# Patient Record
Sex: MALE | Race: BLACK OR AFRICAN AMERICAN | Employment: OTHER | URBAN - METROPOLITAN AREA
[De-identification: names, ages, dates, MRNs, and addresses within clinical notes are randomized per-mention and may not be internally consistent; named-entity substitution may affect disease eponyms.]

---

## 2017-09-05 ENCOUNTER — HOSPITAL ENCOUNTER (INPATIENT)
Age: 82
LOS: 1 days | Discharge: HOME | DRG: 378 | End: 2017-09-06
Attending: EMERGENCY MEDICINE | Admitting: INTERNAL MEDICINE
Payer: MEDICARE

## 2017-09-05 ENCOUNTER — APPOINTMENT (OUTPATIENT)
Dept: GENERAL RADIOLOGY | Age: 82
DRG: 378 | End: 2017-09-05
Attending: EMERGENCY MEDICINE
Payer: MEDICARE

## 2017-09-05 ENCOUNTER — APPOINTMENT (OUTPATIENT)
Dept: CT IMAGING | Age: 82
DRG: 378 | End: 2017-09-05
Attending: EMERGENCY MEDICINE
Payer: MEDICARE

## 2017-09-05 ENCOUNTER — APPOINTMENT (OUTPATIENT)
Dept: ULTRASOUND IMAGING | Age: 82
DRG: 378 | End: 2017-09-05
Attending: INTERNAL MEDICINE
Payer: MEDICARE

## 2017-09-05 DIAGNOSIS — N18.9 ACUTE ON CHRONIC KIDNEY FAILURE (HCC): ICD-10-CM

## 2017-09-05 DIAGNOSIS — N17.9 ACUTE ON CHRONIC KIDNEY FAILURE (HCC): ICD-10-CM

## 2017-09-05 DIAGNOSIS — K92.2 GASTROINTESTINAL HEMORRHAGE, UNSPECIFIED GASTROINTESTINAL HEMORRHAGE TYPE: Primary | ICD-10-CM

## 2017-09-05 PROBLEM — N40.0 BPH (BENIGN PROSTATIC HYPERTROPHY): Status: ACTIVE | Noted: 2017-09-05

## 2017-09-05 PROBLEM — N18.4 CKD (CHRONIC KIDNEY DISEASE) STAGE 4, GFR 15-29 ML/MIN (HCC): Status: ACTIVE | Noted: 2017-09-05

## 2017-09-05 PROBLEM — N13.30 HYDRONEPHROSIS: Status: ACTIVE | Noted: 2017-09-05

## 2017-09-05 PROBLEM — K92.1 MELENA: Status: ACTIVE | Noted: 2017-09-05

## 2017-09-05 LAB
ALBUMIN SERPL-MCNC: 3.6 G/DL (ref 3.5–5)
ALBUMIN/GLOB SERPL: 0.9 {RATIO} (ref 1.1–2.2)
ALP SERPL-CCNC: 94 U/L (ref 45–117)
ALT SERPL-CCNC: 15 U/L (ref 12–78)
ANION GAP SERPL CALC-SCNC: 8 MMOL/L (ref 5–15)
APPEARANCE UR: CLEAR
APTT PPP: 30 SEC (ref 22.1–32.5)
AST SERPL-CCNC: 17 U/L (ref 15–37)
BACTERIA URNS QL MICRO: NEGATIVE /HPF
BASOPHILS # BLD: 0 K/UL (ref 0–0.1)
BASOPHILS NFR BLD: 1 % (ref 0–1)
BILIRUB SERPL-MCNC: 0.2 MG/DL (ref 0.2–1)
BILIRUB UR QL: NEGATIVE
BUN SERPL-MCNC: 45 MG/DL (ref 6–20)
BUN/CREAT SERPL: 13 (ref 12–20)
CALCIUM SERPL-MCNC: 8.6 MG/DL (ref 8.5–10.1)
CHLORIDE SERPL-SCNC: 108 MMOL/L (ref 97–108)
CO2 SERPL-SCNC: 27 MMOL/L (ref 21–32)
COLOR UR: ABNORMAL
CREAT SERPL-MCNC: 3.45 MG/DL (ref 0.7–1.3)
CREAT UR-MCNC: 29.44 MG/DL
EOSINOPHIL # BLD: 0.2 K/UL (ref 0–0.4)
EOSINOPHIL NFR BLD: 3 % (ref 0–7)
EPITH CASTS URNS QL MICRO: ABNORMAL /LPF
ERYTHROCYTE [DISTWIDTH] IN BLOOD BY AUTOMATED COUNT: 12.8 % (ref 11.5–14.5)
GLOBULIN SER CALC-MCNC: 4.1 G/DL (ref 2–4)
GLUCOSE SERPL-MCNC: 109 MG/DL (ref 65–100)
GLUCOSE UR STRIP.AUTO-MCNC: NEGATIVE MG/DL
HCT VFR BLD AUTO: 30.5 % (ref 36.6–50.3)
HCT VFR BLD AUTO: 32.4 % (ref 36.6–50.3)
HEMOCCULT STL QL: POSITIVE
HGB BLD-MCNC: 10.4 G/DL (ref 12.1–17)
HGB BLD-MCNC: 9.7 G/DL (ref 12.1–17)
HGB UR QL STRIP: ABNORMAL
HYALINE CASTS URNS QL MICRO: ABNORMAL /LPF (ref 0–5)
INR PPP: 1 (ref 0.9–1.1)
KETONES UR QL STRIP.AUTO: NEGATIVE MG/DL
LEUKOCYTE ESTERASE UR QL STRIP.AUTO: NEGATIVE
LIPASE SERPL-CCNC: 179 U/L (ref 73–393)
LYMPHOCYTES # BLD: 1.2 K/UL (ref 0.8–3.5)
LYMPHOCYTES NFR BLD: 20 % (ref 12–49)
MCH RBC QN AUTO: 30.9 PG (ref 26–34)
MCHC RBC AUTO-ENTMCNC: 32.1 G/DL (ref 30–36.5)
MCV RBC AUTO: 96.1 FL (ref 80–99)
MONOCYTES # BLD: 0.7 K/UL (ref 0–1)
MONOCYTES NFR BLD: 11 % (ref 5–13)
NEUTS SEG # BLD: 3.9 K/UL (ref 1.8–8)
NEUTS SEG NFR BLD: 65 % (ref 32–75)
NITRITE UR QL STRIP.AUTO: NEGATIVE
PH UR STRIP: 6.5 [PH] (ref 5–8)
PLATELET # BLD AUTO: 179 K/UL (ref 150–400)
POTASSIUM SERPL-SCNC: 4.4 MMOL/L (ref 3.5–5.1)
PROT SERPL-MCNC: 7.7 G/DL (ref 6.4–8.2)
PROT UR STRIP-MCNC: 30 MG/DL
PROT UR-MCNC: 48 MG/DL (ref 0–11.9)
PROTHROMBIN TIME: 10.4 SEC (ref 9–11.1)
RBC # BLD AUTO: 3.37 M/UL (ref 4.1–5.7)
RBC #/AREA URNS HPF: ABNORMAL /HPF (ref 0–5)
SODIUM SERPL-SCNC: 143 MMOL/L (ref 136–145)
SODIUM UR-SCNC: 120 MMOL/L
SP GR UR REFRACTOMETRY: 1.01 (ref 1–1.03)
THERAPEUTIC RANGE,PTTT: NORMAL SECS (ref 58–77)
UROBILINOGEN UR QL STRIP.AUTO: 0.2 EU/DL (ref 0.2–1)
WBC # BLD AUTO: 5.9 K/UL (ref 4.1–11.1)
WBC URNS QL MICRO: ABNORMAL /HPF (ref 0–4)

## 2017-09-05 PROCEDURE — 96361 HYDRATE IV INFUSION ADD-ON: CPT

## 2017-09-05 PROCEDURE — 97165 OT EVAL LOW COMPLEX 30 MIN: CPT

## 2017-09-05 PROCEDURE — 80053 COMPREHEN METABOLIC PANEL: CPT | Performed by: EMERGENCY MEDICINE

## 2017-09-05 PROCEDURE — 85610 PROTHROMBIN TIME: CPT | Performed by: EMERGENCY MEDICINE

## 2017-09-05 PROCEDURE — 82570 ASSAY OF URINE CREATININE: CPT | Performed by: INTERNAL MEDICINE

## 2017-09-05 PROCEDURE — 82272 OCCULT BLD FECES 1-3 TESTS: CPT | Performed by: EMERGENCY MEDICINE

## 2017-09-05 PROCEDURE — C9113 INJ PANTOPRAZOLE SODIUM, VIA: HCPCS | Performed by: INTERNAL MEDICINE

## 2017-09-05 PROCEDURE — 74011250636 HC RX REV CODE- 250/636: Performed by: INTERNAL MEDICINE

## 2017-09-05 PROCEDURE — 74011250637 HC RX REV CODE- 250/637: Performed by: INTERNAL MEDICINE

## 2017-09-05 PROCEDURE — 36415 COLL VENOUS BLD VENIPUNCTURE: CPT | Performed by: INTERNAL MEDICINE

## 2017-09-05 PROCEDURE — 81001 URINALYSIS AUTO W/SCOPE: CPT | Performed by: INTERNAL MEDICINE

## 2017-09-05 PROCEDURE — 85730 THROMBOPLASTIN TIME PARTIAL: CPT | Performed by: EMERGENCY MEDICINE

## 2017-09-05 PROCEDURE — 96374 THER/PROPH/DIAG INJ IV PUSH: CPT

## 2017-09-05 PROCEDURE — 74011000250 HC RX REV CODE- 250: Performed by: SPECIALIST

## 2017-09-05 PROCEDURE — 97535 SELF CARE MNGMENT TRAINING: CPT

## 2017-09-05 PROCEDURE — 65270000029 HC RM PRIVATE

## 2017-09-05 PROCEDURE — 83690 ASSAY OF LIPASE: CPT | Performed by: EMERGENCY MEDICINE

## 2017-09-05 PROCEDURE — 85018 HEMOGLOBIN: CPT | Performed by: INTERNAL MEDICINE

## 2017-09-05 PROCEDURE — 74176 CT ABD & PELVIS W/O CONTRAST: CPT

## 2017-09-05 PROCEDURE — C9113 INJ PANTOPRAZOLE SODIUM, VIA: HCPCS | Performed by: EMERGENCY MEDICINE

## 2017-09-05 PROCEDURE — 97161 PT EVAL LOW COMPLEX 20 MIN: CPT

## 2017-09-05 PROCEDURE — 94761 N-INVAS EAR/PLS OXIMETRY MLT: CPT

## 2017-09-05 PROCEDURE — 99284 EMERGENCY DEPT VISIT MOD MDM: CPT

## 2017-09-05 PROCEDURE — 84300 ASSAY OF URINE SODIUM: CPT | Performed by: INTERNAL MEDICINE

## 2017-09-05 PROCEDURE — 76770 US EXAM ABDO BACK WALL COMP: CPT

## 2017-09-05 PROCEDURE — 74011000250 HC RX REV CODE- 250: Performed by: INTERNAL MEDICINE

## 2017-09-05 PROCEDURE — 74011250636 HC RX REV CODE- 250/636: Performed by: EMERGENCY MEDICINE

## 2017-09-05 PROCEDURE — 84156 ASSAY OF PROTEIN URINE: CPT | Performed by: INTERNAL MEDICINE

## 2017-09-05 PROCEDURE — 85025 COMPLETE CBC W/AUTO DIFF WBC: CPT | Performed by: EMERGENCY MEDICINE

## 2017-09-05 RX ORDER — SODIUM CHLORIDE 0.9 % (FLUSH) 0.9 %
5-10 SYRINGE (ML) INJECTION AS NEEDED
Status: DISCONTINUED | OUTPATIENT
Start: 2017-09-05 | End: 2017-09-06 | Stop reason: HOSPADM

## 2017-09-05 RX ORDER — AMLODIPINE BESYLATE 5 MG/1
10 TABLET ORAL
Status: COMPLETED | OUTPATIENT
Start: 2017-09-05 | End: 2017-09-05

## 2017-09-05 RX ORDER — CHLORTHALIDONE 25 MG/1
25 TABLET ORAL DAILY
COMMUNITY
End: 2017-09-06

## 2017-09-05 RX ORDER — SODIUM CHLORIDE 0.9 % (FLUSH) 0.9 %
5-10 SYRINGE (ML) INJECTION EVERY 8 HOURS
Status: DISCONTINUED | OUTPATIENT
Start: 2017-09-05 | End: 2017-09-06 | Stop reason: HOSPADM

## 2017-09-05 RX ORDER — TAMSULOSIN HYDROCHLORIDE 0.4 MG/1
0.4 CAPSULE ORAL DAILY
Status: DISCONTINUED | OUTPATIENT
Start: 2017-09-05 | End: 2017-09-06 | Stop reason: HOSPADM

## 2017-09-05 RX ORDER — ACETAMINOPHEN 325 MG/1
325 TABLET ORAL
COMMUNITY
End: 2018-11-30

## 2017-09-05 RX ORDER — PANTOPRAZOLE SODIUM 40 MG/10ML
40 INJECTION, POWDER, LYOPHILIZED, FOR SOLUTION INTRAVENOUS
Status: COMPLETED | OUTPATIENT
Start: 2017-09-05 | End: 2017-09-05

## 2017-09-05 RX ORDER — CARVEDILOL 12.5 MG/1
25 TABLET ORAL 2 TIMES DAILY WITH MEALS
Status: DISCONTINUED | OUTPATIENT
Start: 2017-09-05 | End: 2017-09-05

## 2017-09-05 RX ORDER — ACETAMINOPHEN 325 MG/1
650 TABLET ORAL
Status: DISCONTINUED | OUTPATIENT
Start: 2017-09-05 | End: 2017-09-06 | Stop reason: HOSPADM

## 2017-09-05 RX ORDER — CARVEDILOL 6.25 MG/1
6.25 TABLET ORAL 2 TIMES DAILY WITH MEALS
Status: DISCONTINUED | OUTPATIENT
Start: 2017-09-05 | End: 2017-09-05

## 2017-09-05 RX ORDER — SODIUM CHLORIDE 9 MG/ML
75 INJECTION, SOLUTION INTRAVENOUS CONTINUOUS
Status: DISPENSED | OUTPATIENT
Start: 2017-09-05 | End: 2017-09-05

## 2017-09-05 RX ORDER — ACETAMINOPHEN 500 MG
325 TABLET ORAL
Status: ON HOLD | COMMUNITY
End: 2017-09-05

## 2017-09-05 RX ORDER — NALOXONE HYDROCHLORIDE 0.4 MG/ML
0.4 INJECTION, SOLUTION INTRAMUSCULAR; INTRAVENOUS; SUBCUTANEOUS AS NEEDED
Status: DISCONTINUED | OUTPATIENT
Start: 2017-09-05 | End: 2017-09-06 | Stop reason: HOSPADM

## 2017-09-05 RX ORDER — CARVEDILOL 12.5 MG/1
12.5 TABLET ORAL 2 TIMES DAILY WITH MEALS
Status: DISCONTINUED | OUTPATIENT
Start: 2017-09-05 | End: 2017-09-06 | Stop reason: HOSPADM

## 2017-09-05 RX ORDER — HYDRALAZINE HYDROCHLORIDE 20 MG/ML
20 INJECTION INTRAMUSCULAR; INTRAVENOUS
Status: DISCONTINUED | OUTPATIENT
Start: 2017-09-05 | End: 2017-09-06 | Stop reason: HOSPADM

## 2017-09-05 RX ORDER — AMLODIPINE BESYLATE 5 MG/1
10 TABLET ORAL DAILY
Status: DISCONTINUED | OUTPATIENT
Start: 2017-09-06 | End: 2017-09-06 | Stop reason: HOSPADM

## 2017-09-05 RX ORDER — ATORVASTATIN CALCIUM 20 MG/1
20 TABLET, FILM COATED ORAL DAILY
COMMUNITY
End: 2018-02-07

## 2017-09-05 RX ORDER — MORPHINE SULFATE 4 MG/ML
1 INJECTION, SOLUTION INTRAMUSCULAR; INTRAVENOUS
Status: DISCONTINUED | OUTPATIENT
Start: 2017-09-05 | End: 2017-09-06 | Stop reason: HOSPADM

## 2017-09-05 RX ORDER — CARVEDILOL 6.25 MG/1
6.25 TABLET ORAL 2 TIMES DAILY WITH MEALS
COMMUNITY
End: 2017-09-06

## 2017-09-05 RX ORDER — ATORVASTATIN CALCIUM 20 MG/1
20 TABLET, FILM COATED ORAL DAILY
Status: DISCONTINUED | OUTPATIENT
Start: 2017-09-06 | End: 2017-09-06 | Stop reason: HOSPADM

## 2017-09-05 RX ADMIN — POLYETHYLENE GLYCOL-3350 AND ELECTROLYTES 4000 ML: 236; 6.74; 5.86; 2.97; 22.74 POWDER, FOR SOLUTION ORAL at 20:57

## 2017-09-05 RX ADMIN — Medication 10 ML: at 20:58

## 2017-09-05 RX ADMIN — SODIUM CHLORIDE 500 ML: 900 INJECTION, SOLUTION INTRAVENOUS at 00:29

## 2017-09-05 RX ADMIN — SODIUM CHLORIDE 40 MG: 9 INJECTION INTRAMUSCULAR; INTRAVENOUS; SUBCUTANEOUS at 20:57

## 2017-09-05 RX ADMIN — SODIUM CHLORIDE 75 ML/HR: 900 INJECTION, SOLUTION INTRAVENOUS at 02:40

## 2017-09-05 RX ADMIN — AMLODIPINE BESYLATE 10 MG: 5 TABLET ORAL at 03:40

## 2017-09-05 RX ADMIN — CARVEDILOL 12.5 MG: 12.5 TABLET, FILM COATED ORAL at 17:03

## 2017-09-05 RX ADMIN — POLYETHYLENE GLYCOL-3350 AND ELECTROLYTES 2000 ML: 236; 6.74; 5.86; 2.97; 22.74 POWDER, FOR SOLUTION ORAL at 15:33

## 2017-09-05 RX ADMIN — SODIUM CHLORIDE 40 MG: 9 INJECTION INTRAMUSCULAR; INTRAVENOUS; SUBCUTANEOUS at 08:50

## 2017-09-05 RX ADMIN — SODIUM CHLORIDE 500 ML: 900 INJECTION, SOLUTION INTRAVENOUS at 00:48

## 2017-09-05 RX ADMIN — Medication 10 ML: at 13:56

## 2017-09-05 RX ADMIN — Medication 10 ML: at 20:59

## 2017-09-05 RX ADMIN — HYDRALAZINE HYDROCHLORIDE 20 MG: 20 INJECTION INTRAMUSCULAR; INTRAVENOUS at 17:21

## 2017-09-05 RX ADMIN — CARVEDILOL 25 MG: 12.5 TABLET, FILM COATED ORAL at 08:50

## 2017-09-05 RX ADMIN — TAMSULOSIN HYDROCHLORIDE 0.4 MG: 0.4 CAPSULE ORAL at 08:50

## 2017-09-05 RX ADMIN — Medication 10 ML: at 00:20

## 2017-09-05 RX ADMIN — PANTOPRAZOLE SODIUM 40 MG: 40 INJECTION, POWDER, FOR SOLUTION INTRAVENOUS at 00:29

## 2017-09-05 NOTE — PROGRESS NOTES
[facility]: MED RECONCILIATION    Comments/Recommendations:    Patient is alert, awake, and oriented   NKDA   At first, patient stated he does not take atorvastatin for his cholesterol anymore and he takes something else but couldn't recall the name. Spoke with pharmacist at [facility] and he stated patient picked up atorvastatin 20 mg #30 on 8/17 so went back and spoke to patient and he stated he does take atorvastatin 20 mg and the reason he thought he took something different was probably because he used to be on atorvastatin 40 mg and now the 20 mg tablet looks smaller.  At first, patient couldn't recall the strength of his carvedilol. Spoke with pharmacist at [facility] and he stated last prescribed dose was carvedilol 6.25 mg 1 tablet twice a day on 6/12 but was never picked up so went back and spoke to patient and he stated he does take it twice a day and his last dose was yesterday morning.  Recent refill history could not be documented for amlodipine and carvedilol. The patient does report taking these medications. Adherence ?  Patient was aware not to use NSAIDs OTC for pain and to always use Tylenol     Medications added:     · Tylenol prn  · Artificial tears prn    Medications removed:    · Hydrochlorothiazide 50 mg    Medications adjusted:    · Atorvastatin 20 mg  · Carvedilol 6.25 mg    Information obtained from: Patient, Rx Query     Allergies: Review of patient's allergies indicates no known allergies. Prior to Admission Medications:   Prior to Admission Medications   Prescriptions Last Dose Informant Patient Reported? Taking?   acetaminophen (TYLENOL) 325 mg tablet 8/29/2017 at AM Self Yes Yes   Sig: Take 325 mg by mouth every four (4) hours as needed for Pain. amlodipine (NORVASC) 10 mg tablet 9/4/2017 at AM Self Yes Yes   Sig: Take 10 mg by mouth daily. aspirin delayed-release 81 mg tablet 9/4/2017 at am Self Yes Yes   Sig: Take 81 mg by mouth daily.    atorvastatin (LIPITOR) 20 mg tablet 9/4/2017 at AM Self Yes Yes   Sig: Take 20 mg by mouth daily. benazepril (LOTENSIN) 40 mg tablet 9/4/2017 at am Self Yes Yes   Sig: Take 40 mg by mouth daily. carbamazepine (TEGRETOL) 200 mg tablet 9/4/2017 at am Self Yes Yes   Sig: Take 200 mg by mouth three (3) times daily. carvedilol (COREG) 6.25 mg tablet 9/4/2017 at AM Self Yes Yes   Sig: Take 6.25 mg by mouth two (2) times daily (with meals). chlorthalidone (HYGROTEN) 25 mg tablet 9/4/2017 at am Self Yes Yes   Sig: Take 25 mg by mouth daily. dextran 70-hypromellose (ARTIFICIAL TEARS,[id number],) ophthalmic solution 8/29/2017 at am Self Yes Yes   Sig: Administer 1 Drop to both eyes as needed (dry eye).       Facility-Administered Medications: None         Thank you,   Amelia Garcia, PharmD Candidate, Class of 2018   Reviewed and approved  Ten Black, Shai, BCPS

## 2017-09-05 NOTE — ROUTINE PROCESS
TRANSFER - IN REPORT:    Verbal report received from [facility] RN(name) on Tracey Rosenbaum  being received from ED(unit) for routine progression of care. Report consisted of patients Situation, Background, Assessment and   Recommendations(SBAR). Information from the following report(s) SBAR, Kardex, ED Summary, MAR and Accordion was reviewed with the receiving nurse. Opportunity for questions and clarification was provided. Assessment completed upon patients arrival to unit and care assumed.

## 2017-09-05 NOTE — CONSULTS
[address] [facility]  YOB: 1935     Assessment & Plan:   1. TINA or CKD progression  · He has active UA in 2015  · ? Nphrologist out pt  · UA Now  · Cr worse from 2014 to 2017: migh be HTN nephrosclerosis  · If cr stable, ok to resume ACE  · Get US,PVR  2. HTN  · MANY YEARS  · Goal< 140/90  · Home readings will be help ful  3. GI Bleed  · diverticular                   Subjective:   CHIEF COMPLAIN:arf  HPI:  Mr. Felicitas Looney is a 80 y.o. male who is being admitted for GI bleeding. We are seeing him for Cr 3.45. I do not see any cr In between: cr was 2.1 mg% in 2014. He can not tell me if he has seen any Nephrologist.  He has Urologist at [facility]. He has HTN for years. Denies DM,Hepatits  No stones. No NSAIDs. No wt loss. Mr. Felicitas Looney presented to our Emergency Department t  complaining of melena stools. This started last evening. No associated abdominal discomfort, hematemesis, fever or chills. No use of NSAIDs. He also denies any dizziness or passing out. No chest pain or SOB. He has had a colonoscopy for GI bleeding which was attributed to a diverticular bleed. He will be admitted for further management. Review of Systems  A comprehensive review of systems was negative except for that written in the HPI. Past Medical History:   Diagnosis Date    Bronchitis, chronic (HCC)     CAD (coronary artery disease)     CHF (congestive heart failure) (HCC)     Chronic kidney disease     Hypertension       Past Surgical History:   Procedure Laterality Date    HX CORONARY ARTERY BYPASS GRAFT      HX PACEMAKER         Social History     Social History    Marital status:      Spouse name: N/A    Number of children: N/A    Years of education: N/A     Occupational History    Not on file.      Social History Main Topics    Smoking status: Former Smoker    Smokeless tobacco: Never Used    Alcohol use No    Drug use: No    Sexual activity: Not on file     Other Topics Concern    Not on file     Social History Narrative      Family History   Problem Relation Age of Onset    Heart Disease Mother     Hypertension Mother       Prior to Admission medications    Medication Sig Start Date End Date Taking? Authorizing Provider   chlorthalidone (HYGROTEN) 25 mg tablet Take 25 mg by mouth daily. Yes Pablo Engle MD   dextran 70-hypromellose (ARTIFICIAL TEARS,[id number],) ophthalmic solution Administer 1 Drop to both eyes as needed (dry eye). Yes Historical Provider   acetaminophen (TYLENOL) 325 mg tablet Take 325 mg by mouth every four (4) hours as needed for Pain. Yes Historical Provider   aspirin delayed-release 81 mg tablet Take 81 mg by mouth daily. Yes Pablo Engle MD   benazepril (LOTENSIN) 40 mg tablet Take 40 mg by mouth daily. Yes Pablo Engle MD   carvedilol (COREG) 12.5 mg tablet Take 12.5 mg by mouth two (2) times daily (with meals). Yes Pablo Engle MD   amlodipine (NORVASC) 10 mg tablet Take 10 mg by mouth daily. Yes Pablo Engle MD   carbamazepine (TEGRETOL) 200 mg tablet Take 200 mg by mouth three (3) times daily. Yes Pablo Engle MD     No Known Allergies    Objective:     Vitals:  Blood pressure 120/87, pulse 82, temperature 98.1 °F (36.7 °C), resp. rate 19, height 5' 10\" (1.778 m), weight 76.9 kg (169 lb 8.5 oz), SpO2 99 %.   Temp (24hrs), Av.9 °F (36.6 °C), Min:97.8 °F (36.6 °C), Max:98.1 °F (36.7 °C)      Intake and Output:      1901 -  0700  In: -   Out: 200 [Urine:200]    Physical Exam:                Patient is intubated:  no    Physical Examination:   GENERAL ASSESSMENT: well developed and well nourished  SKIN: normal color, no lesions  HEAD: normocephalic  EYES: normal eyes  NECK: normal  CHEST: normal air exchange, no rales, no rhonchi, no wheezes  HEART: regular rate and rhythm, normal S1/S2  ABDOMEN: soft, non-distended, no masses, no hepatosplenomegaly  :Ruffin: no  EXTREMITY: normal and symmetric movement, normal range of motion, no joint swelling  NEURO: gross motor exam normal by observation      ECG/rhythm[de-identified] Rev:yes  Xray/CT/US/MRI REV:yes  Data Review   Recent Results (from the past 72 hour(s))   CBC WITH AUTOMATED DIFF    Collection Time: 09/05/17 12:25 AM   Result Value Ref Range    WBC 5.9 4.1 - 11.1 K/uL    RBC 3.37 (L) 4.10 - 5.70 M/uL    HGB 10.4 (L) 12.1 - 17.0 g/dL    HCT 32.4 (L) 36.6 - 50.3 %    MCV 96.1 80.0 - 99.0 FL    MCH 30.9 26.0 - 34.0 PG    MCHC 32.1 30.0 - 36.5 g/dL    RDW 12.8 11.5 - 14.5 %    PLATELET 883 873 - 617 K/uL    NEUTROPHILS 65 32 - 75 %    LYMPHOCYTES 20 12 - 49 %    MONOCYTES 11 5 - 13 %    EOSINOPHILS 3 0 - 7 %    BASOPHILS 1 0 - 1 %    ABS. NEUTROPHILS 3.9 1.8 - 8.0 K/UL    ABS. LYMPHOCYTES 1.2 0.8 - 3.5 K/UL    ABS. MONOCYTES 0.7 0.0 - 1.0 K/UL    ABS. EOSINOPHILS 0.2 0.0 - 0.4 K/UL    ABS. BASOPHILS 0.0 0.0 - 0.1 K/UL   METABOLIC PANEL, COMPREHENSIVE    Collection Time: 09/05/17 12:25 AM   Result Value Ref Range    Sodium 143 136 - 145 mmol/L    Potassium 4.4 3.5 - 5.1 mmol/L    Chloride 108 97 - 108 mmol/L    CO2 27 21 - 32 mmol/L    Anion gap 8 5 - 15 mmol/L    Glucose 109 (H) 65 - 100 mg/dL    BUN 45 (H) 6 - 20 MG/DL    Creatinine 3.45 (H) 0.70 - 1.30 MG/DL    BUN/Creatinine ratio 13 12 - 20      GFR est AA 21 (L) >60 ml/min/1.73m2    GFR est non-AA 17 (L) >60 ml/min/1.73m2    Calcium 8.6 8.5 - 10.1 MG/DL    Bilirubin, total 0.2 0.2 - 1.0 MG/DL    ALT (SGPT) 15 12 - 78 U/L    AST (SGOT) 17 15 - 37 U/L    Alk.  phosphatase 94 45 - 117 U/L    Protein, total 7.7 6.4 - 8.2 g/dL    Albumin 3.6 3.5 - 5.0 g/dL    Globulin 4.1 (H) 2.0 - 4.0 g/dL    A-G Ratio 0.9 (L) 1.1 - 2.2     OCCULT BLOOD, STOOL    Collection Time: 09/05/17 12:25 AM   Result Value Ref Range    Occult blood, stool POSITIVE (A) NEG     PROTHROMBIN TIME + INR    Collection Time: 09/05/17 12:25 AM   Result Value Ref Range    INR 1.0 0.9 - 1.1      Prothrombin time 10.4 9.0 - 11.1 sec   PTT Collection Time: 09/05/17 12:25 AM   Result Value Ref Range    aPTT 30.0 22.1 - 32.5 sec    aPTT, therapeutic range     58.0 - 77.0 SECS   LIPASE    Collection Time: 09/05/17 12:25 AM   Result Value Ref Range    Lipase 179 73 - 393 U/L       Discussed with:    Patient  Thank you so much to allow us to participate in this patient's care. We will follow.  : Leala Bloch, MD  9/5/2017      [facility]:  [email or web address]  Bianca Jordan office:  [address], [address] [place] [address],[address]  [place], [address] [place] [address]  Phone: [phone]  Fax :     [phone]    [facility] office:  [address] [facility], [address]  Phone - [phone]  Fax - [phone]

## 2017-09-05 NOTE — ED NOTES
Spoke to Dr Rand Ace about pt blood pressure of 186/90, per MD orders give amlodopine now, but no other medication is required at this time to drop blood pressure, MD does not want to drop blood pressure to quickly. Pt stable and non symptomatic at this time.

## 2017-09-05 NOTE — CONSULTS
Urology Consult    Patient: Almas Nguyen MRN: [id number]  SSN: [id number]    YOB: 1935  Age: 80 y.o. Sex: male          Date of Consultation:  September 5, 2017  Requesting Physician: Holly Og MD  Reason for Consultation:  hydro         History of Present Illness:  Almas Nguyen is a 80 y.o. male admitted 9/5/2017 to the hospital for GI bleeding. He was founfd to have elevated cr. CT and us show hydro. CT shows distended bladder. Dome almost to umbilicus. I asked staff to place pelayo. Urine running clear. Cath not painful. Pt had retention 8 months ago. Had pelayo a few weeks. mata following. Currently on tamsulosin. denies sig dysuria. Has had worsening frequency. Past Medical History:   Diagnosis Date    Bronchitis, chronic (HCC)     CAD (coronary artery disease)     CHF (congestive heart failure) (HCC)     Chronic kidney disease     Hypertension         Past Surgical History:   Procedure Laterality Date    HX CORONARY ARTERY BYPASS GRAFT      HX PACEMAKER         No Known Allergies     Prior to Admission medications    Medication Sig Start Date End Date Taking? Authorizing Provider   chlorthalidone (HYGROTEN) 25 mg tablet Take 25 mg by mouth daily. Yes Pablo Engle MD   dextran 70-hypromellose (ARTIFICIAL TEARS,[id number],) ophthalmic solution Administer 1 Drop to both eyes as needed (dry eye). Yes Historical Provider   acetaminophen (TYLENOL) 325 mg tablet Take 325 mg by mouth every four (4) hours as needed for Pain. Yes Historical Provider   atorvastatin (LIPITOR) 20 mg tablet Take 20 mg by mouth daily. Yes Historical Provider   carvedilol (COREG) 6.25 mg tablet Take 6.25 mg by mouth two (2) times daily (with meals). Yes Historical Provider   aspirin delayed-release 81 mg tablet Take 81 mg by mouth daily. Yes Pablo Engle MD   benazepril (LOTENSIN) 40 mg tablet Take 40 mg by mouth daily.    Yes Pablo Engle MD   amlodipine (NORVASC) 10 mg tablet Take 10 mg by mouth daily. Yes Phys MD Kadie   carbamazepine (TEGRETOL) 200 mg tablet Take 200 mg by mouth three (3) times daily. Yes Phys MD Kadie       Social History   Substance Use Topics    Smoking status: Former Smoker    Smokeless tobacco: Never Used    Alcohol use No        Family History   Problem Relation Age of Onset    Heart Disease Mother     Hypertension Mother         ROS:  Admission ROS by Latasha Valadez MD from 2017 was reviewed and changes (other than per HPI) include: none. Physical Exam:            Vitals[de-identified]    Temp (24hrs), Av.9 °F (36.6 °C), Min:97.8 °F (36.6 °C), Max:98.1 °F (36.7 °C)   Blood pressure 156/87, pulse 75, temperature 97.8 °F (36.6 °C), resp. rate 18, height 5' 10\" (1.778 m), weight 76.9 kg (169 lb 8.5 oz), SpO2 100 %.              I&O's:     07 -  1900  In: -   Out: 350 [Urine:350]             General:    alert and oriented, appears nontoxic, no acute distress                     Skin:   Warm and dry                HEENT:  NCAT, anicteric sclerae, moist mucus membranes                 Chest[de-identified]  normal respiratory effort      CV[de-identified]  Regular rhythm, no LE edema, sternal scar             Abdomen/Flank[de-identified]  no CVAT, soft, non-tender abdomen without masses, organomegaly or hernia             Bladder[de-identified]  bladder not palpable   Lymph nodes:  no cervical or supraclavicular LAD   Musculoskeletal:  no deformities    Genitalia:  uncirc, pelayo with clear urine   Neuro/Psychiatric:   normal affect     Lab Review:     CBC   Recent Labs      17   1047  17   0025   WBC   --   5.9   HGB  9.7*  10.4*   HCT  30.5*  32.4*   PLT   --   179     CMP   Recent Labs      17   0025   NA  143   K  4.4   CL  108   CO2  27   GLU  109*   BUN  45*   CREA  3.45*   CA  8.6   ALB  3.6   TBILI  0.2   SGOT  17   ALT  15       Other   Recent Labs      17   0025   INR  1.0       UA:   Lab Results   Component Value Date/Time    Color YELLOW/STRAW 2017 10:55 AM Appearance CLEAR 09/05/2017 10:55 AM    Specific gravity 1.009 09/05/2017 10:55 AM    pH (UA) 6.5 09/05/2017 10:55 AM    Protein 30 09/05/2017 10:55 AM    Glucose NEGATIVE  09/05/2017 10:55 AM    Ketone NEGATIVE  09/05/2017 10:55 AM    Bilirubin NEGATIVE  09/05/2017 10:55 AM    Urobilinogen 0.2 09/05/2017 10:55 AM    Nitrites NEGATIVE  09/05/2017 10:55 AM    Leukocyte Esterase NEGATIVE  09/05/2017 10:55 AM    Epithelial cells FEW 09/05/2017 10:55 AM    Bacteria NEGATIVE  09/05/2017 10:55 AM    WBC 0-4 09/05/2017 10:55 AM    RBC 0-5 09/05/2017 10:55 AM       Cultures:      Imaging:  IMPRESSION  IMPRESSION:     1. Prostatomegaly contributes to urinary retention, moderate left  hydronephrosis, and mild right hydronephrosis. No nephrolithiasis. 2. Colonic diverticulosis is the likely source of rectal bleeding. 3. Hepatic lesions may represent cysts but cannot be fully characterized. 4. 3 mm left lower lobe nodule. Guidelines by the Fleischner society (Radiology  2017 special report) suggest that patients with low risk for lung cancer and  solid nodule(s) less than or equal to 6 mm in diameter require no follow-up. In  patients with a higher risk, such as smokers, follow-up noncontrast chest CT  should be considered at 12 months. Patients with a known malignancy are at  increased risk for metastasis and should receive a three month follow-up.         Assessment:     Principal Problem:    GI bleeding (9/5/2017)    Active Problems:    HTN (hypertension), benign (9/2/2011)      Coronary atherosclerosis of native coronary artery (9/2/2011)      Other and unspecified hyperlipidemia (9/2/2011)      Melena (9/5/2017)      CKD (chronic kidney disease) stage 4, GFR 15-29 ml/min (HCC) (9/5/2017)      BPH (benign prostatic hypertrophy) (9/5/2017)      Hydronephrosis (9/5/2017)          Plan:     1. Retention, leave pelayo 7-10 days  2. Cont flowmax  3 arf, follow cr  4.  Repeat imaging in future to make sure hydro resolved (outpainent unless cr stays up)    Signed By: Andrea Gunderson MD  - September 5, 2017

## 2017-09-05 NOTE — IP AVS SNAPSHOT
Diego Valdez 
 
 
 657 [facility] [address] 
[phone] Patient: Moises Carreon MRN: [id number] :1935 You are allergic to the following No active allergies Recent Documentation Height Weight BMI Smoking Status 1.778 m 74.3 kg 23.5 kg/m2 Former Smoker Unresulted Labs Order Current Status SAMPLE TO BLOOD BANK In process Emergency Contacts Name Discharge Info Relation Home Work Mobile [facility] DISCHARGE CAREGIVER [3] Daughter [21] [phone] [facility] DISCHARGE CAREGIVER [3] Son [22] [phone] About your hospitalization You were admitted on:  2017 You last received care in the:  [facility] 5M1 MED SURG 1 You were discharged on:  2017 Unit phone number:  [phone] Why you were hospitalized Your primary diagnosis was:  Gi Bleeding Your diagnoses also included:  Melena, Ckd (Chronic Kidney Disease) Stage 4, Gfr 15-29 Ml/Min (Hcc), Coronary Atherosclerosis Of Native Coronary Artery, Htn (Hypertension), Benign, Other And Unspecified Hyperlipidemia, Bph (Benign Prostatic Hypertrophy), Hydronephrosis, H Pylori Ulcer, Urine Retention Providers Seen During Your Hospitalizations Provider Role Specialty Primary office phone Lamin Lira DO Attending Provider Emergency Medicine [phone] Henok Aldrich MD Attending Provider Internal Medicine [phone] Trevor Redding MD Attending Provider Internal Medicine [phone] Your Primary Care Physician (PCP) Primary Care Physician Office Phone Office Fax UNKNOWN, PROVIDER ** None ** ** None ** Follow-up Information Follow up With Details Comments Contact Info  
 your primary care doctor Schedule an appointment as soon as possible for a visit in 1 week Anupama Damian MD Schedule an appointment as soon as possible for a visit in 2 weeks  [address] [facility] 57 
[phone] Andrea Gunderson MD Schedule an appointment as soon as possible for a visit in 1 week for voiding trial 402 [facility] [address] [facility] 
[phone] Cesia Bar MD Schedule an appointment as soon as possible for a visit in 2 weeks  [address] [facility] [address] [facility] 
[phone] Current Discharge Medication List  
  
START taking these medications Dose & Instructions Dispensing Information Comments Morning Noon Evening Bedtime  
 amoxicillin 500 mg capsule Commonly known as:  AMOXIL Your last dose was: Your next dose is:    
   
   
 Dose:  1000 mg Take 2 Caps by mouth two (2) times a day for 14 days. Quantity:  56 Cap Refills:  0  
     
   
   
   
  
 clarithromycin 500 mg tablet Commonly known as:  Cresenciano Carte Your last dose was: Your next dose is:    
   
   
 Dose:  500 mg Take 1 Tab by mouth two (2) times a day for 14 days. Quantity:  28 Tab Refills:  0  
     
   
   
   
  
 ferrous sulfate 324 mg (65 mg iron) tablet Your last dose was: Your next dose is:    
   
   
 Dose:  324 mg Take 1 Tab by mouth Daily (before breakfast). Quantity:  30 Tab Refills:  1  
     
   
   
   
  
 finasteride 5 mg tablet Commonly known as:  PROSCAR Your last dose was: Your next dose is:    
   
   
 Dose:  5 mg Take 1 Tab by mouth daily. Quantity:  30 Tab Refills:  1  
     
   
   
   
  
 * pantoprazole 40 mg tablet Commonly known as:  PROTONIX Your last dose was: Your next dose is:    
   
   
 Dose:  40 mg Take 1 Tab by mouth two (2) times a day for 14 days. Quantity:  28 Tab Refills:  0  
     
   
   
   
  
 * pantoprazole 40 mg tablet Commonly known as:  PROTONIX Start taking on:  9/20/2017 Your last dose was: Your next dose is:    
   
   
 Dose:  40 mg Take 1 Tab by mouth Daily (before breakfast). Quantity:  30 Tab Refills:  1  
     
   
   
   
  
 senna-docusate 8.6-50 mg per tablet Commonly known as:  SENNA WITH DOCUSATE SODIUM Your last dose was: Your next dose is:    
   
   
 Dose:  1 Tab Take 1 Tab by mouth daily. Quantity:  30 Tab Refills:  1  
     
   
   
   
  
 tamsulosin 0.4 mg capsule Commonly known as:  FLOMAX Your last dose was: Your next dose is:    
   
   
 Dose:  0.4 mg Take 1 Cap by mouth daily. Quantity:  30 Cap Refills:  1  
     
   
   
   
  
 * Notice: This list has 2 medication(s) that are the same as other medications prescribed for you. Read the directions carefully, and ask your doctor or other care provider to review them with you. CONTINUE these medications which have CHANGED Dose & Instructions Dispensing Information Comments Morning Noon Evening Bedtime  
 carvedilol 12.5 mg tablet Commonly known as:  Priyank Mood What changed:   
- medication strength 
- how much to take Your last dose was: Your next dose is:    
   
   
 Dose:  12.5 mg Take 1 Tab by mouth two (2) times daily (with meals). Quantity:  60 Tab Refills:  0 CONTINUE these medications which have NOT CHANGED Dose & Instructions Dispensing Information Comments Morning Noon Evening Bedtime  
 acetaminophen 325 mg tablet Commonly known as:  TYLENOL Your last dose was: Your next dose is:    
   
   
 Dose:  325 mg Take 325 mg by mouth every four (4) hours as needed for Pain. Refills:  0  
     
   
   
   
  
 amLODIPine 10 mg tablet Commonly known as:  Achilles Winfield Your last dose was: Your next dose is:    
   
   
 Dose:  10 mg Take 10 mg by mouth daily. Refills:  0 ARTIFICIAL TEARS([id number]) ophthalmic solution Generic drug:  dextran 70-hypromellose Your last dose was: Your next dose is:    
   
   
 Dose:  1 Drop Administer 1 Drop to both eyes as needed (dry eye). Refills:  0  
     
   
   
   
  
 aspirin delayed-release 81 mg tablet Your last dose was: Your next dose is:    
   
   
 Dose:  81 mg Take 81 mg by mouth daily. Refills:  0  
     
   
   
   
  
 atorvastatin 20 mg tablet Commonly known as:  LIPITOR Your last dose was: Your next dose is:    
   
   
 Dose:  20 mg Take 20 mg by mouth daily. Refills:  0  
     
   
   
   
  
 carBAMazepine 200 mg tablet Commonly known as:  TEGretol Your last dose was: Your next dose is:    
   
   
 Dose:  200 mg Take 200 mg by mouth three (3) times daily. Refills:  0 STOP taking these medications   
 benazepril 40 mg tablet Commonly known as:  LOTENSIN  
   
  
 chlorthalidone 25 mg tablet Commonly known as:  Earnie Sleet Where to Get Your Medications Information on where to get these meds will be given to you by the nurse or doctor. ! Ask your nurse or doctor about these medications  
  amoxicillin 500 mg capsule  
 carvedilol 12.5 mg tablet  
 clarithromycin 500 mg tablet  
 ferrous sulfate 324 mg (65 mg iron) tablet  
 finasteride 5 mg tablet  
 pantoprazole 40 mg tablet  
 pantoprazole 40 mg tablet  
 senna-docusate 8.6-50 mg per tablet  
 tamsulosin 0.4 mg capsule Discharge Instructions HOSPITALIST DISCHARGE INSTRUCTIONS 
NAME: Lakisha Urias :  1935 MRN:  [id number] Date/Time:  2017 12:41 PM 
 
ADMIT DATE: 2017 DISCHARGE DATE: 2017 ADMITTING DIAGNOSIS: 
GI bleed, peptic ulcer, gastritis, renal failure from urine obstruction DISCHARGE DIAGNOSIS: 
same MEDICATIONS: 
See after visit summary · It is important that you take the medication exactly as they are prescribed. · Keep your medication in the bottles provided by the pharmacist and keep a list of the medication names, dosages, and times to be taken in your wallet. · Do not take other medications without consulting your doctor Pain Management: per above medications What to do at [facility] Recommended diet:  Cardiac Diet Recommended activity: Activity as tolerated 1) Return to the hospital if you feel worse 2) If you experience any of the following symptoms then please call your primary care physician or return to the emergency room if you cannot get hold of your doctor: 
Fever, chills, nausea, vomiting, diarrhea, change in mentation, falling, bleeding, shortness of breath, chest pain, severe headache, severe abdominal pain, 3) Keep pelayo catheter until your can follow up with the Urologist.  Start taking flomax and proscar for your prostate 4) You have gastric ulcers, caused by H. Pylori bacteria. Take Amoxicillin, Clarithromycin, and Protonix for 14 days, then take protonix daily 5) Do not take NSAIDS (ibuprofen, naproxen, BC powder, etc...) 6) Due to renal failure, hold your benazepril and hygroten until you can see your primary care doctor Follow Up: Follow-up Information Follow up With Details Comments Contact Info  
 your primary care doctor Schedule an appointment as soon as possible for a visit in 1 week Roselyn Rodriguez MD Schedule an appointment as soon as possible for a visit in 2 weeks  [address] [place] [address] 
[phone] Micah Mathur MD Schedule an appointment as soon as possible for a visit in 1 week for voiding trial 402 [facility] 1330 1400 [address] [place] [address] 
[phone] Saniya Dhillon MD Schedule an appointment as soon as possible for a visit in 2 weeks  611 [facility] TREATMENT [facility] [address] 70 [facility] 
[phone] Information obtained by : 
I understand that if any problems occur once I am at home I am to contact my physician. I understand and acknowledge receipt of the instructions indicated above. Physician's or R.N.'s Signature                                                                  Date/Time Patient or Representative Signature                                                          Date/Time H. Pylori Bacterial Infection: Care Instructions Your Care Instructions Your test shows the presence of Helicobacter pylori (H. pylori), a kind of bacterium that lives in the lining of the stomach. Many people have H. pylori in their stomachs and do not develop problems. But sometimes H. pylori causes an upset stomach or a sore (ulcer) in the stomach lining. Most stomach ulcers are caused by H. pylori. Symptoms of an ulcer include gnawing or burning pain in the belly that can last minutes or hours. Eating food or taking antacids helps relieve the pain, but the symptoms may come back after a while. Antibiotic medicine can cure an H. pylori infection. Follow-up care is a key part of your treatment and safety. Be sure to make and go to all appointments, and call your doctor if you are having problems. Its also a good idea to know your test results and keep a list of the medicines you take. How can you care for yourself at home? · Take your antibiotics as directed. Do not stop taking them just because you feel better. You need to take the full course of antibiotics. · If your doctor prescribes other medicine, take it exactly as prescribed. Call your doctor if you think you are having a problem with your medicine. You will get more details on the specific medicine your doctor prescribes. · Eat a healthy, balanced diet. ¨ Eat smaller meals, and eat more often. Be sure to eat at least three meals a day. ¨ Avoid heavily spiced or greasy foods. ¨ Do not drink beverages that have caffeine if they bother your stomach. These include coffee, tea, and soda. · Do not smoke. Smoking slows the healing of your ulcer and can make an ulcer come back. If you need help quitting, talk to your doctor about stop-smoking programs and medicines. These can increase your chances of quitting for good. · Limit how much alcohol you drink. Alcohol can slow healing of an ulcer and can make your symptoms worse. · Wash your hands after going to the bathroom. · Avoid aspirin, ibuprofen, or other anti-inflammatory medicines, because they can irritate the stomach. If you need pain medicine, try acetaminophen (Tylenol). When should you call for help? Call 911 anytime you think you may need emergency care. For example, call if: 
· You passed out (lost consciousness). · You vomit blood or what looks like coffee grounds. · You pass maroon or very bloody stools. Call your doctor now or seek immediate medical care if: 
· You have severe pain in your belly, back, or shoulders. · You have new or worsening belly pain. · You are dizzy or lightheaded, or you feel like you may faint. · Your stools are black and tarlike or have streaks of blood. Watch closely for changes in your health, and be sure to contact your doctor if: 
· You have new symptoms such as weight loss, nausea, or vomiting. · You do not feel better as expected. Where can you learn more? Go to http://oscar-kamari.info/. Enter Y769 in the search box to learn more about \"H. Pylori Bacterial Infection: Care Instructions. \" Current as of: November 21, 2016 Content Version: 11.3 © 2173-7135 [employer].  Care instructions adapted under license by [address] (which disclaims liability or warranty for this information). If you have questions about a medical condition or this instruction, always ask your healthcare professional. [address] any warranty or liability for your use of this information. Peptic Ulcer Disease: Care Instructions Your Care Instructions Peptic ulcers are sores on the inside of the stomach or the small intestine. They are usually caused by an infection with bacteria or from use of nonsteroidal anti-inflammatory drugs (NSAIDs). NSAIDs include aspirin, ibuprofen (Advil), and naproxen (Aleve). Your doctor may have prescribed medicine to reduce stomach acid. You also may need to take antibiotics if your peptic ulcers are caused by an infection. You can help your stomach heal and keep ulcers from coming back by making some changes in your lifestyle. Quit smoking, limit caffeine and alcohol, and reduce stress. Follow-up care is a key part of your treatment and safety. Be sure to make and go to all appointments, and call your doctor if you are having problems. Its also a good idea to know your test results and keep a list of the medicines you take. How can you care for yourself at home? · Take your medicines exactly as prescribed. Call your doctor if you think you are having a problem with your medicine. · Do not take aspirin or other NSAIDs such as ibuprofen (Advil or Motrin) or naproxen (Aleve). Ask your doctor what you can take for pain. · Do not smoke. Smoking can make ulcers worse. If you need help quitting, talk to your doctor about stop-smoking programs and medicines. These can increase your chances of quitting for good. · Drink in moderation or avoid drinking alcohol. · Do not drink beverages that have caffeine if they bother your stomach. These include coffee, tea, and soda. · Eat a balanced diet of small, frequent meals.  Make an appointment with a dietitian if you need help planning your meals. · Reduce stress. Avoid people and places that make you feel anxious, if you can. Learn ways to reduce stress, such as biofeedback, guided imagery, and meditation. When should you call for help? Call 911 anytime you think you may need emergency care. For example, call if: 
· You passed out (lost consciousness). · You vomit blood or what looks like coffee grounds. · You pass maroon or very bloody stools. Call your doctor now or seek immediate medical care if: 
· You have severe pain in your belly, back, or shoulders. · You have new or worsening belly pain. · You are dizzy or lightheaded, or you feel like you may faint. · Your stools are black and tarlike or have streaks of blood. Watch closely for changes in your health, and be sure to contact your doctor if: 
· You have new symptoms such as weight loss, nausea or vomiting. · You do not feel better as expected. Where can you learn more? Go to http://oscar-kamari.info/. Enter H234 in the search box to learn more about \"Peptic Ulcer Disease: Care Instructions. \" Current as of: August 9, 2016 Content Version: 11.3 © 6692-9280 [employer]. Care instructions adapted under license by [employer] (which disclaims liability or warranty for this information). If you have questions about a medical condition or this instruction, always ask your healthcare professional. [place] [address] any warranty or liability for your use of this information. Acute Kidney Injury: Care Instructions Your Care Instructions Acute kidney injury is the sudden loss of kidney function that happens when the kidneys stop working over a period of hours, days or, in some cases, weeks. It is also known as acute renal failure. Common causes of acute kidney injury are dehydration, blood loss, and medicines. When acute kidney injury happens, the kidneys cannot remove waste and excess fluids from the body. The waste and fluids build up and become harmful. Kidney function may return to normal if the cause of acute kidney injury is treated quickly. Your chance of a full recovery depends on what caused the problem, how quickly the cause was treated, and what other medical problems you have. A machine may be used to help your kidneys remove waste and fluids for a short period of time. This is called dialysis. Follow-up care is a key part of your treatment and safety. Be sure to make and go to all appointments, and call your doctor if you are having problems. It's also a good idea to know your test results and keep a list of the medicines you take. How can you care for yourself at home? · Talk to your doctor about how much fluid you should drink. · Eat a balanced diet. Talk to your doctor or a dietitian about what type of diet may be best for you. · Follow the instructions and schedule for dialysis that your doctor gives you. · Do not smoke. Smoking can make your condition worse. If you need help quitting, talk to your doctor about stop-smoking programs and medicines. These can increase your chances of quitting for good. · Do not drink alcohol. · Review all of your medicines with your doctor. Do not take any medicines, including nonsteroidal anti-inflammatory drugs (NSAIDs) such as ibuprofen (Advil, Motrin) or naproxen (Aleve), unless your doctor says it is safe for you to do so. · Make sure that anyone treating you for any health problem knows that you have had acute kidney injury. When should you call for help? Call 911 anytime you think you may need emergency care. For example, call if: 
· You passed out (lost consciousness). · You have severe trouble breathing. Call your doctor now or seek immediate medical care if: 
· You have less urine than normal or no urine. · You have trouble urinating or can urinate only very small amounts. · You are confused or have trouble thinking clearly. · You feel weaker or more tired than usual. 
· You are very thirsty, lightheaded, or dizzy. · You have nausea and vomiting. · You have new swelling of your arms or feet, or your swelling is worse. · You have blood in your urine. · Your body weight goes up every day. · You have new or worse trouble breathing. Watch closely for changes in your health, and be sure to contact your doctor if: 
· You do not get better as expected. Where can you learn more? Go to http://oscar-kamari.info/. Enter I652 in the search box to learn more about \"Acute Kidney Injury: Care Instructions. \" Current as of: April 3, 2017 Content Version: 11.3 © 4371-4633 [employer]. Care instructions adapted under license by [employer] (which disclaims liability or warranty for this information). If you have questions about a medical condition or this instruction, always ask your healthcare professional. [address] any warranty or liability for your use of this information. Urinary Retention: Care Instructions Your Care Instructions Urinary retention means that you aren't able to urinate. In men, it is often caused by a blockage of the urinary tract from an enlarged prostate gland. In men and women, it can also be caused by an infection or nerve damage. Or it may be a side effect of a medicine. The doctor may have drained the urine from your bladder. If you still have problems passing urine, you may need to use a catheter at home. This is used to empty your bladder until the problem can be fixed. Your doctor may put a catheter in your bladder before you go home. If so, he or she will tell you when to come back to have the catheter removed. The doctor has checked you closely. But problems can develop later.  If you notice any problems or new symptoms, get medical treatment right away. Follow-up care is a key part of your treatment and safety. Be sure to make and go to all appointments, and call your doctor if you are having problems. It's also a good idea to know your test results and keep a list of the medicines you take. How can you care for yourself at home? · Take your medicines exactly as prescribed. Call your doctor if you think you are having a problem with your medicine. You will get more details on the specific medicines your doctor prescribes. · Check with your doctor before you use any over-the-counter medicines. Many cold and allergy medicines, for example, can make this problem worse. Make sure your doctor knows all of the medicines, vitamins, supplements, and herbal remedies you take. · Spread out through the day the amount of fluid you drink. Do not drink a lot at bedtime. · Avoid alcohol and caffeine. · If you have been given a catheter, or if one is already in place, follow the instructions you were given. Always wash your hands before and after you handle the catheter. When should you call for help? Call your doctor now or seek immediate medical care if: 
· You cannot urinate at all, or it is getting harder to urinate. · You have symptoms of a urinary tract infection. These may include: 
¨ Pain or burning when you urinate. ¨ A frequent need to urinate without being able to pass much urine. ¨ Pain in the flank, which is just below the rib cage and above the waist on either side of the back. ¨ Blood in your urine. ¨ A fever. Watch closely for changes in your health, and be sure to contact your doctor if: 
· You have any problems with your catheter. · You do not get better as expected. Where can you learn more? Go to http://oscar-kamari.info/. Enter M244 in the search box to learn more about \"Urinary Retention: Care Instructions. \" Current as of: August 12, 2016 Content Version: 11.3 © 4383-5373 [employer]. Care instructions adapted under license by [employer] (which disclaims liability or warranty for this information). If you have questions about a medical condition or this instruction, always ask your healthcare professional. [address] any warranty or liability for your use of this information. Discharge Orders None Harimata Announcement We are excited to announce that we are making your provider's discharge notes available to you in Harimata. You will see these notes when they are completed and signed by the physician that discharged you from your recent hospital stay. If you have any questions or concerns about any information you see in Harimata, please call the Health Information Department where you were seen or reach out to your Primary Care Provider for more information about your plan of care. Introducing [facility]! Jeramie Horton introduces Harimata patient portal. Now you can access parts of your medical record, email your doctor's office, and request medication refills online. 1. In your internet browser, go to [email or web address] 2. Click on the First Time User? Click Here link in the Sign In box. You will see the New Member Sign Up page. 3. Enter your Harimata Access Code exactly as it appears below. You will not need to use this code after youve completed the sign-up process. If you do not sign up before the expiration date, you must request a new code. · Harimata Access Code: [email or web address] Expires: 12/5/2017  1:01 PM 
 
4. Enter the last four digits of your Social Security Number (xxxx) and Date of Birth (mm/dd/yyyy) as indicated and click Submit. You will be taken to the next sign-up page. 5. Create a Harimata ID. This will be your Harimata login ID and cannot be changed, so think of one that is secure and easy to remember. 6. Create a [employer] password. You can change your password at any time. 7. Enter your Password Reset Question and Answer. This can be used at a later time if you forget your password. 8. Enter your e-mail address. You will receive e-mail notification when new information is available in [address] 9. Click Sign Up. You can now view and download portions of your medical record. 10. Click the Download Summary menu link to download a portable copy of your medical information. If you have questions, please visit the Frequently Asked Questions section of the [employer] website. Remember, [employer] is NOT to be used for urgent needs. For medical emergencies, dial 911. Now available from your iPhone and Android! General Information Please provide this summary of care documentation to your next provider. Patient Signature:  ____________________________________________________________ Date:  ____________________________________________________________  
  
[facility] Provider Signature:  ____________________________________________________________ Date:  ____________________________________________________________

## 2017-09-05 NOTE — PROGRESS NOTES
Occupational Therapy EVALUATION/discharge  Patient: Tracey Rosenbaum (89 y.o. male)  Date: 2017  Primary Diagnosis: GI bleeding        Precautions:      ASSESSMENT:   Based on the objective data described below, the patient presents with supervision to independence for basic ADLs. ADLs are limited by decreased mobility in UE joints due to arthritis but patient able to perform daily tasks without modifications/assistance or assistive devices. Patient demonstrated good mobility during transfers and ADL tasks in bathroom. Patient lives in home with son and he reports both share home responsibilities, and reports no difficulty managing ADL tasks at baseline. Patient without need for further skilled acute occupational therapy. OT is not indicated at this time. Discharge Recommendations: none  Further Equipment Recommendations for Discharge: none noted       SUBJECTIVE:   Patient stated We both do the cleaning and cooking. My wife  in  and we just make it work.     OBJECTIVE DATA SUMMARY:   HISTORY:   Past Medical History:   Diagnosis Date    Bronchitis, chronic ([facility] [address]     CAD (coronary artery disease)     CHF (congestive heart failure) (HCC)     Chronic kidney disease     Hypertension      Past Surgical History:   Procedure Laterality Date    HX CORONARY ARTERY BYPASS GRAFT      HX PACEMAKER         Prior Level of Function/Home Situation: Patient reports independence with ADL tasks.   Expanded or extensive additional review of patient history:     Home Situation  Home Environment: Private residence  # Steps to Enter: 2  Rails to Enter: Yes  One/Two Story Residence: Two story, live on 1st floor  Living Alone: No  Support Systems: Child(kenney)  Patient Expects to be Discharged to[de-identified] Private residence  Current DME Used/Available at Home: Cane, straight, Grab bars  Tub or Shower Type: Tub/Shower combination  [x]  Right hand dominant   []  Left hand dominant    EXAMINATION OF PERFORMANCE DEFICITS:  Cognitive/Behavioral Status:  Neurologic State: Alert     Cognition: Follows commands  Perception: Appears intact  Perseveration: No perseveration noted  Safety/Judgement: Awareness of environment    Skin: intact as seen    Edema: none noted     Hearing: Auditory  Auditory Impairment: None    Vision/Perceptual:         Range of Motion:  UEs WFL bilaterally. Slight deformity at left elbow due to arthritis. Strength:  WFL bilaterally. Coordination:     Fine Motor Skills-Upper: Left Intact; Right Intact    Gross Motor Skills-Upper: Left Intact; Right Intact    Tone & Sensation:  Normal tone, sensation intact     Balance:  Sitting: Intact  Standing: Intact    Functional Mobility and Transfers for ADLs:  Bed Mobility:  Rolling: Independent  Supine to Sit: Independent  Sit to Supine: Independent  Scooting: Independent    Transfers:  Sit to Stand: Supervision  Stand to Sit: Supervision  Toilet Transfer : Supervision    ADL Assessment:  Feeding: Independent (simulated, NPO at this time )    Oral Facial Hygiene/Grooming: Supervision (standing at sink )    Bathing: Supervision    Upper Body Dressing: Supervision    Lower Body Dressing: Supervision    Toileting: Supervision (standing to void into urinal for collection.  )                ADL Intervention and task modifications:     Cognitive Retraining  Safety/Judgement: Awareness of environment    Therapeutic Exercise:    Functional Measure:  Barthel Index:    Bathin  Bladder: 10  Bowels: 10  Groomin  Dressing: 10  Feeding: 10  Mobility: 0 (not seen by therapist)  Stairs: 0 (not seen by therapist )  Toilet Use: 10  Transfer (Bed to Chair and Back): 15  Total: 75       Barthel and G-code impairment scale:  Percentage of impairment CH  0% CI  1-19% CJ  20-39% CK  40-59% CL  60-79% CM  80-99% CN  100%   Barthel Score 0-100 100 99-80 79-60 59-40 20-39 1-19   0   Barthel Score 0-20 20 17-19 13-16 9-12 5-8 1-4 0      The Barthel ADL Index: Guidelines  1. The index should be used as a record of what a patient does, not as a record of what a patient could do. 2. The main aim is to establish degree of independence from any help, physical or verbal, however minor and for whatever reason. 3. The need for supervision renders the patient not independent. 4. A patient's performance should be established using the best available evidence. Asking the patient, friends/relatives and nurses are the usual sources, but direct observation and common sense are also important. However direct testing is not needed. 5. Usually the patient's performance over the preceding 24-48 hours is important, but occasionally longer periods will be relevant. 6. Middle categories imply that the patient supplies over 50 per cent of the effort. 7. Use of aids to be independent is allowed. Joana Moreno., Barthel, D.W. (5694). Functional evaluation: the Barthel Index. [address] [facility] (14)2. Benito Marie polo DACIA Diamond, Leo Patel., Valentina Brown., [place], [address] [place] [address] (1999). Measuring the change indisability after inpatient rehabilitation; comparison of the responsiveness of the Barthel Index and Functional [place] Measure. Journal of Neurology, Neurosurgery, and Psychiatry, 66(4), 952-374. Laurita Monteiro, N.J.A, KEKE Mckeon.ANIKA, & Hermelinda Pablo M.ANETA. (2004.) Assessment of post-stroke quality of life in cost-effectiveness studies: The usefulness of the Barthel Index and the EuroQoL-5D. Quality of Life Research, 13, 438-68         G codes: In compliance with CMSs Claims Based Outcome Reporting, the following G-code set was chosen for this patient based on their primary functional limitation being treated: The outcome measure chosen to determine the severity of the functional limitation was the barthel index with a score of 75/100 which was correlated with the impairment scale. ?  Self Care:     - CURRENT STATUS: CJ - 20%-39% impaired, limited or restricted    - GOAL STATUS: CJ - 20%-39% impaired, limited or restricted    - D/C STATUS:  CJ - 20%-39% impaired, limited or restricted       Occupational Therapy Evaluation Charge Determination   History Examination Decision-Making   LOW Complexity : Brief history review  LOW Complexity : 1-3 performance deficits relating to physical, cognitive , or psychosocial skils that result in activity limitations and / or participation restrictions  LOW Complexity : No comorbidities that affect functional and no verbal or physical assistance needed to complete eval tasks       Based on the above components, the patient evaluation is determined to be of the following complexity level: LOW   Pain:Pain Scale 1: Numeric (0 - 10)  Pain Intensity 1: 0              Activity Tolerance:   VSS  Please refer to the flowsheet for vital signs taken during this treatment. After treatment:   []  Patient left in no apparent distress sitting up in chair  [x]  Patient left in no apparent distress in bed  [x]  Call bell left within reach  [x]  Nursing notified  []  Caregiver present  []  Bed alarm activated    COMMUNICATION/EDUCATION:   Communication/Collaboration:  [x]      Home safety education was provided and the patient/caregiver indicated understanding. [x]      Patient/family have participated as able and agree with findings and recommendations. []      Patient is unable to participate in plan of care at this time.   Findings and recommendations were discussed with: Registered Nurse    Lise Wood OTR/L  Time Calculation: 30 mins

## 2017-09-05 NOTE — ED NOTES
TRANSFER - OUT REPORT:    Verbal report given to 5th floor RN (name) on Pham Cristina  being transferred to [facility] (unit) for routine progression of care       Report consisted of patients Situation, Background, Assessment and   Recommendations(SBAR). Information from the following report(s) SBAR, ED Summary, MAR, Recent Results and Cardiac Rhythm paced was reviewed with the receiving nurse. Lines:   Peripheral IV 09/05/17 Left Forearm (Active)   Site Assessment Clean, dry, & intact; Swelling 9/5/2017 12:29 AM   Phlebitis Assessment 0 9/5/2017 12:29 AM   Infiltration Assessment 0 9/5/2017 12:29 AM   Dressing Status Clean, dry, & intact 9/5/2017 12:29 AM   Dressing Type Transparent 9/5/2017 12:29 AM   Hub Color/Line Status Pink 9/5/2017 12:29 AM        Opportunity for questions and clarification was provided.       Patient transported with:   Registered Nurse

## 2017-09-05 NOTE — IP AVS SNAPSHOT
[address] [facility] 
 
 
 [address] [facility] [address] [facility] 
[phone] Patient: Jaime Leung MRN: [id number] :[id number] Current Discharge Medication List  
  
START taking these medications Dose & Instructions Dispensing Information Comments Morning Noon Evening Bedtime  
 amoxicillin 500 mg capsule Commonly known as:  AMOXIL Your last dose was: Your next dose is:    
   
   
 Dose:  1000 mg Take 2 Caps by mouth two (2) times a day for 14 days. Quantity:  56 Cap Refills:  0  
     
   
   
   
  
 clarithromycin 500 mg tablet Commonly known as:  Greta Felton Your last dose was: Your next dose is:    
   
   
 Dose:  500 mg Take 1 Tab by mouth two (2) times a day for 14 days. Quantity:  28 Tab Refills:  0  
     
   
   
   
  
 ferrous sulfate 324 mg (65 mg iron) tablet Your last dose was: Your next dose is:    
   
   
 Dose:  324 mg Take 1 Tab by mouth Daily (before breakfast). Quantity:  30 Tab Refills:  1  
     
   
   
   
  
 finasteride 5 mg tablet Commonly known as:  PROSCAR Your last dose was: Your next dose is:    
   
   
 Dose:  5 mg Take 1 Tab by mouth daily. Quantity:  30 Tab Refills:  1  
     
   
   
   
  
 * pantoprazole 40 mg tablet Commonly known as:  PROTONIX Your last dose was: Your next dose is:    
   
   
 Dose:  40 mg Take 1 Tab by mouth two (2) times a day for 14 days. Quantity:  28 Tab Refills:  0  
     
   
   
   
  
 * pantoprazole 40 mg tablet Commonly known as:  PROTONIX Start taking on:  2017 Your last dose was: Your next dose is:    
   
   
 Dose:  40 mg Take 1 Tab by mouth Daily (before breakfast). Quantity:  30 Tab Refills:  1  
     
   
   
   
  
 senna-docusate 8.6-50 mg per tablet Commonly known as:  SENNA WITH DOCUSATE SODIUM Your last dose was: Your next dose is:    
   
   
 Dose:  1 Tab Take 1 Tab by mouth daily. Quantity:  30 Tab Refills:  1  
     
   
   
   
  
 tamsulosin 0.4 mg capsule Commonly known as:  FLOMAX Your last dose was: Your next dose is:    
   
   
 Dose:  0.4 mg Take 1 Cap by mouth daily. Quantity:  30 Cap Refills:  1  
     
   
   
   
  
 * Notice: This list has 2 medication(s) that are the same as other medications prescribed for you. Read the directions carefully, and ask your doctor or other care provider to review them with you. CONTINUE these medications which have CHANGED Dose & Instructions Dispensing Information Comments Morning Noon Evening Bedtime  
 carvedilol 12.5 mg tablet Commonly known as:  Luis Alberto Rodriguez What changed:   
- medication strength 
- how much to take Your last dose was: Your next dose is:    
   
   
 Dose:  12.5 mg Take 1 Tab by mouth two (2) times daily (with meals). Quantity:  60 Tab Refills:  0 CONTINUE these medications which have NOT CHANGED Dose & Instructions Dispensing Information Comments Morning Noon Evening Bedtime  
 acetaminophen 325 mg tablet Commonly known as:  TYLENOL Your last dose was: Your next dose is:    
   
   
 Dose:  325 mg Take 325 mg by mouth every four (4) hours as needed for Pain. Refills:  0  
     
   
   
   
  
 amLODIPine 10 mg tablet Commonly known as:  Agata Navarrete Your last dose was: Your next dose is:    
   
   
 Dose:  10 mg Take 10 mg by mouth daily. Refills:  0  
     
   
   
   
  
 ARTIFICIAL TEARS([id number]) ophthalmic solution Generic drug:  dextran 70-hypromellose Your last dose was: Your next dose is:    
   
   
 Dose:  1 Drop Administer 1 Drop to both eyes as needed (dry eye). Refills:  0  
     
   
   
   
  
 aspirin delayed-release 81 mg tablet Your last dose was: Your next dose is:    
   
   
 Dose:  81 mg Take 81 mg by mouth daily. Refills:  0  
     
   
   
   
  
 atorvastatin 20 mg tablet Commonly known as:  LIPITOR Your last dose was: Your next dose is:    
   
   
 Dose:  20 mg Take 20 mg by mouth daily. Refills:  0  
     
   
   
   
  
 carBAMazepine 200 mg tablet Commonly known as:  TEGretol Your last dose was: Your next dose is:    
   
   
 Dose:  200 mg Take 200 mg by mouth three (3) times daily. Refills:  0 STOP taking these medications   
 benazepril 40 mg tablet Commonly known as:  LOTENSIN  
   
  
 chlorthalidone 25 mg tablet Commonly known as:  Nai Roles Where to Get Your Medications Information on where to get these meds will be given to you by the nurse or doctor. ! Ask your nurse or doctor about these medications  
  amoxicillin 500 mg capsule  
 carvedilol 12.5 mg tablet  
 clarithromycin 500 mg tablet  
 ferrous sulfate 324 mg (65 mg iron) tablet  
 finasteride 5 mg tablet  
 pantoprazole 40 mg tablet  
 pantoprazole 40 mg tablet  
 senna-docusate 8.6-50 mg per tablet  
 tamsulosin 0.4 mg capsule

## 2017-09-05 NOTE — CONSULTS
Sanjana Reeves. Antonia Urbano MD  [phone] office  [phone] voicemail   Gastroenterology Consultation Note      Admit Date: 9/5/2017  Consult Date: 9/5/2017   I greatly appreciate your asking me to see Aleksandr Carlos, thank you very much for the opportunity to participate in his care. Narrative Assessment and Plan   · Melena/hematochezia  · ANemia  · History of diverticulosis  · History of polyps with postpolypectomy bleeding    Prep for EGD and colonsocopy tomorrow  He is willing  Further recommendations based on results of endoscopy/colonoscopy   Transfuse if <7    Subjective:     Chief Complaint: Gastrointestinal Bleeding    History of Present Illness: Melena, located in stool, radiating to toilet, described as dark dark red stool. For 2 days. PCP:  Not On File [facility]    Past Medical History:   Diagnosis Date    Bronchitis, chronic (HCC)     CAD (coronary artery disease)     CHF (congestive heart failure) (HCC)     Chronic kidney disease     Hypertension         Past Surgical History:   Procedure Laterality Date    HX CORONARY ARTERY BYPASS GRAFT      HX PACEMAKER         Social History   Substance Use Topics    Smoking status: Former Smoker    Smokeless tobacco: Never Used    Alcohol use No        Family History   Problem Relation Age of Onset    Heart Disease Mother     Hypertension Mother         No Known Allergies         Home Medications:  Prior to Admission Medications   Prescriptions Last Dose Informant Patient Reported? Taking?   acetaminophen (TYLENOL) 325 mg tablet 8/29/2017 at AM Self Yes Yes   Sig: Take 325 mg by mouth every four (4) hours as needed for Pain. amlodipine (NORVASC) 10 mg tablet 9/4/2017 at AM Self Yes Yes   Sig: Take 10 mg by mouth daily. aspirin delayed-release 81 mg tablet 9/4/2017 at am Self Yes Yes   Sig: Take 81 mg by mouth daily. atorvastatin (LIPITOR) 20 mg tablet 9/4/2017 at AM Self Yes Yes   Sig: Take 20 mg by mouth daily. benazepril (LOTENSIN) 40 mg tablet 9/4/2017 at am Self Yes Yes   Sig: Take 40 mg by mouth daily. carbamazepine (TEGRETOL) 200 mg tablet 9/4/2017 at am Self Yes Yes   Sig: Take 200 mg by mouth three (3) times daily. carvedilol (COREG) 6.25 mg tablet 9/4/2017 at AM Self Yes Yes   Sig: Take 6.25 mg by mouth two (2) times daily (with meals). chlorthalidone (HYGROTEN) 25 mg tablet 9/4/2017 at am Self Yes Yes   Sig: Take 25 mg by mouth daily. dextran 70-hypromellose (ARTIFICIAL TEARS,[id number],) ophthalmic solution 8/29/2017 at am Self Yes Yes   Sig: Administer 1 Drop to both eyes as needed (dry eye).       Facility-Administered Medications: None       Hospital Medications:  Current Facility-Administered Medications   Medication Dose Route Frequency    sodium chloride (NS) flush 5-10 mL  5-10 mL IntraVENous Q8H    sodium chloride (NS) flush 5-10 mL  5-10 mL IntraVENous PRN    sodium chloride (NS) flush 5-10 mL  5-10 mL IntraVENous Q8H    sodium chloride (NS) flush 5-10 mL  5-10 mL IntraVENous PRN    acetaminophen (TYLENOL) tablet 650 mg  650 mg Oral Q4H PRN    morphine injection 1 mg  1 mg IntraVENous Q4H PRN    naloxone (NARCAN) injection 0.4 mg  0.4 mg IntraVENous PRN    0.9% sodium chloride infusion  75 mL/hr IntraVENous CONTINUOUS    pantoprazole (PROTONIX) 40 mg in sodium chloride 0.9 % 10 mL injection  40 mg IntraVENous Q12H    [START ON 9/6/2017] amLODIPine (NORVASC) tablet 10 mg  10 mg Oral DAILY    carvedilol (COREG) tablet 25 mg  25 mg Oral BID WITH MEALS    tamsulosin (FLOMAX) capsule 0.4 mg  0.4 mg Oral DAILY    hydrALAZINE (APRESOLINE) 20 mg/mL injection 20 mg  20 mg IntraVENous Q6H PRN    peg 3350-electrolytes (COLYTE) 4000 mL  4,000 mL Oral ONCE    peg 3350-electrolytes (COLYTE) 4000 mL  2,000 mL Oral ONCE       Review of Systems: Admission ROS by Fabio Braun MD from 9/5/2017 were reviewed with the patient and changes (other than per HPI) include: none      Objective: Physical Exam:  Visit Vitals    /87    Pulse 75    Temp 97.8 °F (36.6 °C)    Resp 18    Ht 5' 10\" (1.778 m)    Wt 76.9 kg (169 lb 8.5 oz)    SpO2 100%    BMI 24.33 kg/m2     SpO2 Readings from Last 6 Encounters:   09/05/17 100%   08/20/15 99%   09/10/14 98%   09/11/11 100%   09/05/11 100%   09/03/11 100%          Intake/Output Summary (Last 24 hours) at 09/05/17 1307  Last data filed at 09/05/17 1035   Gross per 24 hour   Intake                0 ml   Output              550 ml   Net             -550 ml      General: no distress, comfortable  Skin:  No rash or palpable dermatologic mass lesions  HEENT: Pupils equal, sclera anicteric, oropharynx with no gross lesions  Cardiovascular: No abnormal audible heart sounds, well perfused, no edema  Respiratory:  No abnormal audible breath sounds, normal respiratory effort, no throacic deformity  GI:   Abdomen nondistended, nontender, no mass, no free fluid, no rebound or guarding. Musculoskeletal:  No skeletal deformity nor acute arthritis noted. Neurological:  Motor and sensory function intact in upper extremeties  Psychiatric:  Normal affect, memory intact, appears to have insight into current illness  Lymphatic:  No cervical, supraclavicular, or periumbilic lymphadenopathy    Laboratory:    Recent Results (from the past 24 hour(s))   CBC WITH AUTOMATED DIFF    Collection Time: 09/05/17 12:25 AM   Result Value Ref Range    WBC 5.9 4.1 - 11.1 K/uL    RBC 3.37 (L) 4.10 - 5.70 M/uL    HGB 10.4 (L) 12.1 - 17.0 g/dL    HCT 32.4 (L) 36.6 - 50.3 %    MCV 96.1 80.0 - 99.0 FL    MCH 30.9 26.0 - 34.0 PG    MCHC 32.1 30.0 - 36.5 g/dL    RDW 12.8 11.5 - 14.5 %    PLATELET 455 650 - 286 K/uL    NEUTROPHILS 65 32 - 75 %    LYMPHOCYTES 20 12 - 49 %    MONOCYTES 11 5 - 13 %    EOSINOPHILS 3 0 - 7 %    BASOPHILS 1 0 - 1 %    ABS. NEUTROPHILS 3.9 1.8 - 8.0 K/UL    ABS. LYMPHOCYTES 1.2 0.8 - 3.5 K/UL    ABS. MONOCYTES 0.7 0.0 - 1.0 K/UL    ABS.  EOSINOPHILS 0.2 0.0 - 0.4 K/UL    ABS. BASOPHILS 0.0 0.0 - 0.1 K/UL   METABOLIC PANEL, COMPREHENSIVE    Collection Time: 09/05/17 12:25 AM   Result Value Ref Range    Sodium 143 136 - 145 mmol/L    Potassium 4.4 3.5 - 5.1 mmol/L    Chloride 108 97 - 108 mmol/L    CO2 27 21 - 32 mmol/L    Anion gap 8 5 - 15 mmol/L    Glucose 109 (H) 65 - 100 mg/dL    BUN 45 (H) 6 - 20 MG/DL    Creatinine 3.45 (H) 0.70 - 1.30 MG/DL    BUN/Creatinine ratio 13 12 - 20      GFR est AA 21 (L) >60 ml/min/1.73m2    GFR est non-AA 17 (L) >60 ml/min/1.73m2    Calcium 8.6 8.5 - 10.1 MG/DL    Bilirubin, total 0.2 0.2 - 1.0 MG/DL    ALT (SGPT) 15 12 - 78 U/L    AST (SGOT) 17 15 - 37 U/L    Alk.  phosphatase 94 45 - 117 U/L    Protein, total 7.7 6.4 - 8.2 g/dL    Albumin 3.6 3.5 - 5.0 g/dL    Globulin 4.1 (H) 2.0 - 4.0 g/dL    A-G Ratio 0.9 (L) 1.1 - 2.2     OCCULT BLOOD, STOOL    Collection Time: 09/05/17 12:25 AM   Result Value Ref Range    Occult blood, stool POSITIVE (A) NEG     PROTHROMBIN TIME + INR    Collection Time: 09/05/17 12:25 AM   Result Value Ref Range    INR 1.0 0.9 - 1.1      Prothrombin time 10.4 9.0 - 11.1 sec   PTT    Collection Time: 09/05/17 12:25 AM   Result Value Ref Range    aPTT 30.0 22.1 - 32.5 sec    aPTT, therapeutic range     58.0 - 77.0 SECS   LIPASE    Collection Time: 09/05/17 12:25 AM   Result Value Ref Range    Lipase 179 73 - 393 U/L   HGB & HCT    Collection Time: 09/05/17 10:47 AM   Result Value Ref Range    HGB 9.7 (L) 12.1 - 17.0 g/dL    HCT 30.5 (L) 36.6 - 50.3 %   CREATININE, UR, RANDOM    Collection Time: 09/05/17 10:55 AM   Result Value Ref Range    Creatinine, urine 29.44 mg/dL   URINALYSIS W/ RFLX MICROSCOPIC    Collection Time: 09/05/17 10:55 AM   Result Value Ref Range    Color YELLOW/STRAW      Appearance CLEAR CLEAR      Specific gravity 1.009 1.003 - 1.030      pH (UA) 6.5 5.0 - 8.0      Protein 30 (A) NEG mg/dL    Glucose NEGATIVE  NEG mg/dL    Ketone NEGATIVE  NEG mg/dL    Bilirubin NEGATIVE  NEG      Blood TRACE (A) NEG Urobilinogen 0.2 0.2 - 1.0 EU/dL    Nitrites NEGATIVE  NEG      Leukocyte Esterase NEGATIVE  NEG      WBC 0-4 0 - 4 /hpf    RBC 0-5 0 - 5 /hpf    Epithelial cells FEW FEW /lpf    Bacteria NEGATIVE  NEG /hpf    Hyaline cast 0-2 0 - 5 /lpf   PROTEIN URINE, RANDOM    Collection Time: 09/05/17 10:55 AM   Result Value Ref Range    Protein, urine random 48 (H) 0.0 - 11.9 mg/dL         Assessment/Plan:     Principal Problem:    GI bleeding (9/5/2017)    Active Problems:    HTN (hypertension), benign (9/2/2011)      Coronary atherosclerosis of native coronary artery (9/2/2011)      Other and unspecified hyperlipidemia (9/2/2011)      Melena (9/5/2017)      CKD (chronic kidney disease) stage 4, GFR 15-29 ml/min ([facility]) (9/5/2017)      BPH (benign prostatic hypertrophy) (9/5/2017)      Hydronephrosis (9/5/2017)         See above narrative for full detail.

## 2017-09-05 NOTE — ED NOTES
Bedside and Verbal shift change report given to [address] [place] [address] (oncoming nurse) by Ulysses Laundry RN (offgoing nurse). Report included the following information SBAR, ED Summary, MAR, Recent Results and Cardiac Rhythm NSR.

## 2017-09-05 NOTE — ROUTINE PROCESS
Bedside and Verbal shift change report given to Marci 1 (oncoming nurse) by Carmelita Campos RN (offgoing nurse). Report included the following information SBAR, Kardex, MAR, Accordion and Recent Results.

## 2017-09-05 NOTE — ED NOTES
Pt c/o \"heavy blood in my stool\", pt c/o blood in his stool since today, describes the stool as dark/black. Stated 3 bloody DMs today. Pt denies n/v/d, headache, dizziness.

## 2017-09-05 NOTE — PROGRESS NOTES
Bedside and Verbal shift change report given to Fidel Todd RN (oncoming nurse) by Uziel Padilla RN (offgoing nurse). Report included the following information SBAR, Kardex, Intake/Output, MAR, Accordion and Recent Results.

## 2017-09-05 NOTE — PROGRESS NOTES
Jaspal Hackettelsen [facility]  [address]  [phone]      Medical Progress Note      NAME: Bradley Barrett   :  1935  MRM:  [id number]    Date/Time: 2017         Subjective:     Chief Complaint:  Patient was seen and examined by me. Chart reviewed. Denied further bleeding       Objective:       Vitals:       Last 24hrs VS reviewed since prior progress note.  Most recent are:    Visit Vitals    /87    Pulse 75    Temp 97.8 °F (36.6 °C)    Resp 18    Ht 5' 10\" (1.778 m)    Wt 76.9 kg (169 lb 8.5 oz)    SpO2 100%    BMI 24.33 kg/m2     SpO2 Readings from Last 6 Encounters:   17 100%   08/20/15 99%   09/10/14 98%   11 100%   11 100%   11 100%          Intake/Output Summary (Last 24 hours) at 17 1302  Last data filed at 17 1035   Gross per 24 hour   Intake                0 ml   Output              550 ml   Net             -550 ml        Exam:     Physical Exam:    Gen:  Elderly, thin, frail, NAD  HEENT:  Pink conjunctivae, PERRL, hearing intact to voice, moist mucous membranes  Neck:  Supple, without masses, thyroid non-tender  Resp:  No accessory muscle use, clear breath sounds without wheezes rales or rhonchi  Card:  No murmurs, normal S1, S2 without thrills, bruits or peripheral edema  Abd:  Soft, non-tender, non-distended, normoactive bowel sounds are present  Musc:  No cyanosis or clubbing  Skin:  No rashes  Neuro:  Cranial nerves 3-12 are grossly intact, follows commands appropriately  Psych:  Fair insight, oriented to person, place and time, alert    Medications Reviewed: (see below)    Lab Data Reviewed: (see below)    ______________________________________________________________________    Medications:     Current Facility-Administered Medications   Medication Dose Route Frequency    sodium chloride (NS) flush 5-10 mL  5-10 mL IntraVENous Q8H    sodium chloride (NS) flush 5-10 mL  5-10 mL IntraVENous PRN    sodium chloride (NS) flush 5-10 mL  5-10 mL IntraVENous Q8H    sodium chloride (NS) flush 5-10 mL  5-10 mL IntraVENous PRN    acetaminophen (TYLENOL) tablet 650 mg  650 mg Oral Q4H PRN    morphine injection 1 mg  1 mg IntraVENous Q4H PRN    naloxone (NARCAN) injection 0.4 mg  0.4 mg IntraVENous PRN    0.9% sodium chloride infusion  75 mL/hr IntraVENous CONTINUOUS    pantoprazole (PROTONIX) 40 mg in sodium chloride 0.9 % 10 mL injection  40 mg IntraVENous Q12H    [START ON 9/6/2017] amLODIPine (NORVASC) tablet 10 mg  10 mg Oral DAILY    carvedilol (COREG) tablet 25 mg  25 mg Oral BID WITH MEALS    tamsulosin (FLOMAX) capsule 0.4 mg  0.4 mg Oral DAILY    hydrALAZINE (APRESOLINE) 20 mg/mL injection 20 mg  20 mg IntraVENous Q6H PRN    peg 3350-electrolytes (COLYTE) 4000 mL  4,000 mL Oral ONCE    peg 3350-electrolytes (COLYTE) 4000 mL  2,000 mL Oral ONCE          Lab Review:     Recent Labs      09/05/17   1047  09/05/17   0025   WBC   --   5.9   HGB  9.7*  10.4*   HCT  30.5*  32.4*   PLT   --   179     Recent Labs      09/05/17   0025   NA  143   K  4.4   CL  108   CO2  27   GLU  109*   BUN  45*   CREA  3.45*   CA  8.6   ALB  3.6   TBILI  0.2   SGOT  17   ALT  15   INR  1.0     Lab Results   Component Value Date/Time    Glucose (POC) 128 09/07/2011 06:16 PM    Glucose (POC) 99 09/05/2011 12:17 AM    Glucose (POC) 170 09/02/2011 08:39 PM    Glucose (POC) 115 09/02/2011 07:42 AM          Assessment / Plan:     Principal Problem:    79 yo hx of HTN, CAD s/p CABG, CKD, BPH, presented w/ GI bleeding, TINA, bilateral hydronephrosis    1) GI bleeding: unclear etiology. Cont IV PPI. Monitor Hgb closely. GI to eval    2) TINA/CKD 4: likely due to urine obstruction. Will hold diuretics, ACEi. Renal following    3) Bilateral hydronephrosis/urine retention: likely from BPH. Urology now following. Cont pelayo    4) HTN: cont coreg, norvasc. Hold ACEi and diuretics due to TINA.   Start IV hydralazine prn    5) CAD: cont BB, statin.   Holding ASA due to GI bleed    6) BPH: started flomax    Total time spent with patient: 35 Minutes (prolonged care from 12-12:35pm)                 [address] discussed with: Patient, family, nursing    Discussed:  Care Plan    Prophylaxis:  SCD's    Disposition:  Home w/Family           ___________________________________________________    Attending Physician: Andrzej Mendes MD

## 2017-09-05 NOTE — PROGRESS NOTES
Patient voided 350ml post void bladder scan done, patient retaining up to 458ml. MD paged as requested.

## 2017-09-05 NOTE — ED PROVIDER NOTES
HPI Comments: 80 y.o. male with past medical history significant for CABG, Pacemaker, CHF, CKD, HTN, Endoscopy who presents from home with chief complaint of blood in stool. Pt reports while having a bowel movement this evening approximately 3 hours ago, he noticed blood in his stool. Pt decribes the blood as \"very dark\". During bowel movement his stomach as \"growling\" but he denies any significant abdominal pain. Pt further dnies nausea or vomiting. He takes aspirin daily. There are no other acute medical concerns at this time. Chart review: Admitted for lower GI bleed 9/2/2011 and 9/7/2011 suspected 2/2 diverticula. PCP: Not On File [facility]    Note written by Elsa Covarrubias, as dictated by Rosalie Carroll DO 12:22 AM    The history is provided by the patient. No  was used. Past Medical History:   Diagnosis Date    Bronchitis, chronic (HCC)     CAD (coronary artery disease)     CHF (congestive heart failure) (HCC)     Chronic kidney disease     Hypertension        Past Surgical History:   Procedure Laterality Date    HX CORONARY ARTERY BYPASS GRAFT      HX PACEMAKER           Family History:   Problem Relation Age of Onset    Heart Disease Mother     Hypertension Mother        Social History     Social History    Marital status:      Spouse name: N/A    Number of children: N/A    Years of education: N/A     Occupational History    Not on file. Social History Main Topics    Smoking status: Former Smoker    Smokeless tobacco: Never Used    Alcohol use No    Drug use: No    Sexual activity: Not on file     Other Topics Concern    Not on file     Social History Narrative     ALLERGIES: Review of patient's allergies indicates no known allergies. Review of Systems   Constitutional: Negative for appetite change, chills, fever and unexpected weight change. HENT: Negative for ear pain, hearing loss, rhinorrhea and trouble swallowing. Eyes: Negative for pain and visual disturbance. Respiratory: Negative for cough, chest tightness and shortness of breath. Cardiovascular: Negative for chest pain and palpitations. Gastrointestinal: Positive for blood in stool. Negative for abdominal distention, abdominal pain and vomiting. Genitourinary: Negative for dysuria, hematuria and urgency. Musculoskeletal: Negative for back pain and myalgias. Skin: Negative for rash. Neurological: Negative for dizziness, syncope, weakness and numbness. Psychiatric/Behavioral: Negative for confusion and suicidal ideas. All other systems reviewed and are negative. Vitals:    09/05/17 0002   BP: (!) 225/102   Pulse: 95   Resp: 18   Temp: 97.8 °F (36.6 °C)   SpO2: 100%   Weight: 76.9 kg (169 lb 8.5 oz)   Height: 5' 10\" (1.778 m)            Physical Exam   Constitutional: He is oriented to person, place, and time. He appears well-developed and well-nourished. No distress. HENT:   Head: Normocephalic and atraumatic. Right Ear: External ear normal.   Left Ear: External ear normal.   Nose: Nose normal.   Mouth/Throat: Oropharynx is clear and moist. No oropharyngeal exudate. Eyes: Conjunctivae and EOM are normal. Pupils are equal, round, and reactive to light. Right eye exhibits no discharge. Left eye exhibits no discharge. No scleral icterus. Neck: Normal range of motion. Neck supple. No JVD present. No tracheal deviation present. Cardiovascular: Normal rate, regular rhythm, normal heart sounds and intact distal pulses. Exam reveals no gallop and no friction rub. No murmur heard. Pulmonary/Chest: Effort normal and breath sounds normal. No stridor. No respiratory distress. He has no decreased breath sounds. He has no wheezes. He has no rhonchi. He has no rales. He exhibits no tenderness. Abdominal: Soft. Bowel sounds are normal. He exhibits no distension. There is no tenderness. There is no rebound and no guarding.    Genitourinary: Rectal exam shows guaiac positive stool. Musculoskeletal: Normal range of motion. He exhibits no edema or tenderness. Neurological: He is alert and oriented to person, place, and time. He has normal strength and normal reflexes. No cranial nerve deficit or sensory deficit. He exhibits normal muscle tone. Coordination normal. GCS eye subscore is 4. GCS verbal subscore is 5. GCS motor subscore is 6. Skin: Skin is warm and dry. No rash noted. He is not diaphoretic. No erythema. No pallor. Psychiatric: He has a normal mood and affect. His behavior is normal. Judgment and thought content normal.   Nursing note and vitals reviewed. MDM  Number of Diagnoses or Management Options  Acute on chronic kidney failure [facility]):   Gastrointestinal hemorrhage, unspecified gastrointestinal hemorrhage type:      Amount and/or Complexity of Data Reviewed  Clinical lab tests: ordered and reviewed  Tests in the radiology section of CPT®: ordered and reviewed  Discuss the patient with other providers: yes (Hospitalist)    Risk of Complications, Morbidity, and/or Mortality  Presenting problems: moderate  Diagnostic procedures: moderate  Management options: moderate    Patient Progress  Patient progress: stable    ED Course       Procedures  CONSULT NOTE:  2:11 AM Junito Dalal DO spoke with Dr. Leisa Ortega, Consult for Hospitalist.  Discussed available diagnostic tests and clinical findings. He is in agreement with care plans as outlined. Dr. Leisa Ortega will see and admit pt for further evaluation and treatment. Chief Complaint   Patient presents with    Melena       3:11 AM  The patients presenting problems have been discussed, and they are in agreement with the care plan formulated and outlined with them. I have encouraged them to ask questions as they arise throughout their visit.     MEDICATIONS GIVEN:  Medications   sodium chloride (NS) flush 5-10 mL (10 mL IntraVENous Given 9/5/17 0020)   sodium chloride (NS) flush 5-10 mL (not administered)   sodium chloride (NS) flush 5-10 mL (not administered)   sodium chloride (NS) flush 5-10 mL (not administered)   acetaminophen (TYLENOL) tablet 650 mg (not administered)   morphine injection 1 mg (not administered)   naloxone (NARCAN) injection 0.4 mg (not administered)   0.9% sodium chloride infusion (75 mL/hr IntraVENous New Bag 9/5/17 0240)   pantoprazole (PROTONIX) 40 mg in sodium chloride 0.9 % 10 mL injection (not administered)   amLODIPine (NORVASC) tablet 10 mg (not administered)   carvedilol (COREG) tablet 25 mg (not administered)   tamsulosin (FLOMAX) capsule 0.4 mg (not administered)   sodium chloride 0.9 % bolus infusion 500 mL (500 mL IntraVENous New Bag 9/5/17 0029)   pantoprazole (PROTONIX) injection 40 mg (40 mg IntraVENous Given 9/5/17 0029)   sodium chloride 0.9 % bolus infusion 500 mL (500 mL IntraVENous New Bag 9/5/17 0048)       LABS REVIEWED:  Recent Results (from the past 24 hour(s))   CBC WITH AUTOMATED DIFF    Collection Time: 09/05/17 12:25 AM   Result Value Ref Range    WBC 5.9 4.1 - 11.1 K/uL    RBC 3.37 (L) 4.10 - 5.70 M/uL    HGB 10.4 (L) 12.1 - 17.0 g/dL    HCT 32.4 (L) 36.6 - 50.3 %    MCV 96.1 80.0 - 99.0 FL    MCH 30.9 26.0 - 34.0 PG    MCHC 32.1 30.0 - 36.5 g/dL    RDW 12.8 11.5 - 14.5 %    PLATELET 395 014 - 387 K/uL    NEUTROPHILS 65 32 - 75 %    LYMPHOCYTES 20 12 - 49 %    MONOCYTES 11 5 - 13 %    EOSINOPHILS 3 0 - 7 %    BASOPHILS 1 0 - 1 %    ABS. NEUTROPHILS 3.9 1.8 - 8.0 K/UL    ABS. LYMPHOCYTES 1.2 0.8 - 3.5 K/UL    ABS. MONOCYTES 0.7 0.0 - 1.0 K/UL    ABS. EOSINOPHILS 0.2 0.0 - 0.4 K/UL    ABS.  BASOPHILS 0.0 0.0 - 0.1 K/UL   METABOLIC PANEL, COMPREHENSIVE    Collection Time: 09/05/17 12:25 AM   Result Value Ref Range    Sodium 143 136 - 145 mmol/L    Potassium 4.4 3.5 - 5.1 mmol/L    Chloride 108 97 - 108 mmol/L    CO2 27 21 - 32 mmol/L    Anion gap 8 5 - 15 mmol/L    Glucose 109 (H) 65 - 100 mg/dL    BUN 45 (H) 6 - 20 MG/DL    Creatinine 3.45 (H) 0.70 - 1.30 MG/DL    BUN/Creatinine ratio 13 12 - 20      GFR est AA 21 (L) >60 ml/min/1.73m2    GFR est non-AA 17 (L) >60 ml/min/1.73m2    Calcium 8.6 8.5 - 10.1 MG/DL    Bilirubin, total 0.2 0.2 - 1.0 MG/DL    ALT (SGPT) 15 12 - 78 U/L    AST (SGOT) 17 15 - 37 U/L    Alk. phosphatase 94 45 - 117 U/L    Protein, total 7.7 6.4 - 8.2 g/dL    Albumin 3.6 3.5 - 5.0 g/dL    Globulin 4.1 (H) 2.0 - 4.0 g/dL    A-G Ratio 0.9 (L) 1.1 - 2.2     OCCULT BLOOD, STOOL    Collection Time: 09/05/17 12:25 AM   Result Value Ref Range    Occult blood, stool POSITIVE (A) NEG     PROTHROMBIN TIME + INR    Collection Time: 09/05/17 12:25 AM   Result Value Ref Range    INR 1.0 0.9 - 1.1      Prothrombin time 10.4 9.0 - 11.1 sec   PTT    Collection Time: 09/05/17 12:25 AM   Result Value Ref Range    aPTT 30.0 22.1 - 32.5 sec    aPTT, therapeutic range     58.0 - 77.0 SECS   LIPASE    Collection Time: 09/05/17 12:25 AM   Result Value Ref Range    Lipase 179 73 - 393 U/L       VITAL SIGNS:  Patient Vitals for the past 12 hrs:   Temp Pulse Resp BP SpO2   09/05/17 0100 - - - (!) 176/99 100 %   09/05/17 0045 - - - 177/87 100 %   09/05/17 0031 - - - 173/81 100 %   09/05/17 0029 - - - - 100 %   09/05/17 0002 97.8 °F (36.6 °C) 95 18 (!) 225/102 100 %       RADIOLOGY RESULTS:  The following have been ordered and reviewed:  CT ABD PELV WO CONT   Final Result      US RETROPERITONEUM COMP    (Results Pending)     Study Result      EXAM:  CT ABD PELV WO CONT     INDICATION: Dark red rectal bleeding today. Acute renal insufficiency.     COMPARISON: No comparison CT.     CONTRAST:  None.     TECHNIQUE:   Thin axial images were obtained through the abdomen and pelvis. Coronal and  sagittal reconstructions were generated. Oral contrast was not administered. CT  dose reduction was achieved through use of a standardized protocol tailored for  this examination and automatic exposure control for dose modulation.    No IV contrast secondary to estimated GFR less than 30. The absence of intravenous contrast material reduces the sensitivity for  evaluation of the solid parenchymal organs of the abdomen.      FINDINGS:   LUNG BASES: Posterior, medial left lower lobe nodule measures 3 mm. Calcified  granuloma is in the posterior left lower lobe. No pneumonia. INCIDENTALLY IMAGED HEART AND MEDIASTINUM: Mild cardiomegaly is partially  imaged. LIVER: Hypoattenuating liver lesions in segment 4 most likely represent cysts. Liver surface is smooth. GALLBLADDER: Unremarkable. SPLEEN: Normal size. PANCREAS: No inflammation. ADRENALS: Hypertrophy without nodule. KIDNEYS/URETERS: Moderate left hydronephrosis and hydroureter. Mild right  hydronephrosis and hydroureter. No nephrolithiasis. Bilateral renal lesions most  likely represent a combination of hemorrhagic cysts and simple cysts. STOMACH: Nondistended. SMALL BOWEL: No dilatation or wall thickening. COLON: Moderate diverticulosis. No diverticulitis. APPENDIX: Unremarkable. PERITONEUM: No ascites or pneumoperitoneum. RETROPERITONEUM: No lymphadenopathy or aortic aneurysm. REPRODUCTIVE ORGANS: Prostate gland is enlarged. URINARY BLADDER: Distended. Urinary bladder dome small diverticulum. No calculus  or mass. BONES: [Lumbar spinal degenerative disc disease and ankylosis. Right more than  left sacroiliac joint ankylosis. ADDITIONAL COMMENTS: Right inguinal hernia contains nonobstructed small bowel.     IMPRESSION  IMPRESSION:     1. Prostatomegaly contributes to urinary retention, moderate left  hydronephrosis, and mild right hydronephrosis. No nephrolithiasis. 2. Colonic diverticulosis is the likely source of rectal bleeding. 3. Hepatic lesions may represent cysts but cannot be fully characterized. 4. 3 mm left lower lobe nodule.  Guidelines by the Fleischner society (Radiology  2017 special report) suggest that patients with low risk for lung cancer and  solid nodule(s) less than or equal to 6 mm in diameter require no follow-up. In  patients with a higher risk, such as smokers, follow-up noncontrast chest CT  should be considered at 12 months. Patients with a known malignancy are at  increased risk for metastasis and should receive a three month follow-up. CONSULTATIONS:   Hospitalist    PROGRESS NOTES:  Discussed results and plan with patient. Patient will be admitted/observed for further evaluation and treatment. DIAGNOSIS:    1. Gastrointestinal hemorrhage, unspecified gastrointestinal hemorrhage type    2. Acute on chronic kidney failure (HCC)        PLAN:  Admit/obs    ED COURSE: The patients hospital course has been uncomplicated.

## 2017-09-05 NOTE — PROGRESS NOTES
Thank you for requesting this consultation but, this patient has been seen by   Dr Dennis Khanna in the past.  We will inform them of this request.    Respectfully,  Avinash Waters.  Jennifer De Oliveira, LINGP-BC

## 2017-09-05 NOTE — PROGRESS NOTES
9/5/2017  12:55 PM  EMR reviewed, CM met w/ Pt and son Fatemeh Simms for needs assessment and D/C planning. Pt lives w/ son Charley Hunter in 1 story home w/ 2 entry steps, PTA Pt was independent w/ ADL's and drives. Emergency contact is [facility] (c) [phone]. PCP is Dr Albert Patel, has Rx coverage and fills at [facility] in [facility]. Pt has had HH about 8 mo ago w/ [facility].; DME includes straight cane. Family will transport home and to all F/U. Consider palliative consult as Pt RRAT >21 and CHF. Care Management Interventions  PCP Verified by CM: Yes (PCP is DR. Albert Patel; last visit 3 wks ago)  Palliative Care Consult (Criteria: CHF and RRAT>21): Yes (Pt RRAT is 26, documentated CHF)  Transition of Care Consult (CM Consult): Discharge Planning  Discharge Durable Medical Equipment: No  Physical Therapy Consult: Yes  Occupational Therapy Consult: Yes  Current Support Network: Relative's Home (Pt lives w/ son Charley Hunter)  Confirm Follow Up Transport: Family  Discharge Location  Discharge Placement: Home   Plan is to D/c to home when stable, no current D/c needs ID'dYahir Riley  Case Management

## 2017-09-05 NOTE — PROGRESS NOTES
physical Therapy EVALUATION/DISCHARGE  Patient: Isreal Interiano (38 y.o. male)  Date: 9/5/2017  Primary Diagnosis: GI bleeding  melena  Procedure(s) (LRB):  ESOPHAGOGASTRODUODENOSCOPY (EGD)/COLONOSCOPY (N/A)     Precautions:   ASSESSMENT :  Based on the objective data described below, the patient presents with admission for GI Bleed today. Patient presents with intact strength ROM balance and endurance for safe and independent functional mobility such as amb 250 and stairs. Patient lives with his son in 2 story home but lives on first floor. Patient does not require PT services at this time. No DME or follow up PT needed. Skilled physical therapy is not indicated at this time. PLAN :  Discharge Recommendations: None  Further Equipment Recommendations for Discharge: none     SUBJECTIVE:   Patient stated I did not have blood in my urine the last time I went to bathroom.     OBJECTIVE DATA SUMMARY:   HISTORY:    Past Medical History:   Diagnosis Date    Bronchitis, chronic (HCC)     CAD (coronary artery disease)     CHF (congestive heart failure) (HCC)     Chronic kidney disease     Hypertension      Past Surgical History:   Procedure Laterality Date    HX CORONARY ARTERY BYPASS GRAFT      HX PACEMAKER       Prior Level of Function/Home Situation: completely independent  Personal factors and/or comorbidities impacting plan of care:     Home Situation  Home Environment: Private residence  # Steps to Enter: 2  Rails to Enter: Yes  One/Two Story Residence: Two story, live on 1st floor  Living Alone: No  Support Systems: Child(kenney)  Patient Expects to be Discharged to[de-identified] Private residence  Current DME Used/Available at Home: Cane, straight, Grab bars  Tub or Shower Type: Tub/Shower combination    EXAMINATION/PRESENTATION/DECISION MAKING:   Critical Behavior:  Neurologic State: Alert     Cognition: Follows commands  Safety/Judgement: Awareness of environment  Hearing:   Auditory  Auditory Impairment: None    Range Of Motion:  AROM: Within functional limits  Strength:    Strength: Generally decreased, functional     Sensation:   Sensation: Impaired (bilateral feet)     Coordination:  Coordination: Generally decreased, functional       Functional Mobility:  Bed Mobility:  Rolling: Independent  Supine to Sit: Independent  Sit to Supine: Independent  Scooting: Independent  Transfers:  Sit to Stand: Modified independent  Stand to Sit: Modified independent  Stand Pivot Transfers: Modified independent     Bed to Chair: Modified independent              Balance:   Sitting: Intact  Standing: Intact  Ambulation/Gait Training:  Distance (ft): 250 Feet (ft)  Assistive Device: Gait belt  Ambulation - Level of Assistance: Modified independent  Gait Abnormalities: Antalgic     Stairs:  Number of Stairs Trained: 2  Stairs - Level of Assistance: Modified independent   Rail Use: Right          Functional Measure:  Barthel Index:    Bathin  Bladder: 10  Bowels: 10  Groomin  Dressing: 10  Feeding: 10  Mobility: 0 (not seen by therapist)  Stairs: 0 (not seen by therapist )  Toilet Use: 10  Transfer (Bed to Chair and Back): 15  Total: 75       Barthel and G-code impairment scale:  Percentage of impairment CH  0% CI  1-19% CJ  20-39% CK  40-59% CL  60-79% CM  80-99% CN  100%   Barthel Score 0-100 100 99-80 79-60 59-40 20-39 1-19   0   Barthel Score 0-20 20 17-19 13-16 9-12 5-8 1-4 0      The Barthel ADL Index: Guidelines  1. The index should be used as a record of what a patient does, not as a record of what a patient could do. 2. The main aim is to establish degree of independence from any help, physical or verbal, however minor and for whatever reason. 3. The need for supervision renders the patient not independent. 4. A patient's performance should be established using the best available evidence.  Asking the patient, friends/relatives and nurses are the usual sources, but direct observation and common sense are also important. However direct testing is not needed. 5. Usually the patient's performance over the preceding 24-48 hours is important, but occasionally longer periods will be relevant. 6. Middle categories imply that the patient supplies over 50 per cent of the effort. 7. Use of aids to be independent is allowed. Yuliya Bernal., Barthel, D.W. (4903). Functional evaluation: the Barthel Index. [address] [facility] (14)2. Cherelle Munoz polo DACIA Diamond, Kimberley Phipps., Josephine Bella., Micah, [address] (1999). Measuring the change indisability after inpatient rehabilitation; comparison of the responsiveness of the Barthel Index and Functional [place] Measure. Journal of Neurology, Neurosurgery, and Psychiatry, 66(4), 941-121. CODI Joshi, LIZ Mckeon, & Jed Saunders M.A. (2004.) Assessment of post-stroke quality of life in cost-effectiveness studies: The usefulness of the Barthel Index and the EuroQoL-5D. Quality of Life Research, 13, 202-37       G codes: In compliance with CMSs Claims Based Outcome Reporting, the following G-code set was chosen for this patient based on their primary functional limitation being treated: The outcome measure chosen to determine the severity of the functional limitation was the Barthel Index with a score of 75/100 which was correlated with the impairment scale.     ? Mobility - Walking and Moving Around:     - CURRENT STATUS: CJ - 20%-39% impaired, limited or restricted    - GOAL STATUS: CJ - 20%-39% impaired, limited or restricted    - D/C STATUS:  CJ - 20%-39% impaired, limited or restricted          Physical Therapy Evaluation Charge Determination   History Examination Presentation Decision-Making   LOW Complexity : Zero comorbidities / personal factors that will impact the outcome / POC LOW Complexity : 1-2 Standardized tests and measures addressing body structure, function, activity limitation and / or participation in recreation  LOW Complexity : Stable, uncomplicated  Other outcome measures Barthel Index  LOW       Based on the above components, the patient evaluation is determined to be of the following complexity level: LOW     Pain: denies any pain        Activity Tolerance:   fair  Please refer to the flowsheet for vital signs taken during this treatment. After treatment:   []   Patient left in no apparent distress sitting up in chair  [x]   Patient left in no apparent distress in bed  [x]   Call bell left within reach  [x]   Nursing notified  [x]   Caregiver present  []   Bed alarm activated    COMMUNICATION/EDUCATION:   Communication/Collaboration:  [x]   Fall prevention education was provided and the patient/caregiver indicated understanding. [x]   Patient/family have participated as able and agree with findings and recommendations. []   Patient is unable to participate in plan of care at this time.   Findings and recommendations were discussed with: Registered Nurse    Thank you for this referral.  Levonne Cabot, PT   Time Calculation: 19 mins

## 2017-09-05 NOTE — ROUTINE PROCESS
Primary Nurse Nabor Peterson RN and Jordan Adam RN performed a dual skin assessment on this patient. No impairment noted  Gareth score is 20.

## 2017-09-05 NOTE — H&P
[address]  [phone]    Admission History and Physical      NAME:              Juan Diego Zurita   :   1935   MRN:  [id number]     PCP:  Not On File [facility]     Date:     2017     Chief  Complaint: Black stools    History Of Presenting Illness:       Mr. Junaid Vega is a 80 y.o. male who is being admitted for GI bleeding. Mr. Junaid Vega presented to our Emergency Department today complaining of melena stools. This started last evening. No associated abdominal discomfort, hematemesis, fever or chills. No use of NSAIDs. He also denies any dizziness or passing out. No chest pain or SOB. He has had a colonoscopy for GI bleeding which was attributed to a diverticular bleed. He will be admitted for further management. No Known Allergies    Prior to Admission medications    Medication Sig Start Date End Date Taking? Authorizing Provider   chlorthalidone (HYGROTEN) 25 mg tablet Take 25 mg by mouth daily. Yes Pablo Engle MD   atorvastatin (LIPITOR) 40 mg tablet Take 40 mg by mouth daily. Yes Pablo Engle MD   aspirin delayed-release 81 mg tablet Take 81 mg by mouth daily. Yes Pablo Engle MD   benazepril (LOTENSIN) 40 mg tablet Take 40 mg by mouth daily. Yes Pablo Engle MD   carvedilol (COREG) 12.5 mg tablet Take 12.5 mg by mouth two (2) times daily (with meals). Take 2 tablets every morning. Take 1 tablet every evening. Yes Pablo Engle MD   carbamazepine (TEGRETOL) 200 mg tablet Take 200 mg by mouth three (3) times daily. Take 1 and 1/2 tablet in morning and 1 tablet at noon then 1 tablet at 3pm.   Yes Pablo Egnle MD   hydrochlorothiazide (HYDRODIURIL) 50 mg tablet Take 50 mg by mouth daily. Pablo Engle MD   amlodipine (NORVASC) 10 mg tablet Take  by mouth daily.     Pablo Engle MD       Past Medical History: Diagnosis Date    Bronchitis, chronic ([facility] [address] [facility]     CAD (coronary artery disease)     CHF (congestive heart failure) (HCC)     Chronic kidney disease     Hypertension         Past Surgical History:   Procedure Laterality Date    HX CORONARY ARTERY BYPASS GRAFT      HX PACEMAKER         Social History   Substance Use Topics    Smoking status: Former Smoker    Smokeless tobacco: Never Used    Alcohol use No        Family History   Problem Relation Age of Onset    Heart Disease Mother     Hypertension Mother         Review of Systems:    Constitutional ROS: no fever, chills, rigors or night sweats  Respiratory ROS: no cough, sputum, hemoptysis, dyspnea or pleuritic pain. Cardiovascular ROS: no chest pain, palpitations, orthopnea, PND or syncope  Endocrine ROS: no polydispsia, polyuria, heat or cold intolerance or major weight change. Gastrointestinal ROS: no dysphagia, abdominal pain, nausea, vomiting or diarrhea    Genito-Urinary ROS: no dysuria, frequency, hematuria, retention or flank pain  Musculoskeletal ROS: no joint pain, swelling or muscular tenderness  Neurological ROS: no headache, confusion, focal weakness   Psychiatric ROS: no depression, anxiety, mood swings  Dermatological ROS: no rash, pruritis, or urticaria  Heme-Lymph ROS: no swollen glands, bleeding    Examination:    Constitutional:    Visit Vitals    BP (!) 176/99    Pulse 95    Temp 97.8 °F (36.6 °C)    Resp 18    Ht 5' 10\" (1.778 m)    Wt 76.9 kg (169 lb 8.5 oz)    SpO2 100%    BMI 24.33 kg/m2         General:  Weak and ill looking patient in no acute distress  Eyes: Pink conjunctivae, PERRLA with no discharge. Normal eye movements  Ear, Nose, Mouth & Throat: No ottorrhea, rhinorrhea, non tender sinuses, moist mucous membranes  Respiratory:  No accessory muscle use, clear breath sounds without crackles or wheezes  Cardiovascular:  No JVD or murmurs, regular and normal S1, S2 without thrills, bruits or peripheral edema.    GI & :  Soft abdomen, minimal discomfort, non-distended, normoactive bowel sounds with no palpable organomegaly  Heme:  No cervical or axillary adenopathy. Musculoskeletal:  No cyanosis, clubbing, atrophy or deformities  Skin:  No rashes, bruising or ulcers   Neurological: Awake and alert, speech is clear, CNs 2-12 are grossly intact and otherwise non focal  Psychiatric:  Has a fair insight and is oriented x 3  ________________________________________________________________________    Data Review:    Labs:    Recent Labs      09/05/17   0025   WBC  5.9   HGB  10.4*   HCT  32.4*   PLT  179     Recent Labs      09/05/17   0025   NA  143   K  4.4   CL  108   CO2  27   GLU  109*   BUN  45*   CREA  3.45*   CA  8.6   ALB  3.6   SGOT  17   ALT  15     No components found for: GLPOC  No results for input(s): PH, PCO2, PO2, HCO3, FIO2 in the last 72 hours. Recent Labs      09/05/17   0025   INR  1.0       Radiological Studies:      CT scan abdomen - Prostatomegaly contributes to urinary retention, moderate left hydronephrosis, and mild right hydronephrosis. No nephrolithiasis. Colonic diverticulosis is the likely source of rectal bleeding. Hepatic lesions may represent cysts but cannot be fully characterized. 3 mm left lower lobe nodule. I have also reviewed available old medical records. Assessment & Impression:     Mr. Sherri Monge is a 80 y.o. male being evaluated for:     Principal Problem:    GI bleeding (9/5/2017)    Active Problems:    HTN (hypertension), benign (9/2/2011)      Coronary atherosclerosis of native coronary artery (9/2/2011)      Other and unspecified hyperlipidemia (9/2/2011)      Melena (9/5/2017)      CKD (chronic kidney disease) stage 4, GFR 15-29 ml/min ([facility]) (9/5/2017)         Plan of management:    GI bleeding (9/5/2017)/ Melena (9/5/2017): admit to hospital. Although likely diverticular, melanotic stools may indicate an upper etiology too. Start IV pantoprazole.  Monitor Hgb and transfuse as needed. Consult GI     CKD (chronic kidney disease) stage 4, GFR 15-29 ml/min ([facility]) (9/5/2017): renal function progressively worsened since last admission. Suspect partly maybe from BPH. Hydrate for now. Monitor renal function. HTN (hypertension), benign (9/2/2011): uncontrolled. Resume Coreg, Amlodipine. Hold diuretics, ACEi    BPH (benign prostatic hypertrophy) (9/5/2017)/ Hydronephrosis (9/5/2017): consult urology. Start Flomax    Coronary atherosclerosis of native coronary artery (9/2/2011): remote hx and he says he has been without symptoms. Resume Coreg, statin.  Hold Asprin    Other and unspecified hyperlipidemia (9/2/2011): resume statin if verified     Code Status:  Full    Surrogate decision maker: Family    Risk of deterioration: high      Total time spent for the care of the patient: [address] [place] [address] discussed with: Patient, Nursing Staff and ED physician    Discussed:  Code Status, Care Plan and D/C Planning    Prophylaxis:  H2B/PPI    Probable Disposition:  Home w/Family           ___________________________________________________    Attending Physician: Ashlyn Juarez MD

## 2017-09-06 ENCOUNTER — ANESTHESIA EVENT (OUTPATIENT)
Dept: ENDOSCOPY | Age: 82
DRG: 378 | End: 2017-09-06
Payer: MEDICARE

## 2017-09-06 ENCOUNTER — ANESTHESIA (OUTPATIENT)
Dept: ENDOSCOPY | Age: 82
DRG: 378 | End: 2017-09-06
Payer: MEDICARE

## 2017-09-06 VITALS
HEIGHT: 70 IN | BODY MASS INDEX: 23.45 KG/M2 | OXYGEN SATURATION: 100 % | DIASTOLIC BLOOD PRESSURE: 75 MMHG | TEMPERATURE: 97.9 F | SYSTOLIC BLOOD PRESSURE: 156 MMHG | RESPIRATION RATE: 18 BRPM | WEIGHT: 163.8 LBS | HEART RATE: 72 BPM

## 2017-09-06 PROBLEM — K27.9 H PYLORI ULCER: Status: ACTIVE | Noted: 2017-09-06

## 2017-09-06 PROBLEM — B96.81 H PYLORI ULCER: Status: ACTIVE | Noted: 2017-09-06

## 2017-09-06 PROBLEM — R33.9 URINE RETENTION: Status: ACTIVE | Noted: 2017-09-06

## 2017-09-06 LAB
ANION GAP SERPL CALC-SCNC: 8 MMOL/L (ref 5–15)
BUN SERPL-MCNC: 32 MG/DL (ref 6–20)
BUN/CREAT SERPL: 12 (ref 12–20)
CALCIUM SERPL-MCNC: 8.6 MG/DL (ref 8.5–10.1)
CHLORIDE SERPL-SCNC: 110 MMOL/L (ref 97–108)
CO2 SERPL-SCNC: 25 MMOL/L (ref 21–32)
CREAT SERPL-MCNC: 2.66 MG/DL (ref 0.7–1.3)
ERYTHROCYTE [DISTWIDTH] IN BLOOD BY AUTOMATED COUNT: 12.9 % (ref 11.5–14.5)
GLUCOSE SERPL-MCNC: 88 MG/DL (ref 65–100)
H PYLORI FROM TISSUE: POSITIVE
HCT VFR BLD AUTO: 28.3 % (ref 36.6–50.3)
HGB BLD-MCNC: 9.1 G/DL (ref 12.1–17)
KIT LOT NO., HCLOLOT: ABNORMAL
MAGNESIUM SERPL-MCNC: 1.7 MG/DL (ref 1.6–2.4)
MCH RBC QN AUTO: 30.5 PG (ref 26–34)
MCHC RBC AUTO-ENTMCNC: 32.2 G/DL (ref 30–36.5)
MCV RBC AUTO: 95 FL (ref 80–99)
NEGATIVE CONTROL: NEGATIVE
PHOSPHATE SERPL-MCNC: 2.9 MG/DL (ref 2.6–4.7)
PLATELET # BLD AUTO: 164 K/UL (ref 150–400)
POSITIVE CONTROL: POSITIVE
POTASSIUM SERPL-SCNC: 4.2 MMOL/L (ref 3.5–5.1)
RBC # BLD AUTO: 2.98 M/UL (ref 4.1–5.7)
SODIUM SERPL-SCNC: 143 MMOL/L (ref 136–145)
WBC # BLD AUTO: 4.6 K/UL (ref 4.1–11.1)

## 2017-09-06 PROCEDURE — 36415 COLL VENOUS BLD VENIPUNCTURE: CPT | Performed by: INTERNAL MEDICINE

## 2017-09-06 PROCEDURE — 76040000019: Performed by: SPECIALIST

## 2017-09-06 PROCEDURE — 76060000031 HC ANESTHESIA FIRST 0.5 HR: Performed by: SPECIALIST

## 2017-09-06 PROCEDURE — 74011250637 HC RX REV CODE- 250/637: Performed by: UROLOGY

## 2017-09-06 PROCEDURE — 0DJD8ZZ INSPECTION OF LOWER INTESTINAL TRACT, VIA NATURAL OR ARTIFICIAL OPENING ENDOSCOPIC: ICD-10-PCS | Performed by: SPECIALIST

## 2017-09-06 PROCEDURE — 0DB68ZX EXCISION OF STOMACH, VIA NATURAL OR ARTIFICIAL OPENING ENDOSCOPIC, DIAGNOSTIC: ICD-10-PCS | Performed by: SPECIALIST

## 2017-09-06 PROCEDURE — 80048 BASIC METABOLIC PNL TOTAL CA: CPT | Performed by: INTERNAL MEDICINE

## 2017-09-06 PROCEDURE — 74011000258 HC RX REV CODE- 258

## 2017-09-06 PROCEDURE — 74011250637 HC RX REV CODE- 250/637: Performed by: INTERNAL MEDICINE

## 2017-09-06 PROCEDURE — 84100 ASSAY OF PHOSPHORUS: CPT | Performed by: INTERNAL MEDICINE

## 2017-09-06 PROCEDURE — 74011250636 HC RX REV CODE- 250/636

## 2017-09-06 PROCEDURE — 83735 ASSAY OF MAGNESIUM: CPT | Performed by: INTERNAL MEDICINE

## 2017-09-06 PROCEDURE — 87077 CULTURE AEROBIC IDENTIFY: CPT | Performed by: SPECIALIST

## 2017-09-06 PROCEDURE — 74011000250 HC RX REV CODE- 250

## 2017-09-06 PROCEDURE — 77030009426 HC FCPS BIOP ENDOSC BSC -B: Performed by: SPECIALIST

## 2017-09-06 PROCEDURE — 85027 COMPLETE CBC AUTOMATED: CPT | Performed by: INTERNAL MEDICINE

## 2017-09-06 PROCEDURE — 74011250637 HC RX REV CODE- 250/637: Performed by: SPECIALIST

## 2017-09-06 RX ORDER — AMOXICILLIN 500 MG/1
1000 CAPSULE ORAL 2 TIMES DAILY
Qty: 56 CAP | Refills: 0 | Status: SHIPPED | OUTPATIENT
Start: 2017-09-06 | End: 2017-09-20

## 2017-09-06 RX ORDER — PANTOPRAZOLE SODIUM 40 MG/1
40 TABLET, DELAYED RELEASE ORAL
Status: DISCONTINUED | OUTPATIENT
Start: 2017-09-06 | End: 2017-09-06 | Stop reason: HOSPADM

## 2017-09-06 RX ORDER — LIDOCAINE HYDROCHLORIDE 20 MG/ML
INJECTION, SOLUTION EPIDURAL; INFILTRATION; INTRACAUDAL; PERINEURAL AS NEEDED
Status: DISCONTINUED | OUTPATIENT
Start: 2017-09-06 | End: 2017-09-06 | Stop reason: HOSPADM

## 2017-09-06 RX ORDER — FINASTERIDE 5 MG/1
5 TABLET, FILM COATED ORAL DAILY
Status: DISCONTINUED | OUTPATIENT
Start: 2017-09-06 | End: 2017-09-06 | Stop reason: HOSPADM

## 2017-09-06 RX ORDER — FENTANYL CITRATE 50 UG/ML
100 INJECTION, SOLUTION INTRAMUSCULAR; INTRAVENOUS
Status: DISCONTINUED | OUTPATIENT
Start: 2017-09-06 | End: 2017-09-06 | Stop reason: HOSPADM

## 2017-09-06 RX ORDER — PANTOPRAZOLE SODIUM 40 MG/1
40 TABLET, DELAYED RELEASE ORAL 2 TIMES DAILY
Qty: 28 TAB | Refills: 0 | Status: SHIPPED | OUTPATIENT
Start: 2017-09-06 | End: 2017-09-20

## 2017-09-06 RX ORDER — NALOXONE HYDROCHLORIDE 0.4 MG/ML
0.4 INJECTION, SOLUTION INTRAMUSCULAR; INTRAVENOUS; SUBCUTANEOUS
Status: DISCONTINUED | OUTPATIENT
Start: 2017-09-06 | End: 2017-09-06 | Stop reason: HOSPADM

## 2017-09-06 RX ORDER — SODIUM CHLORIDE 9 MG/ML
50 INJECTION, SOLUTION INTRAVENOUS CONTINUOUS
Status: DISPENSED | OUTPATIENT
Start: 2017-09-06 | End: 2017-09-06

## 2017-09-06 RX ORDER — CARVEDILOL 12.5 MG/1
12.5 TABLET ORAL 2 TIMES DAILY WITH MEALS
Qty: 60 TAB | Refills: 0 | Status: SHIPPED | OUTPATIENT
Start: 2017-09-06 | End: 2017-10-10

## 2017-09-06 RX ORDER — PANTOPRAZOLE SODIUM 40 MG/1
40 TABLET, DELAYED RELEASE ORAL
Qty: 30 TAB | Refills: 1 | Status: SHIPPED | OUTPATIENT
Start: 2017-09-20 | End: 2018-02-07

## 2017-09-06 RX ORDER — MIDAZOLAM HYDROCHLORIDE 1 MG/ML
.25-5 INJECTION, SOLUTION INTRAMUSCULAR; INTRAVENOUS
Status: DISCONTINUED | OUTPATIENT
Start: 2017-09-06 | End: 2017-09-06 | Stop reason: HOSPADM

## 2017-09-06 RX ORDER — SODIUM CHLORIDE 9 MG/ML
INJECTION, SOLUTION INTRAVENOUS
Status: DISCONTINUED | OUTPATIENT
Start: 2017-09-06 | End: 2017-09-06 | Stop reason: HOSPADM

## 2017-09-06 RX ORDER — FINASTERIDE 5 MG/1
5 TABLET, FILM COATED ORAL DAILY
Qty: 30 TAB | Refills: 1 | Status: SHIPPED | OUTPATIENT
Start: 2017-09-06 | End: 2019-10-06

## 2017-09-06 RX ORDER — DEXTROMETHORPHAN/PSEUDOEPHED 2.5-7.5/.8
1.2 DROPS ORAL
Status: DISCONTINUED | OUTPATIENT
Start: 2017-09-06 | End: 2017-09-06 | Stop reason: HOSPADM

## 2017-09-06 RX ORDER — PROPOFOL 10 MG/ML
INJECTION, EMULSION INTRAVENOUS
Status: DISCONTINUED | OUTPATIENT
Start: 2017-09-06 | End: 2017-09-06 | Stop reason: HOSPADM

## 2017-09-06 RX ORDER — FLUMAZENIL 0.1 MG/ML
0.2 INJECTION INTRAVENOUS
Status: DISCONTINUED | OUTPATIENT
Start: 2017-09-06 | End: 2017-09-06 | Stop reason: HOSPADM

## 2017-09-06 RX ORDER — CLARITHROMYCIN 500 MG/1
500 TABLET, FILM COATED ORAL 2 TIMES DAILY
Qty: 28 TAB | Refills: 0 | Status: SHIPPED | OUTPATIENT
Start: 2017-09-06 | End: 2017-09-20

## 2017-09-06 RX ORDER — FERROUS SULFATE 324(65)MG
324 TABLET, DELAYED RELEASE (ENTERIC COATED) ORAL
Qty: 30 TAB | Refills: 1 | Status: SHIPPED | OUTPATIENT
Start: 2017-09-06 | End: 2018-02-07

## 2017-09-06 RX ORDER — TAMSULOSIN HYDROCHLORIDE 0.4 MG/1
0.4 CAPSULE ORAL DAILY
Qty: 30 CAP | Refills: 1 | Status: SHIPPED | OUTPATIENT
Start: 2017-09-06 | End: 2018-09-17 | Stop reason: CLARIF

## 2017-09-06 RX ORDER — AMOXICILLIN 250 MG
1 CAPSULE ORAL DAILY
Qty: 30 TAB | Refills: 1 | Status: SHIPPED | OUTPATIENT
Start: 2017-09-06 | End: 2017-10-10

## 2017-09-06 RX ORDER — PROPOFOL 10 MG/ML
INJECTION, EMULSION INTRAVENOUS AS NEEDED
Status: DISCONTINUED | OUTPATIENT
Start: 2017-09-06 | End: 2017-09-06 | Stop reason: HOSPADM

## 2017-09-06 RX ADMIN — SODIUM CHLORIDE: 9 INJECTION, SOLUTION INTRAVENOUS at 07:15

## 2017-09-06 RX ADMIN — AMLODIPINE BESYLATE 10 MG: 5 TABLET ORAL at 09:29

## 2017-09-06 RX ADMIN — ATORVASTATIN CALCIUM 20 MG: 20 TABLET, FILM COATED ORAL at 09:29

## 2017-09-06 RX ADMIN — CARVEDILOL 12.5 MG: 12.5 TABLET, FILM COATED ORAL at 09:29

## 2017-09-06 RX ADMIN — SIMETHICONE 80 MG: 20 SUSPENSION/ DROPS ORAL at 08:00

## 2017-09-06 RX ADMIN — FINASTERIDE 5 MG: 5 TABLET, FILM COATED ORAL at 09:35

## 2017-09-06 RX ADMIN — PROPOFOL 20 MG: 10 INJECTION, EMULSION INTRAVENOUS at 07:51

## 2017-09-06 RX ADMIN — TAMSULOSIN HYDROCHLORIDE 0.4 MG: 0.4 CAPSULE ORAL at 09:29

## 2017-09-06 RX ADMIN — PROPOFOL 140 MCG/KG/MIN: 10 INJECTION, EMULSION INTRAVENOUS at 07:50

## 2017-09-06 RX ADMIN — PROPOFOL 80 MG: 10 INJECTION, EMULSION INTRAVENOUS at 07:50

## 2017-09-06 RX ADMIN — PANTOPRAZOLE SODIUM 40 MG: 40 TABLET, DELAYED RELEASE ORAL at 09:29

## 2017-09-06 RX ADMIN — Medication 10 ML: at 06:07

## 2017-09-06 RX ADMIN — LIDOCAINE HYDROCHLORIDE 40 MG: 20 INJECTION, SOLUTION EPIDURAL; INFILTRATION; INTRACAUDAL; PERINEURAL at 07:50

## 2017-09-06 NOTE — PROGRESS NOTES
[address] [facility]  YOB: 1935          Assessment & Plan:   1. TINA or CKD progression  · He has active UA in 2015  · ? Nphrologist out pt  ·  TINA due to [facility]: better with pierce  · Cr worse from 2014 to 2017: migh be HTN nephrosclerosis  · When cr stable, ok to resume ACE  · US: B/l [place], DR. Moreno  2. HTN  · MANY YEARS  · Goal< 140/90  · Home readings will be help ful  3.  GI Bleed  · diverticular       Subjective:   CC:arf  HPI: Patient seen for TINA  TINA is better  US: Hydro BLPierce     Current Facility-Administered Medications   Medication Dose Route Frequency    finasteride (PROSCAR) tablet 5 mg  5 mg Oral DAILY    0.9% sodium chloride infusion  50 mL/hr IntraVENous CONTINUOUS    midazolam (VERSED) injection 0.25-5 mg  0.25-5 mg IntraVENous Multiple    fentaNYL citrate (PF) injection 100 mcg  100 mcg IntraVENous Multiple    naloxone (NARCAN) injection 0.4 mg  0.4 mg IntraVENous Multiple    flumazenil (ROMAZICON) 0.1 mg/mL injection 0.2 mg  0.2 mg IntraVENous Multiple    simethicone (MYLICON) 43VR/5.3GB oral drops 80 mg  1.2 mL Oral Multiple    pantoprazole (PROTONIX) tablet 40 mg  40 mg Oral ACB    sodium chloride (NS) flush 5-10 mL  5-10 mL IntraVENous Q8H    sodium chloride (NS) flush 5-10 mL  5-10 mL IntraVENous PRN    sodium chloride (NS) flush 5-10 mL  5-10 mL IntraVENous Q8H    sodium chloride (NS) flush 5-10 mL  5-10 mL IntraVENous PRN    acetaminophen (TYLENOL) tablet 650 mg  650 mg Oral Q4H PRN    morphine injection 1 mg  1 mg IntraVENous Q4H PRN    naloxone (NARCAN) injection 0.4 mg  0.4 mg IntraVENous PRN    amLODIPine (NORVASC) tablet 10 mg  10 mg Oral DAILY    tamsulosin (FLOMAX) capsule 0.4 mg  0.4 mg Oral DAILY    hydrALAZINE (APRESOLINE) 20 mg/mL injection 20 mg  20 mg IntraVENous Q6H PRN    atorvastatin (LIPITOR) tablet 20 mg  20 mg Oral DAILY    carvedilol (COREG) tablet 12.5 mg  12.5 mg Oral BID WITH MEALS          Objective:     Vitals:  Blood pressure 149/81, pulse 89, temperature 97.7 °F (36.5 °C), resp. rate 16, height 5' 10\" (1.778 m), weight 74.3 kg (163 lb 12.8 oz), SpO2 100 %. Temp (24hrs), Av °F (36.7 °C), Min:97.6 °F (36.4 °C), Max:98.6 °F (37 °C)      Intake and Output:  701 - 1900  In: 690 [P.O.:240; I.V.:450]  Out: -   1901 - 700  In: -   Out: 9074 [Urine:1550]    Physical Exam:                                  ECG/rhythm:    Data Review      No results for input(s): TNIPOC in the last 72 hours. No lab exists for component: ITNL   No results for input(s): CPK, CKMB, TROIQ in the last 72 hours. Recent Labs      17   0600  17   1047  17   0025   NA  143   --   143   K  4.2   --   4.4   CL  110*   --   108   CO2  25   --   27   BUN  32*   --   45*   CREA  2.66*   --   3.45*   GLU  88   --   109*   PHOS  2.9   --    --    MG  1.7   --    --    CA  8.6   --   8.6   ALB   --    --   3.6   WBC  4.6   --   5.9   HGB  9.1*  9.7*  10.4*   HCT  28.3*  30.5*  32.4*   PLT  164   --   179      Recent Labs      17   0025   INR  1.0   PTP  10.4   APTT  30.0     Needs: urine analysis, urine sodium, protein and creatinine  Lab Results   Component Value Date/Time    Sodium urine, random 120 2017 10:55 AM    Creatinine, urine 29.44 2017 10:55 AM              : Ashely Gonzalez MD  2017        [facility]:  [email or web address]  Shannon Gurpreet office:  [address] [place] [address], [address] [place] [address],[address]  [place], [address] [facility]  Phone: [phone]  Fax :     [phone]    [place] office:  [address] [place] [address]  [place], [address]   Phone - [phone]  Fax - [phone]

## 2017-09-06 NOTE — PROCEDURES
[address] [facility] ORACIO Chau MD  [phone]      2017    Esophagogastroduodenoscopy & Colonoscopy Procedure Note  Indy Marley  : 1935  Thelma Boyd Medical Record Number: [id number]      Indications:    Melena/hematochezia Iron deficiency anemia   Referring Physician:  Not On File [facility]  Anesthesia/Sedation: Conscious Sedation/Moderate Sedation/MAC  Endoscopist:  Dr. Taryn Grewal  Complications:  None  Estimated Blood Loss:  None    Permit:  The indications, risks, benefits and alternatives were reviewed with the patient or their decision maker who was provided an opportunity to ask questions and all questions were answered. The specific risks of esophagogastroduodenoscopy with conscious sedation were reviewed, including but not limited to anesthetic complication, bleeding, adverse drug reaction, missed lesion, infection, IV site reactions, and intestinal perforation which would lead to the need for surgical repair. Alternatives to EGD and colonoscopy including radiographic imaging, observation without testing, or laboratory testing were reviewed as well as the limitations of those alternatives discussed. After considering the options and having all their questions answered, the patient or their decision maker provided both verbal and written consent to proceed. -----------EGD------------   Procedure in Detail:  After obtaining informed consent, positioning of the patient in the left lateral decubitus position, and conduction of a pre-procedure pause or \"time out\" the endoscope was introduced into the mouth and advanced to the duodenum. A careful inspection was made, and findings or interventions are described below. Findings:   Esophagus:Medium sized hiatus hernia - no varices or bleeding.     Stomach: Hiatal hernia and erythematous gastropathy, Biopsies from the mucosa of the gastric antrum and gastric fundus are taken for rapid helicobacter testing. Hemostasis is confirmed from biopsy sites. Duodenum/jejunum: Small 4mm ulcer of duodenal bulb, not bleeding. No stigmata or hematin.        ----------Colonoscopy-----------    Procedure in Detail:  After obtaining informed consent, positioning of the patient in the left lateral decubitus position, and conduction of a pre-procedure pause or \"time out\" the endoscope was introduced into the anus and advanced to the cecum, which was identified by the ileocecal valve and appendiceal orifice. The quality of the colonic preparation was good. A careful inspection was made as the colonoscope was withdrawn, findings and interventions are described below. Findings: There is diverticulosis in the sigmoid colon without complications such as bleeding, inflammatory change, or luminal narrowing. In the rectum, medium internal hemorrhoids are noted without bleeding. OF note, the succus in the colon is completely free of blood or hematin. Bleeding has stopped.    ------------------------------  Specimens:    See above    Complications:   None; patient tolerated the procedure well. Impressions:  EGD:  Duodenal ulcer and gastritis and hiatus hernia. Colonoscopy: Aside from diverticulosis: Normal colonoscopy to the cecum, with no evidence of neoplasia, or mucosal abnormality. Recommendations:      - Advance diet and discharge  -Iron daily until hgb normal  -Repeat CBC at PCP in 2-4 weeks  -PPI for 2 months. Minimize or avoid NSAIDs. Thank you for entrusting me with this patient's care. Please do not hesitate to contact me with any questions or if I can be of assistance with any of your other patients' GI needs.     Signed By: Noa Pretty MD                        September 6, 2017

## 2017-09-06 NOTE — DISCHARGE INSTRUCTIONS
HOSPITALIST DISCHARGE INSTRUCTIONS  NAME: Tracey Rosenbaum   :  1935   MRN:  [id number]     Date/Time:  2017 12:41 PM    ADMIT DATE: 2017     DISCHARGE DATE: 2017     ADMITTING DIAGNOSIS:  GI bleed, peptic ulcer, gastritis, renal failure from urine obstruction    DISCHARGE DIAGNOSIS:  same    MEDICATIONS:  See after visit summary       · It is important that you take the medication exactly as they are prescribed. · Keep your medication in the bottles provided by the pharmacist and keep a list of the medication names, dosages, and times to be taken in your wallet. · Do not take other medications without consulting your doctor     Pain Management: per above medications    What to do at Home    Recommended diet:  Cardiac Diet    Recommended activity: Activity as tolerated    1) Return to the hospital if you feel worse    2) If you experience any of the following symptoms then please call your primary care physician or return to the emergency room if you cannot get hold of your doctor:  Fever, chills, nausea, vomiting, diarrhea, change in mentation, falling, bleeding, shortness of breath, chest pain, severe headache, severe abdominal pain,     3) Keep pelayo catheter until your can follow up with the Urologist.  Start taking flomax and proscar for your prostate    4) You have gastric ulcers, caused by H. Pylori bacteria. Take Amoxicillin, Clarithromycin, and Protonix for 14 days, then take protonix daily    5) Do not take NSAIDS (ibuprofen, naproxen, BC powder, etc...)    6) Due to renal failure, hold your benazepril and hygroten until you can see your primary care doctor    Follow Up:   Follow-up Information     Follow up With Details Comments Contact Info    your primary care doctor Schedule an appointment as soon as possible for a visit in 1 week      Nilda Junior MD Schedule an appointment as soon as possible for a visit in 2 weeks  [address]  [facility] [address] [phone] Joe Espinoza MD Schedule an appointment as soon as possible for a visit in 1 week for voiding trial [address] [address] [phone]      Efrain Bowman MD Schedule an appointment as soon as possible for a visit in 2 weeks  [address] [place]  [address]  [address] [place] [address]  [phone]              Information obtained by :  I understand that if any problems occur once I am at home I am to contact my physician. I understand and acknowledge receipt of the instructions indicated above. [de-identified] or R.N.'s Signature                                                                  Date/Time                                                                                                                                              Patient or Representative Signature                                                          Date/Time         H. Pylori Bacterial Infection: Care Instructions  Your Care Instructions    Your test shows the presence of Helicobacter pylori (H. pylori), a kind of bacterium that lives in the lining of the stomach. Many people have H. pylori in their stomachs and do not develop problems. But sometimes H. pylori causes an upset stomach or a sore (ulcer) in the stomach lining. Most stomach ulcers are caused by H. pylori. Symptoms of an ulcer include gnawing or burning pain in the belly that can last minutes or hours. Eating food or taking antacids helps relieve the pain, but the symptoms may come back after a while. Antibiotic medicine can cure an H. pylori infection. Follow-up care is a key part of your treatment and safety. Be sure to make and go to all appointments, and call your doctor if you are having problems. Its also a good idea to know your test results and keep a list of the medicines you take.   How can you care for yourself at home? · Take your antibiotics as directed. Do not stop taking them just because you feel better. You need to take the full course of antibiotics. · If your doctor prescribes other medicine, take it exactly as prescribed. Call your doctor if you think you are having a problem with your medicine. You will get more details on the specific medicine your doctor prescribes. · Eat a healthy, balanced diet. ¨ Eat smaller meals, and eat more often. Be sure to eat at least three meals a day. ¨ Avoid heavily spiced or greasy foods. ¨ Do not drink beverages that have caffeine if they bother your stomach. These include coffee, tea, and soda. · Do not smoke. Smoking slows the healing of your ulcer and can make an ulcer come back. If you need help quitting, talk to your doctor about stop-smoking programs and medicines. These can increase your chances of quitting for good. · Limit how much alcohol you drink. Alcohol can slow healing of an ulcer and can make your symptoms worse. · Wash your hands after going to the bathroom. · Avoid aspirin, ibuprofen, or other anti-inflammatory medicines, because they can irritate the stomach. If you need pain medicine, try acetaminophen (Tylenol). When should you call for help? Call 911 anytime you think you may need emergency care. For example, call if:  · You passed out (lost consciousness). · You vomit blood or what looks like coffee grounds. · You pass maroon or very bloody stools. Call your doctor now or seek immediate medical care if:  · You have severe pain in your belly, back, or shoulders. · You have new or worsening belly pain. · You are dizzy or lightheaded, or you feel like you may faint. · Your stools are black and tarlike or have streaks of blood. Watch closely for changes in your health, and be sure to contact your doctor if:  · You have new symptoms such as weight loss, nausea, or vomiting. · You do not feel better as expected.   Where can you learn more? Go to http://oscar-kamari.info/. Enter Q231 in the search box to learn more about \"H. Pylori Bacterial Infection: Care Instructions. \"  Current as of: November 21, 2016  Content Version: 11.3  © 6301-2001 [employer]. Care instructions adapted under license by [employer] (which disclaims liability or warranty for this information). If you have questions about a medical condition or this instruction, always ask your healthcare professional. [place] [address] any warranty or liability for your use of this information. Peptic Ulcer Disease: Care Instructions  Your Care Instructions    Peptic ulcers are sores on the inside of the stomach or the small intestine. They are usually caused by an infection with bacteria or from use of nonsteroidal anti-inflammatory drugs (NSAIDs). NSAIDs include aspirin, ibuprofen (Advil), and naproxen (Aleve). Your doctor may have prescribed medicine to reduce stomach acid. You also may need to take antibiotics if your peptic ulcers are caused by an infection. You can help your stomach heal and keep ulcers from coming back by making some changes in your lifestyle. Quit smoking, limit caffeine and alcohol, and reduce stress. Follow-up care is a key part of your treatment and safety. Be sure to make and go to all appointments, and call your doctor if you are having problems. Its also a good idea to know your test results and keep a list of the medicines you take. How can you care for yourself at home? · Take your medicines exactly as prescribed. Call your doctor if you think you are having a problem with your medicine. · Do not take aspirin or other NSAIDs such as ibuprofen (Advil or Motrin) or naproxen (Aleve). Ask your doctor what you can take for pain. · Do not smoke. Smoking can make ulcers worse. If you need help quitting, talk to your doctor about stop-smoking programs and medicines.  These can increase your chances of quitting for good. · Drink in moderation or avoid drinking alcohol. · Do not drink beverages that have caffeine if they bother your stomach. These include coffee, tea, and soda. · Eat a balanced diet of small, frequent meals. Make an appointment with a dietitian if you need help planning your meals. · Reduce stress. Avoid people and places that make you feel anxious, if you can. Learn ways to reduce stress, such as biofeedback, guided imagery, and meditation. When should you call for help? Call 911 anytime you think you may need emergency care. For example, call if:  · You passed out (lost consciousness). · You vomit blood or what looks like coffee grounds. · You pass maroon or very bloody stools. Call your doctor now or seek immediate medical care if:  · You have severe pain in your belly, back, or shoulders. · You have new or worsening belly pain. · You are dizzy or lightheaded, or you feel like you may faint. · Your stools are black and tarlike or have streaks of blood. Watch closely for changes in your health, and be sure to contact your doctor if:  · You have new symptoms such as weight loss, nausea or vomiting. · You do not feel better as expected. Where can you learn more? Go to http://oscar-kamari.info/. Enter Q987 in the search box to learn more about \"Peptic Ulcer Disease: Care Instructions. \"  Current as of: August 9, 2016  Content Version: 11.3  © 1814-1965 [employer]. Care instructions adapted under license by [employer] (which disclaims liability or warranty for this information). If you have questions about a medical condition or this instruction, always ask your healthcare professional. [place] [address] any warranty or liability for your use of this information.          Acute Kidney Injury: Care Instructions  Your Care Instructions  Acute kidney injury is the sudden loss of kidney function that happens when the kidneys stop working over a period of hours, days or, in some cases, weeks. It is also known as acute renal failure. Common causes of acute kidney injury are dehydration, blood loss, and medicines. When acute kidney injury happens, the kidneys cannot remove waste and excess fluids from the body. The waste and fluids build up and become harmful. Kidney function may return to normal if the cause of acute kidney injury is treated quickly. Your chance of a full recovery depends on what caused the problem, how quickly the cause was treated, and what other medical problems you have. A machine may be used to help your kidneys remove waste and fluids for a short period of time. This is called dialysis. Follow-up care is a key part of your treatment and safety. Be sure to make and go to all appointments, and call your doctor if you are having problems. It's also a good idea to know your test results and keep a list of the medicines you take. How can you care for yourself at home? · Talk to your doctor about how much fluid you should drink. · Eat a balanced diet. Talk to your doctor or a dietitian about what type of diet may be best for you. · Follow the instructions and schedule for dialysis that your doctor gives you. · Do not smoke. Smoking can make your condition worse. If you need help quitting, talk to your doctor about stop-smoking programs and medicines. These can increase your chances of quitting for good. · Do not drink alcohol. · Review all of your medicines with your doctor. Do not take any medicines, including nonsteroidal anti-inflammatory drugs (NSAIDs) such as ibuprofen (Advil, Motrin) or naproxen (Aleve), unless your doctor says it is safe for you to do so. · Make sure that anyone treating you for any health problem knows that you have had acute kidney injury. When should you call for help? Call 911 anytime you think you may need emergency care.  For example, call if:  · You passed out (lost consciousness). · You have severe trouble breathing. Call your doctor now or seek immediate medical care if:  · You have less urine than normal or no urine. · You have trouble urinating or can urinate only very small amounts. · You are confused or have trouble thinking clearly. · You feel weaker or more tired than usual.  · You are very thirsty, lightheaded, or dizzy. · You have nausea and vomiting. · You have new swelling of your arms or feet, or your swelling is worse. · You have blood in your urine. · Your body weight goes up every day. · You have new or worse trouble breathing. Watch closely for changes in your health, and be sure to contact your doctor if:  · You do not get better as expected. Where can you learn more? Go to http://oscar-kamari.info/. Enter W540 in the search box to learn more about \"Acute Kidney Injury: Care Instructions. \"  Current as of: April 3, 2017  Content Version: 11.3  © 0623-6607 [employer]. Care instructions adapted under license by [employer] (which disclaims liability or warranty for this information). If you have questions about a medical condition or this instruction, always ask your healthcare professional. [place] [address] any warranty or liability for your use of this information. Urinary Retention: Care Instructions  Your Care Instructions    Urinary retention means that you aren't able to urinate. In men, it is often caused by a blockage of the urinary tract from an enlarged prostate gland. In men and women, it can also be caused by an infection or nerve damage. Or it may be a side effect of a medicine. The doctor may have drained the urine from your bladder. If you still have problems passing urine, you may need to use a catheter at home. This is used to empty your bladder until the problem can be fixed. Your doctor may put a catheter in your bladder before you go home.  If so, he or she will tell you when to come back to have the catheter removed. The doctor has checked you closely. But problems can develop later. If you notice any problems or new symptoms, get medical treatment right away. Follow-up care is a key part of your treatment and safety. Be sure to make and go to all appointments, and call your doctor if you are having problems. It's also a good idea to know your test results and keep a list of the medicines you take. How can you care for yourself at home? · Take your medicines exactly as prescribed. Call your doctor if you think you are having a problem with your medicine. You will get more details on the specific medicines your doctor prescribes. · Check with your doctor before you use any over-the-counter medicines. Many cold and allergy medicines, for example, can make this problem worse. Make sure your doctor knows all of the medicines, vitamins, supplements, and herbal remedies you take. · Spread out through the day the amount of fluid you drink. Do not drink a lot at bedtime. · Avoid alcohol and caffeine. · If you have been given a catheter, or if one is already in place, follow the instructions you were given. Always wash your hands before and after you handle the catheter. When should you call for help? Call your doctor now or seek immediate medical care if:  · You cannot urinate at all, or it is getting harder to urinate. · You have symptoms of a urinary tract infection. These may include:  ¨ Pain or burning when you urinate. ¨ A frequent need to urinate without being able to pass much urine. ¨ Pain in the flank, which is just below the rib cage and above the waist on either side of the back. ¨ Blood in your urine. ¨ A fever. Watch closely for changes in your health, and be sure to contact your doctor if:  · You have any problems with your catheter. · You do not get better as expected. Where can you learn more?   Go to http://oscar-kamari.info/. Enter M244 in the search box to learn more about \"Urinary Retention: Care Instructions. \"  Current as of: August 12, 2016  Content Version: 11.3  © 2777-9764 [employer]. Care instructions adapted under license by [employer] (which disclaims liability or warranty for this information). If you have questions about a medical condition or this instruction, always ask your healthcare professional. [place] [address] any warranty or liability for your use of this information.

## 2017-09-06 NOTE — ROUTINE PROCESS
Pham Cristina  1935  [id number]    Situation:  Verbal report received from: Macho Oneil RN  Procedure: Procedure(s):  ESOPHAGOGASTRODUODENOSCOPY (EGD)/COLONOSCOPY  COLONOSCOPY  ESOPHAGOGASTRODUODENAL (EGD) BIOPSY    Background:    Preoperative diagnosis: melena  Postoperative diagnosis: Hiatal Hernia  Gastritis  Duodenal Ulcer  Diverticulosis  Hemorrhoids    :  Dr. Jossy Rosales  Assistant(s): Endoscopy Technician-1: Jose Miguel Sin  Endoscopy RN-1: Jarod Bundy RN    Specimens: * No specimens in log *  H. Pylori  no    Assessment:  Intra-procedure medications     Anesthesia gave intra-procedure sedation and medications, see anesthesia flow sheet yes    Intravenous fluids: NS@ KVO     Vital signs stable     Abdominal assessment: round and soft     Recommendation:  Discharge patient per MD order. Return to floor  Permission to share finding with family or friend n/a.

## 2017-09-06 NOTE — PROGRESS NOTES
Assumed care of patient from anesthesia. Endoscope was pre-cleaned at bedside immediately following procedure by Nadia.

## 2017-09-06 NOTE — PROGRESS NOTES
See anesthesia flow-sheet for procedural sedation and vital signs. No respiratory distress. Abdomen without distention. Stable for transfer to recovery per anesthesia. Rapid H Pylori obtained.

## 2017-09-06 NOTE — ANESTHESIA POSTPROCEDURE EVALUATION
Post-Anesthesia Evaluation and Assessment    Patient: Swetha De Jesus MRN: [id number]  SSN: [id number]    YOB: 1935  Age: 80 y.o. Sex: male       Cardiovascular Function/Vital Signs  Visit Vitals    /56    Pulse 83    Temp 36.5 °C (97.7 °F)    Resp 18    Ht 5' 10\" (1.778 m)    Wt 74.3 kg (163 lb 12.8 oz)    SpO2 100%    BMI 23.5 kg/m2       Patient is status post MAC anesthesia for Procedure(s):  ESOPHAGOGASTRODUODENOSCOPY (EGD)/COLONOSCOPY  COLONOSCOPY  ESOPHAGOGASTRODUODENAL (EGD) BIOPSY. Nausea/Vomiting: None    Postoperative hydration reviewed and adequate. Pain:  Pain Scale 1: Numeric (0 - 10) (09/06/17 0817)  Pain Intensity 1: 0 (09/06/17 0817)   Managed    Neurological Status:   Neuro  Neurologic State: Alert (09/05/17 1000)  Cognition: Follows commands (09/05/17 1000)   At baseline    Mental Status and Level of Consciousness: Arousable    Pulmonary Status:   O2 Device: Nasal cannula (09/06/17 0817)   Adequate oxygenation and airway patent    Complications related to anesthesia: None    Post-anesthesia assessment completed.  No concerns    Signed By: Dayo Calvert MD     September 6, 2017

## 2017-09-06 NOTE — PROGRESS NOTES
Bedside and Verbal shift change report given to JOS Hernandez (oncoming nurse) by Bessie Rangel RN (offgoing nurse). Report included the following information SBAR, Kardex, Procedure Summary, Intake/Output, MAR, Accordion and Recent Results.

## 2017-09-06 NOTE — DISCHARGE SUMMARY
Jaspal Murrell [facility]  [address] [facility], [address] [place] [address]  [phone]    Physician Discharge Summary     Patient ID:  eKnn Nathan  [id number]  41 y.o.  1935    Admit date: 9/5/2017    Discharge date and time: 9/6/2017    Admission Diagnoses: GI bleeding  melena    Discharge Diagnoses:  Principal Diagnosis GI bleeding                                            Principal Problem:    GI bleeding (9/5/2017)    Active Problems:    HTN (hypertension), benign (9/2/2011)      Coronary atherosclerosis of native coronary artery (9/2/2011)      Other and unspecified hyperlipidemia (9/2/2011)      Melena (9/5/2017)      CKD (chronic kidney disease) stage 4, GFR 15-29 ml/min ([facility]) (9/5/2017)      BPH (benign prostatic hypertrophy) (9/5/2017)      Hydronephrosis (9/5/2017)      H pylori ulcer (9/6/2017)      Urine retention (9/6/2017)           Hospital Course:     79 yo hx of HTN, CAD s/p CABG, CKD, BPH, presented w/ GI bleeding, TINA, bilateral hydronephrosis     1) GI bleeding/gastritis/PUD: GI performed an EGD which showed PUD, gastropathy. H. Pylori was positive. Colonoscopy unremarkable. Patient will be treated with protonix/amox/clarithromycin for 14 days, then protonix daily. F/u with Dr. Fer Amaya     2) TINA/CKD 4: likely due to urine obstruction. Improving with pelayo. Will hold diuretics, ACEi. Renal was following     3) Bilateral hydronephrosis/urine retention: likely from BPH. Urology following. Cont pelayo until patient can f/u with Dr. Eddie Curry. flomax and proscar was started     4) HTN: cont coreg, norvasc.   Hold ACEi and diuretics due to TINA     5) CAD: cont BB, statin, low dose ASA     6) BPH: started flomax, proscar    PCP: PROVIDER UNKNOWN     Consults: GI, renal, urology    Significant Diagnostic Studies: EGD/colonoscopy    Discharge Exam:  Physical Exam:    Gen:  Elderly, thin, frail, NAD  HEENT:  Pink conjunctivae, PERRL, hearing intact to voice, moist mucous membranes  Neck:  Supple, without masses, thyroid non-tender  Resp:  No accessory muscle use, clear breath sounds without wheezes rales or rhonchi  Card:  No murmurs, normal S1, S2 without thrills, bruits or peripheral edema  Abd:  Soft, non-tender, non-distended, normoactive bowel sounds are present  Musc:  No cyanosis or clubbing  Skin:  No rashes  Neuro:  Cranial nerves 3-12 are grossly intact, follows commands appropriately  Psych:  Fair insight, oriented to person, place and time, alert    Disposition: home  Discharge Condition: Stable    Patient Instructions:   Current Discharge Medication List      START taking these medications    Details   ! ! pantoprazole (PROTONIX) 40 mg tablet Take 1 Tab by mouth Daily (before breakfast). Qty: 30 Tab, Refills: 1      finasteride (PROSCAR) 5 mg tablet Take 1 Tab by mouth daily. Qty: 30 Tab, Refills: 1      tamsulosin (FLOMAX) 0.4 mg capsule Take 1 Cap by mouth daily. Qty: 30 Cap, Refills: 1      ferrous sulfate 324 mg (65 mg iron) tablet Take 1 Tab by mouth Daily (before breakfast). Qty: 30 Tab, Refills: 1      senna-docusate (SENNA WITH DOCUSATE SODIUM) 8.6-50 mg per tablet Take 1 Tab by mouth daily. Qty: 30 Tab, Refills: 1      amoxicillin (AMOXIL) 500 mg capsule Take 2 Caps by mouth two (2) times a day for 14 days. Qty: 56 Cap, Refills: 0      clarithromycin (BIAXIN) 500 mg tablet Take 1 Tab by mouth two (2) times a day for 14 days. Qty: 28 Tab, Refills: 0      !! pantoprazole (PROTONIX) 40 mg tablet Take 1 Tab by mouth two (2) times a day for 14 days. Qty: 28 Tab, Refills: 0       !! - Potential duplicate medications found. Please discuss with provider. CONTINUE these medications which have CHANGED    Details   carvedilol (COREG) 12.5 mg tablet Take 1 Tab by mouth two (2) times daily (with meals).   Qty: 60 Tab, Refills: 0         CONTINUE these medications which have NOT CHANGED    Details   dextran 70-hypromellose (ARTIFICIAL TEARS,[id number],) ophthalmic solution Administer 1 Drop to both eyes as needed (dry eye). acetaminophen (TYLENOL) 325 mg tablet Take 325 mg by mouth every four (4) hours as needed for Pain. atorvastatin (LIPITOR) 20 mg tablet Take 20 mg by mouth daily. aspirin delayed-release 81 mg tablet Take 81 mg by mouth daily. amlodipine (NORVASC) 10 mg tablet Take 10 mg by mouth daily. carbamazepine (TEGRETOL) 200 mg tablet Take 200 mg by mouth three (3) times daily.          STOP taking these medications       chlorthalidone (HYGROTEN) 25 mg tablet Comments:   Reason for Stopping:         benazepril (LOTENSIN) 40 mg tablet Comments:   Reason for Stopping:             Activity: Activity as tolerated  Diet: Cardiac Diet  Wound Care: None needed    Follow-up with  Follow-up Information     Follow up With Details Comments Contact Info    your primary care doctor Schedule an appointment as soon as possible for a visit in 1 week      Farrah Zarate MD Schedule an appointment as soon as possible for a visit in 2 weeks  [facility] [address]      Corwin Zurita MD Schedule an appointment as soon as possible for a visit in 1 week for voiding trial [id number] [facility]      Harry Guillen MD Schedule an appointment as soon as possible for a visit in 2 weeks  [facility] [address] [place] [address]  [phone]            Follow-up tests/labs: BMP    Signed:  Winnifred Landau, MD  9/6/2017  12:45 PM    I spent 40 min on discharge

## 2017-09-06 NOTE — PROGRESS NOTES
GI Note    HELICOBACTER POSITIVE    On discharge he needs for 14 days:  Amoxicillin 1000mg PO BID  Clarithromycin 500mg PO BID  Pantoprazole 40mg BID    After above (2 weeks)  Pantoprazole 40mg PO Daily before breakfast  8 weeks  Emmy Ramirez MD

## 2017-09-06 NOTE — PROGRESS NOTES
TRANSFER - OUT REPORT:    Verbal report given to Debbie ARGUELLO on Donald Hurtado  being transferred to [address] [place] [address] for routine progression of care       Report consisted of patients Situation, Background, Assessment and   Recommendations(SBAR). Information from the following report(s) Procedure Summary was reviewed with the receiving nurse. Lines:   Peripheral IV 09/05/17 Left Forearm (Active)   Site Assessment Clean, dry, & intact 9/6/2017  7:29 AM   Phlebitis Assessment 0 9/6/2017  7:29 AM   Infiltration Assessment 0 9/6/2017  7:29 AM   Dressing Status Clean, dry, & intact 9/6/2017  7:29 AM   Dressing Type Tape;Transparent 9/6/2017  7:29 AM   Hub Color/Line Status Pink 9/6/2017  7:29 AM   Action Taken Open ports on tubing capped 9/6/2017  7:29 AM   Alcohol Cap Used Yes 9/6/2017  7:29 AM        Opportunity for questions and clarification was provided.       Patient transported with:   transport staff

## 2017-09-06 NOTE — PROGRESS NOTES
Cr down,  Staff reports urine with blood last night  Now clear. Plan  -retention keep pelayo x 1 week, void trial in office  - hematuria likely due to prostate, will start proscar  Arf, improved      See recent office note below from march:    Mr. Amy Higgins is an 80-year-old gentleman with a complaint of difficulty with voiding symptoms that have been problematic. He has a history of a Pleayo catheter placement, and we had seen him last on 03/21/2017. I reviewed his note from that visit. He has had an extensive medical history including a history of congestive heart failure, coronary artery disease, and chronic renal disease with a serum creatinine of 3.04 on recent hospitalization. He had been on Cardura and finasteride, and he had to have a Pelayo catheter secondary to his urinary retention. We had discussed options with him on his last office visit, and I recommended a followup at this time. He had improved cardiac symptoms, and we had done a voiding trial at his last office visit. He was able to empty, and I had asked him to see me back at this time. His urinalysis today is clear. His renal ultrasound was done for a history of urinary retention and elevated creatinine. His ultrasound was reviewed. He has got echogenic kidneys bilaterally consistent with his medical renal disease. He had a small 16-mm lower pole lesion on the right, and a six-month followup was recommended. Simple cysts bilaterally were also noted. He did have a postvoid residual of 224 cc with some bladder wall thickening. I did review his films today. He reports he has been doing very well. He is voiding without difficulty. He is not having any dysuria or other symptoms, and no complaints. He has had a little bit of urinary frequency. This has been localized to his bladder, long standing now, improved with his Pelayo, and now improved with his medical management. He is essentially asymptomatic.     He has also had a little bit of constipation but this has not been severe. PAST MEDICAL HISTORY:    Allergies: No known allergies. DENIES: Latex, Shellfish, X-Ray Dye, Iodine. Medications: CIPROFLOXACIN  MG TABS (CIPROFLOXACIN HCL) 1 tablet by mouth twice per day  PREDNISONE 20 MG ORAL TABS (PREDNISONE) Daily  * MOMETASONE FORMOTEROL Two puffs twice a day  FINASTERIDE 5 MG ORAL TABS (FINASTERIDE) Daily  CARVEDILOL 6.25 MG ORAL TABS (CARVEDILOL) Daily  CARBATROL 300 MG ORAL XR12H-CAP (CARBAMAZEPINE) Daily  BENAZEPRIL HCL 40 MG ORAL TABS (BENAZEPRIL HCL) Daily  ASPIRIN CHILDRENS 81 MG ORAL CHEW (ASPIRIN) Daily  AMLODIPINE BESYLATE 10 MG ORAL TABS (AMLODIPINE BESYLATE) Daily  ALFUZOSIN HCL ER 10 MG ORAL XR24H-TAB (ALFUZOSIN HCL) At bedtime    Problems: Renal cyst (ICD-753.10) (IED61-K72.00)  Incomplete bladder emptying (KFK-552.01) (GJV08-S37.9)  Elevated serum creatinine (ICD-794.4) (GVE32-R75.89)  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 DENIES: Lung Disease, Diabetes, Bowel Problems, Stroke/Seizure, Kidney Problems, Bleeding Problems, HIV, Hepatitis, or Cancer. Surgeries: DENIES pertinent  surgeries      Family History: DENIES: Prostate cancer, Kidney cancer, Kidney disease, Kidney stones. Social History: Retired. . Smoking status: never smoker. Does not drink alcohol. System Review: DENIES: Unexplained Weight Loss, Dry Eyes, Dry Mouth, Leg Swelling, Shortness of Breath, Constipation, Involuntary Urine Loss, Lower Extremity Weakness, Dry Skin, Difficulty Walking, Psychiatric Problems, Impaired Sex Drive, Easy Bleeding, Rash. URINALYSIS from Voided specimen  Urine Dip: pH: 5.0, Bld: Neg, LE: Neg, Nit: Neg, Prot: Neg, Ket: Neg, Gluc: Neg  Urine Micro not done    IMPRESSION:    1. Urinary retention resolved.  He still has an elevated residual urine, and I would recommend observation at this time as he is very comfortable. I discussed this with him and his son, and we will monitor. I am going to see him in 3 months for a bladder scan in followup. 2. Renal cysts bilaterally. These are somewhat complex, and we will repeat his ultrasound in 6 months. He is an elderly 80year old with renal insufficiency, and contrast is contraindicated for him. He does not have any evidence of hydronephrosis. I have him on maximal medical management for his bladder outlet obstruction. We will see him back in 3 months for a bladder scan and a repeat urinalysis. He will contact me if he has any problems. Precautions were reviewed with him and his son.     Transcribed by [facility]/mb         Today's Services  E&M Service, Urinalysis Dipstick    Upcoming Orders  Return Office Visit - with Brandon Chisholm MD in 3 months  Bladder Scan, UA        Electronically signed by Paul Mejía on 03/31/2017 at 9:57 AM    Electronically signed by Brandon Chisholm MD on 04/03/2017 at 9:20 AM  _______________________________________________________________________

## 2017-09-06 NOTE — ANESTHESIA PREPROCEDURE EVALUATION
Anesthetic History   No history of anesthetic complications            Review of Systems / Medical History  Patient summary reviewed, nursing notes reviewed and pertinent labs reviewed    Pulmonary  Within defined limits  COPD               Neuro/Psych   Within defined limits           Cardiovascular  Within defined limits  Hypertension      CHF    Pacemaker and CAD    Exercise tolerance: >4 METS     GI/Hepatic/Renal  Within defined limits       Renal disease: CRI       Endo/Other  Within defined limits           Other Findings   Comments: Hx GI bleed           Physical Exam    Airway  Mallampati: II    Neck ROM: normal range of motion   Mouth opening: Normal     Cardiovascular  Regular rate and rhythm,  S1 and S2 normal,  no murmur, click, rub, or gallop  Rhythm: regular  Rate: normal         Dental  No notable dental hx       Pulmonary  Breath sounds clear to auscultation               Abdominal  GI exam deferred       Other Findings            Anesthetic Plan    ASA: 3  Anesthesia type: MAC          Induction: Intravenous  Anesthetic plan and risks discussed with: Patient

## 2017-09-06 NOTE — PERIOP NOTES
Deepak Mims  1935  [id number]    Situation:    Scheduled Procedure: Procedure(s):  ESOPHAGOGASTRODUODENOSCOPY (EGD)/COLONOSCOPY  Verbal report received from: Foster Jeff RN  Preoperative diagnosis: melena    Background:    Procedure: Procedure(s):  ESOPHAGOGASTRODUODENOSCOPY (EGD)/COLONOSCOPY  Physician performing procedure; Dr. Janak Wasserman RN    NPO Status/Last PO Intake: midnight    Pregnancy Test:Not applicable If yes, result: none    Is the patient taking Blood Thinners: NO  Is the patient diabetic:no  Does the patient have a Pacemaker/Defibrillator in place?: yes ( no device management orders. Will request this morning)  Does the patient need antibiotics before/during/after procedure: no   If the patient is having a colon, How much prep was drank? all   What were the Colon prep results? Stools clear per RN report   Does the patient have SCD in place:no   Is patient on CONTACT precautions:no        If yes, what kind of CONTACT precautions:     Assessment:  Are the vital signs stable prior to patient coming to ENDO?  yes  Is the patient alert/oriented and able to sign consent for the procedures:yes  How does the patient's abdomen feel prior to coming to ENDO? To be assessed in Endoscopy  Does the patient have a patient IV in place? yes     Recommendation:  Family or Friend present no     Permission to share finding with Family or Friend n/a    Consult from Dr. Yumi Hatfield reviewed.

## 2017-10-10 ENCOUNTER — APPOINTMENT (OUTPATIENT)
Dept: ULTRASOUND IMAGING | Age: 82
DRG: 305 | End: 2017-10-10
Attending: INTERNAL MEDICINE
Payer: MEDICARE

## 2017-10-10 ENCOUNTER — APPOINTMENT (OUTPATIENT)
Dept: CT IMAGING | Age: 82
DRG: 305 | End: 2017-10-10
Attending: EMERGENCY MEDICINE
Payer: MEDICARE

## 2017-10-10 ENCOUNTER — HOSPITAL ENCOUNTER (INPATIENT)
Age: 82
LOS: 3 days | Discharge: HOME | DRG: 305 | End: 2017-10-13
Attending: EMERGENCY MEDICINE | Admitting: INTERNAL MEDICINE
Payer: MEDICARE

## 2017-10-10 ENCOUNTER — APPOINTMENT (OUTPATIENT)
Dept: GENERAL RADIOLOGY | Age: 82
DRG: 305 | End: 2017-10-10
Attending: EMERGENCY MEDICINE
Payer: MEDICARE

## 2017-10-10 DIAGNOSIS — Z91.14 NONCOMPLIANCE WITH MEDICATION REGIMEN: ICD-10-CM

## 2017-10-10 DIAGNOSIS — N17.9 AKI (ACUTE KIDNEY INJURY) (HCC): ICD-10-CM

## 2017-10-10 DIAGNOSIS — I15.9 SECONDARY HYPERTENSION: Primary | ICD-10-CM

## 2017-10-10 DIAGNOSIS — R42 DIZZINESS: ICD-10-CM

## 2017-10-10 PROBLEM — R79.89 ELEVATED BRAIN NATRIURETIC PEPTIDE (BNP) LEVEL: Status: ACTIVE | Noted: 2017-10-10

## 2017-10-10 PROBLEM — I16.0 HYPERTENSIVE URGENCY: Status: ACTIVE | Noted: 2017-10-10

## 2017-10-10 PROBLEM — D64.9 ANEMIA: Status: ACTIVE | Noted: 2017-10-10

## 2017-10-10 PROBLEM — N18.4 ACUTE RENAL FAILURE SUPERIMPOSED ON STAGE 4 CHRONIC KIDNEY DISEASE (HCC): Status: ACTIVE | Noted: 2017-10-10

## 2017-10-10 LAB
ALBUMIN SERPL-MCNC: 3.4 G/DL (ref 3.5–5)
ALBUMIN/GLOB SERPL: 0.9 {RATIO} (ref 1.1–2.2)
ALP SERPL-CCNC: 104 U/L (ref 45–117)
ALT SERPL-CCNC: 26 U/L (ref 12–78)
ANION GAP SERPL CALC-SCNC: 9 MMOL/L (ref 5–15)
AST SERPL-CCNC: 23 U/L (ref 15–37)
ATRIAL RATE: 86 BPM
BASOPHILS # BLD: 0 K/UL (ref 0–0.1)
BASOPHILS NFR BLD: 1 % (ref 0–1)
BILIRUB SERPL-MCNC: 0.3 MG/DL (ref 0.2–1)
BNP SERPL-MCNC: ABNORMAL PG/ML (ref 0–450)
BUN SERPL-MCNC: 48 MG/DL (ref 6–20)
BUN/CREAT SERPL: 13 (ref 12–20)
CALCIUM SERPL-MCNC: 8.4 MG/DL (ref 8.5–10.1)
CALCULATED R AXIS, ECG10: -84 DEGREES
CALCULATED T AXIS, ECG11: 90 DEGREES
CHLORIDE SERPL-SCNC: 110 MMOL/L (ref 97–108)
CK SERPL-CCNC: 163 U/L (ref 39–308)
CO2 SERPL-SCNC: 24 MMOL/L (ref 21–32)
CREAT SERPL-MCNC: 3.71 MG/DL (ref 0.7–1.3)
CREAT UR-MCNC: 25.44 MG/DL
DIAGNOSIS, 93000: NORMAL
EOSINOPHIL # BLD: 0.1 K/UL (ref 0–0.4)
EOSINOPHIL NFR BLD: 3 % (ref 0–7)
ERYTHROCYTE [DISTWIDTH] IN BLOOD BY AUTOMATED COUNT: 14 % (ref 11.5–14.5)
GLOBULIN SER CALC-MCNC: 3.8 G/DL (ref 2–4)
GLUCOSE SERPL-MCNC: 95 MG/DL (ref 65–100)
HCT VFR BLD AUTO: 28.8 % (ref 36.6–50.3)
HGB BLD-MCNC: 9.3 G/DL (ref 12.1–17)
INR PPP: 1.1 (ref 0.9–1.1)
LIPASE SERPL-CCNC: 130 U/L (ref 73–393)
LYMPHOCYTES # BLD: 0.6 K/UL (ref 0.8–3.5)
LYMPHOCYTES NFR BLD: 15 % (ref 12–49)
MAGNESIUM SERPL-MCNC: 2 MG/DL (ref 1.6–2.4)
MCH RBC QN AUTO: 31.4 PG (ref 26–34)
MCHC RBC AUTO-ENTMCNC: 32.3 G/DL (ref 30–36.5)
MCV RBC AUTO: 97.3 FL (ref 80–99)
MONOCYTES # BLD: 0.5 K/UL (ref 0–1)
MONOCYTES NFR BLD: 12 % (ref 5–13)
NEUTS SEG # BLD: 2.9 K/UL (ref 1.8–8)
NEUTS SEG NFR BLD: 69 % (ref 32–75)
PLATELET # BLD AUTO: 203 K/UL (ref 150–400)
POTASSIUM SERPL-SCNC: 4.3 MMOL/L (ref 3.5–5.1)
PROT SERPL-MCNC: 7.2 G/DL (ref 6.4–8.2)
PROTHROMBIN TIME: 11.4 SEC (ref 9–11.1)
Q-T INTERVAL, ECG07: 456 MS
QRS DURATION, ECG06: 184 MS
QTC CALCULATION (BEZET), ECG08: 525 MS
RBC # BLD AUTO: 2.96 M/UL (ref 4.1–5.7)
RBC MORPH BLD: ABNORMAL
SODIUM SERPL-SCNC: 143 MMOL/L (ref 136–145)
SODIUM UR-SCNC: 8 MMOL/L
TROPONIN I SERPL-MCNC: <0.04 NG/ML
VENTRICULAR RATE, ECG03: 80 BPM
WBC # BLD AUTO: 4.1 K/UL (ref 4.1–11.1)

## 2017-10-10 PROCEDURE — 85610 PROTHROMBIN TIME: CPT | Performed by: EMERGENCY MEDICINE

## 2017-10-10 PROCEDURE — 36415 COLL VENOUS BLD VENIPUNCTURE: CPT | Performed by: EMERGENCY MEDICINE

## 2017-10-10 PROCEDURE — 74011250637 HC RX REV CODE- 250/637: Performed by: EMERGENCY MEDICINE

## 2017-10-10 PROCEDURE — 93005 ELECTROCARDIOGRAM TRACING: CPT

## 2017-10-10 PROCEDURE — 82550 ASSAY OF CK (CPK): CPT | Performed by: EMERGENCY MEDICINE

## 2017-10-10 PROCEDURE — 65660000000 HC RM CCU STEPDOWN

## 2017-10-10 PROCEDURE — 83690 ASSAY OF LIPASE: CPT | Performed by: EMERGENCY MEDICINE

## 2017-10-10 PROCEDURE — 76770 US EXAM ABDO BACK WALL COMP: CPT

## 2017-10-10 PROCEDURE — 71020 XR CHEST PA LAT: CPT

## 2017-10-10 PROCEDURE — 84484 ASSAY OF TROPONIN QUANT: CPT | Performed by: EMERGENCY MEDICINE

## 2017-10-10 PROCEDURE — 82570 ASSAY OF URINE CREATININE: CPT | Performed by: INTERNAL MEDICINE

## 2017-10-10 PROCEDURE — 83880 ASSAY OF NATRIURETIC PEPTIDE: CPT | Performed by: EMERGENCY MEDICINE

## 2017-10-10 PROCEDURE — 70450 CT HEAD/BRAIN W/O DYE: CPT

## 2017-10-10 PROCEDURE — 74011250636 HC RX REV CODE- 250/636: Performed by: EMERGENCY MEDICINE

## 2017-10-10 PROCEDURE — 99284 EMERGENCY DEPT VISIT MOD MDM: CPT

## 2017-10-10 PROCEDURE — 93306 TTE W/DOPPLER COMPLETE: CPT

## 2017-10-10 PROCEDURE — 85025 COMPLETE CBC W/AUTO DIFF WBC: CPT | Performed by: EMERGENCY MEDICINE

## 2017-10-10 PROCEDURE — 94761 N-INVAS EAR/PLS OXIMETRY MLT: CPT

## 2017-10-10 PROCEDURE — 84300 ASSAY OF URINE SODIUM: CPT | Performed by: INTERNAL MEDICINE

## 2017-10-10 PROCEDURE — 83735 ASSAY OF MAGNESIUM: CPT | Performed by: EMERGENCY MEDICINE

## 2017-10-10 PROCEDURE — 74011250637 HC RX REV CODE- 250/637: Performed by: INTERNAL MEDICINE

## 2017-10-10 PROCEDURE — 74011000250 HC RX REV CODE- 250: Performed by: INTERNAL MEDICINE

## 2017-10-10 PROCEDURE — 80053 COMPREHEN METABOLIC PANEL: CPT | Performed by: EMERGENCY MEDICINE

## 2017-10-10 PROCEDURE — 74011250636 HC RX REV CODE- 250/636: Performed by: INTERNAL MEDICINE

## 2017-10-10 RX ORDER — FUROSEMIDE 10 MG/ML
20 INJECTION INTRAMUSCULAR; INTRAVENOUS ONCE
Status: COMPLETED | OUTPATIENT
Start: 2017-10-10 | End: 2017-10-10

## 2017-10-10 RX ORDER — CARVEDILOL 6.25 MG/1
12.5 TABLET ORAL 2 TIMES DAILY WITH MEALS
Status: DISCONTINUED | OUTPATIENT
Start: 2017-10-10 | End: 2017-10-13 | Stop reason: HOSPADM

## 2017-10-10 RX ORDER — CARVEDILOL 12.5 MG/1
12.5 TABLET ORAL
Status: COMPLETED | OUTPATIENT
Start: 2017-10-10 | End: 2017-10-10

## 2017-10-10 RX ORDER — HYDROCODONE BITARTRATE AND ACETAMINOPHEN 5; 325 MG/1; MG/1
1 TABLET ORAL
Status: DISCONTINUED | OUTPATIENT
Start: 2017-10-10 | End: 2017-10-13 | Stop reason: HOSPADM

## 2017-10-10 RX ORDER — SODIUM CHLORIDE 0.9 % (FLUSH) 0.9 %
5-10 SYRINGE (ML) INJECTION EVERY 8 HOURS
Status: DISCONTINUED | OUTPATIENT
Start: 2017-10-10 | End: 2017-10-13 | Stop reason: HOSPADM

## 2017-10-10 RX ORDER — PANTOPRAZOLE SODIUM 40 MG/1
40 TABLET, DELAYED RELEASE ORAL
Status: DISCONTINUED | OUTPATIENT
Start: 2017-10-11 | End: 2017-10-13 | Stop reason: HOSPADM

## 2017-10-10 RX ORDER — FINASTERIDE 5 MG/1
5 TABLET, FILM COATED ORAL DAILY
Status: DISCONTINUED | OUTPATIENT
Start: 2017-10-11 | End: 2017-10-13 | Stop reason: HOSPADM

## 2017-10-10 RX ORDER — ONDANSETRON 2 MG/ML
4 INJECTION INTRAMUSCULAR; INTRAVENOUS
Status: DISCONTINUED | OUTPATIENT
Start: 2017-10-10 | End: 2017-10-13 | Stop reason: HOSPADM

## 2017-10-10 RX ORDER — ATORVASTATIN CALCIUM 20 MG/1
20 TABLET, FILM COATED ORAL DAILY
Status: DISCONTINUED | OUTPATIENT
Start: 2017-10-11 | End: 2017-10-13 | Stop reason: HOSPADM

## 2017-10-10 RX ORDER — HEPARIN SODIUM 5000 [USP'U]/ML
5000 INJECTION, SOLUTION INTRAVENOUS; SUBCUTANEOUS EVERY 8 HOURS
Status: DISCONTINUED | OUTPATIENT
Start: 2017-10-10 | End: 2017-10-13 | Stop reason: HOSPADM

## 2017-10-10 RX ORDER — ZOLPIDEM TARTRATE 5 MG/1
5 TABLET ORAL
Status: DISCONTINUED | OUTPATIENT
Start: 2017-10-10 | End: 2017-10-13 | Stop reason: HOSPADM

## 2017-10-10 RX ORDER — LABETALOL HYDROCHLORIDE 5 MG/ML
20 INJECTION, SOLUTION INTRAVENOUS
Status: DISCONTINUED | OUTPATIENT
Start: 2017-10-10 | End: 2017-10-13 | Stop reason: HOSPADM

## 2017-10-10 RX ORDER — NALOXONE HYDROCHLORIDE 0.4 MG/ML
0.4 INJECTION, SOLUTION INTRAMUSCULAR; INTRAVENOUS; SUBCUTANEOUS AS NEEDED
Status: DISCONTINUED | OUTPATIENT
Start: 2017-10-10 | End: 2017-10-13 | Stop reason: HOSPADM

## 2017-10-10 RX ORDER — ASPIRIN 81 MG/1
81 TABLET ORAL DAILY
Status: DISCONTINUED | OUTPATIENT
Start: 2017-10-11 | End: 2017-10-13 | Stop reason: HOSPADM

## 2017-10-10 RX ORDER — CARBAMAZEPINE 200 MG/1
200 TABLET ORAL 3 TIMES DAILY
Status: DISCONTINUED | OUTPATIENT
Start: 2017-10-10 | End: 2017-10-13 | Stop reason: HOSPADM

## 2017-10-10 RX ORDER — SODIUM CHLORIDE 0.9 % (FLUSH) 0.9 %
5-10 SYRINGE (ML) INJECTION AS NEEDED
Status: DISCONTINUED | OUTPATIENT
Start: 2017-10-10 | End: 2017-10-13 | Stop reason: HOSPADM

## 2017-10-10 RX ORDER — AMLODIPINE BESYLATE 5 MG/1
10 TABLET ORAL DAILY
Status: DISCONTINUED | OUTPATIENT
Start: 2017-10-11 | End: 2017-10-13 | Stop reason: HOSPADM

## 2017-10-10 RX ORDER — CARVEDILOL 6.25 MG/1
6.25 TABLET ORAL 2 TIMES DAILY WITH MEALS
Status: ON HOLD | COMMUNITY
End: 2017-10-13

## 2017-10-10 RX ORDER — AMLODIPINE BESYLATE 5 MG/1
10 TABLET ORAL
Status: COMPLETED | OUTPATIENT
Start: 2017-10-10 | End: 2017-10-10

## 2017-10-10 RX ORDER — TAMSULOSIN HYDROCHLORIDE 0.4 MG/1
0.4 CAPSULE ORAL DAILY
Status: DISCONTINUED | OUTPATIENT
Start: 2017-10-11 | End: 2017-10-13 | Stop reason: HOSPADM

## 2017-10-10 RX ORDER — ACETAMINOPHEN 325 MG/1
650 TABLET ORAL
Status: DISCONTINUED | OUTPATIENT
Start: 2017-10-10 | End: 2017-10-13 | Stop reason: HOSPADM

## 2017-10-10 RX ORDER — LANOLIN ALCOHOL/MO/W.PET/CERES
324 CREAM (GRAM) TOPICAL
Status: DISCONTINUED | OUTPATIENT
Start: 2017-10-11 | End: 2017-10-13 | Stop reason: HOSPADM

## 2017-10-10 RX ADMIN — AMLODIPINE BESYLATE 10 MG: 5 TABLET ORAL at 12:30

## 2017-10-10 RX ADMIN — LABETALOL HYDROCHLORIDE 20 MG: 5 INJECTION INTRAVENOUS at 15:49

## 2017-10-10 RX ADMIN — FUROSEMIDE 20 MG: 10 INJECTION, SOLUTION INTRAMUSCULAR; INTRAVENOUS at 15:48

## 2017-10-10 RX ADMIN — CARBAMAZEPINE 200 MG: 200 TABLET ORAL at 22:49

## 2017-10-10 RX ADMIN — LABETALOL HYDROCHLORIDE 20 MG: 5 INJECTION INTRAVENOUS at 20:19

## 2017-10-10 RX ADMIN — CARVEDILOL 12.5 MG: 12.5 TABLET, FILM COATED ORAL at 12:30

## 2017-10-10 RX ADMIN — SODIUM CHLORIDE 1000 ML: 900 INJECTION, SOLUTION INTRAVENOUS at 14:38

## 2017-10-10 RX ADMIN — CARVEDILOL 12.5 MG: 12.5 TABLET, FILM COATED ORAL at 20:18

## 2017-10-10 RX ADMIN — HEPARIN SODIUM 5000 UNITS: 5000 INJECTION, SOLUTION INTRAVENOUS; SUBCUTANEOUS at 22:50

## 2017-10-10 RX ADMIN — Medication 10 ML: at 22:50

## 2017-10-10 RX ADMIN — Medication 10 ML: at 15:51

## 2017-10-10 RX ADMIN — CARBAMAZEPINE 200 MG: 200 TABLET ORAL at 17:30

## 2017-10-10 NOTE — H&P
[address]  [phone]    Hospitalist Admission Note      NAME:  Quincy Auguste   :   1935   MRN:  [id number]     PCP:  PROVIDER UNKNOWN     Date/Time:  10/10/2017 2:42 PM          Subjective:     CHIEF COMPLAINT: Dizzienss     HISTORY OF PRESENT ILLNESS:     Mr. Nazario Shankar is a 80 y.o.  male with a hx of CAD s/p CABG and PPM, CKD, HTN who presented to the Emergency Department complaining of persistent dizziness x 2 days. Sx started after patient stopped all his medications 3 days ago. He states that he just didn't get refills. Patient denies any other associated neurological deficits. Denies any chest pain/SOB. Good urinary output. Denies swelling in legs, fever or chills. Sx improved after given home meds in ED and patient admitted for persistent elevated BP, TINA on CKD, and elevated pro-BNP. Past Medical History:   Diagnosis Date    Bronchitis, chronic (HCC)     CAD (coronary artery disease)     CHF (congestive heart failure) (HCC)     Chronic kidney disease     Hypertension         Past Surgical History:   Procedure Laterality Date    COLONOSCOPY N/A 2017    COLONOSCOPY performed by Dhaval Santana MD at [facility] HX CORONARY ARTERY BYPASS GRAFT      HX PACEMAKER         Social History   Substance Use Topics    Smoking status: Former Smoker    Smokeless tobacco: Never Used    Alcohol use No        Family History   Problem Relation Age of Onset    Heart Disease Mother     Hypertension Mother         No Known Allergies     Prior to Admission medications    Medication Sig Start Date End Date Taking? Authorizing Provider   carvedilol (COREG) 12.5 mg tablet Take 1 Tab by mouth two (2) times daily (with meals). 17   Darrell CONWAY Do, MD   pantoprazole (PROTONIX) 40 mg tablet Take 1 Tab by mouth Daily (before breakfast).  17   Darrell CONWAY Do, MD   finasteride (PROSCAR) 5 mg tablet Take 1 Tab by mouth daily. 9/6/17   Darrell CONWAY Do, MD   tamsulosin (FLOMAX) 0.4 mg capsule Take 1 Cap by mouth daily. 9/6/17   Darrell CONWAY Do, MD   ferrous sulfate 324 mg (65 mg iron) tablet Take 1 Tab by mouth Daily (before breakfast). 9/6/17   Darrell CONWAY Do, MD   senna-docusate (SENNA WITH DOCUSATE SODIUM) 8.6-50 mg per tablet Take 1 Tab by mouth daily. 9/6/17   Darrell CONWAY Do, MD   dextran 70-hypromellose (ARTIFICIAL TEARS,[id number],) ophthalmic solution Administer 1 Drop to both eyes as needed (dry eye). Historical Provider   acetaminophen (TYLENOL) 325 mg tablet Take 325 mg by mouth every four (4) hours as needed for Pain. Historical Provider   atorvastatin (LIPITOR) 20 mg tablet Take 20 mg by mouth daily. Historical Provider   aspirin delayed-release 81 mg tablet Take 81 mg by mouth daily. Pablo Engle MD   amlodipine (NORVASC) 10 mg tablet Take 10 mg by mouth daily. Pablo Engle MD   carbamazepine (TEGRETOL) 200 mg tablet Take 200 mg by mouth three (3) times daily.     Pablo Engle MD       Review of Systems:  (bold if positive, if negative)    Gen:  Eyes:  ENT:  CVS:  dizzinessPulm:  GI:    :    MS:  Skin:  Psych:  Endo:    Hem:  Renal:    Neuro:        Objective:      VITALS:    Vital signs reviewed; most recent are:    Visit Vitals    BP (!) 188/111    Pulse 90    Temp 97.6 °F (36.4 °C)    Resp 23    Ht 5' 9\" (1.753 m)    Wt 80.7 kg (178 lb)    SpO2 100%    BMI 26.29 kg/m2     SpO2 Readings from Last 6 Encounters:   10/10/17 100%   09/06/17 100%   08/20/15 99%   09/10/14 98%   09/11/11 100%   09/05/11 100%        No intake or output data in the 24 hours ending 10/10/17 1442     Exam:     Physical Exam:    Gen:  Well-developed, well-nourished, in no acute distress  HEENT:  Pink conjunctivae, PERRL, hearing intact to voice, moist mucous membranes  Neck:  Supple, without masses, thyroid non-tender  Resp:  No accessory muscle use, clear breath sounds without wheezes rales or rhonchi  Card:  No murmurs, normal S1, S2 without thrills, bruits or peripheral edema  Abd:  Soft, non-tender, non-distended, normoactive bowel sounds are present, no palpable organomegaly and no detectable hernias  Lymph:  No cervical or inguinal adenopathy  Musc:  No cyanosis or clubbing  Skin:  No rashes or ulcers, skin turgor is good  Neuro:  Cranial nerves are grossly intact, no focal motor weakness, follows commands appropriately  Psych:  Good insight, oriented to person, place and time, alert     Labs:    Recent Labs      10/10/17   1136   WBC  4.1   HGB  9.3*   HCT  28.8*   PLT  203     Recent Labs      10/10/17   1136   NA  143   K  4.3   CL  110*   CO2  24   GLU  95   BUN  48*   CREA  3.71*   CA  8.4*   MG  2.0   ALB  3.4*   TBILI  0.3   SGOT  23   ALT  26     Lab Results   Component Value Date/Time    Glucose (POC) 128 09/07/2011 06:16 PM    Glucose (POC) 99 09/05/2011 12:17 AM    Glucose (POC) 170 09/02/2011 08:39 PM     No results for input(s): PH, PCO2, PO2, HCO3, FIO2 in the last 72 hours. Recent Labs      10/10/17   1136   INR  1.1     All Micro Results     None          I have reviewed previous records       Assessment and Plan:      Principal Problem:    Hypertensive urgency. BP elevated with dizziness/confusion. D/t stopping his meds 2-3 days ago. Admit to telemetry. Re-start home meds now, titrate coreg if needed. PRN labetalol. Check enzymes and monitor for evidence of end-organ damage. Active Problems:    Coronary atherosclerosis of native coronary artery / Diastolic CHF (congestive heart failure). Pro-BNP elevated on this admission but CAD seems stable and no SOB but CXR with kerley B lines/effusions concerning for some vascular congestion. Check Echo. Serial Rick. Oxymetry. Will give lasix IV x 1 as I think he is slightly hypervolemic rather than hypovolemic. Strict Is and Os. Daily weight. Continue coreg, statin, ASA    Acute renal failure superimposed on stage 4 chronic kidney disease.  By history, I would suspect some vascular congestion component but does not have much peripheral edema. Nephrology consult. Strict Is and Os. Check Enzymes. Renal US due to hx of hydronephrosis. Benign prostatic hyperplasia. Continue flomax and proscar. Check Renal US for hydro. Check Is and Os. Anemia. Iron deficiency + recent acute blood loss + CKD related. Continue iron.       Telemetry reviewed:   Paced    Risk of deterioration: high      Total time spent with patient: 79 [address] [place] [address] discussed with: Patient, Family and Nursing Staff    Discussed:  Care Plan       ___________________________________________________    Attending Physician: [facility], DO

## 2017-10-10 NOTE — IP AVS SNAPSHOT
[address] [phone] Patient: Becky Bridges MRN: [id number] :1935 Current Discharge Medication List  
  
START taking these medications Dose & Instructions Dispensing Information Comments Morning Noon Evening Bedtime  
 hydrALAZINE 100 mg tablet Commonly known as:  APRESOLINE Your last dose was: Your next dose is:    
   
   
 Dose:  100 mg Take 1 Tab by mouth three (3) times daily. Quantity:  90 Tab Refills:  0 CONTINUE these medications which have CHANGED Dose & Instructions Dispensing Information Comments Morning Noon Evening Bedtime  
 carvedilol 12.5 mg tablet Commonly known as:  Zack Oquendo What changed:   
- medication strength 
- how much to take Your last dose was: Your next dose is:    
   
   
 Dose:  12.5 mg Take 1 Tab by mouth two (2) times daily (with meals). Quantity:  60 Tab Refills:  0 CONTINUE these medications which have NOT CHANGED Dose & Instructions Dispensing Information Comments Morning Noon Evening Bedtime  
 acetaminophen 325 mg tablet Commonly known as:  TYLENOL Your last dose was: Your next dose is:    
   
   
 Dose:  325 mg Take 325 mg by mouth every four (4) hours as needed for Pain. Refills:  0  
     
   
   
   
  
 amLODIPine 10 mg tablet Commonly known as:  Elrocky Goodwiner Your last dose was: Your next dose is:    
   
   
 Dose:  10 mg Take 10 mg by mouth daily. Refills:  0  
     
   
   
   
  
 aspirin delayed-release 81 mg tablet Your last dose was: Your next dose is:    
   
   
 Dose:  81 mg Take 81 mg by mouth daily. Refills:  0  
     
   
   
   
  
 atorvastatin 20 mg tablet Commonly known as:  LIPITOR Your last dose was:     
   
Your next dose is:    
   
   
 Dose:  20 mg  
 Take 20 mg by mouth daily. Refills:  0  
     
   
   
   
  
 carBAMazepine 200 mg tablet Commonly known as:  TEGretol Your last dose was: Your next dose is:    
   
   
 Dose:  200 mg Take 200 mg by mouth three (3) times daily. Refills:  0  
     
   
   
   
  
 ferrous sulfate 324 mg (65 mg iron) tablet Your last dose was: Your next dose is:    
   
   
 Dose:  324 mg Take 1 Tab by mouth Daily (before breakfast). Quantity:  30 Tab Refills:  1  
     
   
   
   
  
 finasteride 5 mg tablet Commonly known as:  PROSCAR Your last dose was: Your next dose is:    
   
   
 Dose:  5 mg Take 1 Tab by mouth daily. Quantity:  30 Tab Refills:  1  
     
   
   
   
  
 pantoprazole 40 mg tablet Commonly known as:  PROTONIX Your last dose was: Your next dose is:    
   
   
 Dose:  40 mg Take 1 Tab by mouth Daily (before breakfast). Quantity:  30 Tab Refills:  1  
     
   
   
   
  
 tamsulosin 0.4 mg capsule Commonly known as:  FLOMAX Your last dose was: Your next dose is:    
   
   
 Dose:  0.4 mg Take 1 Cap by mouth daily. Quantity:  30 Cap Refills:  1 Where to Get Your Medications Information on where to get these meds will be given to you by the nurse or doctor. ! Ask your nurse or doctor about these medications  
  carvedilol 12.5 mg tablet  
 hydrALAZINE 100 mg tablet

## 2017-10-10 NOTE — Clinical Note
Status[de-identified] Inpatient [101] Type of Bed: Telemetry [19] Inpatient Hospitalization Certified Necessary for the Following Reasons: 3. Patient receiving treatment that can only be provided in an inpatient setting (further clarification in H&P documentation) Admitting Diagnosis: Hypertensive urgency [0835084] Admitting Physician: Diego Hdez [[id number]] Attending Physician: Diego Hdez [[id number]] Estimated Length of Stay: 2 Midnights Discharge Plan[de-identified] Home with Office Follow-up

## 2017-10-10 NOTE — PROGRESS NOTES
[facility]: MED RECONCILIATION    Comments/Recommendations:    Patient alert and oriented for medication review and knew medications when list was reviewed with patient.  Patient has not taken his carbamazepine since yesterday morning because he ran out of medication. He has not taken his tamsulosin or finasteride since Saturday 10/7/17 because he ran out of medication also.  Patient has not taken his blood pressure medications of carvedilol and amlodipine since Saturday 10/7/17 because he ran out of medications and has not picked them up from the pharmacy.  Patient's carvedilol dose was increased to 12.5mg BID during his last admission on 9/5/2017 but then decreased by his PCP and is currently 6.25mg BID per the patient and confirmed by [employer].  Patient's benazepril and chlorthalidone were also stopped during his last admission on 9/5/2017 and the patient said he has remained off of these medications.  Allergies verified. Medications added:     · none    Medications removed:    · Senna-docusate 8.6.50mg daily  · Artificial tears to both eyes daily PRN    Medications adjusted:    · Carvedilol adjusted to 6.25mg BID from 12.5mg     Information obtained from: Patient, Rx query, [facility]     Allergies: Review of patient's allergies indicates no known allergies. Prior to Admission Medications:     Medication Documentation Review Audit       Reviewed by Ely Cano (Pharmacist) on 10/10/17 at 1617         Medication Sig Documenting Provider Last Dose Status Taking?      acetaminophen (TYLENOL) 325 mg tablet Take 325 mg by mouth every four (4) hours as needed for Pain. Historical Provider 10/9/2017 AM Active Yes    amlodipine (NORVASC) 10 mg tablet Take 10 mg by mouth daily. Pablo Engle MD 10/7/2017 AM Active Yes    aspirin delayed-release 81 mg tablet Take 81 mg by mouth daily.  Pablo Engle MD 10/10/2017 AM Active Yes    atorvastatin (LIPITOR) 20 mg tablet Take 20 mg by mouth daily. Historical Provider 10/10/2017 AM Active Yes    carbamazepine (TEGRETOL) 200 mg tablet Take 200 mg by mouth three (3) times daily. Pablo Engle MD 10/9/2017 AM Active Yes    carvedilol (COREG) 6.25 mg tablet Take 6.25 mg by mouth two (2) times daily (with meals). Historical Provider 10/10/2017 AM Active Yes    ferrous sulfate 324 mg (65 mg iron) tablet Take 1 Tab by mouth Daily (before breakfast). Sammie Santoyo MD 10/10/2017 AM Active Yes    finasteride (PROSCAR) 5 mg tablet Take 1 Tab by mouth daily. Sammie Santoyo MD 10/7/2017 AM Active Yes    pantoprazole (PROTONIX) 40 mg tablet Take 1 Tab by mouth Daily (before breakfast). Sammie Santoyo MD 10/10/2017 AM Active Yes    tamsulosin (FLOMAX) 0.4 mg capsule Take 1 Cap by mouth daily.  Sammie Santoyo MD 10/7/2017 AM Active Yes                  Thank You,   Ely [place]   Contact: [phone]

## 2017-10-10 NOTE — ED PROVIDER NOTES
HPI Comments: 80 y.o. male with past medical history significant for hypertension, chronic kidney disease, CAD, bronchitis, and congestive heart failure who presents from home via EMS with chief complaint of dizziness. Patient states onset of dizziness this morning approximately 3 hours ago while he was walking. Patient admits he has not taken his blood pressure medications in the past few days since he ran out of them. Patient notes he will refill them as soon as he gets home. Patient reports he ate normally this morning. Patient denies any other issues including headache, numbness, tingling, and diaphoresis. There are no other acute medical concerns at this time. Old Chart Review: Patient has been evaluated multiple times in the ED for GI bleeds. Social hx: Tobacco Use: No (former smoker), Alcohol Use: No, Drug Use: No    PCP: PROVIDER UNKNOWN    Note written by Elsa Mcarthur, as dictated by Renée Tate MD 11:45 AM      The history is provided by the patient. Past Medical History:   Diagnosis Date    Bronchitis, chronic (HCC)     CAD (coronary artery disease)     CHF (congestive heart failure) (HCC)     Chronic kidney disease     Hypertension        Past Surgical History:   Procedure Laterality Date    COLONOSCOPY N/A 9/6/2017    COLONOSCOPY performed by Hi Montoya MD at [facility] HX CORONARY ARTERY BYPASS GRAFT      HX PACEMAKER           Family History:   Problem Relation Age of Onset    Heart Disease Mother     Hypertension Mother        Social History     Social History    Marital status:      Spouse name: N/A    Number of children: N/A    Years of education: N/A     Occupational History    Not on file.      Social History Main Topics    Smoking status: Former Smoker    Smokeless tobacco: Never Used    Alcohol use No    Drug use: No    Sexual activity: Not on file     Other Topics Concern    Not on file     Social History Narrative ALLERGIES: Review of patient's allergies indicates no known allergies. Review of Systems   Neurological: Positive for dizziness. Negative for numbness and headaches. All other systems reviewed and are negative. There were no vitals filed for this visit. Physical Exam   Constitutional: He is oriented to person, place, and time. He appears well-developed and well-nourished. No distress. NAD, AxOx4, speaking in complete sentences, GCS = 15       HENT:   Head: Normocephalic and atraumatic. Nose: Nose normal.   Mouth/Throat: Oropharynx is clear and moist.   Cn intact   Eyes: Conjunctivae and EOM are normal. Pupils are equal, round, and reactive to light. Right eye exhibits no discharge. Left eye exhibits no discharge. Neck: Normal range of motion. Neck supple. Cardiovascular: Normal rate, regular rhythm, normal heart sounds and intact distal pulses. Exam reveals no gallop and no friction rub. No murmur heard. Pulmonary/Chest: Effort normal and breath sounds normal. No respiratory distress. He has no wheezes. He has no rales. He exhibits no tenderness. R ant CW - noted pacer/ nttp     Abdominal: Soft. Bowel sounds are normal. He exhibits no distension and no mass. There is no tenderness. There is no rebound and no guarding. nttp     Genitourinary:   Genitourinary Comments: Pt denies urinary/ Testicular/ scrotal or penile  complaints   Musculoskeletal: Normal range of motion. He exhibits no edema. Lymphadenopathy:     He has no cervical adenopathy. Neurological: He is alert and oriented to person, place, and time. He has normal reflexes. No cranial nerve deficit. Coordination normal.   pt has motor/ CV/ Sensation grossly intact to all extremities, R = L in strength;   Skin: Skin is warm and dry. No rash noted. No erythema. Psychiatric: He has a normal mood and affect. Nursing note and vitals reviewed.        [facility]  ED Course       Procedures    No chief complaint on file.      10:48 AM  The patients presenting problems have been discussed, and they are in agreement with the care plan formulated and outlined with them. I have encouraged them to ask questions as they arise throughout their visit. MEDICATIONS GIVEN:  Medications - No data to display    LABS REVIEWED:  Labs Reviewed   PROTHROMBIN TIME + INR   TROPONIN I   URINALYSIS W/ RFLX MICROSCOPIC   MAGNESIUM   LIPASE   CK W/ REFLX CKMB   CBC WITH AUTOMATED DIFF   NT-PRO BNP       RADIOLOGY RESULTS:  The following have been ordered and reviewed:  _____________________________________________________________________  _____________________________________________________________________    EKG interpretation: (Preliminary)  Rhythm: paced rhythm; and regular . Rate (approx.): 80; Axis: normal; P wave: normal; QRS interval: normal ; ST/T wave: normal; Negative acute significant segmental elevations    PROCEDURES:        CONSULTATIONS:     CONSULT NOTE:  1:19 PM Juan J Fatima MD spoke with Dr. Yolie Taylor, Consult for Hospitalist.  Discussed available diagnostic tests and clinical findings. Provider is in agreement with care plans as outlined. Provider will evaluate the patient for admission to the hospital.      PROGRESS NOTES:      DIAGNOSIS:    1. Secondary hypertension    2. Dizziness    3. Noncompliance with medication regimen    4. TINA (acute kidney injury) [facility])        PLAN:  1-HTN/ Dizziness/ TINA/ Medication noncompliance pt told results/ agrees w/ evaluation fopr admission      ED COURSE: The patients hospital course has been uncomplicated.

## 2017-10-10 NOTE — IP AVS SNAPSHOT
Oksana Vaughn 
 
 
 [address] 
[phone] Patient: Barbara Freed MRN: [id number] :1935 You are allergic to the following No active allergies Immunizations Administered for This Admission Name Date Influenza Vaccine (Quad) PF  Deferred () Recent Documentation Height Weight BMI Smoking Status 1.753 m 77 kg 25.07 kg/m2 Former Smoker Emergency Contacts Name Discharge Info [facility]ation [facility] [3] Daughter [21] [phone] [facility] DISCHARGE CAREGIVER [3] Son [22] [phone] About your hospitalization You were admitted on:  October 10, 2017 You last received care in the:  [facility] 3 [facility] CARE TELE 2 You were discharged on:  2017 Unit phone number:  [phone] Why you were hospitalized Your primary diagnosis was:  Hypertensive Urgency Your diagnoses also included:  Coronary Atherosclerosis Of Native Coronary Artery, Acute Systolic Chf (Congestive Heart Failure) (Hcc), Benign Prostatic Hyperplasia, Acute Renal Failure Superimposed On Stage 4 Chronic Kidney Disease (Hcc), Elevated Brain Natriuretic Peptide (Bnp) Level, Anemia, Cad (Coronary Artery Disease), Hyperlipidemia, Hypertension, Ckd (Chronic Kidney Disease), Stage Iv (Hcc), Systolic Chf, Chronic (Hcc), Chf (Congestive Heart Failure) (Hcc), Ckd (Chronic Kidney Disease) Stage 4, Gfr 15-29 Ml/Min (Hcc), H Pylori Ulcer, Hydronephrosis, Urine Retention, Obstructive Chronic Bronchitis With Acute Bronchitis (Hcc) Providers Seen During Your Hospitalizations Provider Role Specialty Primary office phone Teresa Machuca MD Attending Provider Emergency Medicine [phone] Chris Nelson DO Attending Provider Internal Medicine [phone] Ginny Ford MD Attending Provider Internal Medicine [phone] Zana Mayes MD Attending Provider Internal Medicine [phone] Your Primary Care Physician (PCP) Primary Care Physician Office Phone Office Fax UNKNOWN, PROVIDER ** None ** ** None ** Follow-up Information Follow up With Details Comments Contact Info [facility] EM[facility]Y [facility]PT  If symptoms worsen [address] 
[phone] Gwen Johnson MD  Fllow up with Dr. Nelia Bear or your primary cardiologist within 1 week of discharge [address] [place] [phone] 1007 [facility] 
[phone] Perfecto Mckeon MD  Follow up with Dr. Gagan Mancilla or nephrologist within 1-2 weeks [facility] [address] 1007 [facility] 
[phone] Robin Fletcher MD In 5 days  402 [facility] [address] [address] 
[phone] Olivia Galindo MD Go on 10/16/2017 Primary care physician hospital follow-up appointment at 4:00PM.  [address] 1007 [facility] 
[phone] Current Discharge Medication List  
  
START taking these medications Dose & Instructions Dispensing Information Comments Morning Noon Evening Bedtime  
 hydrALAZINE 100 mg tablet Commonly known as:  APRESOLINE Your last dose was: Your next dose is:    
   
   
 Dose:  100 mg Take 1 Tab by mouth three (3) times daily. Quantity:  90 Tab Refills:  0 CONTINUE these medications which have CHANGED Dose & Instructions Dispensing Information Comments Morning Noon Evening Bedtime  
 carvedilol 12.5 mg tablet Commonly known as:  Graysonalan Coleyal What changed:   
- medication strength 
- how much to take Your last dose was: Your next dose is:    
   
   
 Dose:  12.5 mg Take 1 Tab by mouth two (2) times daily (with meals). Quantity:  60 Tab Refills:  0 CONTINUE these medications which have NOT CHANGED Dose & Instructions Dispensing Information Comments Morning Noon Evening Bedtime  
 acetaminophen 325 mg tablet Commonly known as:  TYLENOL Your last dose was: Your next dose is:    
   
   
 Dose:  325 mg Take 325 mg by mouth every four (4) hours as needed for Pain. Refills:  0  
     
   
   
   
  
 amLODIPine 10 mg tablet Commonly known as:  Lili Pace Your last dose was: Your next dose is:    
   
   
 Dose:  10 mg Take 10 mg by mouth daily. Refills:  0  
     
   
   
   
  
 aspirin delayed-release 81 mg tablet Your last dose was: Your next dose is:    
   
   
 Dose:  81 mg Take 81 mg by mouth daily. Refills:  0  
     
   
   
   
  
 atorvastatin 20 mg tablet Commonly known as:  LIPITOR Your last dose was: Your next dose is:    
   
   
 Dose:  20 mg Take 20 mg by mouth daily. Refills:  0  
     
   
   
   
  
 carBAMazepine 200 mg tablet Commonly known as:  TEGretol Your last dose was: Your next dose is:    
   
   
 Dose:  200 mg Take 200 mg by mouth three (3) times daily. Refills:  0  
     
   
   
   
  
 ferrous sulfate 324 mg (65 mg iron) tablet Your last dose was: Your next dose is:    
   
   
 Dose:  324 mg Take 1 Tab by mouth Daily (before breakfast). Quantity:  30 Tab Refills:  1  
     
   
   
   
  
 finasteride 5 mg tablet Commonly known as:  PROSCAR Your last dose was: Your next dose is:    
   
   
 Dose:  5 mg Take 1 Tab by mouth daily. Quantity:  30 Tab Refills:  1  
     
   
   
   
  
 pantoprazole 40 mg tablet Commonly known as:  PROTONIX Your last dose was: Your next dose is:    
   
   
 Dose:  40 mg Take 1 Tab by mouth Daily (before breakfast). Quantity:  30 Tab Refills:  1  
     
   
   
   
  
 tamsulosin 0.4 mg capsule Commonly known as:  FLOMAX Your last dose was: Your next dose is:    
   
   
 Dose:  0.4 mg Take 1 Cap by mouth daily. Quantity:  30 Cap Refills:  1 Where to Get Your Medications Information on where to get these meds will be given to you by the nurse or doctor. ! Ask your nurse or doctor about these medications  
  carvedilol 12.5 mg tablet  
 hydrALAZINE 100 mg tablet Discharge Instructions HOSPITALIST DISCHARGE INSTRUCTIONS 
NAME: Matt Hurtado :  1935 MRN:  [id number] Date/Time:  10/13/2017 8:31 AM 
 
ADMIT DATE: 10/10/2017 DISCHARGE DATE: 10/13/2017 PRINCIPAL DISCHARGE DIAGNOSES: 
Dizziness Poorly controlled high blood pressure Congestive heart failure Urinary retention MEDICATIONS: 
· It is important that you take the medication exactly as they are prescribed. Note the changes and additions to your medications. Be sure you understand these changes before you are discharged today. · Keep your medication in the bottles provided by the pharmacist and keep a list of the medication names, dosages, and times to be taken in your wallet. · Do not take other medications without consulting your doctor. Pain Management: per above medications What to do at [facility] Recommended diet:  Cardiac Diet and Low fat, Low cholesterol Recommended activity: Activity as tolerated If you experience any of the following symptoms then please call your primary care physician or return to the emergency room if you cannot get hold of your doctor: 
 severe chest pain, shortness of breath, dizziness, confusion, falling, or other severe concerning symptoms that brought you to the hospital in the first place Follow Up: Follow-up Information Follow up With Details Comments Contact Info  
 follow up with primary care doctor within one week Schedule an appointment as soon as possible for a visit in 1 week [facility]  If symptoms worsen [address] [facility] [address] 
[phone] Tarah Moyer MD  Fllow up with Dr. Elaina Styles or your primary cardiologist within 1 week of discharge [address] [place] [phone] 1007 [facility] 
[phone] Coral Dixon MD  Follow up with Dr. Gerson Stevenson or nephrologist within 1-2 weeks [facility] [address] 1007 [facility] 
[phone] Hallie Healy MD In 5 days  402 [facility] [address] [address] 
[phone] Information obtained by : 
I understand that if any problems occur once I am at home I am to contact my physician. I understand and acknowledge receipt of the instructions indicated above. Physician's or R.N.'s Signature                                                                  Date/Time Patient or Representative Signature                                                          Date/Time Dizziness: Care Instructions Your Care Instructions Dizziness is the feeling of unsteadiness or fuzziness in your head. It is different than having vertigo, which is a feeling that the room is spinning or that you are moving or falling. It is also different from lightheadedness, which is the feeling that you are about to faint. It can be hard to know what causes dizziness. Some people feel dizzy when they have migraine headaches. Sometimes bouts of flu can make you feel dizzy. Some medical conditions, such as heart problems or high blood pressure, can make you feel dizzy. Many medicines can cause dizziness, including medicines for high blood pressure, pain, or anxiety. If a medicine causes your symptoms, your doctor may recommend that you stop or change the medicine. If it is a problem with your heart, you may need medicine to help your heart work better. If there is no clear reason for your symptoms, your doctor may suggest watching and waiting for a while to see if the dizziness goes away on its own. Follow-up care is a key part of your treatment and safety. Be sure to make and go to all appointments, and call your doctor if you are having problems. It's also a good idea to know your test results and keep a list of the medicines you take. How can you care for yourself at home? · If your doctor recommends or prescribes medicine, take it exactly as directed. Call your doctor if you think you are having a problem with your medicine. · Do not drive while you feel dizzy. · Try to prevent falls. Steps you can take include: ¨ Using nonskid mats, adding grab bars near the tub, and using night-lights. ¨ Clearing your home so that walkways are free of anything you might trip on. ¨ Letting family and friends know that you have been feeling dizzy. This will help them know how to help you. When should you call for help? Call 911 anytime you think you may need emergency care. For example, call if: 
· You passed out (lost consciousness). · You have dizziness along with symptoms of a heart attack. These may include: ¨ Chest pain or pressure, or a strange feeling in the chest. 
¨ Sweating. ¨ Shortness of breath. ¨ Nausea or vomiting. ¨ Pain, pressure, or a strange feeling in the back, neck, jaw, or upper belly or in one or both shoulders or arms. ¨ Lightheadedness or sudden weakness. ¨ A fast or irregular heartbeat. · You have symptoms of a stroke. These may include: 
¨ Sudden numbness, tingling, weakness, or loss of movement in your face, arm, or leg, especially on only one side of your body. ¨ Sudden vision changes. ¨ Sudden trouble speaking. ¨ Sudden confusion or trouble understanding simple statements. ¨ Sudden problems with walking or balance. ¨ A sudden, severe headache that is different from past headaches. Call your doctor now or seek immediate medical care if: 
· You feel dizzy and have a fever, headache, or ringing in your ears. · You have new or increased nausea and vomiting. · Your dizziness does not go away or comes back. Watch closely for changes in your health, and be sure to contact your doctor if: 
· You do not get better as expected. Where can you learn more? Go to http://oscar-kamari.info/. Enter T832 in the search box to learn more about \"Dizziness: Care Instructions. \" Current as of: March 20, 2017 Content Version: 11.3 © 5671-7825 [employer]. Care instructions adapted under license by [employer] (which disclaims liability or warranty for this information). If you have questions about a medical condition or this instruction, always ask your healthcare professional. [place] [address] any warranty or liability for your use of this information. Learning About High Blood Pressure What is high blood pressure? Blood pressure is a measure of how hard the blood pushes against the walls of your arteries. It's normal for blood pressure to go up and down throughout the day, but if it stays up, you have high blood pressure. Another name for high blood pressure is hypertension. Two numbers tell you your blood pressure. The first number is the systolic pressure. It shows how hard the blood pushes when your heart is pumping. The second number is the diastolic pressure. It shows how hard the blood pushes between heartbeats, when your heart is relaxed and filling with blood. A blood pressure of less than 120/80 (say \"120 over 80\") is ideal for an adult. High blood pressure is 140/90 or higher.  You have high blood pressure if your top number is 140 or higher or your bottom number is 90 or higher, or both. Many people fall into the category in between, called prehypertension. People with prehypertension need to make lifestyle changes to bring their blood pressure down and help prevent or delay high blood pressure. What happens when you have high blood pressure? · Blood flows through your arteries with too much force. Over time, this damages the walls of your arteries. But you can't feel it. High blood pressure usually doesn't cause symptoms. · Fat and calcium start to build up in your arteries. This buildup is called plaque. Plaque makes your arteries narrower and stiffer. Blood can't flow through them as easily. · This lack of good blood flow starts to damage some of the organs in your body. This can lead to problems such as coronary artery disease and heart attack, heart failure, stroke, kidney failure, and eye damage. How can you prevent high blood pressure? · Stay at a healthy weight. · Try to limit how much sodium you eat to less than 2,300 milligrams (mg) a day. If you limit your sodium to 1,500 mg a day, you can lower your blood pressure even more. ¨ Buy foods that are labeled \"unsalted,\" \"sodium-free,\" or \"low-sodium. \" Foods labeled \"reduced-sodium\" and \"light sodium\" may still have too much sodium. ¨ Flavor your food with garlic, lemon juice, onion, vinegar, herbs, and spices instead of salt. Do not use soy sauce, steak sauce, onion salt, garlic salt, mustard, or ketchup on your food. ¨ Use less salt (or none) when recipes call for it. You can often use half the salt a recipe calls for without losing flavor. · Be physically active. Get at least 30 minutes of exercise on most days of the week. Walking is a good choice. You also may want to do other activities, such as running, swimming, cycling, or playing tennis or team sports. · Limit alcohol to 2 drinks a day for men and 1 drink a day for women. · Eat plenty of fruits, vegetables, and low-fat dairy products. Eat less saturated and total fats. How is high blood pressure treated? · Your doctor will suggest making lifestyle changes. For example, your doctor may ask you to eat healthy foods, quit smoking, lose extra weight, and be more active. · If lifestyle changes don't help enough or your blood pressure is very high, you will have to take medicine every day. Follow-up care is a key part of your treatment and safety. Be sure to make and go to all appointments, and call your doctor if you are having problems. It's also a good idea to know your test results and keep a list of the medicines you take. Where can you learn more? Go to http://oscar-kamari.info/. Enter P501 in the search box to learn more about \"Learning About High Blood Pressure. \" Current as of: March 23, 2016 Content Version: 11.3 © 1003-1673 [employer]. Care instructions adapted under license by [employer] (which disclaims liability or warranty for this information). If you have questions about a medical condition or this instruction, always ask your healthcare professional. [place] [address] any warranty or liability for your use of this information. Discharge Orders None [employer] Announcement We are excited to announce that we are making your provider's discharge notes available to you in [employer]. You will see these notes when they are completed and signed by the physician that discharged you from your recent hospital stay. If you have any questions or concerns about any information you see in [employer], please call the Health Information Department where you were seen or reach out to your Primary Care Provider for more information about your plan of care. Introducing [facility]!    
 Julia Magdaleno introduces [employer] patient portal. Now you can access parts of your medical record, email your doctor's office, and request medication refills online. 1. In your internet browser, go to [email or web address] 2. Click on the First Time User? Click Here link in the Sign In box. You will see the New Member Sign Up page. 3. Enter your Engagor Access Code exactly as it appears below. You will not need to use this code after youve completed the sign-up process. If you do not sign up before the expiration date, you must request a new code. · Engagor Access Code: [email or web address] Expires: 12/5/2017  1:01 PM 
 
4. Enter the last four digits of your Social Security Number (xxxx) and Date of Birth (mm/dd/yyyy) as indicated and click Submit. You will be taken to the next sign-up page. 5. Create a Engagor ID. This will be your Engagor login ID and cannot be changed, so think of one that is secure and easy to remember. 6. Create a Engagor password. You can change your password at any time. 7. Enter your Password Reset Question and Answer. This can be used at a later time if you forget your password. 8. Enter your e-mail address. You will receive e-mail notification when new information is available in [address] 9. Click Sign Up. You can now view and download portions of your medical record. 10. Click the Download Summary menu link to download a portable copy of your medical information. If you have questions, please visit the Frequently Asked Questions section of the [employer]or website. Remember, [employer] is NOT to be used for urgent needs. For medical emergencies, dial 911. Now available from your iPhone and Android! General Information Please provide this summary of care documentation to your next provider. Patient Signature:  ____________________________________________________________ Date:  ____________________________________________________________  
  
[facility]  Provider Signature: ____________________________________________________________ Date:  ____________________________________________________________

## 2017-10-10 NOTE — ED NOTES
Bedside shift change report given to Jose Joel (oncoming nurse) by Torri Winters RN (offgoing nurse). Report included the following information SBAR, ED Summary, MAR and Recent Results.

## 2017-10-10 NOTE — PROGRESS NOTES
10/10/2017   CARE MANAGEMENT NOTE:  CM is following pt in the ER for initial discharge planning. EMR reviewed. CM met with pt and his dtr at bedside to obtain hx for this needs assessment. Reportedly, pt resides with his son in a one story home in [place], [place].  [place], pt was ambulatory with a cane, is indepn with ADLs, and drives. He uses [employer]. Pt does not have home healthcare currently. DME - has a cane. PCP is Dr. Jan Richard. Plan is to return home when medically stable.   [place]

## 2017-10-10 NOTE — ED TRIAGE NOTES
Patient arrives via EMS for dizziness. He recently ran out of his blood pressure medicines (several days ago) and his pressure was 590 systolic on scene. Patient alert and oriented in no apparent distress.

## 2017-10-11 PROBLEM — K92.2 GI BLEEDING: Status: RESOLVED | Noted: 2017-09-05 | Resolved: 2017-10-11

## 2017-10-11 PROBLEM — K92.1 MELENA: Status: RESOLVED | Noted: 2017-09-05 | Resolved: 2017-10-11

## 2017-10-11 LAB
ALBUMIN SERPL-MCNC: 3.2 G/DL (ref 3.5–5)
ANION GAP SERPL CALC-SCNC: 10 MMOL/L (ref 5–15)
APPEARANCE UR: CLEAR
BACTERIA URNS QL MICRO: NEGATIVE /HPF
BILIRUB UR QL: NEGATIVE
BUN SERPL-MCNC: 48 MG/DL (ref 6–20)
BUN/CREAT SERPL: 14 (ref 12–20)
CALCIUM SERPL-MCNC: 8.6 MG/DL (ref 8.5–10.1)
CHLORIDE SERPL-SCNC: 105 MMOL/L (ref 97–108)
CO2 SERPL-SCNC: 23 MMOL/L (ref 21–32)
COLOR UR: ABNORMAL
CREAT SERPL-MCNC: 3.36 MG/DL (ref 0.7–1.3)
EPITH CASTS URNS QL MICRO: ABNORMAL /LPF
GLUCOSE SERPL-MCNC: 115 MG/DL (ref 65–100)
GLUCOSE UR STRIP.AUTO-MCNC: NEGATIVE MG/DL
HGB UR QL STRIP: NEGATIVE
HYALINE CASTS URNS QL MICRO: ABNORMAL /LPF (ref 0–5)
KETONES UR QL STRIP.AUTO: NEGATIVE MG/DL
LEUKOCYTE ESTERASE UR QL STRIP.AUTO: NEGATIVE
NITRITE UR QL STRIP.AUTO: NEGATIVE
PH UR STRIP: 6 [PH] (ref 5–8)
PHOSPHATE SERPL-MCNC: 3.5 MG/DL (ref 2.6–4.7)
POTASSIUM SERPL-SCNC: 4.1 MMOL/L (ref 3.5–5.1)
PROT UR STRIP-MCNC: 30 MG/DL
RBC #/AREA URNS HPF: ABNORMAL /HPF (ref 0–5)
SODIUM SERPL-SCNC: 138 MMOL/L (ref 136–145)
SP GR UR REFRACTOMETRY: 1.01 (ref 1–1.03)
UROBILINOGEN UR QL STRIP.AUTO: 0.2 EU/DL (ref 0.2–1)
WBC URNS QL MICRO: ABNORMAL /HPF (ref 0–4)

## 2017-10-11 PROCEDURE — 36415 COLL VENOUS BLD VENIPUNCTURE: CPT | Performed by: INTERNAL MEDICINE

## 2017-10-11 PROCEDURE — 74011250637 HC RX REV CODE- 250/637: Performed by: INTERNAL MEDICINE

## 2017-10-11 PROCEDURE — 80069 RENAL FUNCTION PANEL: CPT | Performed by: INTERNAL MEDICINE

## 2017-10-11 PROCEDURE — L3710 EO ELAS W/METAL JNTS PRE OTS: HCPCS

## 2017-10-11 PROCEDURE — 77030034850

## 2017-10-11 PROCEDURE — 74011250636 HC RX REV CODE- 250/636: Performed by: INTERNAL MEDICINE

## 2017-10-11 PROCEDURE — 74011000250 HC RX REV CODE- 250: Performed by: INTERNAL MEDICINE

## 2017-10-11 PROCEDURE — 65660000000 HC RM CCU STEPDOWN

## 2017-10-11 PROCEDURE — 51798 US URINE CAPACITY MEASURE: CPT

## 2017-10-11 PROCEDURE — 81001 URINALYSIS AUTO W/SCOPE: CPT | Performed by: INTERNAL MEDICINE

## 2017-10-11 RX ORDER — SODIUM CHLORIDE 9 MG/ML
75 INJECTION, SOLUTION INTRAVENOUS CONTINUOUS
Status: DISCONTINUED | OUTPATIENT
Start: 2017-10-11 | End: 2017-10-13

## 2017-10-11 RX ORDER — HYDRALAZINE HYDROCHLORIDE 25 MG/1
100 TABLET, FILM COATED ORAL 3 TIMES DAILY
Status: DISCONTINUED | OUTPATIENT
Start: 2017-10-11 | End: 2017-10-13 | Stop reason: HOSPADM

## 2017-10-11 RX ADMIN — PANTOPRAZOLE SODIUM 40 MG: 40 TABLET, DELAYED RELEASE ORAL at 06:16

## 2017-10-11 RX ADMIN — HEPARIN SODIUM 5000 UNITS: 5000 INJECTION, SOLUTION INTRAVENOUS; SUBCUTANEOUS at 13:28

## 2017-10-11 RX ADMIN — FINASTERIDE 5 MG: 5 TABLET, FILM COATED ORAL at 08:09

## 2017-10-11 RX ADMIN — ASPIRIN 81 MG: 81 TABLET, COATED ORAL at 08:09

## 2017-10-11 RX ADMIN — HYDRALAZINE HYDROCHLORIDE 100 MG: 25 TABLET, FILM COATED ORAL at 08:09

## 2017-10-11 RX ADMIN — FERROUS SULFATE TAB 325 MG (65 MG ELEMENTAL FE) 324 MG: 325 (65 FE) TAB at 06:16

## 2017-10-11 RX ADMIN — CARBAMAZEPINE 200 MG: 200 TABLET ORAL at 17:28

## 2017-10-11 RX ADMIN — LABETALOL HYDROCHLORIDE 20 MG: 5 INJECTION INTRAVENOUS at 00:11

## 2017-10-11 RX ADMIN — CARBAMAZEPINE 200 MG: 200 TABLET ORAL at 21:25

## 2017-10-11 RX ADMIN — Medication 10 ML: at 21:26

## 2017-10-11 RX ADMIN — Medication 10 ML: at 13:21

## 2017-10-11 RX ADMIN — HEPARIN SODIUM 5000 UNITS: 5000 INJECTION, SOLUTION INTRAVENOUS; SUBCUTANEOUS at 06:15

## 2017-10-11 RX ADMIN — SODIUM CHLORIDE 75 ML/HR: 900 INJECTION, SOLUTION INTRAVENOUS at 13:20

## 2017-10-11 RX ADMIN — CARBAMAZEPINE 200 MG: 200 TABLET ORAL at 08:09

## 2017-10-11 RX ADMIN — CARVEDILOL 12.5 MG: 12.5 TABLET, FILM COATED ORAL at 17:28

## 2017-10-11 RX ADMIN — HYDRALAZINE HYDROCHLORIDE 100 MG: 25 TABLET, FILM COATED ORAL at 17:28

## 2017-10-11 RX ADMIN — TAMSULOSIN HYDROCHLORIDE 0.4 MG: 0.4 CAPSULE ORAL at 08:08

## 2017-10-11 RX ADMIN — HYDRALAZINE HYDROCHLORIDE 100 MG: 25 TABLET, FILM COATED ORAL at 21:25

## 2017-10-11 RX ADMIN — CARVEDILOL 12.5 MG: 12.5 TABLET, FILM COATED ORAL at 06:35

## 2017-10-11 RX ADMIN — Medication 10 ML: at 04:35

## 2017-10-11 RX ADMIN — AMLODIPINE BESYLATE 10 MG: 5 TABLET ORAL at 08:09

## 2017-10-11 RX ADMIN — ATORVASTATIN CALCIUM 20 MG: 20 TABLET, FILM COATED ORAL at 08:09

## 2017-10-11 RX ADMIN — LABETALOL HYDROCHLORIDE 20 MG: 5 INJECTION INTRAVENOUS at 04:33

## 2017-10-11 RX ADMIN — HEPARIN SODIUM 5000 UNITS: 5000 INJECTION, SOLUTION INTRAVENOUS; SUBCUTANEOUS at 22:00

## 2017-10-11 NOTE — CONSULTS
Ingris White MD, [address] [facility] [phone]    Date of  Admission: 10/10/2017 10:58 AM         IMPRESSION and RECOMMENDATIONS     1. CM:  Unknown etiology. Possibly ischemic given H/O CAD/CABG. Though he has severe LV systolic dysfunction on echo, he appears euvolemic on exam without evidence of CHF. He feels back to baseline. I agree with current cardiac regimen including coreg, norvasc, ASA, lipitor, and hydralazine. Not on ACEI/ARB presumably due to renal dysfunction. Stable from a cardiac standpoint (as long as he stays on his meds). He should f/u with his primary cardiologist and he/she can consider ICD upgrade. I have discussed this plan with the patient. He appears to understand this plan and wishes to proceed ahead. 2.  HTN:  Normalized on current regimen. Problem List  Date Reviewed: 10/11/2017          Codes Class Noted    CAD (coronary artery disease) ICD-10-CM: I25.10  ICD-9-CM: 414.00  Unknown        Hyperlipidemia ICD-10-CM: E78.5  ICD-9-CM: 272.4  Unknown        Hypertension ICD-10-CM: I10  ICD-9-CM: 401.9  Unknown        CKD (chronic kidney disease), stage IV ([facility] [address] ICD-10-CM: N18.4  ICD-9-CM: 658. 4  Unknown        Systolic CHF, chronic (HCC) ICD-10-CM: I50.22  ICD-9-CM: 428.22, 428.0  Unknown        CHF (congestive heart failure) (HCC) ICD-10-CM: I50.9  ICD-9-CM: 428.0  Unknown        * (Principal)Hypertensive urgency ICD-10-CM: I16.0  ICD-9-CM: 401.9  10/10/2017        Acute renal failure superimposed on stage 4 chronic kidney disease ([facility] [address] ICD-10-CM: N17.9, N18.4  ICD-9-CM: 584.9, 585.4  10/10/2017        Elevated brain natriuretic peptide (BNP) level ICD-10-CM: R79.89  ICD-9-CM: 790.99  10/10/2017        Anemia ICD-10-CM: D64.9  ICD-9-CM: 285.9  10/10/2017        H pylori ulcer ICD-10-CM: K27.9, B96.81  ICD-9-CM: 533.90, 041.86  9/6/2017        Urine retention ICD-10-CM: R33.9  ICD-9-CM: 788.20  9/6/2017        CKD (chronic kidney disease) stage 4, GFR 15-29 ml/min (formerly [facility]) ICD-10-CM: N18.4  ICD-9-CM: 585.4  9/5/2017        Benign prostatic hyperplasia ICD-10-CM: N40.0  ICD-9-CM: 600.00  9/5/2017        Hydronephrosis ICD-10-CM: N13.30  ICD-9-CM: 772  9/5/2017        Coronary atherosclerosis of native coronary artery ICD-10-CM: I25.10  ICD-9-CM: 414.01  9/2/2011        Acute systolic CHF (congestive heart failure) (formerly [facility]) ICD-10-CM: I50.21  ICD-9-CM: 428.21, 428.0  9/2/2011        Obstructive chronic bronchitis with acute bronchitis [facility]) ICD-10-CM: J44.0  ICD-9-CM: 491.22  9/2/2011              History of Present Illness:     Quincy Auguste is a 80 y.o. male with the above problem list who was admitted for Hypertensive urgency. Pt presented with lightheadedness x 2 days after not refilling his meds. No other symptoms. Feels back to baseline after ersumption of meds. He denies chest pain/discomfort, shortness of breath, dyspnea on exertion, orthopnea, paroxysmal noctural dyspnea, lower extremity edema, palpitations, syncope, or near-syncope.     Current Facility-Administered Medications   Medication Dose Route Frequency    hydrALAZINE (APRESOLINE) tablet 100 mg  100 mg Oral TID    0.9% sodium chloride infusion  75 mL/hr IntraVENous CONTINUOUS    sodium chloride (NS) flush 5-10 mL  5-10 mL IntraVENous Q8H    sodium chloride (NS) flush 5-10 mL  5-10 mL IntraVENous PRN    naloxone (NARCAN) injection 0.4 mg  0.4 mg IntraVENous PRN    zolpidem (AMBIEN) tablet 5 mg  5 mg Oral QHS PRN    acetaminophen (TYLENOL) tablet 650 mg  650 mg Oral Q4H PRN    HYDROcodone-acetaminophen (NORCO) 5-325 mg per tablet 1 Tab  1 Tab Oral Q4H PRN    ondansetron (ZOFRAN) injection 4 mg  4 mg IntraVENous Q4H PRN    heparin (porcine) injection 5,000 Units  5,000 Units SubCUTAneous Q8H    labetalol (NORMODYNE;TRANDATE) injection 20 mg  20 mg IntraVENous Q4H PRN    atorvastatin (LIPITOR) tablet 20 mg  20 mg Oral DAILY    carvedilol (COREG) tablet 12.5 mg  12.5 mg Oral BID WITH MEALS    ferrous sulfate tablet 324 mg  324 mg Oral ACB    finasteride (PROSCAR) tablet 5 mg  5 mg Oral DAILY    pantoprazole (PROTONIX) tablet 40 mg  40 mg Oral ACB    tamsulosin (FLOMAX) capsule 0.4 mg  0.4 mg Oral DAILY    aspirin delayed-release tablet 81 mg  81 mg Oral DAILY    amLODIPine (NORVASC) tablet 10 mg  10 mg Oral DAILY    carBAMazepine (TEGretol) tablet 200 mg  200 mg Oral TID    influenza vaccine 2017-18 (3 yrs+)(PF) (FLUZONE QUAD/FLUARIX QUAD) injection 0.5 mL  0.5 mL IntraMUSCular PRIOR TO DISCHARGE      No Known Allergies   Family History   Problem Relation Age of Onset    Heart Disease Mother     Hypertension Mother       Social History     Social History    Marital status:      Spouse name: N/A    Number of children: N/A    Years of education: N/A     Occupational History    Not on file.      Social History Main Topics    Smoking status: Former Smoker    Smokeless tobacco: Never Used    Alcohol use No    Drug use: No    Sexual activity: Not on file     Other Topics Concern    Not on file     Social History Narrative       Physical Exam:     Patient Vitals for the past 16 hrs:   BP Temp Pulse Resp SpO2 Weight   10/11/17 1157 140/77 98.3 °F (36.8 °C) 81 16 100 % -   10/11/17 1031 121/65 - 82 - - -   10/11/17 0953 118/65 - 80 - - -   10/11/17 0917 118/63 - 80 - - -   10/11/17 0900 122/69 - 80 - - -   10/11/17 0830 (!) 178/98 - - - - -   10/11/17 0807 (!) 178/92 98.4 °F (36.9 °C) 83 16 100 % -   10/11/17 0712 (!) 202/120 - - - - -   10/11/17 0618 (!) 200/121 - - - - -   10/11/17 0614 (!) 204/118 - - - - -   10/11/17 0610 (!) 215/124 - - - - -   10/11/17 0546 (!) 180/111 - - - - -   10/11/17 0510 (!) 183/108 - - - - -   10/11/17 0439 - - - - - 166 lb 14.4 oz (75.7 kg)   10/11/17 0431 (!) 181/91 97.8 °F (36.6 °C) 62 15 99 % -   10/11/17 0145 (!) 180/94 - - - - -   10/11/17 0115 155/86 - - - - -   10/11/17 0016 (!) 177/94 - - - - -   10/11/17 0006 (!) 176/97 98.9 °F (37.2 °C) 76 16 97 % -   10/10/17 2249 - - 81 - - -       HEENT Exam:     Normocephalic, atraumatic. EOMI. Oropharynx negative. Neck supple. No lymphadenopathy. Lung Exam:     The patient is not dyspneic. There is no cough. Breath sounds are heard equally in all lung fields. There are no wheezes, rales, rhonchi, or rubs heard on auscultation. Heart Exam:     The rhythm is regular. The PMI is in the 5th intercostal space of the MCL. Apical impulse is normal. S1 is regular. S2 is physiologic. There is no S3, S4 gallop, murmur, click, or rub. Abdomen Exam:     Bowel sounds are normoactive. Abdomen soft in all quadrants. No tenderness. No palpable masses. No organomegaly. No hernias noted. No bruits or pulsatile mass. Extremities Exam:     The extremities are atraumatic appearing. There is no clubbing, cyanosis, edema, ulcers, varicose veins, rash, erythemia noted in the extremities. The neurovascular status is grossly intact with normal distal sensation and pulses. Vascular Exam:     The radial, brachial, dorsalis pedis, posterior tibial, are equal and strong bilaterally The carotids are equal bilaterally without bruits.           Labs:     Lab Results   Component Value Date/Time    Glucose 115 10/11/2017 10:59 AM    Sodium 138 10/11/2017 10:59 AM    Potassium 4.1 10/11/2017 10:59 AM    Chloride 105 10/11/2017 10:59 AM    CO2 23 10/11/2017 10:59 AM    BUN 48 10/11/2017 10:59 AM    Creatinine 3.36 10/11/2017 10:59 AM    Calcium 8.6 10/11/2017 10:59 AM     Recent Labs      10/10/17   1136   WBC  4.1   HGB  9.3*   HCT  28.8*   PLT  203     Recent Labs      10/11/17   1059  10/10/17   1136   SGOT   --   23   ALT   --   26   AP   --   104   TBILI   --   0.3   TP   --   7.2   ALB  3.2*  3.4*   GLOB   --   3.8     Recent Labs      10/10/17   1136   INR  1.1   PTP  11.4*      No results for input(s): CPK, CKMB, TNIPOC in the last 72 hours. No lab exists for component: TROPONINI, ITNL  Recent Labs      10/10/17   1136   TROIQ  <0.04     No results found for: CHOL, CHOLX, CHLST, CHOLV, HDL, LDL, LDLC, DLDLP, TGLX, TRIGL, TRIGP, CHHD, CHHDX    EKG:  V-paced @ 80. Echo (10/10/17):  SUMMARY:  Procedure information: This was a technically difficult study. Left ventricle: Systolic function was moderately to markedly reduced. Ejection fraction was estimated to be 30 %. There was moderate diffuse  hypokinesis. Wall thickness was mildly increased. Right ventricle: The size was at the upper limits of normal. Systolic  function was low normal.    Left atrium: The atrium was dilated. Right atrium: The atrium was dilated. Mitral valve: There was mild regurgitation. Aortic valve: The valve was trileaflet. Leaflets exhibited sclerosis. Tricuspid valve: There was severe regurgitation. There was moderate  pulmonary hypertension. Inferior vena cava, hepatic veins: The inferior vena cava was dilated.

## 2017-10-11 NOTE — PROGRESS NOTES
1115: Order noted for pelayo catheter insertion. Notified patient. Patient refusing. States,\"I have been peeing fine. \" Notified Dr. Jaleesa Ellis. Orders to get post void bladder scan and encourage patient to allow catheter placement. 1140: Patient voided in urinal. Post void bladder scan shows 467mL. Will encourage patient to allow placement of pelayo. 1245: Patient consented to have pelayo placed. 18 fr coude cath placed. Drained 600mL clear yellow urine. Patient tolerated well. Bedside shift change report given to Sree Gaytan (oncoming nurse) by Aleksandr Galaviz RN (offgoing nurse). Report included the following information SBAR, Kardex, Intake/Output, MAR, Recent Results and Cardiac Rhythm V paced.

## 2017-10-11 NOTE — PROGRESS NOTES
1405 - Bedside and Verbal shift change report given to [employer] (oncoming nurse) by Didi Carney RN (offgoing nurse). Report included the following information SBAR, Kardex, Intake/Output, Accordion, Recent Results and Cardiac Rhythm V-paced. 1915 - Bedside and Verbal shift change report given to [facility] RN (oncoming nurse) by [facility] (offgoing nurse). Report included the following information SBAR, Kardex, Intake/Output, Accordion, Recent Results and Cardiac Rhythm Vpaced.

## 2017-10-11 NOTE — CDMP QUERY
Diastolic CHF has been documented for this patient. The following is also noted on the medical record:    * PMH HTN - noncompliant with medications, CKD, s/p CABG, s/p pacer  * Presents with dizziness, . * Pro BNP 13,011  * CXR: Small left pleural effusion. There are Kerley B lines present  * ECHO: EF 30%, LV thickness mildly increased; RV systolic function low normal, YAMILETH, mild MR, AS, severe TR, moderate pHTN, IVC dilated  * Treatment: Lasix 20 mg IV x 1, Coreg 12.5 mg BID    Based on your medical judgment, could you please further specify the patient's CHF as:    =>Acute on chronic systolic and diastolic CHF  =>Chronic systolic and diastolic CHF  =>Acute on chronic systolic CHF  =>Chronic systolic CHF  =>Other Explanation of clinical findings  =>Unable to Determine (no explanation of clinical findings)    Please clarify and document your clinical opinion in the progress notes and discharge summary including the definitive and/or presumptive diagnosis, (suspected or probable), related to the above clinical findings. Please include clinical findings supporting your diagnosis.     Horacio Bernardo, [address] [place] [address], [address] [place] [address]  [email or web address]

## 2017-10-11 NOTE — ED NOTES
TRANSFER - OUT REPORT:    Verbal report given to [facility], RN(name) on Travis Mayer  being transferred to [facility](unit)for routine progression of care       Report consisted of patients Situation, Background, Assessment and   Recommendations(SBAR). Information from the following report(s) SBAR, ED Summary, Intake/Output, MAR, Recent Results and Cardiac Rhythm Paced was reviewed with the receiving nurse. Lines:   Peripheral IV 10/10/17 Left Antecubital (Active)   Site Assessment Clean, dry, & intact 10/10/2017 11:41 AM   Phlebitis Assessment 0 10/10/2017 11:41 AM   Infiltration Assessment 0 10/10/2017 11:41 AM   Dressing Status Clean, dry, & intact 10/10/2017 11:41 AM   Dressing Type Transparent;Tape 10/10/2017 11:41 AM   Hub Color/Line Status Pink 10/10/2017 11:41 AM        Opportunity for questions and clarification was provided.       Patient transported with:   Monitor  Registered Nurse

## 2017-10-11 NOTE — PROGRESS NOTES
Problem: Falls - Risk of  Goal: *Absence of Falls  Document Sara Fall Risk and appropriate interventions in the flowsheet.    Outcome: Progressing Towards Goal  Fall Risk Interventions:  Mobility Interventions: Utilize walker, cane, or other assitive device, Patient to call before getting OOB           Medication Interventions: Patient to call before getting OOB, Teach patient to arise slowly

## 2017-10-11 NOTE — PROGRESS NOTES
Problem: Hypertension  Goal: *Blood pressure within specified parameters  Outcome: Progressing Towards Goal  Blood pressures remain elevated despite prn medications. Improvement seen. Goal: *Fluid volume balance  Outcome: Progressing Towards Goal  No edema noted. Patient voiding. Incontinent episodes interfering with output measurement.

## 2017-10-11 NOTE — PROGRESS NOTES
Primary Nurse Benedict Maloney and Leonard Smith, RN performed a dual skin assessment on this patient No impairment noted  Gareth score is 22

## 2017-10-11 NOTE — PROGRESS NOTES
0935  TRANSFER - IN REPORT:    Verbal report received from Anamaria Hernandez RN(name) on Travis Mayer  being received from ED(unit) for routine progression of care      Report consisted of patients Situation, Background, Assessment and   Recommendations(SBAR). Information from the following report(s) SBAR, Kardex, ED Summary, Procedure Summary, Intake/Output, MAR, Accordion, Recent Results and Cardiac Rhythm NSR was reviewed with the receiving nurse. Opportunity for questions and clarification was provided. Assessment completed upon patients arrival to unit and care assumed. 2048  Patient in bed, BP cycling q30. Denies headache, dizziness, nausea. Pleasant demeanor. Call bell within reach. 2325  Completed admission assessment. Patient states that he has not gotten flu vaccine, agreed to receiving flu vaccine. No reported allergy. Opportunity for questions provided, patient had no questions or concerns regarding vaccine. Patient having incontinent episodes, urinary frequency and urgency. 0025  Patient says does not want flu vaccine tonight, would receive before he went. 0630  Patient blood pressure elevated above 200/120, no prn or scheduled BP medication due. Called Dr. Dilcia Corrales, gave order to administer morning Coreg early.      Patient Vitals for the past 12 hrs:   Temp Pulse Resp BP SpO2   10/11/17 0712 - - - (!) 202/120 -   10/11/17 0618 - - - (!) 200/121 -   10/11/17 7161 - - - (!) 204/118 -   10/11/17 0610 - - - (!) 215/124 -   10/11/17 0546 - - - (!) 180/111 -   10/11/17 0510 - - - (!) 183/108 -   10/11/17 0431 97.8 °F (36.6 °C) 62 15 (!) 181/91 99 %   10/11/17 0145 - - - (!) 180/94 -   10/11/17 0115 - - - 155/86 -   10/11/17 0016 - - - (!) 177/94 -   10/11/17 0006 98.9 °F (37.2 °C) 76 16 (!) 176/97 97 %   10/10/17 2249 - 81 - - -   10/10/17 2138 98.6 °F (37 °C) 60 14 (!) 170/105 98 %   10/10/17 2102 - 60 15 (!) 207/85 100 %   10/10/17 2000 - 97 15 (!) 201/103 99 %   10/10/17 1930 - 66 18 (!) 219/95 100 %     0730  Bedside and Verbal shift change report given to Mallory Garcia RN (oncoming nurse) by Elsa Durham RN (offgoing nurse). Report included the following information SBAR, Kardex, ED Summary, Procedure Summary, Intake/Output, MAR, Accordion, Recent Results and Cardiac Rhythm Vpaced.

## 2017-10-11 NOTE — CONSULTS
[facility]   [address] [facility], [address] [facility] [address]   [address] [facility]       Name:  Richi Sher   MR#:  [id number]   :  1935   Account #:  [de-identified]    Date of Consultation:  10/11/2017   Date of Adm:  10/10/2017       NEPHROLOGY CONSULTATION     REQUESTING PHYSICIAN: Dr. Nely Whitfield. CHIEF COMPLAINT: Elevated creatinine. HISTORY OF PRESENT ILLNESS: The patient is an 80-year-old   [facility] American male with chronic kidney disease stage IV. He lives   in [facility] and has come to our office to have some labs done following   a hospital followup, but does not follow with us regularly in the office. He was hospitalized here last month with acute on chronic renal failure   due to urinary retention and a Ruffin was placed. He was discharged   with an indwelling Ruffin and later followed up with [facility]. He   thinks the Ruffin was removed about 2 weeks ago. He reports voiding   well. He came to the hospital for dizziness and was found to have   markedly elevated blood pressure. He had run out of his blood   pressure medicines. Antihypertensive therapy was resumed and his   blood pressure is now well controlled. Incidentally, on admission labs, the serum creatinine was elevated at   3.71, up from a prior baseline in the mid 2s. Repeat labs from today   are still pending. His urine output has been excellent. A renal   ultrasound was done showing hydronephrosis, similar to imaging   obtained last month. REVIEW OF SYSTEMS   CONSTITUTIONAL: No fevers or chills. GASTROINTESTINAL: No nausea, vomiting, or anorexia. GENITOURINARY: Voiding well. No hematuria. Denies hesitancy. CARDIOVASCULAR: No chest pain or shortness of breath. All other systems reviewed and are negative except as per history of   present illness.     PAST MEDICAL HISTORY: Chronic kidney disease stage IV,   hypertension, coronary artery disease, congestive heart failure, history   of coronary artery bypass grafting, status post pacemaker placement,   BPH with prior urinary retention, hyperlipidemia. FAMILY HISTORY: No family history of renal disease. SOCIAL HISTORY: Denies alcohol or tobacco use. PHYSICAL EXAMINATION   VITAL SIGNS: Temperature 98.4, pulse 80, blood pressure 118/63. GENERAL: Pleasant  male in no acute distress. HEENT: Eyes are anicteric. Extraocular movements intact. ENMT: Mucous membranes are dry. There is no epistaxis. NECK: Nontender with no masses. Thyroid is not enlarged. CARDIOVASCULAR: Heart is regular. There is no lower extremity   edema. There is a pacemaker in the right chest.   RESPIRATORY: Lungs are clear with good effort. GI: Abdomen is soft, nontender, bowel sounds are active. Hepatosplenomegaly is not appreciated. SKIN: Warm with normal turgor. There is no rash. MUSCULOSKELETAL: There is no cyanosis or clubbing. There is no   synovitis of the fingers or wrists bilaterally. LABORATORY DATA: Sodium 143, potassium 4.3, chloride 110,   bicarbonate 24, BUN 48, creatinine 3.71. White count 4.1, hemoglobin   9.3, platelets 789. Urine sodium 8, urine creatinine 25.44.    ASSESSMENT: Acute renal failure superimposed on chronic kidney   disease. He has a low fractional excretion of sodium, which could   suggest intravascular volume depletion, although his history does not   point to a reason for him to be volume depleted. Sometimes acute   hypertension can cause some acute kidney dysfunction as well. I   would also be concerned about recurrent obstruction given the findings   on imaging and  the history of recent removal of a Ruffin catheter. He is   making a good deal of urine, however. PLAN:   1. Start with rechecking serum chemistries today. 2. Check a urinalysis. 3. Avoid nephrotoxins. 4. We will have Urology see the patient to advise on whether or not the   Ruffin catheter should be replaced. 5. Monitor serial labs.    6. He probably should have outpatient nephrology followup. We are of   course happy to see him. Thus far, he has not followed up with us in   the office.         MD HEIDE Mosher / Maxi.Crigler   D:  10/11/2017   10:29   T:  10/11/2017   11:07   Job #:  [id number]

## 2017-10-11 NOTE — PROGRESS NOTES
Jaspal Murrell [facility] [address], [place], [address] [facility]  [phone]      Medical Progress Note      NAME: Raul Mccollum   :  1935  MRM:  [id number]    Date/Time: 10/11/2017  1:40 PM       Assessment and Plan:     Hypertensive urgency - POA due to non-compliance. Resumed hydralazine, coreg, amlodipine, and now better    Acute renal failure superimposed on stage 4 chronic kidney disease - POA due to obstruction and perhaps dehydration. Renal consult appreciated. Gently hydrating, but must be careful due to hx of CHF. Monitor Cr. Would stop IVF if improving, but not sure of baseline. Urine retention / Benign prostatic hyperplasia / Hydronephrosis - Noted on admission. Urology consulted. Ruffin in place. Continue Proscar and Flomax. Check AM US to ensure improvement in hydro prior to discharge. Chronic systolic CHF (congestive heart failure) / Elevated brain natriuretic peptide (BNP) level - Will be difficult to treat in situation of ARF and CKD, with non-compliance. Daily weight. Cardiology consult. Watch IVF and fluid status. Not usually on diuresis? Continue BB and statin. Needs education. H pylori ulcer - Dx last month, and was treated with protonix/amox/clarithromycin for 14 days    Anemia - Presumed from recent GI loss. Check serologies and follow. Taking iron at home    Coronary atherosclerosis of native coronary artery - Appears stable. Monitor tele. Continue ASA, BB, statin    Obstructive chronic bronchitis with acute bronchitis - No symptoms. Not on meds. Perhaps CHF mimicked COPD? Hyperlipidemia - continue Lipitor    Hx seizure - continue carbamazepine       Subjective:     Chief Complaint:  Dyspnea is improve    ROS:  (bold if positive, if negative)      Tolerating PT  Tolerating Diet        Objective:     Last 24hrs VS reviewed since prior progress note.  Most recent are:    Visit Vitals    /77 (BP 1 Location: Right arm, BP Patient Position: At rest)    Pulse 81    Temp 98.3 °F (36.8 °C)    Resp 16    Ht 5' 9\" (1.753 m)    Wt 75.7 kg (166 lb 14.4 oz)    SpO2 100%    BMI 24.65 kg/m2     SpO2 Readings from Last 6 Encounters:   10/11/17 100%   09/06/17 100%   08/20/15 99%   09/10/14 98%   09/11/11 100%   09/05/11 100%          Intake/Output Summary (Last 24 hours) at 10/11/17 1340  Last data filed at 10/11/17 1257   Gross per 24 hour   Intake              480 ml   Output             2625 ml   Net            -2145 ml        Physical Exam:    Gen:  Well-developed, well-nourished, in no acute distress  HEENT:  Pink conjunctivae, PERRL, hearing intact to voice, moist mucous membranes  Neck:  Supple, without masses, thyroid non-tender  Resp:  No accessory muscle use, clear breath sounds without wheezes rales or rhonchi  Card:  No murmurs, normal S1, S2 without thrills, bruits or peripheral edema  Abd:  Soft, non-tender, non-distended, normoactive bowel sounds are present, no mass  Lymph:  No cervical or inguinal adenopathy  Musc:  No cyanosis or clubbing  Skin:  No rashes or ulcers, skin turgor is good  Neuro:  Cranial nerves are grossly intact, no focal motor weakness, follows commands appropriately  Psych:  Good insight, oriented to person, place and time, alert    Telemetry reviewed:   normal sinus rhythm  __________________________________________________________________  Medications Reviewed: (see below)  Medications:     Current Facility-Administered Medications   Medication Dose Route Frequency    hydrALAZINE (APRESOLINE) tablet 100 mg  100 mg Oral TID    0.9% sodium chloride infusion  75 mL/hr IntraVENous CONTINUOUS    sodium chloride (NS) flush 5-10 mL  5-10 mL IntraVENous Q8H    sodium chloride (NS) flush 5-10 mL  5-10 mL IntraVENous PRN    naloxone (NARCAN) injection 0.4 mg  0.4 mg IntraVENous PRN    zolpidem (AMBIEN) tablet 5 mg  5 mg Oral QHS PRN    acetaminophen (TYLENOL) tablet 650 mg  650 mg Oral Q4H PRN    HYDROcodone-acetaminophen (NORCO) 5-325 mg per tablet 1 Tab  1 Tab Oral Q4H PRN    ondansetron (ZOFRAN) injection 4 mg  4 mg IntraVENous Q4H PRN    heparin (porcine) injection 5,000 Units  5,000 Units SubCUTAneous Q8H    labetalol (NORMODYNE;TRANDATE) injection 20 mg  20 mg IntraVENous Q4H PRN    atorvastatin (LIPITOR) tablet 20 mg  20 mg Oral DAILY    carvedilol (COREG) tablet 12.5 mg  12.5 mg Oral BID WITH MEALS    ferrous sulfate tablet 324 mg  324 mg Oral ACB    finasteride (PROSCAR) tablet 5 mg  5 mg Oral DAILY    pantoprazole (PROTONIX) tablet 40 mg  40 mg Oral ACB    tamsulosin (FLOMAX) capsule 0.4 mg  0.4 mg Oral DAILY    aspirin delayed-release tablet 81 mg  81 mg Oral DAILY    amLODIPine (NORVASC) tablet 10 mg  10 mg Oral DAILY    carBAMazepine (TEGretol) tablet 200 mg  200 mg Oral TID    influenza vaccine 2017-18 (3 yrs+)(PF) (FLUZONE QUAD/FLUARIX QUAD) injection 0.5 mL  0.5 mL IntraMUSCular PRIOR TO DISCHARGE        Lab Data Reviewed: (see below)  Lab Review:     Recent Labs      10/10/17   1136   WBC  4.1   HGB  9.3*   HCT  28.8*   PLT  203     Recent Labs      10/11/17   1059  10/10/17   1136   NA  138  143   K  4.1  4.3   CL  105  110*   CO2  23  24   GLU  115*  95   BUN  48*  48*   CREA  3.36*  3.71*   CA  8.6  8.4*   MG   --   2.0   PHOS  3.5   --    ALB  3.2*  3.4*   TBILI   --   0.3   SGOT   --   23   ALT   --   26   INR   --   1.1     Lab Results   Component Value Date/Time    Glucose (POC) 128 09/07/2011 06:16 PM    Glucose (POC) 99 09/05/2011 12:17 AM    Glucose (POC) 170 09/02/2011 08:39 PM    Glucose (POC) 115 09/02/2011 07:42 AM     No results for input(s): PH, PCO2, PO2, HCO3, FIO2 in the last 72 hours. Recent Labs      10/10/17   1136   INR  1.1     All Micro Results     None          I have reviewed notes of prior 24hr.     Other pertinent lab: none    Total time spent with patient: [facility] discussed with: Patient, Nursing Staff, Consultant/Specialist and >50% of time spent in counseling and coordination of care    Discussed:  Care Plan and D/C Planning    Prophylaxis:  H2B/PPI    Disposition:  Home w/Family           ___________________________________________________    Attending Physician: Charon Ormond, MD

## 2017-10-11 NOTE — CONSULTS
Requesting Provider: Karli Cook MD  Reason for Consultation: retention, Renée Vega  Pre-existing [facility] Patient:   yes                Patient: Stevie Rutledge MRN: [id number]  SSN: [id number]    YOB: 1935  Age: 80 y.o. Sex: male     Location: [facility]       Code Status: Full Code   PCP: PROVIDER UNKNOWN  - None   Emergency Contact:  Primary Emergency Contact: Edu Blum Phone: [phone]   Race/Taoism/Language: [address] / Yuni Luciano / Simon Lao   Payor: Payor: Sayda Rodriguez / Plan: [address] / Product Type: Medicare /    Prior Admission Data: 17 [facility] 5 MED SURG [address]   Hospitalized:  Hospital Day: 2 - Admitted 10/10/2017 10:58 AM   POD # * No surgery found *  by * Surgery not found * - Blood Loss: * No surgery found * * Surgery not found *     CONSULTANTS  IP CONSULT TO HOSPITALIST  IP CONSULT TO NEPHROLOGY  IP CONSULT TO [address] [place] [address]    ICD-10-CM ICD-9-CM   1. Secondary hypertension I15.9 405.99   2. Dizziness R42 780.4   3. Noncompliance with medication regimen Z91.14 V15.81   4. TINA (acute kidney injury) ([facility] [address] N17.9 584.9         Assessment/Plan:     · Retention arf and hydro  · Place pelayo  ·  follow cr. Follow up us to make arline hydro resolves once cr at baseline,  · Continue finasteride (started last month) and tamsulosin  · May need TURP     CC: Dizziness   HPI: He is a 80 y.o. male , seen by us recently after retention on last hospital admission. Passed voidtrial. Back in with dizziness, found to have pvr > 500, b hydro on us and elevated cr. Pt states he was voiding at home PTA.  No pain    Temp (24hrs), Av.4 °F (36.9 °C), Min:97.8 °F (36.6 °C), Max:98.9 °F (37.2 °C)    Creatinine   Date/Time Value Ref Range Status   10/11/2017 10:59 AM 3.36 (H) 0.70 - 1.30 MG/DL Final   10/10/2017 11:36 AM 3.71 (H) 0.70 - 1.30 MG/DL Final   2017 06:00 AM 2.66 (H) 0.70 - 1.30 MG/DL Final   2017 12:25 AM 3.45 (H) 0.70 - 1.30 MG/DL Final   09/10/2014 05:30 PM 2.07 (H) 0.45 - 1.15 MG/DL Final     Current Antimicrobial Therapy     None        Diet: DIET CARDIAC Regular - % Diet Eaten: 35 %  Urinary Status: Voiding     Labs     Lab Results   Component Value Date/Time    WBC 4.1 10/10/2017 11:36 AM    HCT 28.8 10/10/2017 11:36 AM    PLATELET 770 34/25/1175 11:36 AM    Sodium 138 10/11/2017 10:59 AM    Potassium 4.1 10/11/2017 10:59 AM    Chloride 105 10/11/2017 10:59 AM    CO2 23 10/11/2017 10:59 AM    BUN 48 10/11/2017 10:59 AM    Creatinine 3.36 10/11/2017 10:59 AM    Glucose 115 10/11/2017 10:59 AM    Calcium 8.6 10/11/2017 10:59 AM    Magnesium 2.0 10/10/2017 11:36 AM    INR 1.1 10/10/2017 11:36 AM     UA: Lab Results   Component Value Date/Time    Color YELLOW/STRAW 10/11/2017 11:36 AM    Appearance CLEAR 10/11/2017 11:36 AM    Specific gravity 1.010 10/11/2017 11:36 AM    pH (UA) 6.0 10/11/2017 11:36 AM    Protein 30 10/11/2017 11:36 AM    Glucose NEGATIVE  10/11/2017 11:36 AM    Ketone NEGATIVE  10/11/2017 11:36 AM    Bilirubin NEGATIVE  10/11/2017 11:36 AM    Urobilinogen 0.2 10/11/2017 11:36 AM    Nitrites NEGATIVE  10/11/2017 11:36 AM    Leukocyte Esterase NEGATIVE  10/11/2017 11:36 AM    Epithelial cells FEW 10/11/2017 11:36 AM    Bacteria NEGATIVE  10/11/2017 11:36 AM    WBC 0-4 10/11/2017 11:36 AM    RBC 0-5 10/11/2017 11:36 AM     Imaging     Results for orders placed during the hospital encounter of 10/10/17   CT HEAD WO CONT    Narrative EXAM:  CT HEAD WO CONT    INDICATION:   dizziness    COMPARISON: None. CONTRAST:  None. TECHNIQUE: Unenhanced CT of the head was performed using 5 mm images. Brain and  bone windows were generated. CT dose reduction was achieved through use of a  standardized protocol tailored for this examination and automatic exposure  control for dose modulation. FINDINGS:  There is slight prominence of the ventricles and sulci.  No hemorrhage mass or  acute infarction identified. There is partially empty sella. There are small  low-density extra-axial collection overlying frontal lobes most consistent with  very small subdural hygromas. There are degenerative changes C1/C2 with  stenosis. Impression IMPRESSION: No acute intracranial abnormality identified. US Results (most recent):    Results from [place] encounter on 10/10/17   US RETROPERITONEUM COMP   Narrative History: Acute renal failure. Ultrasonography of the retroperitoneum was performed. The inferior vena cava and  proximal aorta. Unremarkable. The mid to distal aorta are obscured by bowel gas. The bladder is fluid-filled with a bladder volume of 5 66 cc. The right kidney  measures 11.1 cm and the left kidney measures 10.2 cm. The kidneys are echogenic  bilaterally. There are bilateral renal cysts. There is moderate left internal  for cysts and mild right hydronephrosis. Impression IMPRESSION:  1. Moderate left hydronephrosis. Mild right hydronephrosis. No significant  change from prior study. 2. Echogenic kidneys compatible with medical renal disease. 3. Bilateral renal cysts.         Cultures     All Micro Results     None           Past History: (Complete 2+/3 areas)   No Known Allergies   Current Facility-Administered Medications   Medication Dose Route Frequency    hydrALAZINE (APRESOLINE) tablet 100 mg  100 mg Oral TID    0.9% sodium chloride infusion  75 mL/hr IntraVENous CONTINUOUS    sodium chloride (NS) flush 5-10 mL  5-10 mL IntraVENous Q8H    sodium chloride (NS) flush 5-10 mL  5-10 mL IntraVENous PRN    naloxone (NARCAN) injection 0.4 mg  0.4 mg IntraVENous PRN    zolpidem (AMBIEN) tablet 5 mg  5 mg Oral QHS PRN    acetaminophen (TYLENOL) tablet 650 mg  650 mg Oral Q4H PRN    HYDROcodone-acetaminophen (NORCO) 5-325 mg per tablet 1 Tab  1 Tab Oral Q4H PRN    ondansetron (ZOFRAN) injection 4 mg  4 mg IntraVENous Q4H PRN    heparin (porcine) injection 5,000 Units 5,000 Units SubCUTAneous Q8H    labetalol (NORMODYNE;TRANDATE) injection 20 mg  20 mg IntraVENous Q4H PRN    atorvastatin (LIPITOR) tablet 20 mg  20 mg Oral DAILY    carvedilol (COREG) tablet 12.5 mg  12.5 mg Oral BID WITH MEALS    ferrous sulfate tablet 324 mg  324 mg Oral ACB    finasteride (PROSCAR) tablet 5 mg  5 mg Oral DAILY    pantoprazole (PROTONIX) tablet 40 mg  40 mg Oral ACB    tamsulosin (FLOMAX) capsule 0.4 mg  0.4 mg Oral DAILY    aspirin delayed-release tablet 81 mg  81 mg Oral DAILY    amLODIPine (NORVASC) tablet 10 mg  10 mg Oral DAILY    carBAMazepine (TEGretol) tablet 200 mg  200 mg Oral TID    influenza vaccine 2017-18 (3 yrs+)(PF) (FLUZONE QUAD/FLUARIX QUAD) injection 0.5 mL  0.5 mL IntraMUSCular PRIOR TO DISCHARGE    Prior to Admission medications    Medication Sig Start Date End Date Taking? Authorizing Provider   carvedilol (COREG) 6.25 mg tablet Take 6.25 mg by mouth two (2) times daily (with meals). Yes Historical Provider   pantoprazole (PROTONIX) 40 mg tablet Take 1 Tab by mouth Daily (before breakfast). 9/20/17  Yes Ac Duffy MD   finasteride (PROSCAR) 5 mg tablet Take 1 Tab by mouth daily. 9/6/17  Yes Ac Duffy MD   tamsulosin (FLOMAX) 0.4 mg capsule Take 1 Cap by mouth daily. 9/6/17  Yes Ac Duffy MD   ferrous sulfate 324 mg (65 mg iron) tablet Take 1 Tab by mouth Daily (before breakfast). 9/6/17  Yes Ac Duffy MD   acetaminophen (TYLENOL) 325 mg tablet Take 325 mg by mouth every four (4) hours as needed for Pain. Yes Historical Provider   atorvastatin (LIPITOR) 20 mg tablet Take 20 mg by mouth daily. Yes Historical Provider   aspirin delayed-release 81 mg tablet Take 81 mg by mouth daily. Yes Pablo Engle MD   amlodipine (NORVASC) 10 mg tablet Take 10 mg by mouth daily. Yes Pablo Engle MD   carbamazepine (TEGRETOL) 200 mg tablet Take 200 mg by mouth three (3) times daily.    Yes Pablo Engle MD        PMHx:  has a past medical history of Bronchitis, chronic [facility]); CAD (coronary artery disease); CHF (congestive heart failure) ([facility] [address] Chronic kidney disease; and Hypertension. PSurgHx:  has a past surgical history that includes coronary artery bypass graft; pacemaker; and colonoscopy (N/A, 9/6/2017). PSocHx:  reports that he has quit smoking. He has never used smokeless tobacco. He reports that he does not drink alcohol or use illicit drugs.    ROS:  (Complete - 10 systems) - positives are bolded : Weightloss (Constitutional), Dry mouth (ENMT), Chest pain (CV), SOB (Respiratory), Constipation (GI), Weakness (MS), Pallor (Skin), TIA Sx (Neuro), Confusion (Psych), Easy bruising (Heme)    Physical Exam: (Comprehesive - 8+ 1995 Systems)     (1) Constitutional:  FIO2:   on SpO2: O2 Sat (%): 100 %  O2 Device: Room air    Patient Vitals for the past 24 hrs:   BP Temp Pulse Resp SpO2 Weight   10/11/17 1157 140/77 98.3 °F (36.8 °C) 81 16 100 % -   10/11/17 1031 121/65 - 82 - - -   10/11/17 0953 118/65 - 80 - - -   10/11/17 0917 118/63 - 80 - - -   10/11/17 0900 122/69 - 80 - - -   10/11/17 0830 (!) 178/98 - - - - -   10/11/17 0807 (!) 178/92 98.4 °F (36.9 °C) 83 16 100 % -   10/11/17 0712 (!) 202/120 - - - - -   10/11/17 0618 (!) 200/121 - - - - -   10/11/17 0614 (!) 204/118 - - - - -   10/11/17 0610 (!) 215/124 - - - - -   10/11/17 0546 (!) 180/111 - - - - -   10/11/17 0510 (!) 183/108 - - - - -   10/11/17 0439 - - - - - 75.7 kg (166 lb 14.4 oz)   10/11/17 0431 (!) 181/91 97.8 °F (36.6 °C) 62 15 99 % -   10/11/17 0145 (!) 180/94 - - - - -   10/11/17 0115 155/86 - - - - -   10/11/17 0016 (!) 177/94 - - - - -   10/11/17 0006 (!) 176/97 98.9 °F (37.2 °C) 76 16 97 % -   10/10/17 2249 - - 81 - - -   10/10/17 2138 (!) 170/105 98.6 °F (37 °C) 60 14 98 % -   10/10/17 2102 (!) 207/85 - 60 15 100 % -   10/10/17 2000 (!) 201/103 - 97 15 99 % -   10/10/17 1930 (!) 219/95 - 66 18 100 % -   10/10/17 1915 - - 66 19 99 % -   10/10/17 1900 - - 83 19 99 % -   10/10/17 1845 - - 66 15 97 % - 10/10/17 1830 - - 68 19 100 % -   10/10/17 1800 (!) 229/86 - 67 16 98 % -   10/10/17 1745 - - 68 19 100 % -   10/10/17 1730 - - 78 15 100 % -   10/10/17 1715 - - 81 21 100 % -   10/10/17 1702 (!) 210/96 - (!) 117 20 100 % -   10/10/17 1700 - - 76 21 100 % -   10/10/17 1645 - - 75 18 100 % -   10/10/17 1630 (!) 232/93 - 85 19 99 % -   10/10/17 1615 - - 73 17 100 % -   10/10/17 1600 (!) 212/93 - 69 15 - -   10/10/17 1545 - - 72 17 100 % -   10/10/17 1530 (!) 210/94 - 71 16 100 % -   10/10/17 1516 - - 72 16 100 % -   10/10/17 1515 - - 69 14 100 % -   10/10/17 1500 182/86 - 72 19 99 % -   10/10/17 1445 - - 82 19 100 % -   10/10/17 1433 - - 90 23 100 % -   10/10/17 1432 (!) 188/111 - - - - -   10/10/17 1421 - - 79 17 100 % -   10/10/17 1300 (!) 172/98 - 70 - - -         Date 10/10/17 0700 - 10/11/17 0659 10/11/17 0700 - 10/12/17 0659   Shift [id number] 1900-0659 24 Hour Total 9442-6813 8769-3086 24 Hour Total   I  N  T  A  K  E   P.O.  200 200 240  240      P. O.  200 200 240  240    I.V.  (mL/kg/hr)  40  (0) 40  (0)         I.V.  40 40       Shift Total  (mL/kg)  240  (3.2) 240  (3.2) 240  (3.2)  240  (3.2)   O  U  T  P  U  T   Urine  (mL/kg/hr) 675  (0.7) 850  (0.9) 1525  (0.8) 250  250      Urine Voided  250  250      Urine Occurrence(s)  3 x 3 x       Stool            Stool Occurrence(s)  1 x 1 x       Shift Total  (mL/kg) 675  (8.4) 850  (11.2) 1525  (20.1) 250  (3.3)  250  (3.3)   NET -675 -610 -1285 -10  -10   Weight (kg) 80.7 75.7 75.7 75.7 75.7 75.7      (2) ENMT:   moist mucous membranes   (3) Respiratory:  breathing easily   (4) GI:  no abdominal masses, tenderness, no hepatosplenomegaly, no ventral hernia, stool for occult blood not indicated as urologist.   (5) :   normal   (6) Lymphatic:  no adenopathy   (7) Muscloskeletal:  no gross deformity   (8) Skin:  no rash   (9) Neuro:  no focal deficits      Signed By: Brian Manley MD  - October 11, 2017

## 2017-10-11 NOTE — CONSULTS
Consult dict    ARF on CKD. Could be due to pre-renal azotemia or acute HTN but he also had a pelayo removed 2 wk ago and has hydro on US. Excellent UOP is curious. Rec:  Control BP  Recheck Cr today  Have urology see him to decide if pelayo needs to be replaced. We would be happy to see him in the office in follow up but he has not followed up so far.

## 2017-10-11 NOTE — PROGRESS NOTES
clinic note from 9/12/17 by Jf Regalado. Barbara Freed is an 80year old male who presents today for \"evaluation and possible void trial\". He returns for follow-up. Patient was admitted to the hospital 6 days ago with GI bleeding. Was found to have incomplete bladder emptying and hydronephrosis. Ruffin catheter was placed. Patient has history of urinary retention having had a Ruffin 6 months ago. Patient is on finasteride and tamsulosin. He is here this morning for voiding trial.    PAST MEDICAL HISTORY:    Allergies: No known allergies. DENIES: Latex, Shellfish, X-Ray Dye, Iodine. Medications: CIPROFLOXACIN  MG TABS (CIPROFLOXACIN HCL) 1 tablet by mouth twice per day  PREDNISONE 20 MG ORAL TABS (PREDNISONE) Daily  * MOMETASONE FORMOTEROL Two puffs twice a day  FINASTERIDE 5 MG ORAL TABS (FINASTERIDE) Daily  CARVEDILOL 6.25 MG ORAL TABS (CARVEDILOL) Daily  CARBATROL 300 MG ORAL XR12H-CAP (CARBAMAZEPINE) Daily  BENAZEPRIL HCL 40 MG ORAL TABS (BENAZEPRIL HCL) Daily  ASPIRIN CHILDRENS 81 MG ORAL CHEW (ASPIRIN) Daily  AMLODIPINE BESYLATE 10 MG ORAL TABS (AMLODIPINE BESYLATE) Daily  ALFUZOSIN HCL ER 10 MG ORAL XR24H-TAB (ALFUZOSIN HCL) At bedtime    Problems: Chronic kidney disease stage 4 (ICD-585.4) (KFU84-A54.4)  Hydronephrosis (ICD-591) (MWX46-T97.30)  Renal cyst (ICD-753.10) (HCA92-K40.00)  Incomplete bladder emptying (ICD-788.21) (MAJ09-D84.9)  Elevated serum creatinine (ICD-794.4) (RLK91-S87.89)  BPH with obstruction/LUTS (ICD-600.01) (XRW46-W39.1)  Other specified retention of urine (OSG-198.83) (MTL47-M75.8)    Illnesses: Heart Disease, Pacemaker/Defibrillator, and High Blood Pressure. DENIES: Lung Disease, Diabetes, Bowel Problems, Stroke/Seizure, Kidney Problems, Bleeding Problems, HIV, Hepatitis, or Cancer. Surgeries: DENIES pertinent  surgeries      Family History: DENIES: Prostate cancer, Kidney cancer, Kidney disease, Kidney stones. Social History: Retired. . Smoking status: never smoker. Does not drink alcohol. System Review: DENIES: Unexplained Weight Loss, Dry Eyes, Dry Mouth, Leg Swelling, Shortness of Breath, Constipation, Involuntary Urine Loss, Lower Extremity Weakness, Dry Skin, Difficulty Walking, Psychiatric Problems, Impaired Sex Drive, Easy Bleeding, Rash. EXAMINATION: Vitals: Pulse: 76 BP: 146/72 Weight: 168 lbs Height: 5' 9\" BMI: 24.90 kg/m^2  Appearance: well-developed NAD Neck: supple Respiratory Effort: breathing easily Scrotum: without lesions Penis: no plaques; no lesions Skin Inspection: warm and dry Orientation: oriented to person; time and place Mood/Affect: normal       URINALYSIS  Urine Micro not done    IMPRESSION:    1. INCOMPLETE BLADDER EMPTYING (ICD-788.21)    2. Hydronephrosis (ICD-591)    PLAN: 200 mL water installed into the patient's bladder. He was able to void 100 mL. Patient wishes to continue voiding trial at home. I'll have him follow-up with Dr. Dory Zhao in 2 weeks, sooner if needed. Son understands this plan. Consult to follow.

## 2017-10-12 ENCOUNTER — APPOINTMENT (OUTPATIENT)
Dept: ULTRASOUND IMAGING | Age: 82
DRG: 305 | End: 2017-10-12
Attending: INTERNAL MEDICINE
Payer: MEDICARE

## 2017-10-12 LAB
ALBUMIN SERPL-MCNC: 2.9 G/DL (ref 3.5–5)
ALBUMIN/GLOB SERPL: 0.9 {RATIO} (ref 1.1–2.2)
ALP SERPL-CCNC: 85 U/L (ref 45–117)
ALT SERPL-CCNC: 25 U/L (ref 12–78)
ANION GAP SERPL CALC-SCNC: 11 MMOL/L (ref 5–15)
AST SERPL-CCNC: 17 U/L (ref 15–37)
BILIRUB SERPL-MCNC: 0.3 MG/DL (ref 0.2–1)
BUN SERPL-MCNC: 45 MG/DL (ref 6–20)
BUN/CREAT SERPL: 13 (ref 12–20)
CALCIUM SERPL-MCNC: 8.3 MG/DL (ref 8.5–10.1)
CHLORIDE SERPL-SCNC: 106 MMOL/L (ref 97–108)
CO2 SERPL-SCNC: 22 MMOL/L (ref 21–32)
CREAT SERPL-MCNC: 3.4 MG/DL (ref 0.7–1.3)
ERYTHROCYTE [DISTWIDTH] IN BLOOD BY AUTOMATED COUNT: 13.9 % (ref 11.5–14.5)
FERRITIN SERPL-MCNC: 37 NG/ML (ref 26–388)
FOLATE SERPL-MCNC: 8.5 NG/ML (ref 5–21)
GLOBULIN SER CALC-MCNC: 3.3 G/DL (ref 2–4)
GLUCOSE SERPL-MCNC: 108 MG/DL (ref 65–100)
HAPTOGLOB SERPL-MCNC: 157 MG/DL (ref 30–200)
HCT VFR BLD AUTO: 26.7 % (ref 36.6–50.3)
HGB BLD-MCNC: 8.8 G/DL (ref 12.1–17)
IRON SATN MFR SERPL: 15 % (ref 20–50)
IRON SERPL-MCNC: 27 UG/DL (ref 35–150)
MAGNESIUM SERPL-MCNC: 1.8 MG/DL (ref 1.6–2.4)
MCH RBC QN AUTO: 31.1 PG (ref 26–34)
MCHC RBC AUTO-ENTMCNC: 33 G/DL (ref 30–36.5)
MCV RBC AUTO: 94.3 FL (ref 80–99)
PHOSPHATE SERPL-MCNC: 3.8 MG/DL (ref 2.6–4.7)
PLATELET # BLD AUTO: 209 K/UL (ref 150–400)
POTASSIUM SERPL-SCNC: 3.8 MMOL/L (ref 3.5–5.1)
PROT SERPL-MCNC: 6.2 G/DL (ref 6.4–8.2)
RBC # BLD AUTO: 2.83 M/UL (ref 4.1–5.7)
RETICS/RBC NFR AUTO: 1 % (ref 0.7–2.1)
SODIUM SERPL-SCNC: 139 MMOL/L (ref 136–145)
TIBC SERPL-MCNC: 183 UG/DL (ref 250–450)
TSH SERPL DL<=0.05 MIU/L-ACNC: 2.47 UIU/ML (ref 0.36–3.74)
VIT B12 SERPL-MCNC: 341 PG/ML (ref 211–911)
WBC # BLD AUTO: 3.4 K/UL (ref 4.1–11.1)

## 2017-10-12 PROCEDURE — 82728 ASSAY OF FERRITIN: CPT | Performed by: INTERNAL MEDICINE

## 2017-10-12 PROCEDURE — 85027 COMPLETE CBC AUTOMATED: CPT | Performed by: INTERNAL MEDICINE

## 2017-10-12 PROCEDURE — 65660000000 HC RM CCU STEPDOWN

## 2017-10-12 PROCEDURE — 84100 ASSAY OF PHOSPHORUS: CPT | Performed by: INTERNAL MEDICINE

## 2017-10-12 PROCEDURE — 74011250636 HC RX REV CODE- 250/636: Performed by: INTERNAL MEDICINE

## 2017-10-12 PROCEDURE — 36415 COLL VENOUS BLD VENIPUNCTURE: CPT | Performed by: INTERNAL MEDICINE

## 2017-10-12 PROCEDURE — 84443 ASSAY THYROID STIM HORMONE: CPT | Performed by: INTERNAL MEDICINE

## 2017-10-12 PROCEDURE — 74011250637 HC RX REV CODE- 250/637: Performed by: INTERNAL MEDICINE

## 2017-10-12 PROCEDURE — 83540 ASSAY OF IRON: CPT | Performed by: INTERNAL MEDICINE

## 2017-10-12 PROCEDURE — 85045 AUTOMATED RETICULOCYTE COUNT: CPT | Performed by: INTERNAL MEDICINE

## 2017-10-12 PROCEDURE — 83010 ASSAY OF HAPTOGLOBIN QUANT: CPT | Performed by: INTERNAL MEDICINE

## 2017-10-12 PROCEDURE — 80053 COMPREHEN METABOLIC PANEL: CPT | Performed by: INTERNAL MEDICINE

## 2017-10-12 PROCEDURE — 76770 US EXAM ABDO BACK WALL COMP: CPT

## 2017-10-12 PROCEDURE — 82746 ASSAY OF FOLIC ACID SERUM: CPT | Performed by: INTERNAL MEDICINE

## 2017-10-12 PROCEDURE — 83735 ASSAY OF MAGNESIUM: CPT | Performed by: INTERNAL MEDICINE

## 2017-10-12 PROCEDURE — 82607 VITAMIN B-12: CPT | Performed by: INTERNAL MEDICINE

## 2017-10-12 RX ADMIN — HEPARIN SODIUM 5000 UNITS: 5000 INJECTION, SOLUTION INTRAVENOUS; SUBCUTANEOUS at 14:37

## 2017-10-12 RX ADMIN — SODIUM CHLORIDE 75 ML/HR: 900 INJECTION, SOLUTION INTRAVENOUS at 21:50

## 2017-10-12 RX ADMIN — PANTOPRAZOLE SODIUM 40 MG: 40 TABLET, DELAYED RELEASE ORAL at 06:13

## 2017-10-12 RX ADMIN — HEPARIN SODIUM 5000 UNITS: 5000 INJECTION, SOLUTION INTRAVENOUS; SUBCUTANEOUS at 21:58

## 2017-10-12 RX ADMIN — FINASTERIDE 5 MG: 5 TABLET, FILM COATED ORAL at 08:27

## 2017-10-12 RX ADMIN — Medication 10 ML: at 06:13

## 2017-10-12 RX ADMIN — CARVEDILOL 12.5 MG: 12.5 TABLET, FILM COATED ORAL at 16:22

## 2017-10-12 RX ADMIN — ATORVASTATIN CALCIUM 20 MG: 20 TABLET, FILM COATED ORAL at 08:27

## 2017-10-12 RX ADMIN — Medication 10 ML: at 13:22

## 2017-10-12 RX ADMIN — CARVEDILOL 12.5 MG: 12.5 TABLET, FILM COATED ORAL at 08:27

## 2017-10-12 RX ADMIN — HYDRALAZINE HYDROCHLORIDE 100 MG: 25 TABLET, FILM COATED ORAL at 21:58

## 2017-10-12 RX ADMIN — CARBAMAZEPINE 200 MG: 200 TABLET ORAL at 16:22

## 2017-10-12 RX ADMIN — HEPARIN SODIUM 5000 UNITS: 5000 INJECTION, SOLUTION INTRAVENOUS; SUBCUTANEOUS at 06:14

## 2017-10-12 RX ADMIN — CARBAMAZEPINE 200 MG: 200 TABLET ORAL at 21:58

## 2017-10-12 RX ADMIN — Medication 10 ML: at 21:59

## 2017-10-12 RX ADMIN — HYDRALAZINE HYDROCHLORIDE 100 MG: 25 TABLET, FILM COATED ORAL at 08:27

## 2017-10-12 RX ADMIN — ASPIRIN 81 MG: 81 TABLET, COATED ORAL at 08:27

## 2017-10-12 RX ADMIN — HYDRALAZINE HYDROCHLORIDE 100 MG: 25 TABLET, FILM COATED ORAL at 16:22

## 2017-10-12 RX ADMIN — CARBAMAZEPINE 200 MG: 200 TABLET ORAL at 08:27

## 2017-10-12 RX ADMIN — TAMSULOSIN HYDROCHLORIDE 0.4 MG: 0.4 CAPSULE ORAL at 08:26

## 2017-10-12 RX ADMIN — AMLODIPINE BESYLATE 10 MG: 5 TABLET ORAL at 08:27

## 2017-10-12 RX ADMIN — FERROUS SULFATE TAB 325 MG (65 MG ELEMENTAL FE) 325 MG: 325 (65 FE) TAB at 06:13

## 2017-10-12 RX ADMIN — SODIUM CHLORIDE 75 ML/HR: 900 INJECTION, SOLUTION INTRAVENOUS at 03:48

## 2017-10-12 NOTE — PROGRESS NOTES
Shift Summary  10/12/2017  [id number]    Pt awake at time of bedside shift report received from [facility]. AM vitals, assessment, and meds complete without difficulty, AM rhythm strip shows Vpaced rhythm. Reviewed today's plan of care and goals with patient/family; plan of care today includes renal ultrasound to evaluate hydronephrosis, monitor BP. Pt went for renal US today and urine changed from clear yellow to clear red upon return to unit. Notified Dr. Cheyenne Figueroa, no new orders. No other significant changes during my shift. 1940 Bedside and Verbal shift change report given to Niall Lea (oncoming nurse) by Arnav Gooden (offgoing nurse). Report included the following information SBAR, Kardex, Intake/Output, MAR, Accordion and Cardiac Rhythm VPaced.

## 2017-10-12 NOTE — PROGRESS NOTES
[address] [facility]  YOB: 1935          Assessment & Plan:   1. TINA on CKD: Hydro+/- pre renal  - Low FeNa  - hydro:s/p Pelayo  - non oliguric  - cr better  - no RRT needed  2. HTN urgenct  - Resumed hydralazine, coreg, amlodipine  - -150S  3. Urinary retention  - s/o pelayo  - DR. Antoine Romero following  - may need TURP  - On Flomax and Proscar       Subjective:   CC:arf  HPI: Patient seen   TINA on CKD: He is getting better, making urine. BP is being treated with Amlodipine, Hydralazine,Coreg.   ROS:no cp/sob/n/v  Current Facility-Administered Medications   Medication Dose Route Frequency    hydrALAZINE (APRESOLINE) tablet 100 mg  100 mg Oral TID    0.9% sodium chloride infusion  75 mL/hr IntraVENous CONTINUOUS    sodium chloride (NS) flush 5-10 mL  5-10 mL IntraVENous Q8H    sodium chloride (NS) flush 5-10 mL  5-10 mL IntraVENous PRN    naloxone (NARCAN) injection 0.4 mg  0.4 mg IntraVENous PRN    zolpidem (AMBIEN) tablet 5 mg  5 mg Oral QHS PRN    acetaminophen (TYLENOL) tablet 650 mg  650 mg Oral Q4H PRN    HYDROcodone-acetaminophen (NORCO) 5-325 mg per tablet 1 Tab  1 Tab Oral Q4H PRN    ondansetron (ZOFRAN) injection 4 mg  4 mg IntraVENous Q4H PRN    heparin (porcine) injection 5,000 Units  5,000 Units SubCUTAneous Q8H    labetalol (NORMODYNE;TRANDATE) injection 20 mg  20 mg IntraVENous Q4H PRN    atorvastatin (LIPITOR) tablet 20 mg  20 mg Oral DAILY    carvedilol (COREG) tablet 12.5 mg  12.5 mg Oral BID WITH MEALS    ferrous sulfate tablet 324 mg  324 mg Oral ACB    finasteride (PROSCAR) tablet 5 mg  5 mg Oral DAILY    pantoprazole (PROTONIX) tablet 40 mg  40 mg Oral ACB    tamsulosin (FLOMAX) capsule 0.4 mg  0.4 mg Oral DAILY    aspirin delayed-release tablet 81 mg  81 mg Oral DAILY    amLODIPine (NORVASC) tablet 10 mg  10 mg Oral DAILY    carBAMazepine (TEGretol) tablet 200 mg  200 mg Oral TID    influenza vaccine  (3 yrs+)(PF) (FLUZONE QUAD/FLUARIX QUAD) injection 0.5 mL  0.5 mL IntraMUSCular PRIOR TO DISCHARGE          Objective:     Vitals:  Blood pressure 157/79, pulse 79, temperature 97.9 °F (36.6 °C), resp. rate 16, height 5' 9\" (1.753 m), weight 76 kg (167 lb 8.8 oz), SpO2 98 %. Temp (24hrs), Av.9 °F (36.6 °C), Min:97.6 °F (36.4 °C), Max:98.3 °F (36.8 °C)      Intake and Output:     10/10 1901 - 10/12 0700  In: 2338.8 [P.O.:980; I.V.:1358.8]  Out: 2975 [Urine:2975]    Physical Exam:                Patient is intubated:  no    Physical Examination:   GENERAL ASSESSMENT: NAD  HEENT:Nontraumatic   CHEST: CTA  HEART: S1S2  ABDOMEN: Soft,NT,  :Ruffin: yes  EXTREMITY: EDEMA             ECG/rhythm:    Data Review      No results for input(s): TNIPOC in the last 72 hours. No lab exists for component: ITNL   Recent Labs      10/10/17   1136   TROIQ  <0.04     Recent Labs      10/12/17   0041  10/11/17   1059  10/10/17   1136   NA  139  138  143   K  3.8  4.1  4.3   CL  106  105  110*   CO2  22  23  24   BUN  45*  48*  48*   CREA  3.40*  3.36*  3.71*   GLU  108*  115*  95   PHOS  3.8  3.5   --    MG  1.8   --   2.0   CA  8.3*  8.6  8.4*   ALB  2.9*  3.2*  3.4*   WBC  3.4*   --   4.1   HGB  8.8*   --   9.3*   HCT  26.7*   --   28.8*   PLT  209   --   203      Recent Labs      10/10/17   1136   INR  1.1   PTP  11.4*     Needs: urine analysis, urine sodium, protein and creatinine  Lab Results   Component Value Date/Time    Sodium urine, random 8 10/10/2017 07:14 PM    Creatinine, urine 25.44 10/10/2017 07:14 PM         Discussed with:  pt    : Truong Cheung MD  10/12/2017        [facility]:  [email or web address]  [facility] office:  [address] [place] [address], [address] [place] [address],[address]  [place], [address] [place] [address]  Phone: [phone]  Fax :     [phone]    [place] office:  [address] [facility], [address] [place] [address]  Phone - [phone]  Fax - [phone]

## 2017-10-12 NOTE — PROGRESS NOTES
Problem: Falls - Risk of  Goal: *Absence of Falls  Document Sara Fall Risk and appropriate interventions in the flowsheet. Outcome: Progressing Towards Goal  Fall Risk Interventions:  Mobility Interventions: Utilize walker, cane, or other assitive device, PT Consult for mobility concerns, Patient to call before getting OOB           Medication Interventions: Assess postural VS orthostatic hypotension, Teach patient to arise slowly, Patient to call before getting OOB     Elimination Interventions: Call light in reach, Toileting schedule/hourly rounds                 0730  Bedside and Verbal shift change report given to Светлана Ott RN (oncoming nurse) by Campos Zelaya RN (offgoing nurse). Report included the following information SBAR, Kardex, Intake/Output, Recent Results, Med Rec Status and Cardiac Rhythm V-paced.

## 2017-10-12 NOTE — PROGRESS NOTES
Progress Note    Patient: Mauro [place] MRN: [id number]  SSN: [id number]    YOB: 1935 Age: 80 y.o. Sex: male   Height: Height: 5' 9\" (175.3 cm) Weight: Weight: 76 kg (167 lb 8.8 oz) BMI: Body mass index is 24.74 kg/(m^2). Emergency  Contact:  Primary Emergency Contact: Edu Blum Phone: [phone]   PCP:   PROVIDER UNKNOWN None None     Hospital Day: 3 - Admitted 10/10/2017 10:58 AM by Johann Hanna MD - Full Code  Active Hospital Problems    Diagnosis Date Noted    CAD (coronary artery disease)     Hyperlipidemia     Hypertension     CKD (chronic kidney disease), stage IV ([address]     Systolic CHF, chronic ([address]     CHF (congestive heart failure) ([address]     Hypertensive urgency 10/10/2017    Acute renal failure superimposed on stage 4 chronic kidney disease ([address] 10/10/2017    Elevated brain natriuretic peptide (BNP) level 10/10/2017    Anemia 10/10/2017    H pylori ulcer 09/06/2017    Urine retention 09/06/2017    Benign prostatic hyperplasia 09/05/2017    CKD (chronic kidney disease) stage 4, GFR 15-29 ml/min ([address] 09/05/2017    Hydronephrosis 09/05/2017    Coronary atherosclerosis of native coronary artery 42/48/2675    Acute systolic CHF (congestive heart failure) ([address] 09/02/2011    Obstructive chronic bronchitis with acute bronchitis ([address] 09/02/2011    * No surgery found *            Assessment/Plan:     · Cr slightly better. Urine output ok. If cr not trending to baseline tomorrow will repeat US to make sure hydro resolved. If not may need stents.      Subjective: No complaints   Imaging: N/A   Exam:    ROS:  Denies Chest Pain, SOB         Labs: Recent Labs      10/12/17   0041  10/10/17   1136   WBC  3.4*  4.1   HGB  8.8*  9.3*   HCT  26.7*  28.8*   PLT  209  203     Recent Labs      10/12/17   0041  10/11/17   1059  10/10/17   1136   NA  139  138  143   K  3.8  4.1  4.3   CL  106  105  110*   CO2  22  23  24   GLU  108*  115*  95   BUN 45*  48*  48*   CREA  3.40*  3.36*  3.71*   CA  8.3*  8.6  8.4*   MG  1.8   --   2.0   PHOS  3.8  3.5   --    INR   --    --   1.1      ID: Temp (24hrs), Av.9 °F (36.6 °C), Min:97.6 °F (36.4 °C), Max:98.3 °F (36.8 °C)    10/10/2017: WBC 4.1 K/uL (Ref range: 4.1 - 11.1 K/uL)  10/12/2017: WBC 3.4 K/uL* (Ref range: 4.1 - 11.1 K/uL)  Current Antimicrobial Therapy     None        Cultures: All Micro Results     None         GI: Intake: DIET CARDIAC Regular     % Diet Eaten: 75 %   P.O.: 240 mL I.V.: 40 mL       Last Bowel Movement Date: 10/11/17 Patient Vitals for the past 168 hrs:   Stool Occurrence(s)   10/11/17 1540 1   10/11/17 0000 1         Pain: 0/10   -   -        Current Analgesic Therapy (168h ago through future)    Ordered     Start Stop    10/10/17 1448  acetaminophen (TYLENOL) tablet 650 mg  650 mg,   Oral,   EVERY 4 HOURS AS NEEDED      10/10/17 1448 --    10/10/17 1448  HYDROcodone-acetaminophen (NORCO) 5-325 mg per tablet 1 Tab  1 Tab,   Oral,   EVERY 4 HOURS AS NEEDED      10/10/17 1448 --         :    Ruffin - Urinary Catheter 10/11/17 Coude; Ruffin-Status: Draining;Patent    10/10/2017: Creatinine 3.71 MG/DL* (Ref range: 0.70 - 1.30 MG/DL)  10/11/2017: Creatinine 3.36 MG/DL* (Ref range: 0.70 - 1.30 MG/DL)  10/12/2017: Creatinine 3.40 MG/DL* (Ref range: 0.70 - 1.30 MG/DL)       Vitals: O2 Device: Room air @    Patient Vitals for the past 24 hrs:   BP Temp Pulse Resp SpO2 Weight   10/12/17 1111 106/57 97.7 °F (36.5 °C) 74 16 99 % -   10/12/17 0755 157/79 97.9 °F (36.6 °C) 79 16 98 % -   10/12/17 0714 - - 79 - - -   10/12/17 0654 - - - - - 76 kg (167 lb 8.8 oz)   10/12/17 0452 148/71 97.8 °F (36.6 °C) 78 14 99 % -   10/11/17 2330 118/60 98 °F (36.7 °C) 70 16 100 % -   10/11/17 2301 - - 74 - - -   10/11/17 1943 133/77 97.6 °F (36.4 °C) 68 16 99 % -   10/11/17 1852 149/71 - 68 - - -   10/11/17 1716 178/80 97.9 °F (36.6 °C) 66 18 99 % -   10/11/17 1157 140/77 98.3 °F (36.8 °C) 81 16 100 % -      I&O's: Date 10/11/17 0700 - 10/12/17 0659 10/12/17 0700 - 10/13/17 0659   Shift 7813-6768 6118-2019 24 Hour Total 4220-4100 0774-3079 24 Hour Total   I  N  T  A  K  E   P. O. 780  780         P. O. 780  780       I.V.  (mL/kg/hr)  1318.8  (1.4) 1318.8  (0.7)         Volume (0.9% sodium chloride infusion)  1318.8 1318.8       Shift Total  (mL/kg) 780  (10.3) 1318.8  (17.4) 2098.8  (27.6)      O  U  T  P  U  T   Urine  (mL/kg/hr) 1450  (1.6) 675  (0.7) 2125  (1.2) 350  350      Urine Voided 500  500         Urine Output (mL) (Urinary Catheter 10/11/17 Coude; Ruffin)  350  350    Stool            Stool Occurrence(s) 1 x  1 x       Shift Total  (mL/kg) 1450  (19.2) 675  (8.9) 2125  (28) 350  (4.6)  350  (4.6)   NET -670 643.8 -26.3 -350  -350   Weight (kg) 75.7 76 76 76 76 76       Meds:    Current Facility-Administered Medications:     hydrALAZINE (APRESOLINE) tablet 100 mg, 100 mg, Oral, TID, Nida Krueger MD, 100 mg at 10/12/17 0827    0.9% sodium chloride infusion, 75 mL/hr, IntraVENous, CONTINUOUS, Jovita Xie MD, Last Rate: 75 mL/hr at 10/12/17 0348, 75 mL/hr at 10/12/17 0348    sodium chloride (NS) flush 5-10 mL, 5-10 mL, IntraVENous, Q8H, Trenton Curling Jr V, DO, 10 mL at 10/12/17 3069    sodium chloride (NS) flush 5-10 mL, 5-10 mL, IntraVENous, PRN, John Curling Jr V, DO, 10 mL at 10/11/17 0435    naloxone [facility]) injection 0.4 mg, 0.4 mg, IntraVENous, PRN, John Curling Jr V, DO    zolpidem [facility]) tablet 5 mg, 5 mg, Oral, QHS PRN, Merna Door, DO    acetaminophen (TYLENOL) tablet 650 mg, 650 mg, Oral, Q4H PRN, Huntley Curling Chauncey Law, DO    HYDROcodone-acetaminophen [facility]) 5-325 mg per tablet 1 Tab, 1 Tab, Oral, Q4H PRN, Huntley Curling Jr V, DO    ondansetron [facility]) injection 4 mg, 4 mg, IntraVENous, Q4H PRN, Huntley Curling Jr V, DO    heparin (porcine) injection 5,000 Units, 5,000 Units, SubCUTAneous, Q8H, Huntley Curling Jr V, DO, 5,000 Units at 10/12/17 6275    labetalol (NORMODYNE;TRANDATE) injection 20 mg, 20 mg, IntraVENous, Q4H PRN, Angelika Pare Jr V, DO, 20 mg at 10/11/17 0433    atorvastatin (LIPITOR) tablet 20 mg, 20 mg, Oral, DAILY, Angelika Pare Jr V, DO, 20 mg at 10/12/17 0827    carvedilol (COREG) tablet 12.5 mg, 12.5 mg, Oral, BID WITH MEALS, Angelika Pare Jr V, DO, 12.5 mg at 10/12/17 0827    ferrous sulfate tablet 324 mg, 324 mg, Oral, ACB, Angelika Pare Jr V, DO, 325 mg at 10/12/17 8492    finasteride (PROSCAR) tablet 5 mg, 5 mg, Oral, DAILY, Angelika Pare Jr V, DO, 5 mg at 10/12/17 0827    pantoprazole (PROTONIX) tablet 40 mg, 40 mg, Oral, ACB, Angelika Pare Jr V, DO, 40 mg at 10/12/17 3372    tamsulosin (FLOMAX) capsule 0.4 mg, 0.4 mg, Oral, DAILY, Angelika Pare Jr V, DO, 0.4 mg at 10/12/17 1657    aspirin delayed-release tablet 81 mg, 81 mg, Oral, DAILY, Angelika Pare Jr V, DO, 81 mg at 10/12/17 0827    amLODIPine (NORVASC) tablet 10 mg, 10 mg, Oral, DAILY, Angelika Pare Jr V, DO, 10 mg at 10/12/17 0827    carBAMazepine (TEGretol) tablet 200 mg, 200 mg, Oral, TID, Angelika Pare Jr V, DO, 200 mg at 10/12/17 0827    influenza vaccine 2017-18 (3 yrs+)(PF) (FLUZONE QUAD/FLUARIX QUAD) injection 0.5 mL, 0.5 mL, IntraMUSCular, PRIOR TO DISCHARGE, Apurva Yousif DO          Signed By: Emre Platt MD - October 12, 2017

## 2017-10-12 NOTE — PROGRESS NOTES
Jaspal Murrell [facility] [address]  [address] [facility], [place], [address] [place] [address]  [phone]      Medical Progress Note      NAME: Krishna Corrales   :  1935  MRM:  [id number]    Date/Time: 10/12/2017          Subjective:     Chief Complaint:  F/u TINA    Chart/notes/labs/studies reviewed, patient examined at bedside. Feels a little better. Still weak. No CP or dizziness or SOB. Objective:       Vitals:          Last 24hrs VS reviewed since prior progress note. Most recent are:    Visit Vitals    /70 (BP 1 Location: Right arm, BP Patient Position: At rest)    Pulse 81    Temp 97.4 °F (36.3 °C)    Resp 16    Ht 5' 9\" (1.753 m)    Wt 76 kg (167 lb 8.8 oz)    SpO2 99%    BMI 24.74 kg/m2     SpO2 Readings from Last 6 Encounters:   10/12/17 99%   17 100%   08/20/15 99%   09/10/14 98%   11 100%   11 100%          Intake/Output Summary (Last 24 hours) at 10/12/17 1527  Last data filed at 10/12/17 1010   Gross per 24 hour   Intake          1858.75 ml   Output             1375 ml   Net           483.75 ml          Exam:     Physical Exam:    Gen:  Elderly, chronically ill-appearing, in no acute distress  HEENT:  Sclerae nonicteric, hearing intact to voice, mucous membranes moist  Neck:  Supple, without masses. Resp:  No accessory muscle use, CTAB without wheezes, rales, or rhonchi  Card: RRR, without m/r/g. No LE edema. Abd:  +bowel sounds, soft, NTTP, nondistended   Neuro: Face symmetric, tongue midline, speech fluent, follows commands appropriately  Psych:  Alert, oriented x 3.  Fair insight     Medications Reviewed: (see below)    Lab Data Reviewed: (see below)    ______________________________________________________________________    Medications:     Current Facility-Administered Medications   Medication Dose Route Frequency    hydrALAZINE (APRESOLINE) tablet 100 mg  100 mg Oral TID    0.9% sodium chloride infusion  75 mL/hr IntraVENous CONTINUOUS    sodium chloride (NS) flush 5-10 mL  5-10 mL IntraVENous Q8H    sodium chloride (NS) flush 5-10 mL  5-10 mL IntraVENous PRN    naloxone (NARCAN) injection 0.4 mg  0.4 mg IntraVENous PRN    zolpidem (AMBIEN) tablet 5 mg  5 mg Oral QHS PRN    acetaminophen (TYLENOL) tablet 650 mg  650 mg Oral Q4H PRN    HYDROcodone-acetaminophen (NORCO) 5-325 mg per tablet 1 Tab  1 Tab Oral Q4H PRN    ondansetron (ZOFRAN) injection 4 mg  4 mg IntraVENous Q4H PRN    heparin (porcine) injection 5,000 Units  5,000 Units SubCUTAneous Q8H    labetalol (NORMODYNE;TRANDATE) injection 20 mg  20 mg IntraVENous Q4H PRN    atorvastatin (LIPITOR) tablet 20 mg  20 mg Oral DAILY    carvedilol (COREG) tablet 12.5 mg  12.5 mg Oral BID WITH MEALS    ferrous sulfate tablet 324 mg  324 mg Oral ACB    finasteride (PROSCAR) tablet 5 mg  5 mg Oral DAILY    pantoprazole (PROTONIX) tablet 40 mg  40 mg Oral ACB    tamsulosin (FLOMAX) capsule 0.4 mg  0.4 mg Oral DAILY    aspirin delayed-release tablet 81 mg  81 mg Oral DAILY    amLODIPine (NORVASC) tablet 10 mg  10 mg Oral DAILY    carBAMazepine (TEGretol) tablet 200 mg  200 mg Oral TID    influenza vaccine 2017-18 (3 yrs+)(PF) (FLUZONE QUAD/FLUARIX QUAD) injection 0.5 mL  0.5 mL IntraMUSCular PRIOR TO DISCHARGE            Lab Review:     Recent Labs      10/12/17   0041  10/10/17   1136   WBC  3.4*  4.1   HGB  8.8*  9.3*   HCT  26.7*  28.8*   PLT  209  203     Recent Labs      10/12/17   0041  10/11/17   1059  10/10/17   1136   NA  139  138  143   K  3.8  4.1  4.3   CL  106  105  110*   CO2  22  23  24   GLU  108*  115*  95   BUN  45*  48*  48*   CREA  3.40*  3.36*  3.71*   CA  8.3*  8.6  8.4*   MG  1.8   --   2.0   PHOS  3.8  3.5   --    ALB  2.9*  3.2*  3.4*   SGOT  17   --   23   ALT  25   --   26   INR   --    --   1.1     No components found for: GLPOC  No results for input(s): PH, PCO2, PO2, HCO3, FIO2 in the last 72 hours.   Recent Labs      10/10/17   1136   INR  1.1     No results found for: SDES  Lab Results   Component Value Date/Time    Culture result: NO GROWTH 2 DAYS 08/20/2015 04:34 PM            Assessment / Plan:     Hypertensive urgency: due to non-compliance. Now much better controlled. Cont hydralazine, coreg, amlodipine     Acute renal failure superimposed on stage 4 chronic kidney disease: Cr still elevated today, no change from yesterday. Due to obstruction with e/o hydronephrosis on renal US. US repeated on 10/12 which showed resolution of hydronephrosis. Ruffin catheter per urology. On Flomax and Proscar. IVF per nephrology.      Urine retention / Benign prostatic hyperplasia / Hydronephrosis: as above     Chronic systolic CHF (congestive heart failure) / Elevated brain natriuretic peptide (BNP) level. Monitor volume status closely as pt is on IV NS. Continue BB and statin. Needs education.     H pylori ulcer - Dx last month, and reportedly treated with protonix/amox/clarithromycin for 14 days     Anemia - Presumed from recent GI loss. Serologies equivocal. Continue iron supplements. Monitor on heparin SQ     Coronary atherosclerosis of native coronary artery: stable.  Continue ASA, BB, statin.      ?COPD: stable     Hyperlipidemia - continue statin     Hx seizure - continue carbamazepine      Total time spent with patient: 35 minutes                  Care Plan discussed with: Patient, Nursing Staff and >50% of time spent in counseling and coordination of care    Discussed:  Care Plan and D/C Planning    Prophylaxis:  Hep SQ    Disposition:  TBD           ___________________________________________________    Attending Physician: Caitlin Polanco MD

## 2017-10-12 NOTE — PROGRESS NOTES
Provider Notification  Spoke w/Dr. Carolann Brooks at [id number] regarding:  S - Pt has had red colored urine since return from renal US this afternoon. B - Urine clear yellow prior to renal US. A - UO adequate, no clots noted. New orders received: none at this time. Also read results of renal US to Dr. Carolann Brooks.

## 2017-10-13 VITALS
HEART RATE: 74 BPM | RESPIRATION RATE: 16 BRPM | BODY MASS INDEX: 25.14 KG/M2 | TEMPERATURE: 98.1 F | HEIGHT: 69 IN | DIASTOLIC BLOOD PRESSURE: 62 MMHG | OXYGEN SATURATION: 100 % | SYSTOLIC BLOOD PRESSURE: 134 MMHG | WEIGHT: 169.75 LBS

## 2017-10-13 LAB
ANION GAP SERPL CALC-SCNC: 8 MMOL/L (ref 5–15)
BASOPHILS # BLD: 0 K/UL (ref 0–0.1)
BASOPHILS NFR BLD: 0 % (ref 0–1)
BUN SERPL-MCNC: 42 MG/DL (ref 6–20)
BUN/CREAT SERPL: 13 (ref 12–20)
CALCIUM SERPL-MCNC: 8.3 MG/DL (ref 8.5–10.1)
CHLORIDE SERPL-SCNC: 106 MMOL/L (ref 97–108)
CO2 SERPL-SCNC: 23 MMOL/L (ref 21–32)
CREAT SERPL-MCNC: 3.34 MG/DL (ref 0.7–1.3)
EOSINOPHIL # BLD: 0.1 K/UL (ref 0–0.4)
EOSINOPHIL NFR BLD: 2 % (ref 0–7)
ERYTHROCYTE [DISTWIDTH] IN BLOOD BY AUTOMATED COUNT: 14.5 % (ref 11.5–14.5)
GLUCOSE SERPL-MCNC: 105 MG/DL (ref 65–100)
HCT VFR BLD AUTO: 26.9 % (ref 36.6–50.3)
HGB BLD-MCNC: 8.8 G/DL (ref 12.1–17)
LYMPHOCYTES # BLD: 0.8 K/UL (ref 0.8–3.5)
LYMPHOCYTES NFR BLD: 18 % (ref 12–49)
MAGNESIUM SERPL-MCNC: 1.8 MG/DL (ref 1.6–2.4)
MCH RBC QN AUTO: 31.7 PG (ref 26–34)
MCHC RBC AUTO-ENTMCNC: 32.7 G/DL (ref 30–36.5)
MCV RBC AUTO: 96.8 FL (ref 80–99)
MONOCYTES # BLD: 0.6 K/UL (ref 0–1)
MONOCYTES NFR BLD: 14 % (ref 5–13)
NEUTS SEG # BLD: 2.8 K/UL (ref 1.8–8)
NEUTS SEG NFR BLD: 66 % (ref 32–75)
PHOSPHATE SERPL-MCNC: 3.6 MG/DL (ref 2.6–4.7)
PLATELET # BLD AUTO: 200 K/UL (ref 150–400)
POTASSIUM SERPL-SCNC: 3.8 MMOL/L (ref 3.5–5.1)
RBC # BLD AUTO: 2.78 M/UL (ref 4.1–5.7)
SODIUM SERPL-SCNC: 137 MMOL/L (ref 136–145)
WBC # BLD AUTO: 4.3 K/UL (ref 4.1–11.1)

## 2017-10-13 PROCEDURE — 83735 ASSAY OF MAGNESIUM: CPT | Performed by: INTERNAL MEDICINE

## 2017-10-13 PROCEDURE — 97161 PT EVAL LOW COMPLEX 20 MIN: CPT

## 2017-10-13 PROCEDURE — 74011250636 HC RX REV CODE- 250/636: Performed by: INTERNAL MEDICINE

## 2017-10-13 PROCEDURE — 97165 OT EVAL LOW COMPLEX 30 MIN: CPT

## 2017-10-13 PROCEDURE — 80048 BASIC METABOLIC PNL TOTAL CA: CPT | Performed by: INTERNAL MEDICINE

## 2017-10-13 PROCEDURE — 74011250637 HC RX REV CODE- 250/637: Performed by: INTERNAL MEDICINE

## 2017-10-13 PROCEDURE — 97116 GAIT TRAINING THERAPY: CPT

## 2017-10-13 PROCEDURE — 36415 COLL VENOUS BLD VENIPUNCTURE: CPT | Performed by: INTERNAL MEDICINE

## 2017-10-13 PROCEDURE — 84100 ASSAY OF PHOSPHORUS: CPT | Performed by: INTERNAL MEDICINE

## 2017-10-13 PROCEDURE — 85025 COMPLETE CBC W/AUTO DIFF WBC: CPT | Performed by: INTERNAL MEDICINE

## 2017-10-13 PROCEDURE — 97535 SELF CARE MNGMENT TRAINING: CPT

## 2017-10-13 RX ORDER — CARVEDILOL 12.5 MG/1
12.5 TABLET ORAL 2 TIMES DAILY WITH MEALS
Qty: 60 TAB | Refills: 0 | Status: SHIPPED | OUTPATIENT
Start: 2017-10-13 | End: 2018-02-07

## 2017-10-13 RX ORDER — HYDRALAZINE HYDROCHLORIDE 100 MG/1
100 TABLET, FILM COATED ORAL 3 TIMES DAILY
Qty: 90 TAB | Refills: 0 | Status: SHIPPED | OUTPATIENT
Start: 2017-10-13 | End: 2018-02-07

## 2017-10-13 RX ADMIN — TAMSULOSIN HYDROCHLORIDE 0.4 MG: 0.4 CAPSULE ORAL at 08:06

## 2017-10-13 RX ADMIN — ATORVASTATIN CALCIUM 20 MG: 20 TABLET, FILM COATED ORAL at 08:06

## 2017-10-13 RX ADMIN — HEPARIN SODIUM 5000 UNITS: 5000 INJECTION, SOLUTION INTRAVENOUS; SUBCUTANEOUS at 06:22

## 2017-10-13 RX ADMIN — CARVEDILOL 12.5 MG: 12.5 TABLET, FILM COATED ORAL at 08:05

## 2017-10-13 RX ADMIN — FERROUS SULFATE TAB 325 MG (65 MG ELEMENTAL FE) 324 MG: 325 (65 FE) TAB at 08:06

## 2017-10-13 RX ADMIN — ASPIRIN 81 MG: 81 TABLET, COATED ORAL at 08:05

## 2017-10-13 RX ADMIN — FINASTERIDE 5 MG: 5 TABLET, FILM COATED ORAL at 08:06

## 2017-10-13 RX ADMIN — Medication 10 ML: at 06:08

## 2017-10-13 RX ADMIN — HYDRALAZINE HYDROCHLORIDE 100 MG: 25 TABLET, FILM COATED ORAL at 08:05

## 2017-10-13 RX ADMIN — CARBAMAZEPINE 200 MG: 200 TABLET ORAL at 08:06

## 2017-10-13 RX ADMIN — AMLODIPINE BESYLATE 10 MG: 5 TABLET ORAL at 08:05

## 2017-10-13 RX ADMIN — PANTOPRAZOLE SODIUM 40 MG: 40 TABLET, DELAYED RELEASE ORAL at 08:05

## 2017-10-13 NOTE — PROGRESS NOTES
physical Therapy EVALUATION/DISCHARGE  Patient: Gilbert Albarran (68 y.o. male)  Date: 10/13/2017  Primary Diagnosis: Hypertensive urgency             ASSESSMENT :  Based on the objective data described above, the patient presents with modified independent with ambulation using a rolling walker and single point cane as well as up and down stairs and independent with all functional mobility. Reviewed all safety precaution and home exercise program with the patient, verbalized understanding, clear to go home per Physical Therapy perspective. Skilled physical therapy is not indicated at this time. PLAN :  Discharge Recommendations: None  Further Equipment Recommendations for Discharge: already has single point cane at home     SUBJECTIVE:   Patient stated [place] Abdiel will be going home today.     OBJECTIVE DATA SUMMARY:   HISTORY:    Past Medical History:   Diagnosis Date    CAD (coronary artery disease)     CHF (congestive heart failure) (HCC)     CKD (chronic kidney disease), stage IV (HCC)     Hyperlipidemia     Hypertension     Systolic CHF, chronic (HCC)      Past Surgical History:   Procedure Laterality Date    COLONOSCOPY N/A 9/6/2017    COLONOSCOPY performed by Oralia Gaines MD at [address] [place] [address] HX CORONARY ARTERY BYPASS GRAFT      HX PACEMAKER       Prior Level of Function/Home Situation: modified independent with single point cane at home  Personal factors and/or comorbidities impacting plan of care:     Home Situation  Home Environment: Private residence  # Steps to Enter: 2  One/Two Story Residence: One story  Living Alone: No  Support Systems: Child(kenney)  Patient Expects to be Discharged to[de-identified] Private residence  Current DME Used/Available at Home: Cane, straight    EXAMINATION/PRESENTATION/DECISION MAKING:   Critical Behavior:              Hearing:   Auditory  Auditory Impairment: None    Range Of Motion:  AROM: Within functional limits           PROM: Within functional limits Strength:    Strength: Within functional limits                    Tone & Sensation:                                  Coordination:  Coordination: Within functional limits  Vision:      Functional Mobility:  Bed Mobility:  Rolling: Independent  Supine to Sit: Independent  Sit to Supine: Independent  Scooting: Independent  Transfers:  Sit to Stand: Independent  Stand to Sit: Independent  Stand Pivot Transfers: Modified independent     Bed to Chair: Modified independent              Balance:   Sitting: Intact  Standing: Intact; With support  Ambulation/Gait Training:  Distance (ft): 150 Feet (ft)  Assistive Device: Walker, rolling;Gait belt;Cane, straight  Ambulation - Level of Assistance: Modified independent     Gait Description (WDL): Within defined limits  Gait Abnormalities: Path deviations        Base of Support: Widened     Speed/Juli: Pace decreased (<100 feet/min)                            Stairs:  Number of Stairs Trained: 4  Stairs - Level of Assistance: Modified independent   Rail Use: Both     Therapeutic Exercises:    Instructed patient to continue active range of motion exercise on both legs while up on chair or on bed.        Functional Measure:  Tinetti test:    Sitting Balance: 1  Arises: 2  Attempts to Rise: 2  Immediate Standing Balance: 1  Standing Balance: 1  Nudged: 2  Eyes Closed: 1  Turn 360 Degrees - Continuous/Discontinuous: 1  Turn 360 Degrees - Steady/Unsteady: 1  Sitting Down: 2  Balance Score: 14  Indication of Gait: 1  R Step Length/Height: 1  L Step Length/Height: 1  R Foot Clearance: 1  L Foot Clearance: 1  Step Symmetry: 1  Step Continuity: 1  Path: 1  Trunk: 1  Walking Time: 0  Gait Score: 9  Total Score: 23       Tinetti Test and G-code impairment scale:  Percentage of Impairment CH    0%   CI    1-19% CJ    20-39% CK    40-59% CL    60-79% CM    80-99% CN     100%   Tinetti  Score 0-28 28 23-27 17-22 12-16 6-11 1-5 0       Tinetti Tool Score Risk of Falls  <19 = High Fall Risk  19-24 = Moderate Fall Risk  25-28 = Low Fall Risk  Mari HANKINS. Performance-Oriented Assessment of Mobility Problems in Elderly Patients. Ron 66; [id number]. (Scoring Description: PT Bulletin Feb. 10, 1993)    Older adults: Nirmal Fletcher et al, 2009; n = 1000 [facility] elderly evaluated with ABC, NAIMA, ADL, and IADL)  · Mean NAIMA score for males aged 69-68 years = 26.21(3.40)  · Mean NAIMA score for females age 69-68 years = 25.16(4.30)  · Mean NAIMA score for males over 80 years = 23.29(6.02)  · Mean NAIMA score for females over 80 years = 17.20(8.32)       G codes: In compliance with CMSs Claims Based Outcome Reporting, the following G-code set was chosen for this patient based on their primary functional limitation being treated: The outcome measure chosen to determine the severity of the functional limitation was the tinetti with a score of 23/28 which was correlated with the impairment scale. ? Mobility - Walking and Moving Around:     - CURRENT STATUS: CI - 1%-19% impaired, limited or restricted    - GOAL STATUS: CI - 1%-19% impaired, limited or restricted    - D/C STATUS:  CI - 1%-19% impaired, limited or restricted          Physical Therapy Evaluation Charge Determination   History Examination Presentation Decision-Making   LOW Complexity : Zero comorbidities / personal factors that will impact the outcome / POC LOW Complexity : 1-2 Standardized tests and measures addressing body structure, function, activity limitation and / or participation in recreation  LOW Complexity : Stable, uncomplicated  Other outcome measures tinetti  LOW       Based on the above components, the patient evaluation is determined to be of the following complexity level: LOW     Pain:         Activity Tolerance:   Good. Please refer to the flowsheet for vital signs taken during this treatment.   After treatment:   [x]   Patient left in no apparent distress sitting up in chair  []   Patient left in no apparent distress in bed  [x]   Call bell left within reach  [x]   Nursing notified  []   Caregiver present  []   Bed alarm activated    COMMUNICATION/EDUCATION:   Communication/Collaboration:  [x]   Fall prevention education was provided and the patient/caregiver indicated understanding. [x]   Patient/family have participated as able and agree with findings and recommendations. []   Patient is unable to participate in plan of care at this time. Findings and recommendations were discussed with: Registered Nurse and patient    Thank you for this referral.  Ebenezer Alva, PT,WCC.    Time Calculation: 25 mins

## 2017-10-13 NOTE — PROGRESS NOTES
Pharmacist Discharge Medication Reconciliation    Discharge Provider: Dr. Farrah Howard     Discharge Medications:      Current Discharge Medication List        START taking these medications         Dose & Instructions Dispensing Information Comments   Morning Noon Evening Bedtime      hydrALAZINE 100 mg tablet   Commonly known as:  APRESOLINE       Your last dose was: Your next dose is:              Dose:  100 mg   Take 1 Tab by mouth three (3) times daily. Quantity:  90 Tab   Refills:  0                               CONTINUE these medications which have CHANGED         Dose & Instructions Dispensing Information Comments   Morning Noon Evening Bedtime      carvedilol 12.5 mg tablet   Commonly known as:  COREG   What changed:    - medication strength  - how much to take       Your last dose was: Your next dose is:              Dose:  12.5 mg   Take 1 Tab by mouth two (2) times daily (with meals). Quantity:  60 Tab   Refills:  0                               CONTINUE these medications which have NOT CHANGED         Dose & Instructions Dispensing Information Comments   Morning Noon Evening Bedtime      acetaminophen 325 mg tablet   Commonly known as:  TYLENOL       Your last dose was: Your next dose is:              Dose:  325 mg   Take 325 mg by mouth every four (4) hours as needed for Pain. Refills:  0                         amLODIPine 10 mg tablet   Commonly known as:  NORVASC       Your last dose was: Your next dose is:              Dose:  10 mg   Take 10 mg by mouth daily. Refills:  0                         aspirin delayed-release 81 mg tablet       Your last dose was: Your next dose is:              Dose:  81 mg   Take 81 mg by mouth daily. Refills:  0                         atorvastatin 20 mg tablet   Commonly known as:  LIPITOR       Your last dose was: Your next dose is:              Dose:  20 mg   Take 20 mg by mouth daily.     Refills:  0 carBAMazepine 200 mg tablet   Commonly known as:  TEGretol       Your last dose was: Your next dose is:              Dose:  200 mg   Take 200 mg by mouth three (3) times daily. Refills:  0                         ferrous sulfate 324 mg (65 mg iron) tablet       Your last dose was: Your next dose is:              Dose:  324 mg   Take 1 Tab by mouth Daily (before breakfast). Quantity:  30 Tab   Refills:  1                         finasteride 5 mg tablet   Commonly known as:  PROSCAR       Your last dose was: Your next dose is:              Dose:  5 mg   Take 1 Tab by mouth daily. Quantity:  30 Tab   Refills:  1                         pantoprazole 40 mg tablet   Commonly known as:  PROTONIX       Your last dose was: Your next dose is:              Dose:  40 mg   Take 1 Tab by mouth Daily (before breakfast). Quantity:  30 Tab   Refills:  1                         tamsulosin 0.4 mg capsule   Commonly known as:  FLOMAX       Your last dose was: Your next dose is:              Dose:  0.4 mg   Take 1 Cap by mouth daily. Quantity:  30 Cap   Refills:  1                                  Where to Get Your Medications        Information on where to get these meds will be given to you by the nurse or doctor. !  Ask your nurse or doctor about these medications     carvedilol 12.5 mg tablet    hydrALAZINE 100 mg tablet                The patient's chart, MAR, and AVS were reviewed by   Huseyin Rossi, PharmD  Contact: [phone]

## 2017-10-13 NOTE — PROGRESS NOTES
Pt given discharge instructions verbalized understanding all questions answered to pt's satisfaction. Iv and monitor removed. Pt assisted with bathing and dressing.  Awaiting family for transport home    [phone]  Pt given updated ABHI with discharge instructions and prescription set up for lunch

## 2017-10-13 NOTE — PROGRESS NOTES
Progress Note    Patient: Yasemin Carlson MRN: [id number]  SSN: [id number]    YOB: 1935 Age: 80 y.o. Sex: male   Height: Height: 5' 9\" (175.3 cm) Weight: Weight: 77 kg (169 lb 12.1 oz) BMI: Body mass index is 25.07 kg/(m^2). PCP: PROVIDER UNKNOWN None None   Contact:  Primary Emergency Contact: Yahir Salter, Home Phone: [phone] CanDiag Day: 4 - Admitted 10/10/2017 10:58 AM by Donavan Bennett MD  * No surgery found *      No complaints          Assessment/Plan:     Retention  Hydro resolved on US  Cr not improving  Recommend home with pierce, follow up as outpatient     Imaging: N/A   Exam:    ROS:  Denies Chest Pain, SOB         ID: Temp (24hrs), Av.8 °F (36.6 °C), Min:97.4 °F (36.3 °C), Max:98.2 °F (36.8 °C)    10/10/2017: WBC 4.1 K/uL (Ref range: 4.1 - 11.1 K/uL)  10/12/2017: WBC 3.4 K/uL* (Ref range: 4.1 - 11.1 K/uL)  10/13/2017: WBC 4.3 K/uL (Ref range: 4.1 - 11.1 K/uL)  Current Antimicrobial Therapy     None        Cultures: All Micro Results     None         Pulm: Room air   100 %     IS:   of predicted         GI: Intake: DIET CARDIAC Regular   Appetite: Good % Diet Eaten: 75 %   P.O.: 75 mL    Abdominal Assessment: Soft  Bowel Sounds: Active     Patient Vitals for the past 168 hrs:   Stool Occurrence(s)   10/12/17 1349 1   10/11/17 1540 1   10/11/17 0000 1         Pain: 010   -   -           Current Analgesic Therapy (168h ago through future)    Ordered     Start Stop    10/10/17 1448  acetaminophen (TYLENOL) tablet 650 mg  650 mg,   Oral,   EVERY 4 HOURS AS NEEDED      10/10/17 1448 --    10/10/17 1448  HYDROcodone-acetaminophen (NORCO) 5-325 mg per tablet 1 Tab  1 Tab,   Oral,   EVERY 4 HOURS AS NEEDED      10/10/17 1448 --         :    Ruffin - Urinary Catheter 10/11/17 Coude; Ruffin-Status: Draining      10/10/2017: Creatinine 3.71 MG/DL* (Ref range: 0.70 - 1.30 MG/DL)  10/11/2017: Creatinine 3.36 MG/DL* (Ref range: 0.70 - 1.30 MG/DL)  10/12/2017: Creatinine 3.40 MG/DL* (Ref range: 0.70 - 1.30 MG/DL)  10/13/2017: Creatinine 3.34 MG/DL* (Ref range: 0.70 - 1.30 MG/DL)  No results for input(s): FCREA in the last 336 hours. Lines: Peripheral IV 10/10/17 Left Antecubital (Active)   Site Assessment Clean, dry, & intact 10/13/2017  4:53 AM   Phlebitis Assessment 0 10/13/2017  4:53 AM   Infiltration Assessment 0 10/13/2017  4:53 AM   Dressing Status Intact 10/13/2017  4:53 AM   Dressing Type Transparent 10/13/2017  4:53 AM   Hub Color/Line Status Pink 10/13/2017  4:53 AM   Action Taken Open ports on tubing capped 10/11/2017  4:48 AM   Alcohol Cap Used Yes 10/13/2017  4:53 AM                   Vitals: O2 Device: Room air    Patient Vitals for the past 24 hrs:   BP Temp Pulse Resp SpO2 Weight   10/13/17 0536 - - - - - 77 kg (169 lb 12.1 oz)   10/13/17 0453 147/72 98.2 °F (36.8 °C) 76 16 100 % -   10/12/17 2357 136/73 97.9 °F (36.6 °C) 79 16 99 % -   10/12/17 2303 - - 79 - - -   10/12/17 2007 128/60 97.6 °F (36.4 °C) 76 16 100 % -   10/12/17 1505 134/70 97.4 °F (36.3 °C) 81 16 99 % -   10/12/17 1435 - - 80 - - -   10/12/17 1111 106/57 97.7 °F (36.5 °C) 74 16 99 % -      I&O's:    Date 10/12/17 0700 - 10/13/17 0659 10/13/17 0700 - 10/14/17 0659   Shift [id number] 1900-0659 24 Hour Total 5275-7857 1828-6922 24 Hour Total   I  N  T  A  K  E   P.O.  175 175         P.O.  175 175       Shift Total  (mL/kg)  175  (2.3) 175  (2.3)      O  U  T  P  U  T   Urine  (mL/kg/hr) 350  (0.4) 1125  (1.2) 1475  (0.8)         Urine Output (mL) (Urinary Catheter 10/11/17 Coude; Ruffin) 350 1125 1475       Stool            Stool Occurrence(s) 1 x  1 x       Shift Total  (mL/kg) 350  (4.6) 1125  (14.6) 1475  (19.2)      NET -350 -950 -1300      Weight (kg) 76 77 77 77 77 77       Meds:    Current Facility-Administered Medications:     hydrALAZINE (APRESOLINE) tablet 100 mg, 100 mg, Oral, TID, Elijah Yañez MD, 100 mg at 10/12/17 3586    0.9% sodium chloride infusion, 75 mL/hr, IntraVENous, CONTINUOUS, Juaquin Amaya MD, Last Rate: 75 mL/hr at 10/12/17 2150, 75 mL/hr at 10/12/17 2150    sodium chloride (NS) flush 5-10 mL, 5-10 mL, IntraVENous, Q8H, Gisela Conception Jr V, DO, 10 mL at 10/13/17 0608    sodium chloride (NS) flush 5-10 mL, 5-10 mL, IntraVENous, PRN, Gisela Conception Jr V, DO, 10 mL at 10/11/17 0435    naloxone [facility]) injection 0.4 mg, 0.4 mg, IntraVENous, PRN, Doylene Smiles, DO    zolpidem [facility] [place]) tablet 5 mg, 5 mg, Oral, QHS PRN, Doylene Smiles, DO    acetaminophen (TYLENOL) tablet 650 mg, 650 mg, Oral, Q4H PRN, Gisela Conception Leonardo Newer, DO    HYDROcodone-acetaminophen [facility]) 5-325 mg per tablet 1 Tab, 1 Tab, Oral, Q4H PRN, Gisela Conception Jr V, DO    ondansetron [facility]) injection 4 mg, 4 mg, IntraVENous, Q4H PRN, Gisela Conception Jr V, DO    heparin (porcine) injection 5,000 Units, 5,000 Units, SubCUTAneous, Q8H, Gisela Conception Jr V, DO, 5,000 Units at 10/13/17 0622    labetalol (NORMODYNE;TRANDATE) injection 20 mg, 20 mg, IntraVENous, Q4H PRN, Gisela Conception Jr V, DO, 20 mg at 10/11/17 0433    atorvastatin (LIPITOR) tablet 20 mg, 20 mg, Oral, DAILY, Gisela Conception Jr V, DO, 20 mg at 10/12/17 0827    carvedilol (COREG) tablet 12.5 mg, 12.5 mg, Oral, BID WITH MEALS, Gisela Conception Jr V, DO, 12.5 mg at 10/12/17 1622    ferrous sulfate tablet 324 mg, 324 mg, Oral, ACB, Gisela Conception Jr V, DO, 325 mg at 10/12/17 1363    finasteride (PROSCAR) tablet 5 mg, 5 mg, Oral, DAILY, Gsiela Conception Jr V, DO, 5 mg at 10/12/17 0827    pantoprazole (PROTONIX) tablet 40 mg, 40 mg, Oral, ACB, Gisela Conception Jr V, DO, 40 mg at 10/12/17 4016    tamsulosin (FLOMAX) capsule 0.4 mg, 0.4 mg, Oral, DAILY, Gisela Conception Jr V, DO, 0.4 mg at 10/12/17 4663    aspirin delayed-release tablet 81 mg, 81 mg, Oral, DAILY, Gisela Conception Jr V, DO, 81 mg at 10/12/17 0827    amLODIPine (NORVASC) tablet 10 mg, 10 mg, Oral, DAILY, Gisela Conception Jr V, DO, 10 mg at 10/12/17 0827    carBAMazepine (TEGretol) tablet 200 mg, 200 mg, Oral, TID, Sunshine Asmita Romo V, DO, 200 mg at 10/12/17 2158    influenza vaccine 2017-18 (3 yrs+)(PF) (FLUZONE QUAD/FLUARIX QUAD) injection 0.5 mL, 0.5 mL, IntraMUSCular, PRIOR TO DISCHARGE, Lida BOJORQUEZ DO   Labs: Recent Labs      10/13/17   0040  10/12/17   0041  10/10/17   1136   WBC  4.3  3.4*  4.1   HGB  8.8*  8.8*  9.3*   HCT  26.9*  26.7*  28.8*   PLT  200  209  203     Recent Labs      10/13/17   0040  10/12/17   0041  10/11/17   1059  10/10/17   1136   NA  137  139  138  143   K  3.8  3.8  4.1  4.3   CL  106  106  105  110*   CO2  23  22  23  24   GLU  105*  108*  115*  95   BUN  42*  45*  48*  48*   CREA  3.34*  3.40*  3.36*  3.71*   CA  8.3*  8.3*  8.6  8.4*   MG  1.8  1.8   --   2.0   PHOS  3.6  3.8  3.5   --    INR   --    --    --   1.1             Signed By: Sneha Neville MD - October 13, 2017

## 2017-10-13 NOTE — PROGRESS NOTES
Problem: Falls - Risk of  Goal: *Absence of Falls  Document Sara Fall Risk and appropriate interventions in the flowsheet.    Outcome: Progressing Towards Goal  Fall Risk Interventions:  Mobility Interventions: Bed/chair exit alarm           Medication Interventions: Bed/chair exit alarm     Elimination Interventions: Call light in reach

## 2017-10-13 NOTE — PROGRESS NOTES
Shift Summary  10/13/2017  [id number]    Pt asleep at time of bedside shift report received from [facility]. AM vitals, assessment, and meds complete without difficulty, AM rhythm strip shows VPaced. Reviewed today's plan of care and goals with patient/family; plan of care today includes discharge after being cleared by PT, urology, nephrology, and cardiology. Discharge teaching completed by Micki Hammond RN. Ruffin catheter will stay in place until pt follows up at urology office. Pt dressed in own clothes and wheeled off of unit to meet private vehicle at 1530.

## 2017-10-13 NOTE — PROGRESS NOTES
Occupational Therapy EVALUATION/discharge  Patient: Stevie Rutledge (1 y.o. male)  Date: 10/13/2017  Primary Diagnosis: Hypertensive urgency        Precautions: universal       ASSESSMENT:   Based on the objective data described below, the patient presents with good overall activity tolerance following admission on 10/10 for hypertensive urgency. Patient with dizziness, confusion, and increased BP. Patient admits to non compliance with mediations for 2-3 days PTA. Today, patient was received in bed, alert, oriented x4, and agreeable to OOB activity. He was independent with ADLs and functional mobility. Patient was educated on general home safety, pacing techniques, and energy conservation techniques. Patient has no further skilled OT needs and is cleared from OT services at this time. Discharge Recommendations: None  Further Equipment Recommendations for Discharge: none       SUBJECTIVE:   Patient agreeable to OT evaluation. OBJECTIVE DATA SUMMARY:   HISTORY:   Past Medical History:   Diagnosis Date    CAD (coronary artery disease)     [facility]) [facility]     CKD (chronic kidney disease), stage IV (HCC)     Hyperlipidemia     Hypertension     Systolic CHF, chronic ([facility]      Past Surgical History:   Procedure Laterality Date    COLONOSCOPY N/A 9/6/2017    COLONOSCOPY performed by Jenny Arellano MD at [facility] HX CORONARY ARTERY BYPASS GRAFT      HX PACEMAKER         Prior Level of Function/Home Situation: Patient lives with his son. He reports independence with ADLs and occasional use of SPC for transfers/mobility PTA.        Home Situation  Home Environment: Private residence  # Steps to Enter: 2  One/Two Story Residence: One story  Living Alone: No  Support Systems: Child(kenney)  Patient Expects to be Discharged to[de-identified] Private residence  Current DME Used/Available at Home: Cane, straight  [x]  Right hand dominant   []  Left hand dominant    EXAMINATION OF PERFORMANCE DEFICITS:  Cognitive/Behavioral Status:        Cognition: Appropriate decision making; Appropriate for age attention/concentration; Appropriate safety awareness  Perception: Appears intact  Perseveration: No perseveration noted  Safety/Judgement: Awareness of environment    Skin: Intact in the uppers    Edema: None noted in the uppers    Hearing: Auditory  Auditory Impairment: None    Vision/Perceptual:    Tracking: Able to track stimulus in all quadrants w/o difficulty    Diplopia: No    Acuity: Within Defined Limits       Range of Motion:  AROM: Within functional limits in the uppers  PROM: Within functional limits in the uppers    Strength:  WDL in the uppers    Coordination:  Fine Motor Skills-Upper: Left Intact; Right Intact    Gross Motor Skills-Upper: Left Intact; Right Intact    Tone & Sensation:  Tone: normal  Sensation: Intact    Balance:  Sitting: Intact  Standing: Intact; With support    Functional Mobility and Transfers for ADLs:  Bed Mobility:  Rolling: Independent  Supine to Sit: Independent  Sit to Supine: Independent  Scooting: Independent    Transfers:  Sit to Stand: Independent  Stand to Sit: Independent  Bed to Chair: Modified independent  Toilet Transfer : Modified independent    ADL Assessment:  Feeding: Independent    Oral Facial Hygiene/Grooming: Independent    Bathing: Modified independent    Upper Body Dressing: Independent    Lower Body Dressing: Modified independent    Toileting: Modified independent     Cognitive Retraining  Safety/Judgement: Awareness of environment    Functional Measure:  Barthel Index:    Bathin  Bladder: 10  Bowels: 10  Groomin  Dressing: 10  Feeding: 10  Mobility: 15  Stairs: 10  Toilet Use: 10  Transfer (Bed to Chair and Back): 15  Total: 100       Barthel and G-code impairment scale:  Percentage of impairment CH  0% CI  1-19% CJ  20-39% CK  40-59% CL  60-79% CM  80-99% CN  100%   Barthel Score 0-100 100 99-80 79-60 59-40 20-39 1-19   0   Barthel Score 0-20 20 17-19 13-16 9-12 5-8 1-4 0      The Barthel ADL Index: Guidelines  1. The index should be used as a record of what a patient does, not as a record of what a patient could do. 2. The main aim is to establish degree of independence from any help, physical or verbal, however minor and for whatever reason. 3. The need for supervision renders the patient not independent. 4. A patient's performance should be established using the best available evidence. Asking the patient, friends/relatives and nurses are the usual sources, but direct observation and common sense are also important. However direct testing is not needed. 5. Usually the patient's performance over the preceding 24-48 hours is important, but occasionally longer periods will be relevant. 6. Middle categories imply that the patient supplies over 50 per cent of the effort. 7. Use of aids to be independent is allowed. Ashleigh Smith., Barthel, D.W. (8050). Functional evaluation: the Barthel Index. [facility] (14)2. DACIA Paulson, Prateek Mahajan., [place]., [place], [address] [facility] (1999). Measuring the change indisability after inpatient rehabilitation; comparison of the responsiveness of the Barthel Index and Functional [place] Measure. Journal of Neurology, Neurosurgery, and Psychiatry, 66(4), 149-306. Mine Espinal, N.J.A, LIZ Mckeon, & Monae Pavon MYahirA. (2004.) Assessment of post-stroke quality of life in cost-effectiveness studies: The usefulness of the Barthel Index and the EuroQoL-5D. Quality of Life Research, 13, 080-78       G codes: In compliance with CMSs Claims Based Outcome Reporting, the following G-code set was chosen for this patient based on their primary functional limitation being treated: The outcome measure chosen to determine the severity of the functional limitation was the Barthel Index with a score of 100/100 which was correlated with the impairment scale. ?  Self Care:     - CURRENT STATUS: CH - 0% impaired, limited or restricted    - GOAL STATUS: CH - 0% impaired, limited or restricted    - D/C STATUS:  CH - 0% impaired, limited or restricted       Occupational Therapy Evaluation Charge Determination   History Examination Decision-Making   LOW Complexity : Brief history review  LOW Complexity : 1-3 performance deficits relating to physical, cognitive , or psychosocial skils that result in activity limitations and / or participation restrictions  LOW Complexity : No comorbidities that affect functional and no verbal or physical assistance needed to complete eval tasks       Based on the above components, the patient evaluation is determined to be of the following complexity level: LOW   Pain:Pain Scale 1: Numeric (0 - 10)  Pain Intensity 1: 0              Activity Tolerance:   Patient tolerated eval well. Please refer to the flowsheet for vital signs taken during this treatment. After treatment:   [x]  Patient left in no apparent distress sitting up in chair  []  Patient left in no apparent distress in bed  [x]  Call bell left within reach  [x]  Nursing notified  []  Caregiver present  []  Bed alarm activated    COMMUNICATION/EDUCATION:   Communication/Collaboration:  [x]      Home safety education was provided and the patient/caregiver indicated understanding. [x]      Patient/family have participated as able and agree with findings and recommendations. []      Patient is unable to participate in plan of care at this time. Findings and recommendations were discussed with: Physical Therapist, Registered Nurse and patient.     Darian Espitia OTR/L  Time Calculation: 24 mins

## 2017-10-13 NOTE — DISCHARGE SUMMARY
Physician Discharge Summary     Patient ID:  Quincy Auguste  [id number]  33 y.o.  1935    Admit date: 10/10/2017    Discharge date: 10/13/2017    Admission Diagnoses: Hypertensive urgency    Principal Discharge Diagnoses:    Accelerated HTN  Systolic CHF    OTHER PROBLEMS ADDRESSEDS  Principal Diagnosis Hypertensive urgency                                            Principal Problem:    Hypertensive urgency (10/10/2017)    Active Problems:    Coronary atherosclerosis of native coronary artery (9/2/2011)      Acute systolic CHF (congestive heart failure) ([facility] (9/2/2011)      Obstructive chronic bronchitis with acute bronchitis ([facility] (9/2/2011)      CKD (chronic kidney disease) stage 4, GFR 15-29 ml/min ([facility]) (9/5/2017)      Benign prostatic hyperplasia (9/5/2017)      Hydronephrosis (9/5/2017)      H pylori ulcer (9/6/2017)      Urine retention (9/6/2017)      Acute renal failure superimposed on stage 4 chronic kidney disease ([facility] (10/10/2017)      Elevated brain natriuretic peptide (BNP) level (10/10/2017)      Anemia (10/10/2017)      CAD (coronary artery disease) ()      Hyperlipidemia ()      Hypertension ()      CKD (chronic kidney disease), stage IV ([facility]) ()      Systolic CHF, chronic ([facility]) ()      CHF (congestive heart failure) ([facility] ()       Patient Active Problem List   Diagnosis Code    Coronary atherosclerosis of native coronary artery W41.21    Acute systolic CHF (congestive heart failure) (HCC) I50.21    Obstructive chronic bronchitis with acute bronchitis ([facility]) J44.0    CKD (chronic kidney disease) stage 4, GFR 15-29 ml/min (HCC) N18.4    Benign prostatic hyperplasia N40.0    Hydronephrosis N13.30    H pylori ulcer K27.9, B96.81    Urine retention R33.9    Hypertensive urgency I16.0    Acute renal failure superimposed on stage 4 chronic kidney disease (HCC) N17.9, N18.4    Elevated brain natriuretic peptide (BNP) level R79.89    Anemia D64.9    CAD (coronary artery disease) I25.10  Hyperlipidemia E78.5    Hypertension I10    CKD (chronic kidney disease), stage IV (HCC) G95.6    Systolic CHF, chronic (HCC) I50.22    CHF (congestive heart failure) ([facility] I50.9         Hospital Course:     Hypertensive urgency: due to non-compliance. Now much better controlled. Cont hydralazine, Coreg, amlodipine. Encouraged compliance.       Acute renal failure superimposed on stage 4 chronic kidney disease: Cr remains elevated but stable. Discussed with chad Hsieh for discharge. Needs close follow up. Had e/o obstruction with hydronephrosis on renal US. US repeated on 10/12 which showed resolution of hydronephrosis. Pelayo catheter per urology. On Flomax and Proscar.      Urine retention / Benign prostatic hyperplasia / Hydronephrosis: as above. Seen by urology. Needs to go home with pelayo. Outpatient voiding trial.      Systolic CHF (congestive heart failure): unclear if chronic or acute, but suspect chronic. Echo showed EF 30%. Appears euvolemic. No LE edema. CXR showed no significant edema. Continue BB and statin.  Needs outpatient follow up. Consider AICD outpatient if EF remains low despite optimal medical mgmt. Entresto likely precluded by severe renal dysfunction. Will plan on discharging home today unless cardiology objects      H pylori ulcer - Dx last month, and reportedly treated with protonix/amox/clarithromycin for 14 days. Has GI outpatient follow up      Anemia - Presumed from recent GI loss. Serologies equivocal. Continue iron supplements     Coronary atherosclerosis of native coronary artery: stable. Continue ASA, BB, statin.       ?COPD: stable     Hyperlipidemia - continue statin      Hx seizure - continue carbamazepine    Attempted to update both son and daughter on phone to no avail. Voicemail left on [phone] for daughter.    Addendum: 11:10am. Discussed with son regarding the above, including his CHF, severe renal dysfunction, pelayo cathter,  medication compliance, and outpatient follow up      Pt discharged in improved and stable condition. Procedures performed: none    Imaging studies: see above      PCP: PROVIDER UNKNOWN    Consults: Cardiology, Nephrology and Urology    Discharge Exam:  Patient Vitals for the past 12 hrs:   Temp Pulse Resp BP SpO2   10/13/17 0806 98.1 °F (36.7 °C) 71 16 145/74 99 %   10/13/17 0453 98.2 °F (36.8 °C) 76 16 147/72 100 %   10/12/17 2357 97.9 °F (36.6 °C) 79 16 136/73 99 %   10/12/17 2303 - 79 - - -       Gen:  Elderly, chronically ill-appearing, in no acute distress  HEENT:  Sclerae nonicteric, hearing intact to voice, mucous membranes moist  Neck:  Supple, without masses. Resp:  No accessory muscle use, CTAB without wheezes, rales, or rhonchi  Card: RRR, without m/r/g. No LE edema. Abd:  +bowel sounds, soft, NTTP, nondistended   Neuro: Face symmetric, tongue midline, speech fluent, follows commands appropriately  Psych:  Alert, oriented x 3. Fair insight    Disposition: home    Patient Instructions:   Current Discharge Medication List      START taking these medications    Details   hydrALAZINE (APRESOLINE) 100 mg tablet Take 1 Tab by mouth three (3) times daily. Qty: 90 Tab, Refills: 0         CONTINUE these medications which have CHANGED    Details   carvedilol (COREG) 12.5 mg tablet Take 1 Tab by mouth two (2) times daily (with meals). Qty: 60 Tab, Refills: 0         CONTINUE these medications which have NOT CHANGED    Details   pantoprazole (PROTONIX) 40 mg tablet Take 1 Tab by mouth Daily (before breakfast). Qty: 30 Tab, Refills: 1      finasteride (PROSCAR) 5 mg tablet Take 1 Tab by mouth daily. Qty: 30 Tab, Refills: 1      tamsulosin (FLOMAX) 0.4 mg capsule Take 1 Cap by mouth daily. Qty: 30 Cap, Refills: 1      ferrous sulfate 324 mg (65 mg iron) tablet Take 1 Tab by mouth Daily (before breakfast). Qty: 30 Tab, Refills: 1      acetaminophen (TYLENOL) 325 mg tablet Take 325 mg by mouth every four (4) hours as needed for Pain. atorvastatin (LIPITOR) 20 mg tablet Take 20 mg by mouth daily. aspirin delayed-release 81 mg tablet Take 81 mg by mouth daily. amlodipine (NORVASC) 10 mg tablet Take 10 mg by mouth daily. carbamazepine (TEGRETOL) 200 mg tablet Take 200 mg by mouth three (3) times daily. Activity: See discharge instructions  Diet: See discharge instructions  Wound Care: See discharge instructions    Follow-up Information     Follow up With Details Comments Contact Info    follow up with primary care doctor within one week Schedule an appointment as soon as possible for a visit in 1 week      [facility] EMERGENCY DEPT  If symptoms worsen [id number]    Jose Hewitt MD  Fllow up with Dr. Juan Manuel Ramires or your primary cardiologist within 1 week of discharge [address] [facility]      Adilene Kwan MD  Follow up with [facility]. [facility] or nephrologist within 1-2 weeks [address]  Bhaskar Ellis MD In 5 days  [facility] [address] [facility] [facility]            I spent 35 minutes on this discharge.     Signed:  Madelyn Fagan MD  10/13/2017  8:35 AM

## 2017-10-13 NOTE — PROGRESS NOTES
[address] [facility]  YOB: 1935          Assessment & Plan:   ARF on CKD  · Improving slowly  · Due to pre-renal azotemia, uncontrolled HTN, and UTO  · OK for d/c from renal standpoint  · Needs to f/u with us in [facility] in 2 wk    UTO  · Ruffin replaced  · [facility]    HTN  · Better  · Needs to take his meds       Subjective:   CC: f/u ARF  HPI: Renal function is better.     ROS: no n/v/sob  Current Facility-Administered Medications   Medication Dose Route Frequency    hydrALAZINE (APRESOLINE) tablet 100 mg  100 mg Oral TID    sodium chloride (NS) flush 5-10 mL  5-10 mL IntraVENous Q8H    sodium chloride (NS) flush 5-10 mL  5-10 mL IntraVENous PRN    naloxone (NARCAN) injection 0.4 mg  0.4 mg IntraVENous PRN    zolpidem (AMBIEN) tablet 5 mg  5 mg Oral QHS PRN    acetaminophen (TYLENOL) tablet 650 mg  650 mg Oral Q4H PRN    HYDROcodone-acetaminophen (NORCO) 5-325 mg per tablet 1 Tab  1 Tab Oral Q4H PRN    ondansetron (ZOFRAN) injection 4 mg  4 mg IntraVENous Q4H PRN    heparin (porcine) injection 5,000 Units  5,000 Units SubCUTAneous Q8H    labetalol (NORMODYNE;TRANDATE) injection 20 mg  20 mg IntraVENous Q4H PRN    atorvastatin (LIPITOR) tablet 20 mg  20 mg Oral DAILY    carvedilol (COREG) tablet 12.5 mg  12.5 mg Oral BID WITH MEALS    ferrous sulfate tablet 324 mg  324 mg Oral ACB    finasteride (PROSCAR) tablet 5 mg  5 mg Oral DAILY    pantoprazole (PROTONIX) tablet 40 mg  40 mg Oral ACB    tamsulosin (FLOMAX) capsule 0.4 mg  0.4 mg Oral DAILY    aspirin delayed-release tablet 81 mg  81 mg Oral DAILY    amLODIPine (NORVASC) tablet 10 mg  10 mg Oral DAILY    carBAMazepine (TEGretol) tablet 200 mg  200 mg Oral TID    influenza vaccine 2017-18 (3 yrs+)(PF) (FLUZONE QUAD/FLUARIX QUAD) injection 0.5 mL  0.5 mL IntraMUSCular PRIOR TO DISCHARGE          Objective:     Vitals:  Blood pressure 145/74, pulse 71, temperature 98.1 °F (36.7 °C), resp. rate 16, height 5' 9\" (1.753 m), weight 77 kg (169 lb 12.1 oz), SpO2 99 %. Temp (24hrs), Av.8 °F (36.6 °C), Min:97.4 °F (36.3 °C), Max:98.2 °F (36.8 °C)      Intake and Output:     10/11 1901 - 10/13 0700  In: 1493.8 [P.O.:175; I.V.:1318.8]  Out: 2150 [Urine:2150]    Physical Exam:               GENERAL ASSESSMENT: NAD  CHEST: CTA  : +Ruffin   EXTREMITY: no EDEMA  NEURO: Grossly non focal          ECG/rhythm:    Data Review      No results for input(s): TNIPOC in the last 72 hours. No lab exists for component: ITNL   Recent Labs      10/10/17   1136   TROIQ  <0.04     Recent Labs      10/13/17   0040  10/12/17   0041  10/11/17   1059  10/10/17   1136   NA  137  139  138  143   K  3.8  3.8  4.1  4.3   CL  106  106  105  110*   CO2  23  22  23  24   BUN  42*  45*  48*  48*   CREA  3.34*  3.40*  3.36*  3.71*   GLU  105*  108*  115*  95   PHOS  3.6  3.8  3.5   --    MG  1.8  1.8   --   2.0   CA  8.3*  8.3*  8.6  8.4*   ALB   --   2.9*  3.2*  3.4*   WBC  4.3  3.4*   --   4.1   HGB  8.8*  8.8*   --   9.3*   HCT  26.9*  26.7*   --   28.8*   PLT  200  209   --   203      Recent Labs      10/10/17   1136   INR  1.1   PTP  11.4*     Needs: urine analysis, urine sodium, protein and creatinine  Lab Results   Component Value Date/Time    Sodium urine, random 8 10/10/2017 07:14 PM    Creatinine, urine 25.44 10/10/2017 07:14 PM           : Minnie Mai MD  10/13/2017        [facility]:  [email or web address]. [email or web address]  Alexandra Dollar office:  [address] [place] [address], [address] [place] [address],[address]  [place], [address] [facility]  Phone: [phone]  Fax :     [phone]    [place] office:  [address] [facility], [address]  Phone - [phone]  Fax - [phone]

## 2017-10-13 NOTE — DISCHARGE INSTRUCTIONS
HOSPITALIST DISCHARGE INSTRUCTIONS  NAME: Sydnie Rodriguez   :  1935   MRN:  [id number]     Date/Time:  10/13/2017 8:31 AM    ADMIT DATE: 10/10/2017     DISCHARGE DATE: 10/13/2017     PRINCIPAL DISCHARGE DIAGNOSES:  Dizziness  Poorly controlled high blood pressure  Congestive heart failure  Urinary retention    MEDICATIONS:  · It is important that you take the medication exactly as they are prescribed. Note the changes and additions to your medications. Be sure you understand these changes before you are discharged today. · Keep your medication in the bottles provided by the pharmacist and keep a list of the medication names, dosages, and times to be taken in your wallet. · Do not take other medications without consulting your doctor. Pain Management: per above medications    What to do at Home    Recommended diet:  Cardiac Diet and Low fat, Low cholesterol    Recommended activity: Activity as tolerated    If you experience any of the following symptoms then please call your primary care physician or return to the emergency room if you cannot get hold of your doctor:   severe chest pain, shortness of breath, dizziness, confusion, falling, or other severe concerning symptoms that brought you to the hospital in the first place    Follow Up:   Follow-up Information     Follow up With Details Comments Contact Info    follow up with primary care doctor within one week Schedule an appointment as soon as possible for a visit in 1 week      [facility] EMERGENCY DEPT  If symptoms worsen [address]    Jean Veras MD  Fllow up with Dr. Avinash Gaytan or your primary cardiologist within 1 week of discharge [address]      Allyson Nayak MD  Follow up with Dr. Gilberto Zuniga or nephrologist within 1-2 weeks [place] [address]  [facility]  Hilda Koch MD In 5 days  [address] [place] [address] [address] [address]  [phone]              Information obtained by :  I understand that if any problems occur once I am at home I am to contact my physician. I understand and acknowledge receipt of the instructions indicated above. Physician's or R.N.'s Signature                                                                  Date/Time                                                                                                                                              Patient or Representative Signature                                                          Date/Time       Dizziness: Care Instructions  Your Care Instructions  Dizziness is the feeling of unsteadiness or fuzziness in your head. It is different than having vertigo, which is a feeling that the room is spinning or that you are moving or falling. It is also different from lightheadedness, which is the feeling that you are about to faint. It can be hard to know what causes dizziness. Some people feel dizzy when they have migraine headaches. Sometimes bouts of flu can make you feel dizzy. Some medical conditions, such as heart problems or high blood pressure, can make you feel dizzy. Many medicines can cause dizziness, including medicines for high blood pressure, pain, or anxiety. If a medicine causes your symptoms, your doctor may recommend that you stop or change the medicine. If it is a problem with your heart, you may need medicine to help your heart work better. If there is no clear reason for your symptoms, your doctor may suggest watching and waiting for a while to see if the dizziness goes away on its own. Follow-up care is a key part of your treatment and safety. Be sure to make and go to all appointments, and call your doctor if you are having problems.  It's also a good idea to know your test results and keep a list of the medicines you take. How can you care for yourself at home? · If your doctor recommends or prescribes medicine, take it exactly as directed. Call your doctor if you think you are having a problem with your medicine. · Do not drive while you feel dizzy. · Try to prevent falls. Steps you can take include:  ¨ Using nonskid mats, adding grab bars near the tub, and using night-lights. ¨ Clearing your home so that walkways are free of anything you might trip on. ¨ Letting family and friends know that you have been feeling dizzy. This will help them know how to help you. When should you call for help? Call 911 anytime you think you may need emergency care. For example, call if:  · You passed out (lost consciousness). · You have dizziness along with symptoms of a heart attack. These may include:  ¨ Chest pain or pressure, or a strange feeling in the chest.  ¨ Sweating. ¨ Shortness of breath. ¨ Nausea or vomiting. ¨ Pain, pressure, or a strange feeling in the back, neck, jaw, or upper belly or in one or both shoulders or arms. ¨ Lightheadedness or sudden weakness. ¨ A fast or irregular heartbeat. · You have symptoms of a stroke. These may include:  ¨ Sudden numbness, tingling, weakness, or loss of movement in your face, arm, or leg, especially on only one side of your body. ¨ Sudden vision changes. ¨ Sudden trouble speaking. ¨ Sudden confusion or trouble understanding simple statements. ¨ Sudden problems with walking or balance. ¨ A sudden, severe headache that is different from past headaches. Call your doctor now or seek immediate medical care if:  · You feel dizzy and have a fever, headache, or ringing in your ears. · You have new or increased nausea and vomiting. · Your dizziness does not go away or comes back. Watch closely for changes in your health, and be sure to contact your doctor if:  · You do not get better as expected. Where can you learn more?   Go to http://oscar-kamari.info/. Enter M313 in the search box to learn more about \"Dizziness: Care Instructions. \"  Current as of: March 20, 2017  Content Version: 11.3  © 0215-2984 [employer]. Care instructions adapted under license by [email or web address] (which disclaims liability or warranty for this information). If you have questions about a medical condition or this instruction, always ask your healthcare professional. [address] any warranty or liability for your use of this information. Learning About High Blood Pressure  What is high blood pressure? Blood pressure is a measure of how hard the blood pushes against the walls of your arteries. It's normal for blood pressure to go up and down throughout the day, but if it stays up, you have high blood pressure. Another name for high blood pressure is hypertension. Two numbers tell you your blood pressure. The first number is the systolic pressure. It shows how hard the blood pushes when your heart is pumping. The second number is the diastolic pressure. It shows how hard the blood pushes between heartbeats, when your heart is relaxed and filling with blood. A blood pressure of less than 120/80 (say \"120 over 80\") is ideal for an adult. High blood pressure is 140/90 or higher. You have high blood pressure if your top number is 140 or higher or your bottom number is 90 or higher, or both. Many people fall into the category in between, called prehypertension. People with prehypertension need to make lifestyle changes to bring their blood pressure down and help prevent or delay high blood pressure. What happens when you have high blood pressure? · Blood flows through your arteries with too much force. Over time, this damages the walls of your arteries. But you can't feel it. High blood pressure usually doesn't cause symptoms. · Fat and calcium start to build up in your arteries.  This buildup is called plaque. Plaque makes your arteries narrower and stiffer. Blood can't flow through them as easily. · This lack of good blood flow starts to damage some of the organs in your body. This can lead to problems such as coronary artery disease and heart attack, heart failure, stroke, kidney failure, and eye damage. How can you prevent high blood pressure? · Stay at a healthy weight. · Try to limit how much sodium you eat to less than 2,300 milligrams (mg) a day. If you limit your sodium to 1,500 mg a day, you can lower your blood pressure even more. ¨ Buy foods that are labeled \"unsalted,\" \"sodium-free,\" or \"low-sodium. \" Foods labeled \"reduced-sodium\" and \"light sodium\" may still have too much sodium. ¨ Flavor your food with garlic, lemon juice, onion, vinegar, herbs, and spices instead of salt. Do not use soy sauce, steak sauce, onion salt, garlic salt, mustard, or ketchup on your food. ¨ Use less salt (or none) when recipes call for it. You can often use half the salt a recipe calls for without losing flavor. · Be physically active. Get at least 30 minutes of exercise on most days of the week. Walking is a good choice. You also may want to do other activities, such as running, swimming, cycling, or playing tennis or team sports. · Limit alcohol to 2 drinks a day for men and 1 drink a day for women. · Eat plenty of fruits, vegetables, and low-fat dairy products. Eat less saturated and total fats. How is high blood pressure treated? · Your doctor will suggest making lifestyle changes. For example, your doctor may ask you to eat healthy foods, quit smoking, lose extra weight, and be more active. · If lifestyle changes don't help enough or your blood pressure is very high, you will have to take medicine every day. Follow-up care is a key part of your treatment and safety. Be sure to make and go to all appointments, and call your doctor if you are having problems.  It's also a good idea to know your test results and keep a list of the medicines you take. Where can you learn more? Go to http://oscar-kamari.info/. Enter P501 in the search box to learn more about \"Learning About High Blood Pressure. \"  Current as of: March 23, 2016  Content Version: 11.3  © 9318-5707 [employer]. Care instructions adapted under license by [employer] (which disclaims liability or warranty for this information). If you have questions about a medical condition or this instruction, always ask your healthcare professional. [address] any warranty or liability for your use of this information.

## 2017-10-13 NOTE — ROUTINE PROCESS
Pt's PCP identified through CM assessment note. PCP hospital follow-up appointment scheduled with another doctor in practice since pt's doctor booked full.  Mejia Nagel, [address] specialist, [phone]

## 2017-10-13 NOTE — PROGRESS NOTES
Bedside, Verbal and  shift change report given to JENNIFER Espinosa (oncoming nurse) by Marichuy Alexis (offgoing nurse). Report included the following information SBAR, Kardex, Intake/Output, MAR, Recent Results and Cardiac Rhythm V Paced. Candance Huger

## 2017-10-18 ENCOUNTER — HOSPITAL ENCOUNTER (EMERGENCY)
Age: 82
Discharge: HOME | End: 2017-10-18
Attending: EMERGENCY MEDICINE
Payer: MEDICARE

## 2017-10-18 VITALS
HEART RATE: 73 BPM | OXYGEN SATURATION: 100 % | BODY MASS INDEX: 26.36 KG/M2 | SYSTOLIC BLOOD PRESSURE: 155 MMHG | RESPIRATION RATE: 16 BRPM | HEIGHT: 69 IN | DIASTOLIC BLOOD PRESSURE: 77 MMHG | TEMPERATURE: 97.2 F | WEIGHT: 178 LBS

## 2017-10-18 DIAGNOSIS — R03.0 ELEVATED BLOOD PRESSURE READING: Primary | ICD-10-CM

## 2017-10-18 LAB
ALBUMIN SERPL-MCNC: 3.4 G/DL (ref 3.5–5)
ALBUMIN/GLOB SERPL: 0.8 {RATIO} (ref 1.1–2.2)
ALP SERPL-CCNC: 106 U/L (ref 45–117)
ALT SERPL-CCNC: 17 U/L (ref 12–78)
ANION GAP SERPL CALC-SCNC: 12 MMOL/L (ref 5–15)
AST SERPL-CCNC: 21 U/L (ref 15–37)
BASOPHILS # BLD: 0 K/UL (ref 0–0.1)
BASOPHILS NFR BLD: 0 % (ref 0–1)
BILIRUB SERPL-MCNC: 0.3 MG/DL (ref 0.2–1)
BUN SERPL-MCNC: 55 MG/DL (ref 6–20)
BUN/CREAT SERPL: 14 (ref 12–20)
CALCIUM SERPL-MCNC: 8.9 MG/DL (ref 8.5–10.1)
CHLORIDE SERPL-SCNC: 104 MMOL/L (ref 97–108)
CO2 SERPL-SCNC: 22 MMOL/L (ref 21–32)
CREAT SERPL-MCNC: 3.94 MG/DL (ref 0.7–1.3)
EOSINOPHIL # BLD: 0.1 K/UL (ref 0–0.4)
EOSINOPHIL NFR BLD: 1 % (ref 0–7)
ERYTHROCYTE [DISTWIDTH] IN BLOOD BY AUTOMATED COUNT: 14.6 % (ref 11.5–14.5)
GLOBULIN SER CALC-MCNC: 4.1 G/DL (ref 2–4)
GLUCOSE SERPL-MCNC: 154 MG/DL (ref 65–100)
HCT VFR BLD AUTO: 31.2 % (ref 36.6–50.3)
HGB BLD-MCNC: 9.8 G/DL (ref 12.1–17)
LYMPHOCYTES # BLD: 1 K/UL (ref 0.8–3.5)
LYMPHOCYTES NFR BLD: 14 % (ref 12–49)
MCH RBC QN AUTO: 31 PG (ref 26–34)
MCHC RBC AUTO-ENTMCNC: 31.4 G/DL (ref 30–36.5)
MCV RBC AUTO: 98.7 FL (ref 80–99)
MONOCYTES # BLD: 1.1 K/UL (ref 0–1)
MONOCYTES NFR BLD: 15 % (ref 5–13)
NEUTS SEG # BLD: 5 K/UL (ref 1.8–8)
NEUTS SEG NFR BLD: 70 % (ref 32–75)
PLATELET # BLD AUTO: 258 K/UL (ref 150–400)
POTASSIUM SERPL-SCNC: 5.7 MMOL/L (ref 3.5–5.1)
PROT SERPL-MCNC: 7.5 G/DL (ref 6.4–8.2)
RBC # BLD AUTO: 3.16 M/UL (ref 4.1–5.7)
SODIUM SERPL-SCNC: 138 MMOL/L (ref 136–145)
WBC # BLD AUTO: 7.3 K/UL (ref 4.1–11.1)

## 2017-10-18 PROCEDURE — 99284 EMERGENCY DEPT VISIT MOD MDM: CPT

## 2017-10-18 PROCEDURE — 80053 COMPREHEN METABOLIC PANEL: CPT | Performed by: EMERGENCY MEDICINE

## 2017-10-18 PROCEDURE — 85025 COMPLETE CBC W/AUTO DIFF WBC: CPT | Performed by: EMERGENCY MEDICINE

## 2017-10-18 PROCEDURE — 77030010545

## 2017-10-18 PROCEDURE — 36415 COLL VENOUS BLD VENIPUNCTURE: CPT | Performed by: EMERGENCY MEDICINE

## 2017-10-18 PROCEDURE — 93005 ELECTROCARDIOGRAM TRACING: CPT

## 2017-10-18 RX ORDER — HYDRALAZINE HYDROCHLORIDE 25 MG/1
100 TABLET, FILM COATED ORAL
Status: DISCONTINUED | OUTPATIENT
Start: 2017-10-18 | End: 2017-10-18

## 2017-10-18 NOTE — ED TRIAGE NOTES
Pt reports elevated BP today. Hx of HTN and takes blood pressure medications regularly. Denies chest pain or SOB.

## 2017-10-19 ENCOUNTER — HOSPITAL ENCOUNTER (EMERGENCY)
Age: 82
Discharge: HOME | End: 2017-10-19
Attending: EMERGENCY MEDICINE
Payer: MEDICARE

## 2017-10-19 VITALS
HEART RATE: 71 BPM | OXYGEN SATURATION: 100 % | HEIGHT: 69 IN | RESPIRATION RATE: 13 BRPM | TEMPERATURE: 97.6 F | DIASTOLIC BLOOD PRESSURE: 67 MMHG | BODY MASS INDEX: 26.36 KG/M2 | SYSTOLIC BLOOD PRESSURE: 156 MMHG | WEIGHT: 178 LBS

## 2017-10-19 DIAGNOSIS — E87.5 HYPERKALEMIA: ICD-10-CM

## 2017-10-19 DIAGNOSIS — R42 DIZZINESS: Primary | ICD-10-CM

## 2017-10-19 DIAGNOSIS — I48.92 ATRIAL FLUTTER, UNSPECIFIED TYPE (HCC): ICD-10-CM

## 2017-10-19 LAB
ALBUMIN SERPL-MCNC: 3.4 G/DL (ref 3.5–5)
ALBUMIN/GLOB SERPL: 0.8 {RATIO} (ref 1.1–2.2)
ALP SERPL-CCNC: 104 U/L (ref 45–117)
ALT SERPL-CCNC: 17 U/L (ref 12–78)
ANION GAP SERPL CALC-SCNC: 10 MMOL/L (ref 5–15)
AST SERPL-CCNC: 23 U/L (ref 15–37)
ATRIAL RATE: 21 BPM
BASOPHILS # BLD: 0 K/UL (ref 0–0.1)
BASOPHILS NFR BLD: 0 % (ref 0–1)
BILIRUB SERPL-MCNC: 0.3 MG/DL (ref 0.2–1)
BUN SERPL-MCNC: 52 MG/DL (ref 6–20)
BUN/CREAT SERPL: 14 (ref 12–20)
CALCIUM SERPL-MCNC: 8.9 MG/DL (ref 8.5–10.1)
CALCULATED R AXIS, ECG10: 14 DEGREES
CALCULATED T AXIS, ECG11: -121 DEGREES
CHLORIDE SERPL-SCNC: 104 MMOL/L (ref 97–108)
CO2 SERPL-SCNC: 23 MMOL/L (ref 21–32)
CREAT SERPL-MCNC: 3.63 MG/DL (ref 0.7–1.3)
DIAGNOSIS, 93000: NORMAL
EOSINOPHIL # BLD: 0.1 K/UL (ref 0–0.4)
EOSINOPHIL NFR BLD: 2 % (ref 0–7)
ERYTHROCYTE [DISTWIDTH] IN BLOOD BY AUTOMATED COUNT: 14.5 % (ref 11.5–14.5)
GLOBULIN SER CALC-MCNC: 4.1 G/DL (ref 2–4)
GLUCOSE SERPL-MCNC: 88 MG/DL (ref 65–100)
HCT VFR BLD AUTO: 31.1 % (ref 36.6–50.3)
HGB BLD-MCNC: 10.1 G/DL (ref 12.1–17)
INR BLD: 1.1 (ref 0.9–1.2)
LYMPHOCYTES # BLD: 1.1 K/UL (ref 0.8–3.5)
LYMPHOCYTES NFR BLD: 15 % (ref 12–49)
MAGNESIUM SERPL-MCNC: 2.4 MG/DL (ref 1.6–2.4)
MCH RBC QN AUTO: 31.3 PG (ref 26–34)
MCHC RBC AUTO-ENTMCNC: 32.5 G/DL (ref 30–36.5)
MCV RBC AUTO: 96.3 FL (ref 80–99)
MONOCYTES # BLD: 1 K/UL (ref 0–1)
MONOCYTES NFR BLD: 14 % (ref 5–13)
NEUTS SEG # BLD: 4.8 K/UL (ref 1.8–8)
NEUTS SEG NFR BLD: 69 % (ref 32–75)
PHOSPHATE SERPL-MCNC: 3.6 MG/DL (ref 2.6–4.7)
PLATELET # BLD AUTO: 266 K/UL (ref 150–400)
POTASSIUM SERPL-SCNC: 5.6 MMOL/L (ref 3.5–5.1)
PROT SERPL-MCNC: 7.5 G/DL (ref 6.4–8.2)
Q-T INTERVAL, ECG07: 456 MS
QRS DURATION, ECG06: 90 MS
QTC CALCULATION (BEZET), ECG08: 495 MS
RBC # BLD AUTO: 3.23 M/UL (ref 4.1–5.7)
SODIUM SERPL-SCNC: 137 MMOL/L (ref 136–145)
VENTRICULAR RATE, ECG03: 71 BPM
WBC # BLD AUTO: 7.1 K/UL (ref 4.1–11.1)

## 2017-10-19 PROCEDURE — 80053 COMPREHEN METABOLIC PANEL: CPT | Performed by: FAMILY MEDICINE

## 2017-10-19 PROCEDURE — 74011250637 HC RX REV CODE- 250/637: Performed by: EMERGENCY MEDICINE

## 2017-10-19 PROCEDURE — 83735 ASSAY OF MAGNESIUM: CPT | Performed by: FAMILY MEDICINE

## 2017-10-19 PROCEDURE — 99285 EMERGENCY DEPT VISIT HI MDM: CPT

## 2017-10-19 PROCEDURE — 85025 COMPLETE CBC W/AUTO DIFF WBC: CPT | Performed by: FAMILY MEDICINE

## 2017-10-19 PROCEDURE — 93005 ELECTROCARDIOGRAM TRACING: CPT

## 2017-10-19 PROCEDURE — 85610 PROTHROMBIN TIME: CPT

## 2017-10-19 PROCEDURE — 36415 COLL VENOUS BLD VENIPUNCTURE: CPT | Performed by: FAMILY MEDICINE

## 2017-10-19 PROCEDURE — 74011250637 HC RX REV CODE- 250/637: Performed by: FAMILY MEDICINE

## 2017-10-19 PROCEDURE — 84100 ASSAY OF PHOSPHORUS: CPT | Performed by: FAMILY MEDICINE

## 2017-10-19 RX ORDER — WARFARIN SODIUM 5 MG/1
5 TABLET ORAL
Status: COMPLETED | OUTPATIENT
Start: 2017-10-19 | End: 2017-10-19

## 2017-10-19 RX ORDER — WARFARIN SODIUM 5 MG/1
5 TABLET ORAL DAILY
Qty: 30 TAB | Refills: 0 | Status: SHIPPED | OUTPATIENT
Start: 2017-10-19 | End: 2017-12-05 | Stop reason: SDUPTHER

## 2017-10-19 RX ORDER — HYDRALAZINE HYDROCHLORIDE 25 MG/1
100 TABLET, FILM COATED ORAL
Status: COMPLETED | OUTPATIENT
Start: 2017-10-19 | End: 2017-10-19

## 2017-10-19 RX ADMIN — WARFARIN SODIUM 5 MG: 5 TABLET ORAL at 22:40

## 2017-10-19 RX ADMIN — HYDRALAZINE HYDROCHLORIDE 100 MG: 25 TABLET, FILM COATED ORAL at 20:05

## 2017-10-19 NOTE — DISCHARGE INSTRUCTIONS
Elevated Blood Pressure: Care Instructions  Your Care Instructions    Blood pressure is a measure of how hard the blood pushes against the walls of your arteries. It's normal for blood pressure to go up and down throughout the day. But if it stays up over time, you have high blood pressure. Two numbers tell you your blood pressure. The first number is the systolic pressure. It shows how hard the blood pushes when your heart is pumping. The second number is the diastolic pressure. It shows how hard the blood pushes between heartbeats, when your heart is relaxed and filling with blood. An ideal blood pressure in adults is less than 120/80 (say \"120 over 80\"). High blood pressure is 140/90 or higher. You have high blood pressure if your top number is 140 or higher or your bottom number is 90 or higher, or both. The main test for high blood pressure is simple, fast, and painless. To diagnose high blood pressure, your doctor will test your blood pressure at different times. After testing your blood pressure, your doctor may ask you to test it again when you are home. If you are diagnosed with high blood pressure, you can work with your doctor to make a long-term plan to manage it. Follow-up care is a key part of your treatment and safety. Be sure to make and go to all appointments, and call your doctor if you are having problems. It's also a good idea to know your test results and keep a list of the medicines you take. How can you care for yourself at home? · Do not smoke. Smoking increases your risk for heart attack and stroke. If you need help quitting, talk to your doctor about stop-smoking programs and medicines. These can increase your chances of quitting for good. · Stay at a healthy weight. · Try to limit how much sodium you eat to less than 2,300 milligrams (mg) a day. Your doctor may ask you to try to eat less than 1,500 mg a day. · Be physically active.  Get at least 30 minutes of exercise on most days of the week. Walking is a good choice. You also may want to do other activities, such as running, swimming, cycling, or playing tennis or team sports. · Avoid or limit alcohol. Talk to your doctor about whether you can drink any alcohol. · Eat plenty of fruits, vegetables, and low-fat dairy products. Eat less saturated and total fats. · Learn how to check your blood pressure at home. When should you call for help? Call your doctor now or seek immediate medical care if:  · Your blood pressure is much higher than normal (such as 180/110 or higher). · You think high blood pressure is causing symptoms such as:  ¨ Severe headache. ¨ Blurry vision. Watch closely for changes in your health, and be sure to contact your doctor if:  · You do not get better as expected. Where can you learn more? Go to http://oscarDreamisekamari.info/. Enter N048 in the search box to learn more about \"Elevated Blood Pressure: Care Instructions. \"  Current as of: April 3, 2017  Content Version: 11.3  © 5402-5739 [employer]. Care instructions adapted under license by [employer] (which disclaims liability or warranty for this information). If you have questions about a medical condition or this instruction, always ask your healthcare professional. [address] any warranty or liability for your use of this information. We hope that we have addressed all of your medical concerns. The examination and treatment you received in the Emergency Department were for an emergent problem and were not intended as complete care. It is important that you follow up with your healthcare provider(s) for ongoing care. If your symptoms worsen or do not improve as expected, and you are unable to reach your usual health care provider(s), you should return to the Emergency Department.       Today's healthcare is undergoing tremendous change, and patient satisfaction surveys are one of the many tools to assess the quality of medical care. You may receive a survey from the [employer] regarding your experience in the Emergency Department. I hope that your experience has been completely positive, particularly the medical care that I provided. As such, please participate in the survey; anything less than excellent does not meet my expectations or intentions. [facility] and [facility] participate in nationally recognized quality of care measures. If your blood pressure is greater than 120/80, as reported below, we urge that you seek medical care to address the potential of high blood pressure, commonly known as hypertension. Hypertension can be hereditary or can be caused by certain medical conditions, pain, stress, or \"white coat syndrome. \"       Please make an appointment with your health care provider(s) for follow up of your Emergency Department visit. VITALS:   Patient Vitals for the past 8 hrs:   Temp Pulse Resp BP SpO2   10/18/17 2115 - 73 16 155/77 100 %   10/18/17 2100 - 70 19 156/58 99 %   10/18/17 2045 - 68 19 170/53 99 %   10/18/17 2033 - 73 22 168/62 100 %   10/18/17 1958 97.2 °F (36.2 °C) 73 18 193/84 97 %          Thank you for allowing us to provide you with medical care today. We realize that you have many choices for your emergency care needs. Please choose us in the future for any continued health care needs.       Vadim Wahl MD    [facility]   Office: [phone]            Recent Results (from the past 24 hour(s))   CBC WITH AUTOMATED DIFF    Collection Time: 10/18/17  8:23 PM   Result Value Ref Range    WBC 7.3 4.1 - 11.1 K/uL    RBC 3.16 (L) 4.10 - 5.70 M/uL    HGB 9.8 (L) 12.1 - 17.0 g/dL    HCT 31.2 (L) 36.6 - 50.3 %    MCV 98.7 80.0 - 99.0 FL    MCH 31.0 26.0 - 34.0 PG    MCHC 31.4 30.0 - 36.5 g/dL    RDW 14.6 (H) 11.5 - 14.5 %    PLATELET 133 759 - 116 K/uL    NEUTROPHILS 70 32 - 75 %    LYMPHOCYTES 14 12 - 49 %    MONOCYTES 15 (H) 5 - 13 %    EOSINOPHILS 1 0 - 7 %    BASOPHILS 0 0 - 1 %    ABS. NEUTROPHILS 5.0 1.8 - 8.0 K/UL    ABS. LYMPHOCYTES 1.0 0.8 - 3.5 K/UL    ABS. MONOCYTES 1.1 (H) 0.0 - 1.0 K/UL    ABS. EOSINOPHILS 0.1 0.0 - 0.4 K/UL    ABS. BASOPHILS 0.0 0.0 - 0.1 K/UL   METABOLIC PANEL, COMPREHENSIVE    Collection Time: 10/18/17  8:23 PM   Result Value Ref Range    Sodium 138 136 - 145 mmol/L    Potassium 5.7 (H) 3.5 - 5.1 mmol/L    Chloride 104 97 - 108 mmol/L    CO2 22 21 - 32 mmol/L    Anion gap 12 5 - 15 mmol/L    Glucose 154 (H) 65 - 100 mg/dL    BUN 55 (H) 6 - 20 MG/DL    Creatinine 3.94 (H) 0.70 - 1.30 MG/DL    BUN/Creatinine ratio 14 12 - 20      GFR est AA 18 (L) >60 ml/min/1.73m2    GFR est non-AA 15 (L) >60 ml/min/1.73m2    Calcium 8.9 8.5 - 10.1 MG/DL    Bilirubin, total 0.3 0.2 - 1.0 MG/DL    ALT (SGPT) 17 12 - 78 U/L    AST (SGOT) 21 15 - 37 U/L    Alk. phosphatase 106 45 - 117 U/L    Protein, total 7.5 6.4 - 8.2 g/dL    Albumin 3.4 (L) 3.5 - 5.0 g/dL    Globulin 4.1 (H) 2.0 - 4.0 g/dL    A-G Ratio 0.8 (L) 1.1 - 2.2     EKG, 12 LEAD, INITIAL    Collection Time: 10/18/17  8:26 PM   Result Value Ref Range    Ventricular Rate 71 BPM    Atrial Rate 21 BPM    QRS Duration 90 ms    Q-T Interval 456 ms    QTC Calculation (Bezet) 495 ms    Calculated R Axis 14 degrees    Calculated T Axis -121 degrees    Diagnosis       Atrial fibrillation  ST & Marked T wave abnormality, consider anterolateral ischemia  Prolonged QT  Abnormal ECG  When compared with ECG of 10-OCT-2017 10:53,  Atrial fibrillation has replaced Electronic ventricular pacemaker         No results found.

## 2017-10-19 NOTE — ED NOTES
Pt presented with Pelayo catheter in place with large drainage bag. Family inquired about pelayo maintenance and asked about leg bag. Pt's drainage collection bag changed to leg bag by this RN and pt sent home with new large collection bag. Family educated on changing out collection bag and to use \"clean technique. \"

## 2017-10-19 NOTE — ED TRIAGE NOTES
Reports dizziness starting last night that was resolved and came back today. Denies CP/SOB. Pt states he was seen here last night in the ED.

## 2017-10-19 NOTE — ED PROVIDER NOTES
HPI Comments: 81 y/o male with h/o HTN, CAD, CHF, hyperlipidemia, CKD, chronic systolic CHF, and s/p CABG and pacemaker who presents from home via private vehicle accompanied by son with c/o dizziness. Patient reports he felt a dizzy sensation while sitting on his bed around 4 pm after taking his medications. He doesn't feel as bad as before but is still having a \"woozy\" sensation. Vision feels a little blurry, and he felt a popping sensation in his ear. Says he has had a cold since the weekend because he can feel it in his chest. Has an occasional dry cough. Denies fever, HA, N/V/D. He has been able to walk with his cane. Old chart review: Pt was seen in the ED last night with c/o high blood pressure. He was asymptomatic at the time with an unremarkable exam. His BP improved on its own, and he was discharged home in stable condition. Old Chart Review: Pt was admitted 10/10/17 - 10/13/17 with accelerated HTN, systolic CHF, ARF on CKD4. Pt's HTN was believed to be due to noncompliance. Pt was discharged with the addition of hydralazine and adjustments to his Coreg in addition to outpatient pelayo. Patient is a 80 y.o. male presenting with dizziness. The history is provided by the patient and a relative. Dizziness   This is a recurrent problem. The current episode started 3 to 5 hours ago. The problem has been gradually improving. Pertinent negatives include no loss of balance and no mental status change. There has been no fever. Pertinent negatives include no shortness of breath, no chest pain, no vomiting, no altered mental status, no confusion, no headaches and no nausea.         Past Medical History:   Diagnosis Date    CAD (coronary artery disease)     CHF (congestive heart failure) (HCC)     CKD (chronic kidney disease), stage IV (HCC)     Hyperlipidemia     Hypertension     Systolic CHF, chronic ([facility] [address]        Past Surgical History:   Procedure Laterality Date    COLONOSCOPY N/A 9/6/2017 COLONOSCOPY performed by Monica Lovett MD at [facility] HX CORONARY ARTERY BYPASS GRAFT      HX PACEMAKER           Family History:   Problem Relation Age of Onset    Heart Disease Mother     Hypertension Mother        Social History     Social History    Marital status:      Spouse name: N/A    Number of children: N/A    Years of education: N/A     Occupational History    Not on file. Social History Main Topics    Smoking status: Former Smoker    Smokeless tobacco: Never Used    Alcohol use No    Drug use: No    Sexual activity: Not on file     Other Topics Concern    Not on file     Social History Narrative         ALLERGIES: Review of patient's allergies indicates no known allergies. Review of Systems   Constitutional: Negative for fever. HENT: Positive for ear pain. Negative for congestion, rhinorrhea, sinus pain and sore throat. Eyes:        He has had a little blurry vision   Respiratory: Negative for shortness of breath. Cardiovascular: Negative for chest pain. Gastrointestinal: Negative for nausea and vomiting. Genitourinary: Negative for dysuria. He has a pelayo bag   Musculoskeletal: Negative for gait problem. Skin: Negative for rash. Neurological: Positive for dizziness. Negative for headaches and loss of balance. Psychiatric/Behavioral: Negative for confusion. Vitals:    10/19/17 1714   BP: 171/74   Pulse: 84   Resp: 16   Temp: 97.6 °F (36.4 °C)   SpO2: 98%   Weight: 80.7 kg (178 lb)   Height: 5' 9\" (1.753 m)            Physical Exam   Constitutional: He is oriented to person, place, and time. He appears well-developed and well-nourished. No distress. HENT:   Head: Normocephalic and atraumatic. Right Ear: External ear normal.   Left Ear: External ear normal.   Nose: Nose normal.   Mouth/Throat: Oropharynx is clear and moist. No oropharyngeal exudate. There is cerumen present in the Right ear.  Otherwise TMs appear normal. Neck: Normal range of motion. Neck supple. Cardiovascular: Normal rate. An irregular rhythm present. No murmur heard. Pulmonary/Chest: Effort normal and breath sounds normal. No respiratory distress. He has no wheezes. Abdominal: Soft. Bowel sounds are normal. He exhibits no distension. There is no tenderness. There is no rebound and no guarding. Musculoskeletal: Normal range of motion. He exhibits no edema. Lymphadenopathy:     He has no cervical adenopathy. Neurological: He is alert and oriented to person, place, and time. No cranial nerve deficit. Coordination normal.   Skin: Skin is warm and dry. Psychiatric: He has a normal mood and affect. His behavior is normal.   Nursing note and vitals reviewed. MDM  Number of Diagnoses or Management Options  Diagnosis management comments: DDx includes: HTN, orthostatic hypotension, effect of atrial fibrillation, electrolyte imbalance, worsening kidney function. He does not appear to be in fluid overload. Will check cbc, cmp, mg, phos. Will check with Cardiology regarding pt's EKG findings. Reviewed pt's pill bottles. He is taking coreg 12.5 mg bid, hydralazine 100 mg tid, amlodipine 10 mg/d, and benazapril 40 mg/d. Benazapril is not on his discharge med list from recent hospital discharge. Patient does not have the card for his device with him, but the son thinks it is [employer] and was placed around February of this year. Counseled pt on stopping benazapril given kidney function. Will give a dose of hydralazine for BP. I have reviewed patient with Dr. Naz Gregg, who recommends pacer interrogation. Patient discussed with Dr. Emilia Willett, ED Attending, who also saw and examined patient. Patient signed out to Dr. Emilia Willett.     Dinesh Olmos MD  Family Medicine Resident, PGY-3         Amount and/or Complexity of Data Reviewed  Clinical lab tests: ordered and reviewed  Obtain history from someone other than the patient: yes  Discuss the patient with other providers: yes (Cardiology and nephrologist)  Independent visualization of images, tracings, or specimens: yes (ekg)    Patient Progress  Patient progress: stable    ED Course       Procedures    I have personally seen and evaluated the patient. I find the patients history and physical exam are consistent with the residents documentation. i agree with the care provided, treatments rendered, disposition and follow up plan. Pt in aflutter. Discussed with cardiology and wants coumadin started. Discussed with patient and son need for follow up. Pt is no longer dizzy suspect sx were from taking his meds at home because it is lowering bp and hydralazine is new. Potassium mildly elevated as well discussed with dr Khushi Cervantes no need for kayexylate. Stop benazepril which pt is still taking and recehek on Monday.  I discussed this at length with pt and son

## 2017-10-19 NOTE — ED PROVIDER NOTES
HPI Comments: 80 y.o. male with past medical history significant for HTN, CAD, CHF, hyperlipidemia, CKD, chronic systolic CHF, and s/p CABG and pacemaker who presents from home via private vehicle accompanied by son with chief complaint of hypertension. Son reports that the pt was recently discharged from the hospital on new BP medications (Coreg and hydralazine) and has been complaint with these. However, pt went to nephrologist for a regular follow up appointment and became dizzy while there. It was noted at the time that his BP was elevated. Pt returned home and again had onset of dizziness at home - home BP cuff reporting 206/98. Pt has taken all of his BP medications for today and currently denies any dizziness while laying down. Pt notes that dizziness might return were he to stand up. Pt reports having a \"chest cold\" but denies being on any medications for this. Pt denies any CP, nausea, vomiting, and any back or abdominal pain. Pt reports that his PCP typically manages his BP medications. Son reports that the pt has been living at home with the son who makes sure that the pt has been taking his medication as instructed. There are no other acute medical concerns at this time. Social hx: Former smoker. No alcohol use. Old Chart Review: Pt was admitted 10/10/17 - 10/13/17 with accelerated HTN and systolic CHF. It was noted that the pt's HTN was believed to be caused by noncompliance. Pt was discharged with the addition of hydralazine and changes to his Coreg. Note written by Elsa Aivles Do, as dictated by Nick Nelson MD 8:18 PM      The history is provided by the patient and a relative (son). No  was used.         Past Medical History:   Diagnosis Date    CAD (coronary artery disease)     CHF (congestive heart failure) (HCC)     CKD (chronic kidney disease), stage IV (HCC)     Hyperlipidemia     Hypertension     Systolic CHF, chronic (HCC)        Past Surgical History:   Procedure Laterality Date    COLONOSCOPY N/A 9/6/2017    COLONOSCOPY performed by Jah Batista MD at [address] [facility] HX CORONARY ARTERY BYPASS GRAFT      HX PACEMAKER           Family History:   Problem Relation Age of Onset    Heart Disease Mother     Hypertension Mother        Social History     Social History    Marital status:      Spouse name: N/A    Number of children: N/A    Years of education: N/A     Occupational History    Not on file. Social History Main Topics    Smoking status: Former Smoker    Smokeless tobacco: Never Used    Alcohol use No    Drug use: No    Sexual activity: Not on file     Other Topics Concern    Not on file     Social History Narrative         ALLERGIES: Review of patient's allergies indicates no known allergies. Review of Systems   Constitutional: Negative for diaphoresis and fever. HENT: Negative for facial swelling. Eyes: Negative for visual disturbance. Respiratory: Negative for cough. Cardiovascular: Negative for chest pain. Gastrointestinal: Negative for abdominal pain. Genitourinary: Negative for dysuria. Musculoskeletal: Negative for joint swelling. Skin: Negative for rash. Neurological: Positive for dizziness. Negative for headaches. Hematological: Negative for adenopathy. Psychiatric/Behavioral: Negative for suicidal ideas. All other systems reviewed and are negative. Vitals:    10/18/17 1958   BP: 193/84   Pulse: 73   Resp: 18   Temp: 97.2 °F (36.2 °C)   SpO2: 97%   Weight: 80.7 kg (178 lb)   Height: 5' 9\" (1.753 m)            Physical Exam   Constitutional: He is oriented to person, place, and time. He appears well-developed and well-nourished. No distress. HENT:   Head: Normocephalic and atraumatic. Mouth/Throat: Oropharynx is clear and moist.   Eyes: Pupils are equal, round, and reactive to light. Neck: Normal range of motion. Neck supple.    Cardiovascular: Normal rate, normal heart sounds and intact distal pulses. An irregular rhythm present. Pulmonary/Chest: Effort normal and breath sounds normal. No respiratory distress. Abdominal: Soft. Bowel sounds are normal. He exhibits no distension. There is no tenderness. Genitourinary:   Genitourinary Comments: Ruffin in place   Musculoskeletal: Normal range of motion. He exhibits no edema. Neurological: He is alert and oriented to person, place, and time. Skin: Skin is warm and dry. Nursing note and vitals reviewed. Note written by Elsa Spears, as dictated by Giovanny Gao MD 8:31 PM      MDM  Number of Diagnoses or Management Options  Elevated blood pressure reading:   Diagnosis management comments: A:  79yo M here for high bp. No symptoms. Exam unremarkable. Recently discharged from hospital after being treated for TINA, elevated BP, cardiomyopathy with volume overload. Pt reports compliance with new bp med regimen. P:  Labs  ecg  hydralazine    ED Course       Procedures    ED EKG interpretation:  Rhythm: normal sinus rhythm. Rate (approx.): 71. Axis: normal.  ST segment:  No concerning ST elevations or depressions. Atrial fibrillation with marked TWI v4-v6. Previous ECG from 10/10/2017 was ventricular paced rhythm. This EKG was interpreted by Giovanny Gao MD,ED Provider. Labs unremarkable.  hgb stable  Cr stable. ECG not paced at this time with underlying a. Fib and TWI. Pt with no cp, sob, or other angina equivalents. I suspect these changes are not new but seen now due to not being paced. Has known severe cardiomyopathy. Hydralazine held due to rapid improvement in BP. Stable for d/c home. Has f/u appts scheduled with PCP and nephrology.

## 2017-10-19 NOTE — ED NOTES
Bedside and Verbal shift change report given to Vincent ARGUELLO (oncoming nurse) by Angelo Trivedi (offgoing nurse). Report included the following information SBAR, ED Summary and MAR.

## 2017-10-20 ENCOUNTER — TELEPHONE (OUTPATIENT)
Dept: CARDIOLOGY CLINIC | Age: 82
End: 2017-10-20

## 2017-10-20 LAB
ATRIAL RATE: 67 BPM
CALCULATED R AXIS, ECG10: 12 DEGREES
CALCULATED T AXIS, ECG11: -138 DEGREES
DIAGNOSIS, 93000: NORMAL
Q-T INTERVAL, ECG07: 370 MS
QRS DURATION, ECG06: 100 MS
QTC CALCULATION (BEZET), ECG08: 418 MS
VENTRICULAR RATE, ECG03: 77 BPM

## 2017-10-20 NOTE — TELEPHONE ENCOUNTER
Patient needs to get inr Monday. Needs to plug into Coumadin clinic and device clinic   Needs to see dr Sherri Armijo in next week   Needs to see me in 2-3 weeks     Called patient to schedule coumadin check on 10/23 per Dr. Ana Hopper. Patient stated he will  coumadin tonight.     Future Appointments  Date Time Provider Nena Silver   10/23/2017 2:40 PM COUMADIN, CONNER MOSER SCHED

## 2017-10-20 NOTE — PROGRESS NOTES
Pharmacist Discharge Medication Counseling: The purpose, expected benefits, and side effects, dietary concerns of Warfarin were discussed with the patient and with the patient and son. The patient and son verbalized understanding of the information presented. All questions asked were address and answered. Emphasized the importance of follow-up and informing all providers of being on warfarin.      Education Material provided (if any) includes:  None     The patient's chart, MAR, and AVS were reviewed by  Thank you,  Yue Jackman PharmD     Contact: [phone]

## 2017-10-20 NOTE — DISCHARGE INSTRUCTIONS
We hope that we have addressed all of your medical concerns. The examination and treatment you received in the Emergency Department were for an emergent problem and were not intended as complete care. It is important that you follow up with your healthcare provider(s) for ongoing care. If your symptoms worsen or do not improve as expected, and you are unable to reach your usual health care provider(s), you should return to the Emergency Department. Today's healthcare is undergoing tremendous change, and patient satisfaction surveys are one of the many tools to assess the quality of medical care. You may receive a survey from the [facility] regarding your experience in the Emergency Department. I hope that your experience has been completely positive, particularly the medical care that I provided. As such, please participate in the survey; anything less than excellent does not meet my expectations or intentions. [facility] and [facility] participate in nationally recognized quality of care measures. If your blood pressure is greater than 120/80, as reported below, we urge that you seek medical care to address the potential of high blood pressure, commonly known as hypertension. Hypertension can be hereditary or can be caused by certain medical conditions, pain, stress, or \"white coat syndrome. \"       Please make an appointment with your health care provider(s) for follow up of your Emergency Department visit. VITALS:   Patient Vitals for the past 8 hrs:   Temp Pulse Resp BP SpO2   10/19/17 2100 - 73 14 154/62 99 %   10/19/17 2030 - 72 14 149/71 100 %   10/19/17 2000 - 89 17 163/71 100 %   10/19/17 1930 - 79 13 174/79 100 %   10/19/17 1714 97.6 °F (36.4 °C) 84 16 171/74 98 %          Thank you for allowing us to provide you with medical care today. We realize that you have many choices for your emergency care needs.   Please choose us in the future for any continued health care needs. Beverly Freedman MD    [facility]   Office: [phone]            Recent Results (from the past 24 hour(s))   EKG, 12 LEAD, INITIAL    Collection Time: 10/19/17  5:25 PM   Result Value Ref Range    Ventricular Rate 77 BPM    Atrial Rate 67 BPM    QRS Duration 100 ms    Q-T Interval 370 ms    QTC Calculation (Bezet) 418 ms    Calculated R Axis 12 degrees    Calculated T Axis -138 degrees    Diagnosis       Atrial fibrillation  ST & Marked T wave abnormality, consider anterolateral ischemia  Abnormal ECG  When compared with ECG of 18-OCT-2017 20:26,  QT has shortened     METABOLIC PANEL, COMPREHENSIVE    Collection Time: 10/19/17  7:55 PM   Result Value Ref Range    Sodium 137 136 - 145 mmol/L    Potassium 5.6 (H) 3.5 - 5.1 mmol/L    Chloride 104 97 - 108 mmol/L    CO2 23 21 - 32 mmol/L    Anion gap 10 5 - 15 mmol/L    Glucose 88 65 - 100 mg/dL    BUN 52 (H) 6 - 20 MG/DL    Creatinine 3.63 (H) 0.70 - 1.30 MG/DL    BUN/Creatinine ratio 14 12 - 20      GFR est AA 20 (L) >60 ml/min/1.73m2    GFR est non-AA 16 (L) >60 ml/min/1.73m2    Calcium 8.9 8.5 - 10.1 MG/DL    Bilirubin, total 0.3 0.2 - 1.0 MG/DL    ALT (SGPT) 17 12 - 78 U/L    AST (SGOT) 23 15 - 37 U/L    Alk.  phosphatase 104 45 - 117 U/L    Protein, total 7.5 6.4 - 8.2 g/dL    Albumin 3.4 (L) 3.5 - 5.0 g/dL    Globulin 4.1 (H) 2.0 - 4.0 g/dL    A-G Ratio 0.8 (L) 1.1 - 2.2     CBC WITH AUTOMATED DIFF    Collection Time: 10/19/17  7:55 PM   Result Value Ref Range    WBC 7.1 4.1 - 11.1 K/uL    RBC 3.23 (L) 4.10 - 5.70 M/uL    HGB 10.1 (L) 12.1 - 17.0 g/dL    HCT 31.1 (L) 36.6 - 50.3 %    MCV 96.3 80.0 - 99.0 FL    MCH 31.3 26.0 - 34.0 PG    MCHC 32.5 30.0 - 36.5 g/dL    RDW 14.5 11.5 - 14.5 %    PLATELET 949 438 - 760 K/uL    NEUTROPHILS 69 32 - 75 %    LYMPHOCYTES 15 12 - 49 %    MONOCYTES 14 (H) 5 - 13 %    EOSINOPHILS 2 0 - 7 %    BASOPHILS 0 0 - 1 % ABS. NEUTROPHILS 4.8 1.8 - 8.0 K/UL    ABS. LYMPHOCYTES 1.1 0.8 - 3.5 K/UL    ABS. MONOCYTES 1.0 0.0 - 1.0 K/UL    ABS. EOSINOPHILS 0.1 0.0 - 0.4 K/UL    ABS. BASOPHILS 0.0 0.0 - 0.1 K/UL   MAGNESIUM    Collection Time: 10/19/17  7:55 PM   Result Value Ref Range    Magnesium 2.4 1.6 - 2.4 mg/dL   PHOSPHORUS    Collection Time: 10/19/17  7:55 PM   Result Value Ref Range    Phosphorus 3.6 2.6 - 4.7 MG/DL       No results found. Atrial Fibrillation: Care Instructions  Your Care Instructions    Atrial fibrillation is an irregular and often fast heartbeat. Treating this condition is important for several reasons. It can cause blood clots, which can travel from your heart to your brain and cause a stroke. If you have a fast heartbeat, you may feel lightheaded, dizzy, and weak. An irregular heartbeat can also increase your risk for heart failure. Atrial fibrillation is often the result of another heart condition, such as high blood pressure or coronary artery disease. Making changes to improve your heart condition will help you stay healthy and active. Follow-up care is a key part of your treatment and safety. Be sure to make and go to all appointments, and call your doctor if you are having problems. It's also a good idea to know your test results and keep a list of the medicines you take. How can you care for yourself at home? Medicines  · Take your medicines exactly as prescribed. Call your doctor if you think you are having a problem with your medicine. You will get more details on the specific medicines your doctor prescribes. · If your doctor has given you a blood thinner to prevent a stroke, be sure you get instructions about how to take your medicine safely. Blood thinners can cause serious bleeding problems. · Do not take any vitamins, over-the-counter drugs, or herbal products without talking to your doctor first.  Lifestyle changes  · Do not smoke.  Smoking can increase your chance of a stroke and heart attack. If you need help quitting, talk to your doctor about stop-smoking programs and medicines. These can increase your chances of quitting for good. · Eat a heart-healthy diet. · Stay at a healthy weight. Lose weight if you need to. · Limit alcohol to 2 drinks a day for men and 1 drink a day for women. Too much alcohol can cause health problems. · Avoid colds and flu. Get a pneumococcal vaccine shot. If you have had one before, ask your doctor whether you need another dose. Get a flu shot every year. If you must be around people with colds or flu, wash your hands often. Activity  · If your doctor recommends it, get more exercise. Walking is a good choice. Bit by bit, increase the amount you walk every day. Try for at least 30 minutes on most days of the week. You also may want to swim, bike, or do other activities. Your doctor may suggest that you join a cardiac rehabilitation program so that you can have help increasing your physical activity safely. · Start light exercise if your doctor says it is okay. Even a small amount will help you get stronger, have more energy, and manage stress. Walking is an easy way to get exercise. Start out by walking a little more than you did in the hospital. Gradually increase the amount you walk. · When you exercise, watch for signs that your heart is working too hard. You are pushing too hard if you cannot talk while you are exercising. If you become short of breath or dizzy or have chest pain, sit down and rest immediately. · Check your pulse regularly. Place two fingers on the artery at the palm side of your wrist, in line with your thumb. If your heartbeat seems uneven or fast, talk to your doctor. When should you call for help? Call 911 anytime you think you may need emergency care. For example, call if:  · You have symptoms of a heart attack. These may include:  ¨ Chest pain or pressure, or a strange feeling in the chest.  ¨ Sweating.   ¨ Shortness of breath. ¨ Nausea or vomiting. ¨ Pain, pressure, or a strange feeling in the back, neck, jaw, or upper belly or in one or both shoulders or arms. ¨ Lightheadedness or sudden weakness. ¨ A fast or irregular heartbeat. After you call 911, the  may tell you to chew 1 adult-strength or 2 to 4 low-dose aspirin. Wait for an ambulance. Do not try to drive yourself. · You have symptoms of a stroke. These may include:  ¨ Sudden numbness, tingling, weakness, or loss of movement in your face, arm, or leg, especially on only one side of your body. ¨ Sudden vision changes. ¨ Sudden trouble speaking. ¨ Sudden confusion or trouble understanding simple statements. ¨ Sudden problems with walking or balance. ¨ A sudden, severe headache that is different from past headaches. · You passed out (lost consciousness). Call your doctor now or seek immediate medical care if:  · You have new or increased shortness of breath. · You feel dizzy or lightheaded, or you feel like you may faint. · Your heart rate becomes irregular. · You can feel your heart flutter in your chest or skip heartbeats. Tell your doctor if these symptoms are new or worse. Watch closely for changes in your health, and be sure to contact your doctor if you have any problems. Where can you learn more? Go to http://oscar-kamari.info/. Enter U020 in the search box to learn more about \"Atrial Fibrillation: Care Instructions. \"  Current as of: September 21, 2016  Content Version: 11.3  © 2270-7590 [employer]. Care instructions adapted under license by [employer] (which disclaims liability or warranty for this information). If you have questions about a medical condition or this instruction, always ask your healthcare professional. [address] any warranty or liability for your use of this information.          Dizziness: Care Instructions  Your Care Instructions  Dizziness is the feeling of unsteadiness or fuzziness in your head. It is different than having vertigo, which is a feeling that the room is spinning or that you are moving or falling. It is also different from lightheadedness, which is the feeling that you are about to faint. It can be hard to know what causes dizziness. Some people feel dizzy when they have migraine headaches. Sometimes bouts of flu can make you feel dizzy. Some medical conditions, such as heart problems or high blood pressure, can make you feel dizzy. Many medicines can cause dizziness, including medicines for high blood pressure, pain, or anxiety. If a medicine causes your symptoms, your doctor may recommend that you stop or change the medicine. If it is a problem with your heart, you may need medicine to help your heart work better. If there is no clear reason for your symptoms, your doctor may suggest watching and waiting for a while to see if the dizziness goes away on its own. Follow-up care is a key part of your treatment and safety. Be sure to make and go to all appointments, and call your doctor if you are having problems. It's also a good idea to know your test results and keep a list of the medicines you take. How can you care for yourself at home? · If your doctor recommends or prescribes medicine, take it exactly as directed. Call your doctor if you think you are having a problem with your medicine. · Do not drive while you feel dizzy. · Try to prevent falls. Steps you can take include:  ¨ Using nonskid mats, adding grab bars near the tub, and using night-lights. ¨ Clearing your home so that walkways are free of anything you might trip on. ¨ Letting family and friends know that you have been feeling dizzy. This will help them know how to help you. When should you call for help? Call 911 anytime you think you may need emergency care. For example, call if:  · You passed out (lost consciousness).   · You have dizziness along with symptoms of a heart attack. These may include:  ¨ Chest pain or pressure, or a strange feeling in the chest.  ¨ Sweating. ¨ Shortness of breath. ¨ Nausea or vomiting. ¨ Pain, pressure, or a strange feeling in the back, neck, jaw, or upper belly or in one or both shoulders or arms. ¨ Lightheadedness or sudden weakness. ¨ A fast or irregular heartbeat. · You have symptoms of a stroke. These may include:  ¨ Sudden numbness, tingling, weakness, or loss of movement in your face, arm, or leg, especially on only one side of your body. ¨ Sudden vision changes. ¨ Sudden trouble speaking. ¨ Sudden confusion or trouble understanding simple statements. ¨ Sudden problems with walking or balance. ¨ A sudden, severe headache that is different from past headaches. Call your doctor now or seek immediate medical care if:  · You feel dizzy and have a fever, headache, or ringing in your ears. · You have new or increased nausea and vomiting. · Your dizziness does not go away or comes back. Watch closely for changes in your health, and be sure to contact your doctor if:  · You do not get better as expected. Where can you learn more? Go to http://oscar-kamari.info/. Enter T787 in the search box to learn more about \"Dizziness: Care Instructions. \"  Current as of: March 20, 2017  Content Version: 11.3  © 0525-4463 [employer]. Care instructions adapted under license by [employer] (which disclaims liability or warranty for this information). If you have questions about a medical condition or this instruction, always ask your healthcare professional. [place] [address] any warranty or liability for your use of this information. Hyperkalemia: Care Instructions  Your Care Instructions  Hyperkalemia is too much potassium in the blood. Potassium helps keep the right mix of fluids in your body. It also helps your nerves and muscles work as they should.  And it keeps your heartbeat in a normal rhythm. Some things can raise potassium levels. These include some health problems, medicines, and kidney problems. (Normally, your kidneys remove extra potassium.)  Too much potassium can cause nausea. It also can cause a heartbeat that isn't normal. But you may not have any symptoms. Too much potassium can be dangerous. That's why it's important to treat it. If you are taking any of the medicines that can raise your levels, your doctor will ask you to stop. You may get medicines to lower your levels. And you may have to limit or not eat foods that have a lot of potassium. Follow-up care is a key part of your treatment and safety. Be sure to make and go to all appointments, and call your doctor if you are having problems. It's also a good idea to know your test results and keep a list of the medicines you take. How can you care for yourself at home? · Take your medicines exactly as prescribed. Call your doctor if you think you are having a problem with your medicine. · Stop taking certain medicines if your doctor asks you to. They may be causing your high potassium levels. If you have concerns about stopping medicine, talk with your doctor. · If you have kidney, heart, or liver disease and have to limit fluids, talk with your doctor before you increase the amount of fluids you drink. If the doctor says it's okay, drink plenty of fluids. This means drinking enough so that your urine is light yellow or clear like water. · Avoid strenuous exercise until your doctor tells you it is okay. · Potassium is in many foods, including vegetables, fruits, and milk products. Foods high in potassium include bananas, cantaloupe, broccoli, milk, potatoes, and tomatoes. · Low potassium foods include blueberries, raspberries, cucumber, white or brown rice, spaghetti, and macaroni. · Do not use a salt substitute without talking to your doctor first. Most of these are very high in potassium.   · Be sure to tell your doctor about any prescription, over-the-counter, or herbal medicines you take. Some of these can raise potassium. When should you call for help? Call 911 anytime you think you may need emergency care. For example, call if:  · You passed out (lost consciousness). · You have trouble breathing. · You have an unusual heartbeat. Your heart may beat fast or skip beats. Call your doctor now or seek immediate medical care if:  · You have trouble urinating or can urinate only very small amounts. · Your nausea does not get better. Watch closely for changes in your health, and be sure to contact your doctor if:  · You do not get better as expected. · You want more help planning meals. Where can you learn more? Go to http://oscar-kamari.info/. Enter C120 in the search box to learn more about \"Hyperkalemia: Care Instructions. \"  Current as of: August 8, 2016  Content Version: 11.3  © 0599-4035 [employer]. Care instructions adapted under license by [email or web address] (which disclaims liability or warranty for this information). If you have questions about a medical condition or this instruction, always ask your healthcare professional. [address] any warranty or liability for your use of this information.

## 2017-10-23 ENCOUNTER — PATIENT OUTREACH (OUTPATIENT)
Dept: CARDIOLOGY CLINIC | Age: 82
End: 2017-10-23

## 2017-10-23 PROBLEM — I16.0 HYPERTENSIVE URGENCY: Status: RESOLVED | Noted: 2017-10-10 | Resolved: 2017-10-23

## 2017-10-23 PROBLEM — I25.5 ISCHEMIC CARDIOMYOPATHY: Status: ACTIVE | Noted: 2017-10-23

## 2017-10-23 PROBLEM — N18.4 ACUTE RENAL FAILURE SUPERIMPOSED ON STAGE 4 CHRONIC KIDNEY DISEASE (HCC): Status: RESOLVED | Noted: 2017-10-10 | Resolved: 2017-10-23

## 2017-10-23 PROBLEM — N17.9 ACUTE RENAL FAILURE SUPERIMPOSED ON STAGE 4 CHRONIC KIDNEY DISEASE (HCC): Status: RESOLVED | Noted: 2017-10-10 | Resolved: 2017-10-23

## 2017-10-23 NOTE — PROGRESS NOTES
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 Recommended follow up was:   PCP- ayse keys on 10/25/17 at 4:30. Cardiology - seeing primary cardiology and INR check on 10/24/17;   EP w/ device check on 11/8/17 at 9:15 and 9:20 am. Called [facility] - last device check was 3/17/17- she is sending us the report- received and given to Shukri Bowers in the pacer clinic. Urology- seeing on 10/30 at 1 pm.  Ronaldo Eduardo at [facility] office location. Spoke with his son and later with Mr. Brenda De Anda- son lives with his father. He is planning on getting BP cuff from brother and will get a scale to daily weights. Previously seen at [address] [place] [address] 2/17- received Pacer and saw Dr. Mala Ceballos for last follow up March 2017. Confirmed that he plans on staying at [facility] for cardiology care. Son relays that Dr. Ana Zapata is his PCP. He still has the pleayo catheter in place. Mr. Brenda De Anda states that he is doing well- better. [place] ([place])- so remaining conversation was done with son. Brief discussion about need for increased supervision and adherence to medications. Daily Monitoring of weight, reviewed routine for doing so, daily monitoring of BP values. Need to follow with providers for appointments including pacemaker checks. Will discuss in person more about socio-economic and other barriers including completing/reviewing advance directive.

## 2017-10-24 ENCOUNTER — OFFICE VISIT (OUTPATIENT)
Dept: CARDIOLOGY CLINIC | Age: 82
End: 2017-10-24

## 2017-10-24 ENCOUNTER — CLINICAL SUPPORT (OUTPATIENT)
Dept: CARDIOLOGY CLINIC | Age: 82
End: 2017-10-24

## 2017-10-24 VITALS
BODY MASS INDEX: 24.22 KG/M2 | RESPIRATION RATE: 18 BRPM | OXYGEN SATURATION: 99 % | DIASTOLIC BLOOD PRESSURE: 60 MMHG | SYSTOLIC BLOOD PRESSURE: 130 MMHG | HEART RATE: 67 BPM | WEIGHT: 164 LBS

## 2017-10-24 DIAGNOSIS — I48.91 ATRIAL FIBRILLATION WITH CONTROLLED VENTRICULAR RATE (HCC): ICD-10-CM

## 2017-10-24 DIAGNOSIS — I25.5 ISCHEMIC CARDIOMYOPATHY: Primary | ICD-10-CM

## 2017-10-24 DIAGNOSIS — D64.9 ANEMIA, UNSPECIFIED TYPE: ICD-10-CM

## 2017-10-24 DIAGNOSIS — I25.10 ATHEROSCLEROSIS OF NATIVE CORONARY ARTERY OF NATIVE HEART WITHOUT ANGINA PECTORIS: ICD-10-CM

## 2017-10-24 DIAGNOSIS — I50.22 SYSTOLIC CHF, CHRONIC (HCC): ICD-10-CM

## 2017-10-24 DIAGNOSIS — N18.4 CKD (CHRONIC KIDNEY DISEASE) STAGE 4, GFR 15-29 ML/MIN (HCC): ICD-10-CM

## 2017-10-24 DIAGNOSIS — E78.5 DYSLIPIDEMIA: ICD-10-CM

## 2017-10-24 DIAGNOSIS — I42.0 DILATED CARDIOMYOPATHY (HCC): ICD-10-CM

## 2017-10-24 DIAGNOSIS — Z79.01 LONG-TERM (CURRENT) USE OF ANTICOAGULANTS: Primary | ICD-10-CM

## 2017-10-24 DIAGNOSIS — I10 ESSENTIAL HYPERTENSION: ICD-10-CM

## 2017-10-24 DIAGNOSIS — I25.10 CORONARY ARTERY DISEASE INVOLVING NATIVE CORONARY ARTERY OF NATIVE HEART WITHOUT ANGINA PECTORIS: ICD-10-CM

## 2017-10-24 PROBLEM — Z95.0 PACEMAKER: Status: ACTIVE | Noted: 2017-10-24

## 2017-10-24 LAB
INR BLD: 1.5
INR, EXTERNAL: 1.5 (ref 2–3)
PT POC: 18.4 SECONDS
VALID INTERNAL CONTROL?: YES

## 2017-10-24 NOTE — PROGRESS NOTES
Marcelo Alicea, [address]  [address],  [address]  [place], [address] [facility]    Office [phone]  Fax [phone]  Cell [phone]        Shonna Unger is a 80 y.o. male referred for follow up from hospitalization at [facility] 10/10/17-10/13/17 with hypertensive urgency. Assessment  Encounter Diagnoses   Name Primary?  Ischemic cardiomyopathy Yes    Coronary artery disease involving native coronary artery of native heart without angina pectoris     Essential hypertension     Dyslipidemia     Atherosclerosis of native coronary artery of native heart without angina pectoris     Chronic combined systolic and diastolic congestive heart failure (HCC)     Elevated brain natriuretic peptide (BNP) level     CKD (chronic kidney disease) stage 4, GFR 15-29 ml/min (HCC)     Anemia, unspecified type     Atrial fibrillation with controlled ventricular rate (HCC)        Recommendations:    Shonna Unger has recently diagnosed cardiomyopathy with severe LV dysfunction with CAD in the setting of remote CABG, chronic severe HTN, CKD now complicated by conduction disease s/p PPM > 6 months ago now with newly discovered AF. He has class 2 HF sxs on no diuretic therapy. His BP is well controlled. His AF is rate controlled. Will evaluate for myocardial ischemic with lexiscan cardiolite. Reserving consideration of cath for high grade ischemia. Risk of cath would clearly by increased due to renal dysfunction. Anticoagulation monitored by Jamil Carson MD.     Phone follow up after reviewing tests    Subjective:    Hospital notes reviewed. He was seen in consultation by Dr. Law Reap 10/10/17. Echo demonstrated severe LV dysfunction with LVEF 30%, global HK, PA systolic 54. He was also noted to have TINA with evidence of medical renal disease by echo as well as mild-moderate bilateral hydronephrosis due to bladder outlet obstruction which resolved with pelayo drainage.  He has CAD s/p CABG 30 years ago complicated by conduction disease s/p PPM ([employer]tr[employer]) placed by Dr. Lilliam Camp earlier this year. He as seen in the ER on 10/19/17 with dizziness. He was found to be in AF in the 70-80 range. After phone consultation with cardiology he was started on Coumadin. Benazepril was stopped. Patient notes COOPER with walking. He has HTN but denies hx of DM. He does not smoke and does not drink alcohol. Patient denies any exertional chest pain, dyspnea, palpitations, syncope, orthopnea, edema or paroxysmal nocturnal dyspnea. He saw Dr. Xiao Dillon from nephrology today who stopped Amlodipine due to low SBP (90s). He is scheduled to see his PCP tomorrow. He is here with his son. Patient is a retired  who formerly worked for [employer]. He lives with his son. Cardiac risk factors   HTN yes  DM no  Smoking no    Cardiac testing  Echo 10/10/17-  LVEF 30%, global HK, PA systolic 47    Past Medical History:   Diagnosis Date    CAD (coronary artery disease)     CHF (congestive heart failure) (HCC)     CKD (chronic kidney disease), stage IV (HCC)     Hyperlipidemia     Hypertension     Systolic CHF, chronic (HCC)         Current Outpatient Prescriptions   Medication Sig Dispense Refill    OTHER 8.6 mg. RA P-COL take 1 tab by mouth daily      warfarin (COUMADIN) 5 mg tablet Take 1 Tab by mouth daily. 30 Tab 0    hydrALAZINE (APRESOLINE) 100 mg tablet Take 1 Tab by mouth three (3) times daily. 90 Tab 0    carvedilol (COREG) 12.5 mg tablet Take 1 Tab by mouth two (2) times daily (with meals). 60 Tab 0    finasteride (PROSCAR) 5 mg tablet Take 1 Tab by mouth daily. 30 Tab 1    tamsulosin (FLOMAX) 0.4 mg capsule Take 1 Cap by mouth daily. 30 Cap 1    ferrous sulfate 324 mg (65 mg iron) tablet Take 1 Tab by mouth Daily (before breakfast). 30 Tab 1    acetaminophen (TYLENOL) 325 mg tablet Take 325 mg by mouth every four (4) hours as needed for Pain.       aspirin delayed-release 81 mg tablet Take 81 mg by mouth daily.  carbamazepine (TEGRETOL) 200 mg tablet Take 200 mg by mouth three (3) times daily.  pantoprazole (PROTONIX) 40 mg tablet Take 1 Tab by mouth Daily (before breakfast). 30 Tab 1    atorvastatin (LIPITOR) 20 mg tablet Take 20 mg by mouth daily.  amlodipine (NORVASC) 10 mg tablet Take 10 mg by mouth daily. No Known Allergies     Retired. . Review of Systems  Constitutional: Negative for fever, chills, malaise/fatigue and diaphoresis. Respiratory: Negative for cough, hemoptysis, sputum production, and wheezing. +COOPER  Cardiovascular: Negative for chest pain, palpitations, orthopnea, claudication, leg swelling and PND. Gastrointestinal: Negative for heartburn, nausea, vomiting, blood in stool and melena. Genitourinary: Negative for dysuria and flank pain. Musculoskeletal: Negative for joint pain and back pain. Skin: Negative for rash. Neurological: Negative for focal weakness, seizures, loss of consciousness, weakness and headaches. Endo/Heme/Allergies: Does not bruise/bleed easily. Psychiatric/Behavioral: Negative for memory loss. The patient does not have insomnia. Physical Exam    Visit Vitals    /60    Pulse 67    Resp 18    Wt 164 lb (74.4 kg)    SpO2 99%    BMI 24.22 kg/m2     Wt Readings from Last 3 Encounters:   10/24/17 164 lb (74.4 kg)   10/19/17 178 lb (80.7 kg)   10/18/17 178 lb (80.7 kg)      General - well developed well nourished  Neck - JVP normal, thyroid nl  Cardiac - normal S1, S2, no murmurs, rubs or gallops.  No clicks  Vascular - carotids without bruits, radials, femorals and pedal pulses equal bilateral  Lungs - clear to auscultation bilaterals, no rales, wheezing or rhonchi  Abd - soft nontender, no HSM, no abd bruits  Extremities - no edema  Skin - no rash  Neuro - nonfocal  Psych - normal mood and affect      Cardiographics  Echo 10/10/17-  LVEF 30%, global HK, PA systolic 54    Written by Janak Banuelos Romel Kramer, as dictated by Sanford Sykes MD.  Sanford Sykes MD     The patient was contacted by the nurse navigator by phone within 48 hours of discharge, underwent medication reconciliation and disease education coupled with care coordination.

## 2017-10-30 ENCOUNTER — PATIENT OUTREACH (OUTPATIENT)
Dept: CARDIOLOGY CLINIC | Age: 82
End: 2017-10-30

## 2017-10-30 ENCOUNTER — CLINICAL SUPPORT (OUTPATIENT)
Dept: CARDIOLOGY CLINIC | Age: 82
End: 2017-10-30

## 2017-10-30 DIAGNOSIS — I42.0 DILATED CARDIOMYOPATHY (HCC): ICD-10-CM

## 2017-10-30 DIAGNOSIS — Z79.01 LONG-TERM (CURRENT) USE OF ANTICOAGULANTS: Primary | ICD-10-CM

## 2017-10-30 DIAGNOSIS — I48.91 ATRIAL FIBRILLATION WITH CONTROLLED VENTRICULAR RATE (HCC): ICD-10-CM

## 2017-10-30 LAB
INR BLD: 1.9
INR, EXTERNAL: 1.9 (ref 2–3)
PT POC: 22.9 SECONDS
VALID INTERNAL CONTROL?: YES

## 2017-10-30 NOTE — PROGRESS NOTES
Cardiology NN note- met with Mr. Mabel Guevara and son- during visit with INR clinic today. He states that he feels slightly stronger. He is in wheelchair today- needs for long distance walking per son. Has been able to do activities around the home without symptoms. He confirms monitoring-watching NA content of foods and staying hydrated. Saw PCP with no changes made. His son has been completing VS information:  See chart below. Reviewed with provider- will watch for now and continue to monitor to see if BP values improve. He had norvasc 10 mg discontinued by nephrology recently, continues on coreg 12.5 mg BID and hydralazine 100 mg TID. Date  Weight  BP and HR  am before meds   10/25/17 167.4 164/89      71                   re-check after AM meds 118/60   78   10/26/17 168 170/80   70   10/27/17 167 160/70   10/28/17 167 145/70   10/29/17 166 159/69   10/30/17 165           Goals        Heart Failure     Reduce risk of CHF exacerbations and complications. 10/23/17- Increase knowledge and understanding of HF; establish adherence of daily monitoring and increase self-management. Daily weights, adherence to Low NA diet and maintaining adequate hydration. [facility]   10/30/17- continued to reinforce adherence to Low NA diet and staying hydrated. Praised son for monitoring medication schedule and adherence. [facility]          Myocardial Infarction     To achieve and maintain patients' BP at or less than 140/85 mmHg            10/23/17- work on better BP control, understanding and self-management. Monitor and record daily BP. [facility]          Post Hospitalization     Attends follow-up appointments as directed. 10/23/17- attends all recommended post hospital appointments. Has cardiology with INR check on 10/24/17. Has urology appointment on 10/30/17 at 1 pm. Has EP and will establish with pacer clinic.  [facility]

## 2017-11-08 ENCOUNTER — CLINICAL SUPPORT (OUTPATIENT)
Dept: CARDIOLOGY CLINIC | Age: 82
End: 2017-11-08

## 2017-11-08 ENCOUNTER — OFFICE VISIT (OUTPATIENT)
Dept: CARDIOLOGY CLINIC | Age: 82
End: 2017-11-08

## 2017-11-08 VITALS
SYSTOLIC BLOOD PRESSURE: 188 MMHG | WEIGHT: 166.2 LBS | HEART RATE: 72 BPM | BODY MASS INDEX: 24.62 KG/M2 | RESPIRATION RATE: 16 BRPM | DIASTOLIC BLOOD PRESSURE: 86 MMHG | OXYGEN SATURATION: 99 % | HEIGHT: 69 IN

## 2017-11-08 DIAGNOSIS — I48.91 ATRIAL FIBRILLATION WITH CONTROLLED VENTRICULAR RATE (HCC): ICD-10-CM

## 2017-11-08 DIAGNOSIS — Z95.0 CARDIAC PACEMAKER IN SITU: Primary | ICD-10-CM

## 2017-11-08 DIAGNOSIS — I10 ESSENTIAL HYPERTENSION: ICD-10-CM

## 2017-11-08 DIAGNOSIS — Z95.0 PACEMAKER: ICD-10-CM

## 2017-11-08 DIAGNOSIS — I42.0 DILATED CARDIOMYOPATHY (HCC): ICD-10-CM

## 2017-11-08 DIAGNOSIS — E78.5 DYSLIPIDEMIA: ICD-10-CM

## 2017-11-08 DIAGNOSIS — I48.91 ATRIAL FIBRILLATION WITH CONTROLLED VENTRICULAR RATE (HCC): Primary | ICD-10-CM

## 2017-11-08 DIAGNOSIS — I25.10 CORONARY ARTERY DISEASE INVOLVING NATIVE CORONARY ARTERY OF NATIVE HEART WITHOUT ANGINA PECTORIS: ICD-10-CM

## 2017-11-08 DIAGNOSIS — Z79.01 LONG-TERM (CURRENT) USE OF ANTICOAGULANTS: Primary | ICD-10-CM

## 2017-11-08 LAB
INR BLD: 2.3
INR, EXTERNAL: 2.3 (ref 2–3)
PT POC: 28.1 SECONDS
VALID INTERNAL CONTROL?: YES

## 2017-11-08 RX ORDER — CIPROFLOXACIN 250 MG/1
TABLET, FILM COATED ORAL
Refills: 0 | COMMUNITY
Start: 2017-11-01 | End: 2018-02-07

## 2017-11-08 NOTE — PROGRESS NOTES
HISTORY OF PRESENTING ILLNESS      Joy Mueller is a 80 y.o. male with AF, CAD s/p CABG, pacemaker, CHF (NYHA Class II), dyslipidemia, and CKD recently hospitalized for hypertensive urgency and now referred for reduced EF and advanced rhythm strategies for atrial fibrillation. Most recent echocardiogram demonstrated an EF of 30%. He has episodes of an atrial fibrillation noted on device interrogation. EKGs during admission demonstrated rate controlled AF. Device interrogation today demonstrated >40% RVP. He reports shortness of breath and fatigue with exertion. He is planned for stress testing on 11/15/2017 for evaluation of reduced EF.        ACTIVE PROBLEM LIST     Patient Active Problem List    Diagnosis Date Noted    Dyslipidemia 10/24/2017    Dilated cardiomyopathy ([facility] [address] 10/24/2017    Atrial fibrillation with controlled ventricular rate ([facility] [address] 10/24/2017    Pacemaker 10/24/2017    CAD (coronary artery disease)     Hypertension     Systolic CHF, chronic ([facility])     Elevated brain natriuretic peptide (BNP) level 10/10/2017    Anemia 10/10/2017    H pylori ulcer 09/06/2017    Urine retention 09/06/2017    CKD (chronic kidney disease) stage 4, GFR 15-29 ml/min ([facility]) 09/05/2017    Benign prostatic hyperplasia 09/05/2017    Hydronephrosis 09/05/2017    Coronary atherosclerosis of native coronary artery 09/02/2011           PAST MEDICAL HISTORY     Past Medical History:   Diagnosis Date    CAD (coronary artery disease)     CHF (congestive heart failure) (HCC)     CKD (chronic kidney disease), stage IV ([facility])     Hyperlipidemia     Hypertension     Systolic CHF, chronic ([facility] [address]            PAST SURGICAL HISTORY     Past Surgical History:   Procedure Laterality Date    COLONOSCOPY N/A 9/6/2017    COLONOSCOPY performed by Pricila Vinson MD at [address] [place] [address] HX CORONARY ARTERY BYPASS GRAFT      HX PACEMAKER            ALLERGIES     No Known Allergies       FAMILY HISTORY     Family History   Problem Relation Age of Onset    Heart Disease Mother     Hypertension Mother     negative for cardiac disease       SOCIAL HISTORY     Social History     Social History    Marital status:      Spouse name: N/A    Number of children: N/A    Years of education: N/A     Social History Main Topics    Smoking status: Former Smoker    Smokeless tobacco: Never Used    Alcohol use No    Drug use: No    Sexual activity: Not Asked     Other Topics Concern    None     Social History Narrative         MEDICATIONS     Current Outpatient Prescriptions   Medication Sig    ciprofloxacin HCl (CIPRO) 250 mg tablet take 1 tablet by mouth twice a day    OTHER 8.6 mg. RA P-COL take 1 tab by mouth daily    warfarin (COUMADIN) 5 mg tablet Take 1 Tab by mouth daily.  hydrALAZINE (APRESOLINE) 100 mg tablet Take 1 Tab by mouth three (3) times daily.  carvedilol (COREG) 12.5 mg tablet Take 1 Tab by mouth two (2) times daily (with meals).  pantoprazole (PROTONIX) 40 mg tablet Take 1 Tab by mouth Daily (before breakfast).  finasteride (PROSCAR) 5 mg tablet Take 1 Tab by mouth daily.  tamsulosin (FLOMAX) 0.4 mg capsule Take 1 Cap by mouth daily.  ferrous sulfate 324 mg (65 mg iron) tablet Take 1 Tab by mouth Daily (before breakfast).  acetaminophen (TYLENOL) 325 mg tablet Take 325 mg by mouth every four (4) hours as needed for Pain.  aspirin delayed-release 81 mg tablet Take 81 mg by mouth daily.  carbamazepine (TEGRETOL) 200 mg tablet Take 200 mg by mouth three (3) times daily.  atorvastatin (LIPITOR) 20 mg tablet Take 20 mg by mouth daily. No current facility-administered medications for this visit. I have reviewed the nurses notes, vitals, problem list, allergy list, medical history, family, social history and medications. REVIEW OF SYMPTOMS      General: +fatigue, Pt denies excessive weight gain or loss.  Pt is able to conduct ADL's  HEENT: Denies blurred vision, headaches, hearing loss, epistaxis and difficulty swallowing. Respiratory: +shortness of breath with exertion, Denies cough, congestion,wheezing or stridor. Cardiovascular: Denies precordial pain, palpitations, edema or PND  Gastrointestinal: Denies poor appetite, indigestion, abdominal pain or blood in stool  Genitourinary: Denies hematuria, dysuria, increased urinary frequency  Musculoskeletal: Denies joint pain or swelling from muscles or joints  Neurologic: Denies tremor, paresthesias, headache, or sensory motor disturbance  Psychiatric: Denies confusion, insomnia, depression  Integumentray: Denies rash, itching or ulcers. Hematologic: Denies easy bruising, bleeding       PHYSICAL EXAMINATION      Vitals:    11/08/17 1005   BP: 188/86   Pulse: 72   Resp: 16   SpO2: 99%   Weight: 166 lb 3.2 oz (75.4 kg)   Height: 5' 9\" (1.753 m)     General: Well developed, in no acute distress. HEENT: No jaundice, oral mucosa moist, no oral ulcers  Neck: Supple, no stiffness, no lymphadenopathy, supple  Heart:  Normal S1/S2 negative S3 or S4. Regular, no murmur, gallop or rub, no jugular venous distention  Respiratory: Clear bilaterally x 4, no wheezing or rales  Abdomen:   Soft, non-tender, bowel sounds are active.   Extremities:  No edema, normal cap refill, no cyanosis. Musculoskeletal: No clubbing, no deformities  Neuro: A&Ox3, speech clear, gait stable, cooperative, no focal neurologic deficits  Skin: Skin color is normal. No rashes or lesions.  Non diaphoretic, moist.  Vascular: 2+ pulses symmetric in all extremities       DIAGNOSTIC DATA      EKG: sinus rhythm with PVC       LABORATORY DATA      Lab Results   Component Value Date/Time    WBC 7.1 10/19/2017 07:55 PM    Hemoglobin (POC) 7.5 09/07/2011 06:16 PM    HGB 10.1 10/19/2017 07:55 PM    Hematocrit (POC) 22 09/07/2011 06:16 PM    HCT 31.1 10/19/2017 07:55 PM    PLATELET 749 61/03/1198 07:55 PM    MCV 96.3 10/19/2017 07:55 PM      Lab Results   Component Value Date/Time    Sodium 137 10/19/2017 07:55 PM    Potassium 5.6 10/19/2017 07:55 PM    Chloride 104 10/19/2017 07:55 PM    CO2 23 10/19/2017 07:55 PM    Anion gap 10 10/19/2017 07:55 PM    Glucose 88 10/19/2017 07:55 PM    BUN 52 10/19/2017 07:55 PM    Creatinine 3.63 10/19/2017 07:55 PM    BUN/Creatinine ratio 14 10/19/2017 07:55 PM    GFR est AA 20 10/19/2017 07:55 PM    GFR est non-AA 16 10/19/2017 07:55 PM    Calcium 8.9 10/19/2017 07:55 PM    Bilirubin, total 0.3 10/19/2017 07:55 PM    AST (SGOT) 23 10/19/2017 07:55 PM    Alk. phosphatase 104 10/19/2017 07:55 PM    Protein, total 7.5 10/19/2017 07:55 PM    Albumin 3.4 10/19/2017 07:55 PM    Globulin 4.1 10/19/2017 07:55 PM    A-G Ratio 0.8 10/19/2017 07:55 PM    ALT (SGPT) 17 10/19/2017 07:55 PM           ASSESSMENT      1. Cardiomyopathy   EF 30%   NYHA Class II  2. Hypertension  3. Atrial fibrillation   Coumadin   Rate controlled  4. CAD   S/p CABG  5. Pacemaker  6. CKD     PLAN     If stress testing is negative, will plan to proceed with upgrade to Medtronic BIV ICD for primary prevention of sudden cardiac death with mac anesthesia-- will schedule after review of stress test results. Continue all other current medical therapy. FOLLOW-UP   Pending    Thank you, Edy Metzger MD and Dr. Katherine Lott for allowing me to participate in the care of this extraordinarily pleasant male. Please do not hesitate to contact me for further questions/concerns.        JEFFREY Reyes 92.  [address] [place] [address], [facility], [address], [facility] [address]  [phone] / [phone] Fax   [phone] / [phone] Fax

## 2017-11-08 NOTE — PROGRESS NOTES
Visit Vitals    /86 (BP 1 Location: Left arm, BP Patient Position: Sitting)    Pulse 72    Resp 16    Ht 5' 9\" (1.753 m)    Wt 166 lb 3.2 oz (75.4 kg)    SpO2 99%    BMI 24.54 kg/m2

## 2017-11-15 ENCOUNTER — CLINICAL SUPPORT (OUTPATIENT)
Dept: CARDIOLOGY CLINIC | Age: 82
End: 2017-11-15

## 2017-11-15 DIAGNOSIS — Z95.0 PACEMAKER: ICD-10-CM

## 2017-11-15 DIAGNOSIS — I42.0 DILATED CARDIOMYOPATHY (HCC): Primary | ICD-10-CM

## 2017-11-15 DIAGNOSIS — I25.10 CORONARY ARTERY DISEASE INVOLVING NATIVE CORONARY ARTERY OF NATIVE HEART WITHOUT ANGINA PECTORIS: ICD-10-CM

## 2017-11-15 DIAGNOSIS — I48.91 ATRIAL FIBRILLATION WITH CONTROLLED VENTRICULAR RATE (HCC): ICD-10-CM

## 2017-11-15 DIAGNOSIS — I10 ESSENTIAL HYPERTENSION: ICD-10-CM

## 2017-11-15 DIAGNOSIS — I50.22 SYSTOLIC CHF, CHRONIC (HCC): ICD-10-CM

## 2017-11-15 DIAGNOSIS — I25.10 ATHEROSCLEROSIS OF NATIVE CORONARY ARTERY OF NATIVE HEART WITHOUT ANGINA PECTORIS: ICD-10-CM

## 2017-11-15 DIAGNOSIS — E78.5 DYSLIPIDEMIA: ICD-10-CM

## 2017-11-15 NOTE — PROGRESS NOTES
Explained procedure to patient, Obtaining IV access, radiation exposure, risks and discomforts (for Lexiscan stress test), waiting between injections and obtaining images. All concerns and questions addressed, prior to obtaining consent. See scanned report. Dr. Magan Del Castillo ordered and Dr. Magan Del Castillo read study. ID verified per protocol. Pt  reported no symptoms at completion of protocol.

## 2017-11-21 ENCOUNTER — TELEPHONE (OUTPATIENT)
Dept: CARDIOLOGY CLINIC | Age: 82
End: 2017-11-21

## 2017-11-21 NOTE — TELEPHONE ENCOUNTER
JEFFREY Engle LPN Cc: Mary Ellen Salgado NP                   Cardiac testing   [facility] Normal perfusion. EF 50%. Marcio Cerda,   Please notify Mr. Savannah Bey that his stress test showed normal blood flow. No blockage concern. Attempted to reach patient. No answer. Voice mailbox not set up.

## 2017-12-05 ENCOUNTER — CLINICAL SUPPORT (OUTPATIENT)
Dept: CARDIOLOGY CLINIC | Age: 82
End: 2017-12-05

## 2017-12-05 DIAGNOSIS — I42.0 DILATED CARDIOMYOPATHY (HCC): ICD-10-CM

## 2017-12-05 DIAGNOSIS — Z79.01 LONG-TERM (CURRENT) USE OF ANTICOAGULANTS: Primary | ICD-10-CM

## 2017-12-05 DIAGNOSIS — I10 ESSENTIAL HYPERTENSION: ICD-10-CM

## 2017-12-05 DIAGNOSIS — I48.91 ATRIAL FIBRILLATION WITH CONTROLLED VENTRICULAR RATE (HCC): ICD-10-CM

## 2017-12-05 LAB
INR BLD: 1.1
INR, EXTERNAL: 1.1 (ref 2–3)
PT POC: 13.4 SECONDS
VALID INTERNAL CONTROL?: YES

## 2017-12-05 RX ORDER — WARFARIN SODIUM 5 MG/1
5 TABLET ORAL DAILY
Qty: 30 TAB | Refills: 1 | Status: SHIPPED | OUTPATIENT
Start: 2017-12-05 | End: 2018-01-03 | Stop reason: SDUPTHER

## 2017-12-12 ENCOUNTER — CLINICAL SUPPORT (OUTPATIENT)
Dept: CARDIOLOGY CLINIC | Age: 82
End: 2017-12-12

## 2017-12-12 DIAGNOSIS — Z79.01 LONG-TERM (CURRENT) USE OF ANTICOAGULANTS: Primary | ICD-10-CM

## 2017-12-12 DIAGNOSIS — I42.0 DILATED CARDIOMYOPATHY (HCC): ICD-10-CM

## 2017-12-12 DIAGNOSIS — I10 ESSENTIAL HYPERTENSION: ICD-10-CM

## 2017-12-12 DIAGNOSIS — I48.91 ATRIAL FIBRILLATION WITH CONTROLLED VENTRICULAR RATE (HCC): ICD-10-CM

## 2017-12-12 LAB
INR BLD: 2
INR, EXTERNAL: 2 (ref 2–3)
PT POC: 21.3 SECONDS
VALID INTERNAL CONTROL?: YES

## 2018-01-03 ENCOUNTER — CLINICAL SUPPORT (OUTPATIENT)
Dept: CARDIOLOGY CLINIC | Age: 83
End: 2018-01-03

## 2018-01-03 DIAGNOSIS — Z79.01 LONG-TERM (CURRENT) USE OF ANTICOAGULANTS: Primary | ICD-10-CM

## 2018-01-03 DIAGNOSIS — I42.0 DILATED CARDIOMYOPATHY (HCC): ICD-10-CM

## 2018-01-03 DIAGNOSIS — I48.91 ATRIAL FIBRILLATION WITH CONTROLLED VENTRICULAR RATE (HCC): ICD-10-CM

## 2018-01-03 DIAGNOSIS — I10 ESSENTIAL HYPERTENSION: ICD-10-CM

## 2018-01-03 LAB
INR BLD: 2.1
INR, EXTERNAL: 2.1 (ref 2–3)
PT POC: 25.6 SECONDS
VALID INTERNAL CONTROL?: YES

## 2018-01-03 RX ORDER — WARFARIN SODIUM 5 MG/1
5 TABLET ORAL DAILY
Qty: 90 TAB | Refills: 0 | OUTPATIENT
Start: 2018-01-03 | End: 2018-02-07

## 2018-02-02 ENCOUNTER — CLINICAL SUPPORT (OUTPATIENT)
Dept: CARDIOLOGY CLINIC | Age: 83
End: 2018-02-02

## 2018-02-02 DIAGNOSIS — I48.91 ATRIAL FIBRILLATION WITH CONTROLLED VENTRICULAR RATE (HCC): ICD-10-CM

## 2018-02-02 DIAGNOSIS — I10 ESSENTIAL HYPERTENSION: ICD-10-CM

## 2018-02-02 DIAGNOSIS — Z79.01 LONG-TERM (CURRENT) USE OF ANTICOAGULANTS: Primary | ICD-10-CM

## 2018-02-02 DIAGNOSIS — I42.0 DILATED CARDIOMYOPATHY (HCC): ICD-10-CM

## 2018-02-02 LAB
INR BLD: 2
INR, EXTERNAL: 2 (ref 2–3)
PT POC: 20.9 SECONDS
VALID INTERNAL CONTROL?: YES

## 2018-02-07 ENCOUNTER — HOSPITAL ENCOUNTER (INPATIENT)
Age: 83
LOS: 4 days | Discharge: HOME | DRG: 378 | End: 2018-02-11
Attending: EMERGENCY MEDICINE | Admitting: INTERNAL MEDICINE
Payer: MEDICARE

## 2018-02-07 DIAGNOSIS — K92.2 GASTROINTESTINAL HEMORRHAGE, UNSPECIFIED GASTROINTESTINAL HEMORRHAGE TYPE: Primary | ICD-10-CM

## 2018-02-07 DIAGNOSIS — I15.9 SECONDARY HYPERTENSION: ICD-10-CM

## 2018-02-07 LAB
ALBUMIN SERPL-MCNC: 3.4 G/DL (ref 3.5–5)
ALBUMIN/GLOB SERPL: 0.9 {RATIO} (ref 1.1–2.2)
ALP SERPL-CCNC: 99 U/L (ref 45–117)
ALT SERPL-CCNC: 14 U/L (ref 12–78)
ANION GAP SERPL CALC-SCNC: 9 MMOL/L (ref 5–15)
AST SERPL-CCNC: 17 U/L (ref 15–37)
ATRIAL RATE: 76 BPM
BASOPHILS # BLD: 0.1 K/UL (ref 0–0.1)
BASOPHILS NFR BLD: 1 % (ref 0–1)
BILIRUB SERPL-MCNC: 0.2 MG/DL (ref 0.2–1)
BUN SERPL-MCNC: 44 MG/DL (ref 6–20)
BUN/CREAT SERPL: 14 (ref 12–20)
CALCIUM SERPL-MCNC: 8.4 MG/DL (ref 8.5–10.1)
CALCULATED P AXIS, ECG09: 41 DEGREES
CALCULATED R AXIS, ECG10: 48 DEGREES
CALCULATED T AXIS, ECG11: -2 DEGREES
CHLORIDE SERPL-SCNC: 109 MMOL/L (ref 97–108)
CK MB CFR SERPL CALC: 1 % (ref 0–2.5)
CK MB SERPL-MCNC: 2.3 NG/ML (ref 5–25)
CK SERPL-CCNC: 223 U/L (ref 39–308)
CO2 SERPL-SCNC: 23 MMOL/L (ref 21–32)
CREAT SERPL-MCNC: 3.06 MG/DL (ref 0.7–1.3)
DIAGNOSIS, 93000: NORMAL
DIFFERENTIAL METHOD BLD: ABNORMAL
EOSINOPHIL # BLD: 0.1 K/UL (ref 0–0.4)
EOSINOPHIL NFR BLD: 1 % (ref 0–7)
ERYTHROCYTE [DISTWIDTH] IN BLOOD BY AUTOMATED COUNT: 13.1 % (ref 11.5–14.5)
GLOBULIN SER CALC-MCNC: 4 G/DL (ref 2–4)
GLUCOSE SERPL-MCNC: 97 MG/DL (ref 65–100)
HCT VFR BLD AUTO: 20.8 % (ref 36.6–50.3)
HCT VFR BLD AUTO: 29.5 % (ref 36.6–50.3)
HEMOCCULT STL QL: POSITIVE
HGB BLD-MCNC: 6.5 G/DL (ref 12.1–17)
HGB BLD-MCNC: 9.4 G/DL (ref 12.1–17)
IMM GRANULOCYTES # BLD: 0 K/UL (ref 0–0.04)
IMM GRANULOCYTES NFR BLD AUTO: 0 % (ref 0–0.5)
INR PPP: 2.1 (ref 0.9–1.1)
LACTATE SERPL-SCNC: 0.9 MMOL/L (ref 0.4–2)
LIPASE SERPL-CCNC: 175 U/L (ref 73–393)
LYMPHOCYTES # BLD: 0.6 K/UL (ref 0.8–3.5)
LYMPHOCYTES NFR BLD: 11 % (ref 12–49)
MCH RBC QN AUTO: 31.6 PG (ref 26–34)
MCHC RBC AUTO-ENTMCNC: 31.9 G/DL (ref 30–36.5)
MCV RBC AUTO: 99.3 FL (ref 80–99)
MONOCYTES # BLD: 0.3 K/UL (ref 0–1)
MONOCYTES NFR BLD: 6 % (ref 5–13)
NEUTS SEG # BLD: 4.7 K/UL (ref 1.8–8)
NEUTS SEG NFR BLD: 81 % (ref 32–75)
NRBC # BLD: 0 K/UL (ref 0–0.01)
NRBC BLD-RTO: 0 PER 100 WBC
P-R INTERVAL, ECG05: 190 MS
PLATELET # BLD AUTO: 172 K/UL (ref 150–400)
PMV BLD AUTO: 9.7 FL (ref 8.9–12.9)
POTASSIUM SERPL-SCNC: 5 MMOL/L (ref 3.5–5.1)
PROT SERPL-MCNC: 7.4 G/DL (ref 6.4–8.2)
PROTHROMBIN TIME: 21.4 SEC (ref 9–11.1)
Q-T INTERVAL, ECG07: 362 MS
QRS DURATION, ECG06: 84 MS
QTC CALCULATION (BEZET), ECG08: 407 MS
RBC # BLD AUTO: 2.97 M/UL (ref 4.1–5.7)
RBC MORPH BLD: ABNORMAL
SODIUM SERPL-SCNC: 141 MMOL/L (ref 136–145)
TROPONIN I SERPL-MCNC: 0.06 NG/ML
VENTRICULAR RATE, ECG03: 76 BPM
WBC # BLD AUTO: 5.8 K/UL (ref 4.1–11.1)

## 2018-02-07 PROCEDURE — 65270000029 HC RM PRIVATE

## 2018-02-07 PROCEDURE — 83605 ASSAY OF LACTIC ACID: CPT | Performed by: EMERGENCY MEDICINE

## 2018-02-07 PROCEDURE — 82553 CREATINE MB FRACTION: CPT | Performed by: EMERGENCY MEDICINE

## 2018-02-07 PROCEDURE — 83690 ASSAY OF LIPASE: CPT | Performed by: EMERGENCY MEDICINE

## 2018-02-07 PROCEDURE — 65660000000 HC RM CCU STEPDOWN

## 2018-02-07 PROCEDURE — 86900 BLOOD TYPING SEROLOGIC ABO: CPT | Performed by: INTERNAL MEDICINE

## 2018-02-07 PROCEDURE — 36415 COLL VENOUS BLD VENIPUNCTURE: CPT | Performed by: INTERNAL MEDICINE

## 2018-02-07 PROCEDURE — 86923 COMPATIBILITY TEST ELECTRIC: CPT | Performed by: INTERNAL MEDICINE

## 2018-02-07 PROCEDURE — 84484 ASSAY OF TROPONIN QUANT: CPT | Performed by: EMERGENCY MEDICINE

## 2018-02-07 PROCEDURE — 74011250637 HC RX REV CODE- 250/637: Performed by: INTERNAL MEDICINE

## 2018-02-07 PROCEDURE — 82272 OCCULT BLD FECES 1-3 TESTS: CPT | Performed by: EMERGENCY MEDICINE

## 2018-02-07 PROCEDURE — 99285 EMERGENCY DEPT VISIT HI MDM: CPT

## 2018-02-07 PROCEDURE — 74011250637 HC RX REV CODE- 250/637: Performed by: EMERGENCY MEDICINE

## 2018-02-07 PROCEDURE — 85025 COMPLETE CBC W/AUTO DIFF WBC: CPT | Performed by: EMERGENCY MEDICINE

## 2018-02-07 PROCEDURE — 85018 HEMOGLOBIN: CPT | Performed by: INTERNAL MEDICINE

## 2018-02-07 PROCEDURE — 80053 COMPREHEN METABOLIC PANEL: CPT | Performed by: EMERGENCY MEDICINE

## 2018-02-07 PROCEDURE — 93005 ELECTROCARDIOGRAM TRACING: CPT

## 2018-02-07 PROCEDURE — 85610 PROTHROMBIN TIME: CPT | Performed by: EMERGENCY MEDICINE

## 2018-02-07 RX ORDER — CLONIDINE HYDROCHLORIDE 0.1 MG/1
0.2 TABLET ORAL
Status: COMPLETED | OUTPATIENT
Start: 2018-02-07 | End: 2018-02-07

## 2018-02-07 RX ORDER — CLONIDINE HYDROCHLORIDE 0.1 MG/1
TABLET ORAL
Status: DISPENSED
Start: 2018-02-07 | End: 2018-02-08

## 2018-02-07 RX ORDER — SODIUM CHLORIDE 0.9 % (FLUSH) 0.9 %
5-10 SYRINGE (ML) INJECTION AS NEEDED
Status: DISCONTINUED | OUTPATIENT
Start: 2018-02-07 | End: 2018-02-11 | Stop reason: HOSPADM

## 2018-02-07 RX ORDER — WARFARIN SODIUM 5 MG/1
5 TABLET ORAL
COMMUNITY
End: 2018-02-11

## 2018-02-07 RX ORDER — NALOXONE HYDROCHLORIDE 0.4 MG/ML
0.4 INJECTION, SOLUTION INTRAMUSCULAR; INTRAVENOUS; SUBCUTANEOUS AS NEEDED
Status: DISCONTINUED | OUTPATIENT
Start: 2018-02-07 | End: 2018-02-11 | Stop reason: HOSPADM

## 2018-02-07 RX ORDER — TAMSULOSIN HYDROCHLORIDE 0.4 MG/1
0.4 CAPSULE ORAL DAILY
Status: DISCONTINUED | OUTPATIENT
Start: 2018-02-08 | End: 2018-02-11 | Stop reason: HOSPADM

## 2018-02-07 RX ORDER — SODIUM CHLORIDE 0.9 % (FLUSH) 0.9 %
5-10 SYRINGE (ML) INJECTION EVERY 8 HOURS
Status: DISCONTINUED | OUTPATIENT
Start: 2018-02-07 | End: 2018-02-11 | Stop reason: HOSPADM

## 2018-02-07 RX ORDER — ACETAMINOPHEN 325 MG/1
650 TABLET ORAL
Status: DISCONTINUED | OUTPATIENT
Start: 2018-02-07 | End: 2018-02-11 | Stop reason: HOSPADM

## 2018-02-07 RX ORDER — AMOXICILLIN 250 MG
1 CAPSULE ORAL DAILY
COMMUNITY
End: 2018-04-23

## 2018-02-07 RX ORDER — MORPHINE SULFATE 2 MG/ML
2 INJECTION, SOLUTION INTRAMUSCULAR; INTRAVENOUS
Status: DISCONTINUED | OUTPATIENT
Start: 2018-02-07 | End: 2018-02-11 | Stop reason: HOSPADM

## 2018-02-07 RX ORDER — PROCHLORPERAZINE EDISYLATE 5 MG/ML
5 INJECTION INTRAMUSCULAR; INTRAVENOUS
Status: DISCONTINUED | OUTPATIENT
Start: 2018-02-07 | End: 2018-02-11 | Stop reason: HOSPADM

## 2018-02-07 RX ORDER — AMOXICILLIN 250 MG
1 CAPSULE ORAL DAILY
Status: DISCONTINUED | OUTPATIENT
Start: 2018-02-08 | End: 2018-02-11 | Stop reason: HOSPADM

## 2018-02-07 RX ORDER — CARBAMAZEPINE 200 MG/1
200 TABLET ORAL 3 TIMES DAILY
Status: DISCONTINUED | OUTPATIENT
Start: 2018-02-07 | End: 2018-02-11 | Stop reason: HOSPADM

## 2018-02-07 RX ORDER — FINASTERIDE 5 MG/1
5 TABLET, FILM COATED ORAL DAILY
Status: DISCONTINUED | OUTPATIENT
Start: 2018-02-08 | End: 2018-02-11 | Stop reason: HOSPADM

## 2018-02-07 RX ORDER — SODIUM CHLORIDE 9 MG/ML
250 INJECTION, SOLUTION INTRAVENOUS AS NEEDED
Status: DISCONTINUED | OUTPATIENT
Start: 2018-02-07 | End: 2018-02-11 | Stop reason: HOSPADM

## 2018-02-07 RX ADMIN — CLONIDINE HYDROCHLORIDE 0.2 MG: 0.1 TABLET ORAL at 18:53

## 2018-02-07 RX ADMIN — Medication 10 ML: at 21:43

## 2018-02-07 RX ADMIN — CARBAMAZEPINE 200 MG: 200 TABLET ORAL at 21:42

## 2018-02-07 NOTE — PROGRESS NOTES
[facility]: MED RECONCILIATION    Comments/Recommendations:   Patient is awake and alert. Pill bottles present. Family will take the pill bottles home today and the patient will not take his own medications while in the hospital.    Medications added:     · None    Medications removed:    · Atorvastatin  · Carvedilol  · Ciprofloxacin  · Ferrous sulfate  · Hydralazine  · pantoprazole    Medications adjusted:    · None      Allergies: Review of patient's allergies indicates no known allergies. Prior to Admission Medications:   Prior to Admission Medications   Prescriptions Last Dose Informant Patient Reported? Taking?   acetaminophen (TYLENOL) 325 mg tablet 2/6/2018 at pm Self Yes Yes   Sig: Take 325 mg by mouth every four (4) hours as needed for Pain. aspirin delayed-release 81 mg tablet 2/7/2018 at Unknown time Self Yes Yes   Sig: Take 81 mg by mouth daily. carbamazepine (TEGRETOL) 200 mg tablet 2/6/2018 at Unknown time Self Yes Yes   Sig: Take 200 mg by mouth three (3) times daily. finasteride (PROSCAR) 5 mg tablet 2/7/2018 at Unknown time Self No Yes   Sig: Take 1 Tab by mouth daily. senna-docusate (PERICOLACE) 8.6-50 mg per tablet 2/7/2018 at Unknown time Self Yes Yes   Sig: Take 1 Tab by mouth daily. tamsulosin (FLOMAX) 0.4 mg capsule 2/7/2018 at Unknown time Self No Yes   Sig: Take 1 Cap by mouth daily. warfarin (COUMADIN) 5 mg tablet 2/6/2018 at Unknown time Self Yes Yes   Sig: Take 5 mg by mouth nightly.       Facility-Administered Medications: None      Thank you,    Radha Jose, PharmD, BCPS

## 2018-02-07 NOTE — IP AVS SNAPSHOT
[address] [id number] Patient: Johnathan Mullins MRN: [id number] :1935 A check ray indicates which time of day the medication should be taken. My Medications START taking these medications Instructions Each Dose to Equal  
 Morning Noon Evening Bedtime  
 amLODIPine 5 mg tablet Commonly known as:  Isiah Redd Start taking on:  2018 Your last dose was: Your next dose is: Take 1 Tab by mouth daily. Do not take if systolic blood pressure is less than 140  
 5 mg  
    
   
   
   
  
 cefdinir 300 mg capsule Commonly known as:  OMNICEF Your last dose was: Your next dose is: Take 1 Cap by mouth two (2) times a day for 4 days. 300 mg CONTINUE taking these medications Instructions Each Dose to Equal  
 Morning Noon Evening Bedtime  
 acetaminophen 325 mg tablet Commonly known as:  TYLENOL Your last dose was: Your next dose is: Take 325 mg by mouth every four (4) hours as needed for Pain. 325 mg  
    
   
   
   
  
 carBAMazepine 200 mg tablet Commonly known as:  TEGretol Your last dose was: Your next dose is: Take 200 mg by mouth three (3) times daily. 200 mg  
    
   
   
   
  
 finasteride 5 mg tablet Commonly known as:  PROSCAR Your last dose was: Your next dose is: Take 1 Tab by mouth daily. 5 mg  
    
   
   
   
  
 senna-docusate 8.6-50 mg per tablet Commonly known as:  Idris Ruse Your last dose was: Your next dose is: Take 1 Tab by mouth daily. 1 Tab  
    
   
   
   
  
 tamsulosin 0.4 mg capsule Commonly known as:  FLOMAX Your last dose was: Your next dose is: Take 1 Cap by mouth daily.   
 0.4 mg  
    
   
   
   
  
  
 STOP taking these medications   
 aspirin delayed-release 81 mg tablet  
   
  
 warfarin 5 mg tablet Commonly known as:  COUMADIN Where to Get Your Medications Information on where to get these meds will be given to you by the nurse or doctor. ! Ask your nurse or doctor about these medications  
  amLODIPine 5 mg tablet  
 cefdinir 300 mg capsule

## 2018-02-07 NOTE — PROGRESS NOTES
Case Management Note. CM following in ED for initial discharge planning. EMR reviewed. Pt lives with his son Sonnie Felty, [phone], CM met with Pt. at bedside to obtain hx for this needs assessment. Pt resides In a single level home with 1 entry step. PTA pt was independent with ADL's, drives, and retired. Pt has prescription  coverage and uses [facility]. Pt states he has had [facility] in the past.  DME has a cane, Plan discharge home, PT/OT eval to help determine any further discharge needs. [address] Management Interventions  PCP Verified by CM:  Yes (Dr Bisi Verduzco )  Mode of Transport at Discharge: Self  Transition of Care Consult (CM Consult): Discharge Planning  Physical Therapy Consult: Yes  Occupational Therapy Consult: Yes  Current Support Network: Relative's Home (Lives with his son Sonnie Felty [phone])  Confirm Follow Up Transport: Family  Plan discussed with Pt/Family/Caregiver: Yes  Discharge Location  Discharge Placement: Assisted Living

## 2018-02-07 NOTE — ED NOTES
The patient ambulated to the restroom for BM, observed moderate amount of bright red blood in th commode. Denies any abdominal pain at this time.

## 2018-02-07 NOTE — ED PROVIDER NOTES
HPI Comments: 80 y.o. male with past medical history significant for hypertension, CAD, CHF, hyperlipidemia, stage IV chronic kidney disease, systolic CHF who presents from home with chief complaint of rectal bleeding. Patient states onset of 3 episodes bloody stools since this morning, which prompted him to be evaluated in the ED for further evaluation. Patient admits he has not been eating much today. Patient denies taking any regular medications. Patient mentions hx of the present sx approximately 1 year ago, but he is unsure who is GI doctor is. Patient denies any fever, chills, nausea, vomiting, and abdominal pain. There are no other acute medical concerns at this time. Old Chart Review: Per note, patient was admitted here from 09/05/17 through the 6th for GI bleeding and melena. EGD revealed PUD and gastropathy. H. Pylori was positive and colonoscopy was unremarkable. Social hx: Tobacco Use: No (former smoker), Alcohol Use: No, Drug Use: No    PCP: Marcella Phelps MD    Note written by Elsa Bean, as dictated by Joanna Tenorio MD 1:37 PM      The history is provided by medical records and the patient. Past Medical History:   Diagnosis Date    CAD (coronary artery disease)     CHF (congestive heart failure) (HCC)     CKD (chronic kidney disease), stage IV (HCC)     Hyperlipidemia     Hypertension     Systolic CHF, chronic (HCC)        Past Surgical History:   Procedure Laterality Date    COLONOSCOPY N/A 9/6/2017    COLONOSCOPY performed by Jo Baumann MD at [address] HX CORONARY ARTERY BYPASS GRAFT      HX PACEMAKER           Family History:   Problem Relation Age of Onset    Heart Disease Mother     Hypertension Mother        Social History     Social History    Marital status:      Spouse name: N/A    Number of children: N/A    Years of education: N/A     Occupational History    Not on file.      Social History Main Topics    Smoking status: Former Smoker    Smokeless tobacco: Never Used    Alcohol use No    Drug use: No    Sexual activity: Not on file     Other Topics Concern    Not on file     Social History Narrative         ALLERGIES: Review of patient's allergies indicates no known allergies. Review of Systems   Constitutional: Positive for appetite change (not eating much). Negative for chills and fever. Gastrointestinal: Positive for anal bleeding, blood in stool and diarrhea. Negative for abdominal pain, constipation, nausea and vomiting. All other systems reviewed and are negative. Vitals:    02/07/18 1500 02/07/18 1515 02/07/18 1530 02/07/18 1545   BP: 184/84 197/86 (!) 205/81 (!) 216/84   Pulse: 72 71 73 74   Resp: 16 17 14 16   Temp:       SpO2:  98% 98% 100%   Weight:       Height:                Physical Exam   Constitutional: He is oriented to person, place, and time. He appears well-developed and well-nourished. No distress. NAD, AxOx4, speaking in complete sentences, GCS = 15     HENT:   Head: Normocephalic and atraumatic. Nose: Nose normal.   Mouth/Throat: Oropharynx is clear and moist.   Eyes: Conjunctivae and EOM are normal. Pupils are equal, round, and reactive to light. Right eye exhibits no discharge. Left eye exhibits no discharge. Neck: Normal range of motion. Neck supple. Cardiovascular: Normal rate, regular rhythm, normal heart sounds and intact distal pulses. Exam reveals no gallop and no friction rub. No murmur heard. Pulmonary/Chest: Effort normal and breath sounds normal. No respiratory distress. He has no wheezes. He has no rales. He exhibits no tenderness. Abdominal: Soft. Bowel sounds are normal. He exhibits no distension and no mass. There is no tenderness. There is no rebound and no guarding. nttp     Genitourinary:   Genitourinary Comments: Pt denies urinary/ Testicular/ scrotal or penile  complaints   Musculoskeletal: Normal range of motion. He exhibits no edema. Lymphadenopathy:     He has no cervical adenopathy. Neurological: He is alert and oriented to person, place, and time. He has normal reflexes. No cranial nerve deficit. Coordination normal.   pt has motor/ CV/ Sensation grossly intact to all extremities, R = L in strength;   Skin: Skin is warm and dry. No rash noted. No erythema. Psychiatric: He has a normal mood and affect. Nursing note and vitals reviewed. MDM      ED Course       Procedures    Chief Complaint   Patient presents with    Rectal Bleeding       1:47 PM  The patients presenting problems have been discussed, and they are in agreement with the care plan formulated and outlined with them. I have encouraged them to ask questions as they arise throughout their visit. MEDICATIONS GIVEN:  Medications - No data to display    LABS REVIEWED:  Labs Reviewed   CBC WITH AUTOMATED DIFF   PROTHROMBIN TIME + INR   TROPONIN I   CK W/ CKMB & INDEX   LIPASE   METABOLIC PANEL, COMPREHENSIVE   SAMPLES BEING HELD   LACTIC ACID   OCCULT BLOOD, STOOL   SAMPLES BEING HELD   SAMPLE TO BLOOD BANK       RADIOLOGY RESULTS:  The following have been ordered and reviewed:  _____________________________________________________________________  _____________________________________________________________________    EKG interpretation: (Preliminary)  Rhythm: normal sinus rhythm; and regular . Rate (approx.): 76; Axis: normal; P wave: normal; QRS interval: normal ; ST/T wave: normal; Negative acute significant segmental elevations    PROCEDURES:        CONSULTATIONS:       PROGRESS NOTES:      DIAGNOSIS:    1. Gastrointestinal hemorrhage, unspecified gastrointestinal hemorrhage type        PLAN:  1- [facility]/ will admit      ED COURSE: The patients hospital course has been uncomplicated. Procedure Note - Rectal Exam:   1:47 PM  Performed by: Sadia Tam MD  Chaperoned by: nurse  Rectal exam performed. bloody stool was collected.   Stool was collected and sent to the lab for Hemoccult testing. Other findings: no hemorrhoid/ fissure/ impaction   The procedure took 1-15 minutes, and pt tolerated well.     3:06 PM  Awaiting GI consult. CONSULT NOTE:  3:57 PM Nani Casey MD spoke with Dr. Lisha Monsivais, Consult for Gastroenterology. Discussed available diagnostic tests and clinical findings. He is in agreement with care plans as outlined. Dr. Lisha Monsivais recommends admission to the hospital for further evaluation. He will follow along during admission. CONSULT NOTE:  4:23 PM Nani Casey MD spoke with Dr. Kim Elias, Consult for Hospitalist.  Discussed available diagnostic tests and clinical findings. Provider is in agreement with care plans as outlined.  Provider will evaluate the patient for admission to the hospital.

## 2018-02-07 NOTE — ED TRIAGE NOTES
Patient reports GI bleed with possible blood clots this morning when he had a BM. He denies any pain, denies any other symptoms.

## 2018-02-07 NOTE — ED NOTES
Verbal shift change report given to thanh DEGROOT (oncoming nurse) by Jignesh Benitez  (offgoing nurse). Report included the following information SBAR, Kardex, ED Summary, Recent Results and Med Rec Status.

## 2018-02-07 NOTE — H&P
[facility]  [address] [facility] [address]  [phone]    Admission History and Physical      NAME:              Lillie Hutchins   :   1935   MRN:  [id number]     PCP:  Huang Haji MD     Date:     2018     Chief  Complaint: Blood in stool    History Of Presenting Illness:       Mr. Cassie Beckwith is a 80 y.o. male who is being admitted for Acute GI bleeding. Mr. Cassie Beckwith presented to our Emergency Department today complaining of hematochezia. He awoke this morning and when he had a BM, he noted fresh blood and in clumps. He later had two other episodes in the ED. He has a mild cramping abdominal discomfort but denies any nausea, vomiting or fever. He has a hx of chronic atrial fibrillation and on coumadin. His Hgb was found to be 9.4. He has no dizziness or palpitations. No relieving factors. The patient will be admitted to hospital for further management. No Known Allergies    Prior to Admission medications    Medication Sig Start Date End Date Taking? Authorizing Provider   warfarin (COUMADIN) 5 mg tablet Take 1 Tab by mouth daily. 1/3/18   Ge Garcia MD   ciprofloxacin HCl (CIPRO) 250 mg tablet take 1 tablet by mouth twice a day 17   Historical Provider   OTHER 8.6 mg. RA P-COL take 1 tab by mouth daily    Historical Provider   hydrALAZINE (APRESOLINE) 100 mg tablet Take 1 Tab by mouth three (3) times daily. 10/13/17   Flash Easley MD   carvedilol (COREG) 12.5 mg tablet Take 1 Tab by mouth two (2) times daily (with meals). 10/13/17   Flash Easley MD   pantoprazole (PROTONIX) 40 mg tablet Take 1 Tab by mouth Daily (before breakfast). 17   Darrell CONWAY Do, MD   finasteride (PROSCAR) 5 mg tablet Take 1 Tab by mouth daily. 17   Darrell CONWAY Do, MD   tamsulosin (FLOMAX) 0.4 mg capsule Take 1 Cap by mouth daily.  17   Darrell CONWAY Do MD   ferrous sulfate 324 mg (65 mg iron) tablet Take 1 Tab by mouth Daily (before breakfast). 9/6/17   Darrell CONWAY Do, MD   acetaminophen (TYLENOL) 325 mg tablet Take 325 mg by mouth every four (4) hours as needed for Pain. Historical Provider   atorvastatin (LIPITOR) 20 mg tablet Take 20 mg by mouth daily. Historical Provider   aspirin delayed-release 81 mg tablet Take 81 mg by mouth daily. Pablo Engle MD   carbamazepine (TEGRETOL) 200 mg tablet Take 200 mg by mouth three (3) times daily. Pablo Engle MD       Past Medical History:   Diagnosis Date    CAD (coronary artery disease)     CHF (congestive heart failure) (HCC)     CKD (chronic kidney disease), stage IV (HCC)     Hyperlipidemia     Hypertension     Systolic CHF, chronic (HCC)         Past Surgical History:   Procedure Laterality Date    COLONOSCOPY N/A 9/6/2017    COLONOSCOPY performed by Sharmila Bowens MD at [facility] HX CORONARY ARTERY BYPASS GRAFT      HX PACEMAKER         Social History   Substance Use Topics    Smoking status: Former Smoker    Smokeless tobacco: Never Used    Alcohol use No        Family History   Problem Relation Age of Onset    Heart Disease Mother     Hypertension Mother         Review of Systems:    Constitutional ROS: no fever, chills, rigors or night sweats  Respiratory ROS: no cough, sputum, hemoptysis, dyspnea or pleuritic pain. Cardiovascular ROS: no chest pain, palpitations, orthopnea, PND or syncope  Endocrine ROS: no polydispsia, polyuria, heat or cold intolerance or major weight change.   Gastrointestinal ROS: no dysphagia, abdominal pain, nausea, vomiting or diarrhea    Genito-Urinary ROS: no dysuria, frequency, hematuria, retention or flank pain  Musculoskeletal ROS: no joint pain, swelling or muscular tenderness  Neurological ROS: no headache, confusion, focal weakness or any other neurological symptoms  Psychiatric ROS: no depression, anxiety, mood swings  Dermatological ROS: no rash, pruritis, or urticaria  Heme-Lymph ROS: no swollen glands, bleeding    Examination:    Constitutional:    Visit Vitals    BP (!) 217/97    Pulse 79    Temp 98.1 °F (36.7 °C)    Resp 15    Ht 5' 9\" (1.753 m)    Wt 77.1 kg (170 lb)    SpO2 100%    BMI 25.1 kg/m2         General:  Weak and ill looking patient in no acute distress  Eyes: Pink conjunctivae, PERRLA with no discharge. Normal eye movements  Ear, Nose, Mouth & Throat: No ottorrhea, rhinorrhea, non tender sinuses, moist mucous membranes  Respiratory:  No accessory muscle use, clear breath sounds without crackles or wheezes  Cardiovascular:  No JVD or murmurs, atrial fibrillation, without thrills, bruits or peripheral edema. GI & :  Soft abdomen, mild discomfort, non-distended, normoactive bowel sounds with no palpable organomegaly  Heme:  No cervical or axillary adenopathy. Musculoskeletal:  No cyanosis, clubbing, atrophy or deformities  Skin:  No rashes, bruising or ulcers   Neurological: Awake and alert, speech is clear, CNs 2-12 are grossly intact and otherwise non focal  Psychiatric:  Has a fair insight and is oriented x 3  ________________________________________________________________________    Data Review:    Labs:    Recent Labs      02/07/18   1340   WBC  5.8   HGB  9.4*   HCT  29.5*   PLT  172     Recent Labs      02/07/18   1340   NA  141   K  5.0   CL  109*   CO2  23   GLU  97   BUN  44*   CREA  3.06*   CA  8.4*   ALB  3.4*   SGOT  17   ALT  14     No components found for: GLPOC  No results for input(s): PH, PCO2, PO2, HCO3, FIO2 in the last 72 hours. Recent Labs      02/07/18   1340   INR  2.1*     I have also reviewed available old medical records.      Assessment & Impression:     Mr. Yovana Pope is a 80 y.o. male being evaluated for:     Principal Problem:    Acute GI bleeding (2/7/2018)    Active Problems:    Coronary atherosclerosis of native coronary artery (9/2/2011)      CKD (chronic kidney disease) stage 4, GFR 15-29 ml/min ([address] (9/5/2017)      Benign prostatic hyperplasia (9/5/2017)      Anemia (10/10/2017)      CAD (coronary artery disease) ()      Overview: 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 ([facility])      Hypertension ()      Systolic CHF, chronic ([address] ()      Dyslipidemia (10/24/2017)      Dilated cardiomyopathy ([address] (10/24/2017)      Atrial fibrillation with controlled ventricular rate ([address] (10/24/2017)      Overview: New discovered 10/19/17      Pacemaker (10/24/2017)      Overview: Implanted Feb 2017 (Dr Karishma Mazariegos at [facility] of management:    Acute GI bleeding (2/7/2018): suspect diverticular. Hx diverticulosis noted on colonoscopy done 9/2017. Exacerbated by coumadin and Asprin use. Admit to hospital. Hold these medications. Start pantoprazole. Clear liquid diet. NPO from midnight. Consult GI. Anemia (10/10/2017): likely acute blood loss on chronic illness. Monitor Hgb. Transfuse if drop to less than 7g/dl or he had hemodynamic instablity with bleeding. He has given consent for transfusion if needed    Atrial fibrillation with controlled ventricular rate ([address] (10/24/2017)/  Pacemaker (10/24/2017): Overview: Implanted Feb 2017 (Dr Karishma Mazariegos at [place] [id number]) - [employer]. Rate controlled. Resume Coreg. Hold Coumadin    Hypertension: BP uncontrolled. Resume BP medications. Monitor closely with likely ongoing blood loss    CAD (coronary artery disease): Overview: 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 ([facility])/ Coronary atherosclerosis of native coronary artery (9/2/2011)/ Dilated cardiomyopathy ([address] (10/24/2017): resume Coreg, Lipitor. Hold Asprin    CKD (chronic kidney disease) stage 4, GFR 15-29 ml/min ([address] (9/5/2017): monitor renal function    Benign prostatic hyperplasia (9/5/2017): resume Flomax    Systolic CHF, chronic ([address]): compensated. Not on any home diuretic use.  Monitor    Dyslipidemia (10/24/2017): resume Lipitor    Code Status:  Full    Surrogate decision maker: Family    Risk of deterioration: high Total time spent for the care of the patient: [address] discussed with: Patient, Family, Nursing Staff and ED physician    Discussed:  Code Status, Care Plan and D/C Planning    Prophylaxis:  H2B/PPI    Probable Disposition:  Home w/Family           ___________________________________________________    Attending Physician: Lamberto Tobias MD

## 2018-02-08 LAB
ERYTHROCYTE [DISTWIDTH] IN BLOOD BY AUTOMATED COUNT: 14.8 % (ref 11.5–14.5)
HCT VFR BLD AUTO: 26.4 % (ref 36.6–50.3)
HCT VFR BLD AUTO: 26.9 % (ref 36.6–50.3)
HCT VFR BLD AUTO: 27.9 % (ref 36.6–50.3)
HGB BLD-MCNC: 8.4 G/DL (ref 12.1–17)
HGB BLD-MCNC: 8.6 G/DL (ref 12.1–17)
HGB BLD-MCNC: 9 G/DL (ref 12.1–17)
MCH RBC QN AUTO: 31.3 PG (ref 26–34)
MCHC RBC AUTO-ENTMCNC: 32.3 G/DL (ref 30–36.5)
MCV RBC AUTO: 96.9 FL (ref 80–99)
NRBC # BLD: 0 K/UL (ref 0–0.01)
NRBC BLD-RTO: 0 PER 100 WBC
PLATELET # BLD AUTO: 144 K/UL (ref 150–400)
PMV BLD AUTO: 10.5 FL (ref 8.9–12.9)
RBC # BLD AUTO: 2.88 M/UL (ref 4.1–5.7)
WBC # BLD AUTO: 5.7 K/UL (ref 4.1–11.1)

## 2018-02-08 PROCEDURE — 76040000019: Performed by: INTERNAL MEDICINE

## 2018-02-08 PROCEDURE — 65660000000 HC RM CCU STEPDOWN

## 2018-02-08 PROCEDURE — 74011250636 HC RX REV CODE- 250/636: Performed by: PHYSICIAN ASSISTANT

## 2018-02-08 PROCEDURE — 85014 HEMATOCRIT: CPT | Performed by: INTERNAL MEDICINE

## 2018-02-08 PROCEDURE — 74011000250 HC RX REV CODE- 250: Performed by: INTERNAL MEDICINE

## 2018-02-08 PROCEDURE — 99152 MOD SED SAME PHYS/QHP 5/>YRS: CPT

## 2018-02-08 PROCEDURE — 99153 MOD SED SAME PHYS/QHP EA: CPT

## 2018-02-08 PROCEDURE — 36415 COLL VENOUS BLD VENIPUNCTURE: CPT | Performed by: INTERNAL MEDICINE

## 2018-02-08 PROCEDURE — 0DJ08ZZ INSPECTION OF UPPER INTESTINAL TRACT, VIA NATURAL OR ARTIFICIAL OPENING ENDOSCOPIC: ICD-10-PCS | Performed by: INTERNAL MEDICINE

## 2018-02-08 PROCEDURE — 74011250636 HC RX REV CODE- 250/636: Performed by: INTERNAL MEDICINE

## 2018-02-08 PROCEDURE — 97165 OT EVAL LOW COMPLEX 30 MIN: CPT

## 2018-02-08 PROCEDURE — P9016 RBC LEUKOCYTES REDUCED: HCPCS | Performed by: INTERNAL MEDICINE

## 2018-02-08 PROCEDURE — 74011000250 HC RX REV CODE- 250: Performed by: PHYSICIAN ASSISTANT

## 2018-02-08 PROCEDURE — 74011000258 HC RX REV CODE- 258: Performed by: INTERNAL MEDICINE

## 2018-02-08 PROCEDURE — 36430 TRANSFUSION BLD/BLD COMPNT: CPT

## 2018-02-08 PROCEDURE — 97116 GAIT TRAINING THERAPY: CPT

## 2018-02-08 PROCEDURE — C9113 INJ PANTOPRAZOLE SODIUM, VIA: HCPCS | Performed by: PHYSICIAN ASSISTANT

## 2018-02-08 PROCEDURE — 74011250637 HC RX REV CODE- 250/637: Performed by: INTERNAL MEDICINE

## 2018-02-08 PROCEDURE — 85027 COMPLETE CBC AUTOMATED: CPT | Performed by: INTERNAL MEDICINE

## 2018-02-08 PROCEDURE — 97535 SELF CARE MNGMENT TRAINING: CPT

## 2018-02-08 PROCEDURE — 97161 PT EVAL LOW COMPLEX 20 MIN: CPT

## 2018-02-08 PROCEDURE — 0DJD8ZZ INSPECTION OF LOWER INTESTINAL TRACT, VIA NATURAL OR ARTIFICIAL OPENING ENDOSCOPIC: ICD-10-PCS | Performed by: INTERNAL MEDICINE

## 2018-02-08 RX ORDER — EPINEPHRINE 0.1 MG/ML
1 INJECTION INTRACARDIAC; INTRAVENOUS
Status: DISPENSED | OUTPATIENT
Start: 2018-02-08 | End: 2018-02-08

## 2018-02-08 RX ORDER — SODIUM CHLORIDE 9 MG/ML
50 INJECTION, SOLUTION INTRAVENOUS CONTINUOUS
Status: DISPENSED | OUTPATIENT
Start: 2018-02-08 | End: 2018-02-08

## 2018-02-08 RX ORDER — NALOXONE HYDROCHLORIDE 0.4 MG/ML
0.4 INJECTION, SOLUTION INTRAMUSCULAR; INTRAVENOUS; SUBCUTANEOUS
Status: DISPENSED | OUTPATIENT
Start: 2018-02-08 | End: 2018-02-08

## 2018-02-08 RX ORDER — FLUMAZENIL 0.1 MG/ML
0.2 INJECTION INTRAVENOUS
Status: DISPENSED | OUTPATIENT
Start: 2018-02-08 | End: 2018-02-08

## 2018-02-08 RX ORDER — POLYETHYLENE GLYCOL 3350 17 G/17G
17 POWDER, FOR SOLUTION ORAL
Status: ACTIVE | OUTPATIENT
Start: 2018-02-08 | End: 2018-02-08

## 2018-02-08 RX ORDER — DEXTROMETHORPHAN/PSEUDOEPHED 2.5-7.5/.8
1.2 DROPS ORAL
Status: DISCONTINUED | OUTPATIENT
Start: 2018-02-08 | End: 2018-02-08

## 2018-02-08 RX ORDER — MIDAZOLAM HYDROCHLORIDE 1 MG/ML
.25-5 INJECTION, SOLUTION INTRAMUSCULAR; INTRAVENOUS
Status: DISCONTINUED | OUTPATIENT
Start: 2018-02-08 | End: 2018-02-08

## 2018-02-08 RX ORDER — FENTANYL CITRATE 50 UG/ML
100 INJECTION, SOLUTION INTRAMUSCULAR; INTRAVENOUS
Status: DISPENSED | OUTPATIENT
Start: 2018-02-08 | End: 2018-02-08

## 2018-02-08 RX ORDER — ATROPINE SULFATE 0.1 MG/ML
0.5 INJECTION INTRAVENOUS
Status: DISPENSED | OUTPATIENT
Start: 2018-02-08 | End: 2018-02-08

## 2018-02-08 RX ADMIN — SODIUM CHLORIDE 50 ML/HR: 900 INJECTION, SOLUTION INTRAVENOUS at 16:24

## 2018-02-08 RX ADMIN — MIDAZOLAM HYDROCHLORIDE 3 MG: 1 INJECTION, SOLUTION INTRAMUSCULAR; INTRAVENOUS at 16:53

## 2018-02-08 RX ADMIN — CARBAMAZEPINE 200 MG: 200 TABLET ORAL at 22:35

## 2018-02-08 RX ADMIN — Medication 10 ML: at 22:35

## 2018-02-08 RX ADMIN — Medication 5 ML: at 14:00

## 2018-02-08 RX ADMIN — FINASTERIDE 5 MG: 5 TABLET, FILM COATED ORAL at 08:40

## 2018-02-08 RX ADMIN — DOCUSATE SODIUM AND SENNOSIDES 1 TABLET: 8.6; 5 TABLET, FILM COATED ORAL at 08:40

## 2018-02-08 RX ADMIN — PHYTONADIONE 5 MG: 10 INJECTION, EMULSION INTRAMUSCULAR; INTRAVENOUS; SUBCUTANEOUS at 11:22

## 2018-02-08 RX ADMIN — FENTANYL CITRATE 50 MCG: 50 INJECTION, SOLUTION INTRAMUSCULAR; INTRAVENOUS at 16:59

## 2018-02-08 RX ADMIN — ACETAMINOPHEN 650 MG: 325 TABLET ORAL at 00:50

## 2018-02-08 RX ADMIN — MIDAZOLAM HYDROCHLORIDE 2 MG: 1 INJECTION, SOLUTION INTRAMUSCULAR; INTRAVENOUS at 16:57

## 2018-02-08 RX ADMIN — MIDAZOLAM HYDROCHLORIDE 2 MG: 1 INJECTION, SOLUTION INTRAMUSCULAR; INTRAVENOUS at 17:13

## 2018-02-08 RX ADMIN — CARBAMAZEPINE 200 MG: 200 TABLET ORAL at 08:40

## 2018-02-08 RX ADMIN — POLYETHYLENE GLYCOL-3350 AND ELECTROLYTES 4000 ML: 236; 6.74; 5.86; 2.97; 22.74 POWDER, FOR SOLUTION ORAL at 08:41

## 2018-02-08 RX ADMIN — FENTANYL CITRATE 50 MCG: 50 INJECTION, SOLUTION INTRAMUSCULAR; INTRAVENOUS at 16:54

## 2018-02-08 RX ADMIN — SODIUM CHLORIDE 40 MG: 9 INJECTION INTRAMUSCULAR; INTRAVENOUS; SUBCUTANEOUS at 11:29

## 2018-02-08 RX ADMIN — TAMSULOSIN HYDROCHLORIDE 0.4 MG: 0.4 CAPSULE ORAL at 08:39

## 2018-02-08 RX ADMIN — CARBAMAZEPINE 200 MG: 200 TABLET ORAL at 18:25

## 2018-02-08 NOTE — PROGRESS NOTES
Patient is declining Miralax doses. He drank the whole volume of GoLyte and states \"that is enougn\".

## 2018-02-08 NOTE — ED NOTES
Bedside and Verbal shift change report given to [address] [place] [address] (oncoming nurse) by Randy Nunez (offgoing nurse). Report included the following information SBAR, Kardex, MAR, Recent Results and Cardiac Rhythm NSR.

## 2018-02-08 NOTE — PROGRESS NOTES
GI    Life threatening bleed has fortunately stopped. While not fully conclusive pan colonic diverticulosis likely bleed source. Return of similar bleed magnitude can not be prevented short of colectomy. Need review whether continued anticoagulation is worth risk of repeat bleed of this magnitude.     Thanks again

## 2018-02-08 NOTE — PROCEDURES
Mayelin Elam M.D. 2018    Esophagogastroduodenoscopy plus colonoscopy  Procedure Note  Lillie Hutchins  : 1935  [employer] Medical Record Number: [id number]      Indications:   acute blood loss anemia; hematochezia  Referring Physician:  Huang Haji MD  Anesthesia/Sedation: Conscious Sedation/Moderate Sedation  Endoscopist:  Dr. Don Cespedes:  The indications, risks, benefits and alternatives were reviewed with the patient or their decision maker who was provided an opportunity to ask questions and all questions were answered. The specific risks of esophagogastroduodenoscopy with conscious sedation were reviewed, including but not limited to anesthetic complication, bleeding, adverse drug reaction, missed lesion, infection, IV site reactions, and intestinal perforation which would lead to the need for surgical repair. Alternatives to EGD including radiographic imaging, observation without testing, or laboratory testing were reviewed as well as the limitations of those alternatives discussed. After considering the options and having all their questions answered, the patient or their decision maker provided both verbal and written consent to proceed. Exam start time     455 PM            Exam End time      525 PM  All sedation done at my direction without the aid of anesthesiology    Procedure in Detail:  After obtaining informed consent, positioning of the patient in the left lateral decubitus position, and conduction of a pre-procedure pause or \"time out\" the endoscope was introduced into the mouth and advanced to the third portion duodenum. A careful inspection was made, and findings or interventions are described below.     Findings: No blood seen on this exam  Esophagus:normal  Stomach: scarring without active ulcer  Duodenum/jejunum: scarring without active ulcer      Permit:  The indications, risks, benefits and alternatives were reviewed with the patient or their decision maker who was provided an opportunity to ask questions and all questions were answered. The specific risks of colonoscopy with conscious sedation were reviewed, including but not limited to anesthetic complication, bleeding, adverse drug reaction, missed lesion, infection, IV site reactions, and intestinal perforation which would lead to the need for surgical repair. Alternatives to colonoscopy including radiographic imaging, observation without testing, or laboratory testing were reviewed including the limitations of those alternatives. After considering the options and having all their questions answered, the patient or their decision maker provided both verbal and written consent to proceed. Procedure in Detail:  After obtaining informed consent, positioning of the patient in the left lateral decubitus position, and conduction of a pre-procedure pause or \"time out\" the endoscope was introduced into the anus and advanced to the cecum, which was identified by the ileocecal valve and appendiceal orifice. The quality of the colonic preparation was good. A careful inspection was made as the colonoscope was withdrawn, findings and interventions are described below. Appendiceal orifice photographed    Findings:       - Diverticulosis too numerous to count entire length colon; no inflammation seen  Sessile 10 mm ascending colon polyp; not bleeding; not removed because of anticoagulation    Specimens:    none    Complications:   None; patient tolerated the procedure well. Estimated blood loss: none    Impression:  bleeding very likely from diverticulosis    Recommendations:      - short of total colectomy can not reduce risk of recurrent large volume intestinal blood loss. Need review  need for anticoagulation .     No routine follow up exam recommended    Thank you for entrusting me with this patient's care. Please do not hesitate to contact me with any questions or if I can be of assistance with any of your other patients' GI needs.     Signed By: Zohaib Soares MD                        February 8, 2018

## 2018-02-08 NOTE — PROGRESS NOTES
Occupational Therapy EVALUATION/discharge  Patient: Leopold Bijou (56 y.o. male)  Date: 2/8/2018  Primary Diagnosis: Acute GI bleeding  GI bleed  Procedure(s) (LRB):  ESOPHAGOGASTRODUODENOSCOPY (EGD) (N/A)  COLONOSCOPY (N/A)     Precautions:        ASSESSMENT:   Based on the objective data described below, the patient presents with hospital admission secondary to acute GI bleeding. Patient received supine in bed, and agreeable to OT . Patient reports he lives with son, and performs all his own ADLs, drives and uses cane for ambulation, but not all the time. Patient able to demonstrate activity for ADL tasks and patient without difficulty performing. Patient without need for OT services at this time. Will sign off as further acute OT not indicated. Discharge Recommendations: None  Further Equipment Recommendations for Discharge: none noted       SUBJECTIVE:   Patient stated I am feeling Ok.  I have to drink this stuff for the test.    OBJECTIVE DATA SUMMARY:   HISTORY:   Past Medical History:   Diagnosis Date    CAD (coronary artery disease)     CHF (congestive heart failure) (HCC)     CKD [facility]chronic kidney disease[facility] stage IV ([facility])     Hyperlipidemia     Hypertension     Systolic CHF, chronic ([facility] [address]      Past Surgical History:   Procedure Laterality Date    COLONOSCOPY N/A 9/6/2017    COLONOSCOPY performed by Katalina Cardenas MD at [address] [facility] HX CORONARY ARTERY BYPASS GRAFT      HX PACEMAKER         Prior Level of Function/Environment/Context: modified independent   Occupations in which the patient is/was successful, what are the barriers preventing that success:   Performance Patterns (routines, roles, habits, and rituals):   Personal Interests and/or values:   Expanded or extensive additional review of patient history:     Home Situation  Home Environment: Private residence  # Steps to Enter: 1  Rails to Enter: Yes  Hand Rails : Bilateral  One/Two Story Residence: One story  Living Alone: No  Support Systems: Child(kenney), Family member(s)  Patient Expects to be Discharged to[de-identified] Private residence  Current DME Used/Available at Home: Cane, straight, Grab bars  Tub or Shower Type: Tub/Shower combination  [x]  Right hand dominant   []  Left hand dominant    EXAMINATION OF PERFORMANCE DEFICITS:  Cognitive/Behavioral Status:  Neurologic State: Alert  Orientation Level: Oriented X4  Cognition: Appropriate decision making; Follows commands  Perception: Appears intact  Perseveration: No perseveration noted  Safety/Judgement: Awareness of environment    Skin: intact as seen    Edema: none noted     Hearing: Auditory  Auditory Impairment: None    Vision/Perceptual:                                     Range of Motion:  AROM: Within functional limits       Strength:  Strength: Within functional limits                Coordination:  Coordination: Within functional limits  Fine Motor Skills-Upper: Left Intact; Right Intact    Gross Motor Skills-Upper: Left Intact; Right Intact    Tone & Sensation:    Tone: Normal  Sensation: Intact                      Balance:  Sitting: Intact  Standing: Intact    Functional Mobility and Transfers for ADLs:  Bed Mobility:  Sit to Supine: Modified independent    Transfers:  Sit to Stand: Supervision  Stand to Sit: Supervision  Toilet Transfer : Supervision    ADL Assessment:  Feeding: Independent    Oral Facial Hygiene/Grooming: Supervision (standing )    Bathing: Supervision    Upper Body Dressing: Supervision    Lower Body Dressing: Supervision    Toileting: Supervision                ADL Intervention and task modifications:                                     Cognitive Retraining  Safety/Judgement: Awareness of environment    Therapeutic Exercise:     Functional Measure:  Barthel Index:    Bathin  Bladder: 10  Bowels: 10  Groomin  Dressing: 10  Feeding: 10  Mobility: 15  Stairs: 5  Toilet Use: 10  Transfer (Bed to Chair and Back): 15  Total: 95       Barthel and G-code impairment scale:  Percentage of impairment CH  0% CI  1-19% CJ  20-39% CK  40-59% CL  60-79% CM  80-99% CN  100%   Barthel Score 0-100 100 99-80 79-60 59-40 20-39 1-19   0   Barthel Score 0-20 20 17-19 13-16 9-12 5-8 1-4 0      The Barthel ADL Index: Guidelines  1. The index should be used as a record of what a patient does, not as a record of what a patient could do. 2. The main aim is to establish degree of independence from any help, physical or verbal, however minor and for whatever reason. 3. The need for supervision renders the patient not independent. 4. A patient's performance should be established using the best available evidence. Asking the patient, friends/relatives and nurses are the usual sources, but direct observation and common sense are also important. However direct testing is not needed. 5. Usually the patient's performance over the preceding 24-48 hours is important, but occasionally longer periods will be relevant. 6. Middle categories imply that the patient supplies over 50 per cent of the effort. 7. Use of aids to be independent is allowed. Cindi Arora., Barthel, DYahirW. ([id number]). Functional evaluation: the Barthel Index. [address] [facility] (14)2. DACIA Hoyt, Erik Rubalcava., Silvio Cheung., [place], [address] [place] [address] (1999). Measuring the change indisability after inpatient rehabilitation; comparison of the responsiveness of the Barthel Index and Functional Zearing Measure. Journal of Neurology, Neurosurgery, and Psychiatry, 66(4), 995-075. Pasquale Boone, N.J.A, LIZ Mckeon, & Spring Heck MYahirA. (2004.) Assessment of post-stroke quality of life in cost-effectiveness studies: The usefulness of the Barthel Index and the EuroQoL-5D. Quality of Life Research, 13, 822-52         G codes: In compliance with CMSs Claims Based Outcome Reporting, the following G-code set was chosen for this patient based on their primary functional limitation being treated:     The outcome measure chosen to determine the severity of the functional limitation was the Barthel Index  with a score of 95/100 which was correlated with the impairment scale. ? Self Care:     - CURRENT STATUS: CI - 1%-19% impaired, limited or restricted    - GOAL STATUS: CI - 1%-19% impaired, limited or restricted    - D/C STATUS:  CI - 1%-19% impaired, limited or restricted     Occupational Therapy Evaluation Charge Determination   History Examination Decision-Making   LOW Complexity : Brief history review  LOW Complexity : 1-3 performance deficits relating to physical, cognitive , or psychosocial skils that result in activity limitations and / or participation restrictions  LOW Complexity : No comorbidities that affect functional and no verbal or physical assistance needed to complete eval tasks       Based on the above components, the patient evaluation is determined to be of the following complexity level: LOW   Pain:  Pain Scale 1: Numeric (0 - 10)  Pain Intensity 1: 3  Pain Location 1: Shoulder  Pain Orientation 1: Right  Pain Description 1: Aching  Pain Intervention(s) 1: Medication (see MAR)  Activity Tolerance:   VSS  Please refer to the flowsheet for vital signs taken during this treatment. After treatment:   []  Patient left in no apparent distress sitting up in chair  [x]  Patient left in no apparent distress in bed  [x]  Call bell left within reach  [x]  Nursing notified  []  Caregiver present  []  Bed alarm activated    COMMUNICATION/EDUCATION:   Communication/Collaboration:  [x]      Home safety education was provided and the patient/caregiver indicated understanding. [x]      Patient/family have participated as able and agree with findings and recommendations. []      Patient is unable to participate in plan of care at this time.   Findings and recommendations were discussed with: Physical Therapist and Registered Nurse    TO Kwok/L  Time Calculation: 20 mins

## 2018-02-08 NOTE — ED NOTES
BSBAR, Kardex, MAR, Recent Results and Cardiac Rhythm Bedside and Verbal shift change report given to Adama Mackenzie. (oncoming nurse) by Gruu Snider RN (offgoing nurse). Report included the following informatiNSR/PacerSBAR, Kardex, MAR, Recent Results and Cardiac Rhythm NSR/Pacer.

## 2018-02-08 NOTE — CONSULTS
Gastroenterology Consultation Note      Admit Date: 2/7/2018  Consult Date: 2/8/2018   I greatly appreciate your asking me to see Lillie Hutchins, thank you very much for the opportunity to participate in his care. Narrative Assessment and Plan   · Hematochezia  · Acute GI blood loss anemia - Hgb 6.5 this AM and required 2 units PRBC  · Hx PUD  · Known diverticulosis  · Chronic afib on anticoagulation    Plan  - endoscopic evaluation warranted: plan for EGD/Colonoscopy this afternoon  - keep NPO  - hold anticoagulation  - start PPI  - continue serial H/H and transfuse for Hgb <7  - I have discussed EGD and colonoscopy, alternatives complications including but not limited to pain, cardiopulmonary event, bleeding, perforation; all questions answered. Subjective:     Chief Complaint: rectal bleeding    History of Present Illness: Lillie Hutchins is a 80 y.o. male wit chronic afib on coumadin, CHF, CAD, and hx PUD who presents with complaints of rectal bleeding. Patient states it started yesterday describes dark red blood with clots. About 4-5 episodes yesterday and then 2 today although bleeding is starting to subside. Not associated with abdominal pain, nausea, vomiting or rectal pain. Similar to prior episode in September 2017.  On admission Hgb 9.4 but has trended down to 6.5     EGD 9/2017 - medium side hiatal hernia, erythematous gastropathy, small duodenal bulb ulcer  Colon 9/2017 - colon diverticulosis in sigmoid colon, medium internal hemorrhoids    PCP:  Huang Haji MD    Past Medical History:   Diagnosis Date    CAD (coronary artery disease)     [facility] failure) ([facility] [address]     CKD (chronic kidney disease), stage IV ([facility] [address]     Hyperlipidemia     Hypertension     Systolic CHF, chronic ([facility] [address]         Past Surgical History:   Procedure Laterality Date    COLONOSCOPY N/A 9/6/2017    COLONOSCOPY performed by Maxi Oscar MD at [facility] BYPASS GRAFT      HX PACEMAKER         Social History   Substance Use Topics    Smoking status: Former Smoker    Smokeless tobacco: Never Used    Alcohol use No        Family History   Problem Relation Age of Onset    Heart Disease Mother     Hypertension Mother         No Known Allergies         Home Medications:  Prior to Admission Medications   Prescriptions Last Dose Informant Patient Reported? Taking?   acetaminophen (TYLENOL) 325 mg tablet 2/6/2018 at pm Self Yes Yes   Sig: Take 325 mg by mouth every four (4) hours as needed for Pain. aspirin delayed-release 81 mg tablet 2/7/2018 at Unknown time Self Yes Yes   Sig: Take 81 mg by mouth daily. carbamazepine (TEGRETOL) 200 mg tablet 2/6/2018 at Unknown time Self Yes Yes   Sig: Take 200 mg by mouth three (3) times daily. finasteride (PROSCAR) 5 mg tablet 2/7/2018 at Unknown time Self No Yes   Sig: Take 1 Tab by mouth daily. senna-docusate (PERICOLACE) 8.6-50 mg per tablet 2/7/2018 at Unknown time Self Yes Yes   Sig: Take 1 Tab by mouth daily. tamsulosin (FLOMAX) 0.4 mg capsule 2/7/2018 at Unknown time Self No Yes   Sig: Take 1 Cap by mouth daily. warfarin (COUMADIN) 5 mg tablet 2/6/2018 at Unknown time Self Yes Yes   Sig: Take 5 mg by mouth nightly.       Facility-Administered Medications: None       Hospital Medications:  Current Facility-Administered Medications   Medication Dose Route Frequency    sodium chloride (NS) flush 5-10 mL  5-10 mL IntraVENous Q8H    sodium chloride (NS) flush 5-10 mL  5-10 mL IntraVENous PRN    acetaminophen (TYLENOL) tablet 650 mg  650 mg Oral Q4H PRN    morphine injection 2 mg  2 mg IntraVENous Q4H PRN    naloxone (NARCAN) injection 0.4 mg  0.4 mg IntraVENous PRN    prochlorperazine (COMPAZINE) injection 5 mg  5 mg IntraVENous Q8H PRN    finasteride (PROSCAR) tablet 5 mg  5 mg Oral DAILY    senna-docusate (PERICOLACE) 8.6-50 mg per tablet 1 Tab  1 Tab Oral DAILY    tamsulosin (FLOMAX) capsule 0.4 mg  0.4 mg Oral DAILY    carBAMazepine (TEGretol) tablet 200 mg  200 mg Oral TID    0.9% sodium chloride infusion 250 mL  250 mL IntraVENous PRN     Current Outpatient Prescriptions   Medication Sig    senna-docusate (PERICOLACE) 8.6-50 mg per tablet Take 1 Tab by mouth daily.  warfarin (COUMADIN) 5 mg tablet Take 5 mg by mouth nightly.  finasteride (PROSCAR) 5 mg tablet Take 1 Tab by mouth daily.  tamsulosin (FLOMAX) 0.4 mg capsule Take 1 Cap by mouth daily.  acetaminophen (TYLENOL) 325 mg tablet Take 325 mg by mouth every four (4) hours as needed for Pain.  aspirin delayed-release 81 mg tablet Take 81 mg by mouth daily.  carbamazepine (TEGRETOL) 200 mg tablet Take 200 mg by mouth three (3) times daily.        Review of Systems: Admission ROS by Grace Kam MD from 2/7/2018 were reviewed with the patient and changes (other than per HPI) include: none      Objective:     Physical Exam:  Visit Vitals    /65 (BP 1 Location: Left arm, BP Patient Position: At rest)    Pulse 68    Temp 97.7 °F (36.5 °C)    Resp 16    Ht 5' 9\" (1.753 m)    Wt 77.1 kg (170 lb)    SpO2 100%    BMI 25.1 kg/m2     SpO2 Readings from Last 6 Encounters:   02/08/18 100%   11/08/17 99%   10/24/17 99%   10/19/17 100%   10/18/17 100%   10/13/17 100%          Intake/Output Summary (Last 24 hours) at 02/08/18 0902  Last data filed at 02/08/18 0554   Gross per 24 hour   Intake            628.7 ml   Output                0 ml   Net            628.7 ml      General: no distress, comfortable  Skin:  No rash or palpable dermatologic mass lesions  HEENT: Pupils equal, sclera anicteric, oropharynx with no gross lesions  Cardiovascular: irregular rhythm, normal rate, well perfused, no edema  Respiratory:  No abnormal audible breath sounds, normal respiratory effort, no throacic deformity  GI:  Bowel sounds present, soft, nondistended, nontender  Neurological:  CN II-XII grossly intact, no focal deficits  Psychiatric:  Normal affect, memory intact, appears to have insight into current illness    Laboratory:    Recent Results (from the past 24 hour(s))   CBC WITH AUTOMATED DIFF    Collection Time: 02/07/18  1:40 PM   Result Value Ref Range    WBC 5.8 4.1 - 11.1 K/uL    RBC 2.97 (L) 4.10 - 5.70 M/uL    HGB 9.4 (L) 12.1 - 17.0 g/dL    HCT 29.5 (L) 36.6 - 50.3 %    MCV 99.3 (H) 80.0 - 99.0 FL    MCH 31.6 26.0 - 34.0 PG    MCHC 31.9 30.0 - 36.5 g/dL    RDW 13.1 11.5 - 14.5 %    PLATELET 084 840 - 212 K/uL    MPV 9.7 8.9 - 12.9 FL    NRBC 0.0 0  WBC    ABSOLUTE NRBC 0.00 0.00 - 0.01 K/uL    NEUTROPHILS 81 (H) 32 - 75 %    LYMPHOCYTES 11 (L) 12 - 49 %    MONOCYTES 6 5 - 13 %    EOSINOPHILS 1 0 - 7 %    BASOPHILS 1 0 - 1 %    IMMATURE GRANULOCYTES 0 0.0 - 0.5 %    ABS. NEUTROPHILS 4.7 1.8 - 8.0 K/UL    ABS. LYMPHOCYTES 0.6 (L) 0.8 - 3.5 K/UL    ABS. MONOCYTES 0.3 0.0 - 1.0 K/UL    ABS. EOSINOPHILS 0.1 0.0 - 0.4 K/UL    ABS. BASOPHILS 0.1 0.0 - 0.1 K/UL    ABS. IMM.  GRANS. 0.0 0.00 - 0.04 K/UL    DF AUTOMATED      RBC COMMENTS NORMOCYTIC, NORMOCHROMIC     PROTHROMBIN TIME + INR    Collection Time: 02/07/18  1:40 PM   Result Value Ref Range    INR 2.1 (H) 0.9 - 1.1      Prothrombin time 21.4 (H) 9.0 - 11.1 sec   TROPONIN I    Collection Time: 02/07/18  1:40 PM   Result Value Ref Range    Troponin-I, Qt. 0.06 (H) <0.05 ng/mL   CK W/ CKMB & INDEX    Collection Time: 02/07/18  1:40 PM   Result Value Ref Range     39 - 308 U/L    CK - MB 2.3 <3.6 NG/ML    CK-MB Index 1.0 0 - 2.5     LIPASE    Collection Time: 02/07/18  1:40 PM   Result Value Ref Range    Lipase 175 73 - 429 U/L   METABOLIC PANEL, COMPREHENSIVE    Collection Time: 02/07/18  1:40 PM   Result Value Ref Range    Sodium 141 136 - 145 mmol/L    Potassium 5.0 3.5 - 5.1 mmol/L    Chloride 109 (H) 97 - 108 mmol/L    CO2 23 21 - 32 mmol/L    Anion gap 9 5 - 15 mmol/L    Glucose 97 65 - 100 mg/dL    BUN 44 (H) 6 - 20 MG/DL    Creatinine 3.06 (H) 0.70 - 1.30 MG/DL    BUN/Creatinine ratio 14 12 - 20      GFR est AA 24 (L) >60 ml/min/1.73m2    GFR est non-AA 20 (L) >60 ml/min/1.73m2    Calcium 8.4 (L) 8.5 - 10.1 MG/DL    Bilirubin, total 0.2 0.2 - 1.0 MG/DL    ALT (SGPT) 14 12 - 78 U/L    AST (SGOT) 17 15 - 37 U/L    Alk.  phosphatase 99 45 - 117 U/L    Protein, total 7.4 6.4 - 8.2 g/dL    Albumin 3.4 (L) 3.5 - 5.0 g/dL    Globulin 4.0 2.0 - 4.0 g/dL    A-G Ratio 0.9 (L) 1.1 - 2.2     TYPE + CROSSMATCH    Collection Time: 02/07/18  1:41 PM   Result Value Ref Range    Crossmatch Expiration 02/10/2018     ABO/Rh(D) A POSITIVE     Antibody screen NEG     Unit number [id number]     Blood component type [place]     Unit division 00     Status of unit ISSUED     Crossmatch result Compatible     Unit number [id number]     Blood component type [place]     Unit division 00     Status of unit ISSUED     Crossmatch result Compatible    EKG, 12 LEAD, INITIAL    Collection Time: 02/07/18  1:49 PM   Result Value Ref Range    Ventricular Rate 76 BPM    Atrial Rate 76 BPM    P-R Interval 190 ms    QRS Duration 84 ms    Q-T Interval 362 ms    QTC Calculation (Bezet) 407 ms    Calculated P Axis 41 degrees    Calculated R Axis 48 degrees    Calculated T Axis -2 degrees    Diagnosis       Normal sinus rhythm  Nonspecific T wave abnormality  Abnormal ECG  When compared with ECG of 19-OCT-2017 17:25,  Sinus rhythm has replaced Atrial fibrillation  T wave inversion no longer evident in Anterior leads  Confirmed by Tania Kaplan MD. ([id number]) on 2/7/2018 6:13:24 PM     OCCULT BLOOD, STOOL    Collection Time: 02/07/18  1:51 PM   Result Value Ref Range    Occult blood, stool POSITIVE (A) NEG     LACTIC ACID    Collection Time: 02/07/18  4:15 PM   Result Value Ref Range    Lactic acid 0.9 0.4 - 2.0 MMOL/L   HGB & HCT    Collection Time: 02/07/18  9:48 PM   Result Value Ref Range    HGB 6.5 (L) 12.1 - 17.0 g/dL    HCT 20.8 (L) 36.6 - 50.3 %   CBC W/O DIFF    Collection Time: 02/08/18  8:36 AM   Result Value Ref Range WBC 5.7 4.1 - 11.1 K/uL    RBC 2.88 (L) 4.10 - 5.70 M/uL    HGB 9.0 (L) 12.1 - 17.0 g/dL    HCT 27.9 (L) 36.6 - 50.3 %    MCV 96.9 80.0 - 99.0 FL    MCH 31.3 26.0 - 34.0 PG    MCHC 32.3 30.0 - 36.5 g/dL    RDW 14.8 (H) 11.5 - 14.5 %    PLATELET 146 (L) 722 - 400 K/uL    MPV 10.5 8.9 - 12.9 FL    NRBC 0.0 0  WBC    ABSOLUTE NRBC 0.00 0.00 - 0.01 K/uL         Assessment/Plan:     Principal Problem:    Acute GI bleeding (2/7/2018)    Active Problems:    Coronary atherosclerosis of native coronary artery (9/2/2011)      CKD (chronic kidney disease) stage 4, GFR 15-29 ml/min ([facility]) (9/5/2017)      Benign prostatic hyperplasia (9/5/2017)      Anemia (10/10/2017)      CAD (coronary artery disease) ()      Overview: 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 ([facility])      Hypertension ()      Systolic CHF, chronic ([facility] [address] ()      Dyslipidemia (10/24/2017)      Dilated cardiomyopathy ([facility] [address] (10/24/2017)      Atrial fibrillation with controlled ventricular rate ([facility] [address] (10/24/2017)      Overview: New discovered 10/19/17      Pacemaker (10/24/2017)      Overview: Implanted Feb 2017 (Dr Claudia Mike at [place] [id number]) - [employer]         See above narrative for full detail. Juan Luis Merchant PA-C  02/08/18  9:02 AM    I've discussed EGD, colonoscopy possible biopsy, polypectomy, cautery, injection, alternatives, complications including but not limited to pain, cardiopulmonary event, bleeding, perforation requiring additional blood transfusion or operative repair; all questions answered.     I have interviewed and examined patient with addendum to note above and formulation care plan    Patricia Cannon M.D.

## 2018-02-08 NOTE — PROGRESS NOTES
physical Therapy EVALUATION/DISCHARGE  Patient: Derek Peters (89 y.o. male)  Date: 2/8/2018  Primary Diagnosis: Acute GI bleeding  GI bleed  Procedure(s) (LRB):  ESOPHAGOGASTRODUODENOSCOPY (EGD) (N/A)  COLONOSCOPY (N/A)          ASSESSMENT :  Based on the objective data described above, the patient presents with independent with ambulation without assistive device and independent with all functional mobility. Ambulate without assistive device out in the ED hallway patient occasionally use single point cane at home. Has been going in and out of the bathroom in the ED. Reviewed all safety precaution and home exercise program with the patient, verbalized understanding, clear to go home per Physical Therapy perspective. Skilled physical therapy is not indicated at this time. PLAN :  Discharge Recommendations: None  Further Equipment Recommendations for Discharge: already has DME's     SUBJECTIVE:   Patient stated Pratibha Jama been going to and from the bath room.     OBJECTIVE DATA SUMMARY:   HISTORY:    Past Medical History:   Diagnosis Date    CAD (coronary artery disease)     [facility]estive [facility]     CKD (chronic kidney disease), stage IV (HCC)     Hyperlipidemia     Hypertension     Systolic CHF, chronic ([facility]      Past Surgical History:   Procedure Laterality Date    COLONOSCOPY N/A 9/6/2017    COLONOSCOPY performed by Tyrel Chung MD at [facility] 112 HX CORONARY ARTERY BYPASS GRAFT      HX PACEMAKER       Prior Level of Function/Home Situation: Independent community ambulator without assistive device.  occasionally use single point cane  Personal factors and/or comorbidities impacting plan of care:     Home Situation  Home Environment: Private residence  # Steps to Enter: 1  Rails to Enter: Yes  Hand Rails : Bilateral  One/Two Story Residence: One story  Living Alone: No  Support Systems: Child(kenney), Family member(s)  Patient Expects to be Discharged to[de-identified] Private residence  Current DME Used/Available at Home: Cane, straight, Grab bars  Tub or Shower Type: Tub/Shower combination    EXAMINATION/PRESENTATION/DECISION MAKING:   Critical Behavior:  Neurologic State: Alert  Orientation Level: Oriented X4  Cognition: Appropriate decision making, Follows commands  Safety/Judgement: Awareness of environment  Hearing: Auditory  Auditory Impairment: None    Range Of Motion:  AROM: Within functional limits                       Strength:    Strength: Within functional limits                    Tone & Sensation:   Tone: Normal              Sensation: Intact               Coordination:  Coordination: Within functional limits  Vision:      Functional Mobility:  Bed Mobility:  Rolling: Independent  Supine to Sit: Independent  Sit to Supine: Modified independent  Scooting: Independent  Transfers:  Sit to Stand: Supervision  Stand to Sit: Supervision  Stand Pivot Transfers: Supervision     Bed to Chair: Supervision              Balance:   Sitting: Intact  Standing: Intact; Without support  Ambulation/Gait Training:  Distance (ft): 200 Feet (ft)     Ambulation - Level of Assistance: Independent     Gait Description (WDL): Within defined limits                                                  Therapeutic Exercises:    Instructed patient to continue active range of motion exercise on both legs while up on chair or on bed.        Functional Measure:  Tinetti test:    Sitting Balance: 1  Arises: 2  Attempts to Rise: 2  Immediate Standing Balance: 2  Standing Balance: 2  Nudged: 2  Eyes Closed: 1  Turn 360 Degrees - Continuous/Discontinuous: 1  Turn 360 Degrees - Steady/Unsteady: 1  Sitting Down: 2  Balance Score: 16  Indication of Gait: 1  R Step Length/Height: 1  L Step Length/Height: 1  R Foot Clearance: 1  L Foot Clearance: 1  Step Symmetry: 1  Step Continuity: 1  Path: 2  Trunk: 2  Walking Time: 1  Gait Score: 12  Total Score: 28       Tinetti Test and G-code impairment scale:  Percentage of Impairment CH    0%   CI    1-19% CJ    20-39% CK    40-59% CL    60-79% CM    80-99% CN     100%   Tinetti  Score 0-28 28 23-27 17-22 12-16 6-11 1-5 0       Tinetti Tool Score Risk of Falls  <19 = High Fall Risk  19-24 = Moderate Fall Risk  25-28 = Low Fall Risk  Tinetti ME. Performance-Oriented Assessment of Mobility Problems in Elderly Patients. Ron 66; [id number]. (Scoring Description: PT Bulletin Feb. 10, 1993)    Older adults: Kade Rodriguez et al, 2009; n = 1000 [facility] elderly evaluated with ABC, NAIMA, ADL, and IADL)  · Mean NAIMA score for males aged 69-68 years = 26.21(3.40)  · Mean NAIMA score for females age 69-68 years = 25.16(4.30)  · Mean NAIMA score for males over 80 years = 23.29(6.02)  · Mean NAIMA score for females over 80 years = 17.20(8.32)       G codes: In compliance with CMSs Claims Based Outcome Reporting, the following G-code set was chosen for this patient based on their primary functional limitation being treated: The outcome measure chosen to determine the severity of the functional limitation was the tinetti with a score of 28/28 which was correlated with the impairment scale.     ? Mobility - Walking and Moving Around:     - CURRENT STATUS: CH - 0% impaired, limited or restricted    - GOAL STATUS: CH - 0% impaired, limited or restricted    - D/C STATUS:  CH - 0% impaired, limited or restricted        Physical Therapy Evaluation Charge Determination   History Examination Presentation Decision-Making   LOW Complexity : Zero comorbidities / personal factors that will impact the outcome / POC LOW Complexity : 1-2 Standardized tests and measures addressing body structure, function, activity limitation and / or participation in recreation  LOW Complexity : Stable, uncomplicated  Other outcome measures tinetti  LOW       Based on the above components, the patient evaluation is determined to be of the following complexity level: LOW     Pain:           Activity Tolerance: Good,.  Please refer to the flowsheet for vital signs taken during this treatment. After treatment:   []   Patient left in no apparent distress sitting up in chair  [x]   Patient left in no apparent distress in bed  [x]   Call bell left within reach  [x]   Nursing notified  []   Caregiver present  []   Bed alarm activated    COMMUNICATION/EDUCATION:   Communication/Collaboration:  [x]   Fall prevention education was provided and the patient/caregiver indicated understanding. [x]   Patient/family have participated as able and agree with findings and recommendations. []   Patient is unable to participate in plan of care at this time. Findings and recommendations were discussed with: Occupational Therapist, Registered Nurse,  and patient    Thank you for this referral.  Valerie Corea, PT,WCC.    Time Calculation: 25 mins

## 2018-02-08 NOTE — ROUTINE PROCESS
TRANSFER - OUT REPORT:    Verbal report given to Jia Su RN (name) on Rehana Alex  being transferred to [facility] (unit) for routine progression of care       Report consisted of patients Situation, Background, Assessment and   Recommendations(SBAR). Information from the following report(s) SBAR, Procedure Summary, MAR and Recent Results was reviewed with the receiving nurse. Lines:   Peripheral IV 02/07/18 Right Forearm (Active)   Site Assessment Clean, dry, & intact 2/8/2018  4:24 PM   Phlebitis Assessment 0 2/8/2018  4:24 PM   Infiltration Assessment 0 2/8/2018  4:24 PM   Dressing Status Clean, dry, & intact 2/8/2018  4:24 PM   Dressing Type Transparent;Tape 2/8/2018  4:24 PM   Hub Color/Line Status Pink;Capped;Flushed;Patent; Infusing 2/8/2018  4:24 PM   Action Taken Open ports on tubing capped 2/8/2018  4:24 PM   Alcohol Cap Used Yes 2/8/2018  4:24 PM        Opportunity for questions and clarification was provided.

## 2018-02-08 NOTE — PROGRESS NOTES
Patient has had 2 bowel movements since beginning of shift. Patient reports less and less blood in the stool each time. Tolerating first unit of blood well.

## 2018-02-08 NOTE — PROGRESS NOTES
Jaspal Murrell [address]  [address]  [phone]      Medical Progress Note      NAME: Franklin Sanchez   :  1935  MRM:  [id number]    Date/Time: 2018         Subjective:     Chief Complaint:  Patient was seen and examined by me. Chart reviewed. Still with some rectal bleeding. S/p 2u RBCs overnight       Objective:       Vitals:       Last 24hrs VS reviewed since prior progress note.  Most recent are:    Visit Vitals    /65 (BP 1 Location: Left arm, BP Patient Position: At rest)    Pulse 68    Temp 97.7 °F (36.5 °C)    Resp 16    Ht 5' 9\" (1.753 m)    Wt 77.1 kg (170 lb)    SpO2 100%    BMI 25.1 kg/m2     SpO2 Readings from Last 6 Encounters:   18 100%   17 99%   10/24/17 99%   10/19/17 100%   10/18/17 100%   10/13/17 100%          Intake/Output Summary (Last 24 hours) at 18 0837  Last data filed at 18 0554   Gross per 24 hour   Intake            628.7 ml   Output                0 ml   Net            628.7 ml        Exam:     Physical Exam:    Gen:  Elderly, thin, frail, NAD  HEENT:  Pink conjunctivae, PERRL, hearing intact to voice, moist mucous membranes  Neck:  Supple, without masses, thyroid non-tender  Resp:  No accessory muscle use, clear breath sounds without wheezes rales or rhonchi  Card:  No murmurs, normal S1, S2 without thrills, bruits or peripheral edema  Abd:  Soft, non-tender, non-distended, normoactive bowel sounds are present  Musc:  No cyanosis or clubbing  Skin:  No rashes  Neuro:  Cranial nerves 3-12 are grossly intact, follows commands appropriately  Psych:  fair insight, oriented to person, place and time, alert    Medications Reviewed: (see below)    Lab Data Reviewed: (see below)    ______________________________________________________________________    Medications:     Current Facility-Administered Medications   Medication Dose Route Frequency    peg 3350-electrolytes (COLYTE) 4000 mL 4,000 mL Oral ONCE    sodium chloride (NS) flush 5-10 mL  5-10 mL IntraVENous Q8H    sodium chloride (NS) flush 5-10 mL  5-10 mL IntraVENous PRN    acetaminophen (TYLENOL) tablet 650 mg  650 mg Oral Q4H PRN    morphine injection 2 mg  2 mg IntraVENous Q4H PRN    naloxone (NARCAN) injection 0.4 mg  0.4 mg IntraVENous PRN    prochlorperazine (COMPAZINE) injection 5 mg  5 mg IntraVENous Q8H PRN    finasteride (PROSCAR) tablet 5 mg  5 mg Oral DAILY    senna-docusate (PERICOLACE) 8.6-50 mg per tablet 1 Tab  1 Tab Oral DAILY    tamsulosin (FLOMAX) capsule 0.4 mg  0.4 mg Oral DAILY    carBAMazepine (TEGretol) tablet 200 mg  200 mg Oral TID    0.9% sodium chloride infusion 250 mL  250 mL IntraVENous PRN     Current Outpatient Prescriptions   Medication Sig    senna-docusate (PERICOLACE) 8.6-50 mg per tablet Take 1 Tab by mouth daily.  warfarin (COUMADIN) 5 mg tablet Take 5 mg by mouth nightly.  finasteride (PROSCAR) 5 mg tablet Take 1 Tab by mouth daily.  tamsulosin (FLOMAX) 0.4 mg capsule Take 1 Cap by mouth daily.  acetaminophen (TYLENOL) 325 mg tablet Take 325 mg by mouth every four (4) hours as needed for Pain.  aspirin delayed-release 81 mg tablet Take 81 mg by mouth daily.  carbamazepine (TEGRETOL) 200 mg tablet Take 200 mg by mouth three (3) times daily.           Lab Review:     Recent Labs      02/07/18   2148  02/07/18   1340   WBC   --   5.8   HGB  6.5*  9.4*   HCT  20.8*  29.5*   PLT   --   172     Recent Labs      02/07/18   1340   NA  141   K  5.0   CL  109*   CO2  23   GLU  97   BUN  44*   CREA  3.06*   CA  8.4*   ALB  3.4*   TBILI  0.2   SGOT  17   ALT  14   INR  2.1*     Lab Results   Component Value Date/Time    Glucose (POC) 128 (H) 09/07/2011 06:16 PM    Glucose (POC) 99 09/05/2011 12:17 AM    Glucose (POC) 170 (H) 09/02/2011 08:39 PM    Glucose (POC) 115 (H) 09/02/2011 07:42 AM          Assessment / Plan:     Principal Problem:    81 yo hx of HTN, CAD s/p CABG, sCHF EF 30%, chronic afib on coumadin, PUD, CKD 4, BPH, presented w/ rectal bleeding    1) Acute GI bleeding: likely diverticular bleed, exacerbated by coumadin and ASA. Will hold these meds. Cont PPI. GI to eval    2) Acute blood loss anemia: due to above. S/p 2u RBCS, will monitor Hgb closely, transfuse prn    3) HTN/CAD: not taking BB, statin. Hold ASA. Monitor    4) Chronic afib/chronic anticoagulation: s/p pacer. Rate controlled. Hold coumadin. Given active bleeding, will give one dose of Vit K    5) Chronic syst CHF: EF 30%.   Volume stable, monitor    6) CKD 4: Cr at baseline, monitor BMP    7) Hx of seizures: cont Tegretol    Total time spent with patient: 35 min                  Care Plan discussed with: Patient, nursing    Discussed:  Care Plan    Prophylaxis:  SCD's    Disposition:  Home w/Family           ___________________________________________________    Attending Physician: Tanesha Anderson MD

## 2018-02-09 LAB
ANION GAP SERPL CALC-SCNC: 7 MMOL/L (ref 5–15)
BUN SERPL-MCNC: 39 MG/DL (ref 6–20)
BUN/CREAT SERPL: 14 (ref 12–20)
CALCIUM SERPL-MCNC: 8 MG/DL (ref 8.5–10.1)
CHLORIDE SERPL-SCNC: 109 MMOL/L (ref 97–108)
CO2 SERPL-SCNC: 24 MMOL/L (ref 21–32)
CREAT SERPL-MCNC: 2.7 MG/DL (ref 0.7–1.3)
ERYTHROCYTE [DISTWIDTH] IN BLOOD BY AUTOMATED COUNT: 14.6 % (ref 11.5–14.5)
ERYTHROCYTE [DISTWIDTH] IN BLOOD BY AUTOMATED COUNT: 14.7 % (ref 11.5–14.5)
GLUCOSE SERPL-MCNC: 72 MG/DL (ref 65–100)
HCT VFR BLD AUTO: 22.6 % (ref 36.6–50.3)
HCT VFR BLD AUTO: 25.7 % (ref 36.6–50.3)
HGB BLD-MCNC: 7.3 G/DL (ref 12.1–17)
HGB BLD-MCNC: 8.3 G/DL (ref 12.1–17)
INR PPP: 1.2 (ref 0.9–1.1)
MAGNESIUM SERPL-MCNC: 1.9 MG/DL (ref 1.6–2.4)
MCH RBC QN AUTO: 31.3 PG (ref 26–34)
MCH RBC QN AUTO: 31.5 PG (ref 26–34)
MCHC RBC AUTO-ENTMCNC: 32.3 G/DL (ref 30–36.5)
MCHC RBC AUTO-ENTMCNC: 32.3 G/DL (ref 30–36.5)
MCV RBC AUTO: 97 FL (ref 80–99)
MCV RBC AUTO: 97.4 FL (ref 80–99)
NRBC # BLD: 0 K/UL (ref 0–0.01)
NRBC # BLD: 0 K/UL (ref 0–0.01)
NRBC BLD-RTO: 0 PER 100 WBC
NRBC BLD-RTO: 0 PER 100 WBC
PHOSPHATE SERPL-MCNC: 3.5 MG/DL (ref 2.6–4.7)
PLATELET # BLD AUTO: 124 K/UL (ref 150–400)
PLATELET # BLD AUTO: 139 K/UL (ref 150–400)
PMV BLD AUTO: 10.4 FL (ref 8.9–12.9)
PMV BLD AUTO: 10.5 FL (ref 8.9–12.9)
POTASSIUM SERPL-SCNC: 4.4 MMOL/L (ref 3.5–5.1)
PROTHROMBIN TIME: 12 SEC (ref 9–11.1)
RBC # BLD AUTO: 2.32 M/UL (ref 4.1–5.7)
RBC # BLD AUTO: 2.65 M/UL (ref 4.1–5.7)
SODIUM SERPL-SCNC: 140 MMOL/L (ref 136–145)
WBC # BLD AUTO: 5.4 K/UL (ref 4.1–11.1)
WBC # BLD AUTO: 5.8 K/UL (ref 4.1–11.1)

## 2018-02-09 PROCEDURE — 74011250636 HC RX REV CODE- 250/636: Performed by: PHYSICIAN ASSISTANT

## 2018-02-09 PROCEDURE — 77010033678 HC OXYGEN DAILY

## 2018-02-09 PROCEDURE — 83735 ASSAY OF MAGNESIUM: CPT | Performed by: INTERNAL MEDICINE

## 2018-02-09 PROCEDURE — 36415 COLL VENOUS BLD VENIPUNCTURE: CPT | Performed by: INTERNAL MEDICINE

## 2018-02-09 PROCEDURE — 74011250636 HC RX REV CODE- 250/636: Performed by: INTERNAL MEDICINE

## 2018-02-09 PROCEDURE — 74011250637 HC RX REV CODE- 250/637: Performed by: INTERNAL MEDICINE

## 2018-02-09 PROCEDURE — 74011000250 HC RX REV CODE- 250: Performed by: PHYSICIAN ASSISTANT

## 2018-02-09 PROCEDURE — 65660000000 HC RM CCU STEPDOWN

## 2018-02-09 PROCEDURE — 85610 PROTHROMBIN TIME: CPT | Performed by: INTERNAL MEDICINE

## 2018-02-09 PROCEDURE — 80048 BASIC METABOLIC PNL TOTAL CA: CPT | Performed by: INTERNAL MEDICINE

## 2018-02-09 PROCEDURE — 85027 COMPLETE CBC AUTOMATED: CPT | Performed by: INTERNAL MEDICINE

## 2018-02-09 PROCEDURE — 84100 ASSAY OF PHOSPHORUS: CPT | Performed by: INTERNAL MEDICINE

## 2018-02-09 PROCEDURE — C9113 INJ PANTOPRAZOLE SODIUM, VIA: HCPCS | Performed by: PHYSICIAN ASSISTANT

## 2018-02-09 RX ORDER — HYDRALAZINE HYDROCHLORIDE 20 MG/ML
10 INJECTION INTRAMUSCULAR; INTRAVENOUS
Status: DISCONTINUED | OUTPATIENT
Start: 2018-02-09 | End: 2018-02-11 | Stop reason: HOSPADM

## 2018-02-09 RX ORDER — AMLODIPINE BESYLATE 5 MG/1
5 TABLET ORAL DAILY
Status: DISCONTINUED | OUTPATIENT
Start: 2018-02-09 | End: 2018-02-11 | Stop reason: HOSPADM

## 2018-02-09 RX ADMIN — Medication 10 ML: at 22:02

## 2018-02-09 RX ADMIN — DOCUSATE SODIUM AND SENNOSIDES 1 TABLET: 8.6; 5 TABLET, FILM COATED ORAL at 08:00

## 2018-02-09 RX ADMIN — CARBAMAZEPINE 200 MG: 200 TABLET ORAL at 22:02

## 2018-02-09 RX ADMIN — SODIUM CHLORIDE 40 MG: 9 INJECTION INTRAMUSCULAR; INTRAVENOUS; SUBCUTANEOUS at 08:01

## 2018-02-09 RX ADMIN — CARBAMAZEPINE 200 MG: 200 TABLET ORAL at 15:31

## 2018-02-09 RX ADMIN — FINASTERIDE 5 MG: 5 TABLET, FILM COATED ORAL at 08:00

## 2018-02-09 RX ADMIN — Medication 10 ML: at 13:31

## 2018-02-09 RX ADMIN — AMLODIPINE BESYLATE 5 MG: 5 TABLET ORAL at 08:53

## 2018-02-09 RX ADMIN — TAMSULOSIN HYDROCHLORIDE 0.4 MG: 0.4 CAPSULE ORAL at 08:00

## 2018-02-09 RX ADMIN — CARBAMAZEPINE 200 MG: 200 TABLET ORAL at 08:01

## 2018-02-09 RX ADMIN — HYDRALAZINE HYDROCHLORIDE 10 MG: 20 INJECTION INTRAMUSCULAR; INTRAVENOUS at 09:41

## 2018-02-09 NOTE — PROGRESS NOTES
Problem: Falls - Risk of  Goal: *Absence of Falls  Document Sara Fall Risk and appropriate interventions in the flowsheet.    Outcome: Progressing Towards Goal  Fall Risk Interventions:  Mobility Interventions: Communicate number of staff needed for ambulation/transfer, Patient to call before getting OOB         Medication Interventions: Patient to call before getting OOB, Teach patient to arise slowly    Elimination Interventions: Call light in reach, Patient to call for help with toileting needs, Toileting schedule/hourly rounds, Urinal in reach

## 2018-02-09 NOTE — PROGRESS NOTES
0800: Patient blood pressure 195/69. Notified Dr. Silvia Puri. Orders to give norvasc now. Recheck in 30 minutes and give hydralazine PRN. Bedside shift change report given to Sabra Roblero RN (oncoming nurse) by Rachel Mitchell RN (offgoing nurse). Report included the following information SBAR, Kardex, Intake/Output, MAR, Recent Results and Cardiac Rhythm NSR, PAced.

## 2018-02-09 NOTE — PROGRESS NOTES
Full to follow. GI workup and recs noted. Agree with holding AC for now given severe acute blood loss anemia. Repeat INR. Cards to see. Need to optimize medical therapy for HFrEF (30%) and consideration of ICD upgrade.

## 2018-02-09 NOTE — PROGRESS NOTES
Bedside and Verbal shift change report given to Melchor Owens RN (oncoming nurse) by Jia Su RN (offgoing nurse). Report included the following information SBAR, Kardex, ED Summary, Procedure Summary, Intake/Output, Recent Results, Med Rec Status and Cardiac Rhythm V-Paced. Bedside and Verbal shift change report given to Suresh Rich RN (oncoming nurse) by Melchor Owens RN (offgoing nurse). Report included the following information SBAR, Kardex, ED Summary, Procedure Summary, Intake/Output, Recent Results, Med Rec Status and Cardiac Rhythm V-Paced with 2nd degree type 1 AVB.

## 2018-02-09 NOTE — PROGRESS NOTES
Gastrointestinal Progress Note    2/9/2018    Admit Date: 2/7/2018    Subjective:     New Complaints Today:  No complaints this AM. Denies abdominal pain, nausea or vomiting. Passing flatus but no Bm since procedure. No further hematochezia. Hgb stable at 8.3. Discussed colonoscopy results with patient    Current Facility-Administered Medications   Medication Dose Route Frequency    amLODIPine (NORVASC) tablet 5 mg  5 mg Oral DAILY    hydrALAZINE (APRESOLINE) 20 mg/mL injection 10 mg  10 mg IntraVENous Q6H PRN    pantoprazole (PROTONIX) 40 mg in sodium chloride 0.9 % 10 mL injection  40 mg IntraVENous DAILY    sodium chloride (NS) flush 5-10 mL  5-10 mL IntraVENous Q8H    sodium chloride (NS) flush 5-10 mL  5-10 mL IntraVENous PRN    acetaminophen (TYLENOL) tablet 650 mg  650 mg Oral Q4H PRN    morphine injection 2 mg  2 mg IntraVENous Q4H PRN    naloxone (NARCAN) injection 0.4 mg  0.4 mg IntraVENous PRN    prochlorperazine (COMPAZINE) injection 5 mg  5 mg IntraVENous Q8H PRN    finasteride (PROSCAR) tablet 5 mg  5 mg Oral DAILY    senna-docusate (PERICOLACE) 8.6-50 mg per tablet 1 Tab  1 Tab Oral DAILY    tamsulosin (FLOMAX) capsule 0.4 mg  0.4 mg Oral DAILY    carBAMazepine (TEGretol) tablet 200 mg  200 mg Oral TID    0.9% sodium chloride infusion 250 mL  250 mL IntraVENous PRN        Objective:     Blood pressure 174/66, pulse 87, temperature 98.2 °F (36.8 °C), resp. rate 13, height 5' 9\" (1.753 m), weight 70 kg (154 lb 5.2 oz), SpO2 100 %.     02/09 0701 - 02/09 1900  In: -   Out: 400 [Urine:400]    02/07 1901 - 02/09 0700  In: 1128.7 [I.V.:500]  Out: 550 [Urine:550]    EXAM:  GENERAL: alert, no distress, HEART: regular rate and rhythm LUNGS: clear to auscultation bilaterally ABDOMEN: soft, nondistended, nontender and EXTREMITY: no edema      Data Review    Recent Results (from the past 24 hour(s))   HGB & HCT    Collection Time: 02/08/18  2:03 PM   Result Value Ref Range    HGB 8.6 (L) 12.1 - 17.0 g/dL    HCT 26.4 (L) 36.6 - 50.3 %   HGB & HCT    Collection Time: 02/08/18  9:32 PM   Result Value Ref Range    HGB 8.4 (L) 12.1 - 17.0 g/dL    HCT 26.9 (L) 36.6 - 50.3 %   CBC W/O DIFF    Collection Time: 02/09/18  1:22 AM   Result Value Ref Range    WBC 5.4 4.1 - 11.1 K/uL    RBC 2.32 (L) 4.10 - 5.70 M/uL    HGB 7.3 (L) 12.1 - 17.0 g/dL    HCT 22.6 (L) 36.6 - 50.3 %    MCV 97.4 80.0 - 99.0 FL    MCH 31.5 26.0 - 34.0 PG    MCHC 32.3 30.0 - 36.5 g/dL    RDW 14.7 (H) 11.5 - 14.5 %    PLATELET 820 (L) 242 - 400 K/uL    MPV 10.4 8.9 - 12.9 FL    NRBC 0.0 0  WBC    ABSOLUTE NRBC 0.00 0.00 - 9.06 K/uL   METABOLIC PANEL, BASIC    Collection Time: 02/09/18  1:22 AM   Result Value Ref Range    Sodium 140 136 - 145 mmol/L    Potassium 4.4 3.5 - 5.1 mmol/L    Chloride 109 (H) 97 - 108 mmol/L    CO2 24 21 - 32 mmol/L    Anion gap 7 5 - 15 mmol/L    Glucose 72 65 - 100 mg/dL    BUN 39 (H) 6 - 20 MG/DL    Creatinine 2.70 (H) 0.70 - 1.30 MG/DL    BUN/Creatinine ratio 14 12 - 20      GFR est AA 28 (L) >60 ml/min/1.73m2    GFR est non-AA 23 (L) >60 ml/min/1.73m2    Calcium 8.0 (L) 8.5 - 10.1 MG/DL   PHOSPHORUS    Collection Time: 02/09/18  1:22 AM   Result Value Ref Range    Phosphorus 3.5 2.6 - 4.7 MG/DL   MAGNESIUM    Collection Time: 02/09/18  1:22 AM   Result Value Ref Range    Magnesium 1.9 1.6 - 2.4 mg/dL   CBC W/O DIFF    Collection Time: 02/09/18  9:44 AM   Result Value Ref Range    WBC 5.8 4.1 - 11.1 K/uL    RBC 2.65 (L) 4.10 - 5.70 M/uL    HGB 8.3 (L) 12.1 - 17.0 g/dL    HCT 25.7 (L) 36.6 - 50.3 %    MCV 97.0 80.0 - 99.0 FL    MCH 31.3 26.0 - 34.0 PG    MCHC 32.3 30.0 - 36.5 g/dL    RDW 14.6 (H) 11.5 - 14.5 %    PLATELET 473 (L) 278 - 400 K/uL    MPV 10.5 8.9 - 12.9 FL    NRBC 0.0 0  WBC    ABSOLUTE NRBC 0.00 0.00 - 0.01 K/uL       Assessment:     Principal Problem:    Acute GI bleeding (2/7/2018)    Active Problems:    Coronary atherosclerosis of native coronary artery (9/2/2011)      CKD (chronic kidney disease) stage 4, GFR 15-29 ml/min (HCC) (9/5/2017)      Benign prostatic hyperplasia (9/5/2017)      Anemia (10/10/2017)      CAD (coronary artery disease) ()      Overview: 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 ([facility])      Hypertension ()      Systolic CHF, chronic ([facility]) ()      Dyslipidemia (10/24/2017)      Dilated cardiomyopathy ([facility] [address] (10/24/2017)      Atrial fibrillation with controlled ventricular rate ([facility] [address] (10/24/2017)      Overview: New discovered 10/19/17      Pacemaker (10/24/2017)      Overview: Implanted Feb 2017 (Dr Elsa Rosales at [facility]:     Suspected diverticular bleed - bleeding has subsided. Hgb stable at 8.3.    1.  Advance diet  2. Continue to monitor frequency and characteristic of stool  3.   Cardiology to weigh in on anticoagulation     Sagar Jhaveri PA-C  02/09/18   10:18 AM  Will see as needed over weekend  I have interviewed and examined patient with addendum to note above and formulation care plan    Crystal Edwards M.D.

## 2018-02-10 LAB
ANION GAP SERPL CALC-SCNC: 9 MMOL/L (ref 5–15)
BASOPHILS # BLD: 0 K/UL (ref 0–0.1)
BASOPHILS NFR BLD: 1 % (ref 0–1)
BUN SERPL-MCNC: 38 MG/DL (ref 6–20)
BUN/CREAT SERPL: 13 (ref 12–20)
CALCIUM SERPL-MCNC: 8 MG/DL (ref 8.5–10.1)
CHLORIDE SERPL-SCNC: 107 MMOL/L (ref 97–108)
CO2 SERPL-SCNC: 23 MMOL/L (ref 21–32)
CREAT SERPL-MCNC: 2.83 MG/DL (ref 0.7–1.3)
DIFFERENTIAL METHOD BLD: ABNORMAL
EOSINOPHIL # BLD: 0.1 K/UL (ref 0–0.4)
EOSINOPHIL NFR BLD: 3 % (ref 0–7)
ERYTHROCYTE [DISTWIDTH] IN BLOOD BY AUTOMATED COUNT: 14.2 % (ref 11.5–14.5)
GLUCOSE SERPL-MCNC: 90 MG/DL (ref 65–100)
HCT VFR BLD AUTO: 20.1 % (ref 36.6–50.3)
HGB BLD-MCNC: 6.6 G/DL (ref 12.1–17)
HGB BLD-MCNC: 8.4 G/DL (ref 12.1–17)
IMM GRANULOCYTES # BLD: 0 K/UL (ref 0–0.04)
IMM GRANULOCYTES NFR BLD AUTO: 0 % (ref 0–0.5)
LYMPHOCYTES # BLD: 1.2 K/UL (ref 0.8–3.5)
LYMPHOCYTES NFR BLD: 22 % (ref 12–49)
MAGNESIUM SERPL-MCNC: 1.9 MG/DL (ref 1.6–2.4)
MCH RBC QN AUTO: 31.7 PG (ref 26–34)
MCHC RBC AUTO-ENTMCNC: 32.8 G/DL (ref 30–36.5)
MCV RBC AUTO: 96.6 FL (ref 80–99)
MONOCYTES # BLD: 0.6 K/UL (ref 0–1)
MONOCYTES NFR BLD: 12 % (ref 5–13)
NEUTS SEG # BLD: 3.3 K/UL (ref 1.8–8)
NEUTS SEG NFR BLD: 63 % (ref 32–75)
NRBC # BLD: 0 K/UL (ref 0–0.01)
NRBC BLD-RTO: 0 PER 100 WBC
PHOSPHATE SERPL-MCNC: 3.7 MG/DL (ref 2.6–4.7)
PLATELET # BLD AUTO: 120 K/UL (ref 150–400)
PMV BLD AUTO: 10.5 FL (ref 8.9–12.9)
POTASSIUM SERPL-SCNC: 4.4 MMOL/L (ref 3.5–5.1)
RBC # BLD AUTO: 2.08 M/UL (ref 4.1–5.7)
SODIUM SERPL-SCNC: 139 MMOL/L (ref 136–145)
WBC # BLD AUTO: 5.3 K/UL (ref 4.1–11.1)

## 2018-02-10 PROCEDURE — 85025 COMPLETE CBC W/AUTO DIFF WBC: CPT | Performed by: INTERNAL MEDICINE

## 2018-02-10 PROCEDURE — 36430 TRANSFUSION BLD/BLD COMPNT: CPT

## 2018-02-10 PROCEDURE — 36415 COLL VENOUS BLD VENIPUNCTURE: CPT | Performed by: INTERNAL MEDICINE

## 2018-02-10 PROCEDURE — 74011250636 HC RX REV CODE- 250/636: Performed by: INTERNAL MEDICINE

## 2018-02-10 PROCEDURE — 77030032490 HC SLV COMPR SCD KNE COVD -B

## 2018-02-10 PROCEDURE — 30233P1 TRANSFUSION OF NONAUTOLOGOUS FROZEN RED CELLS INTO PERIPHERAL VEIN, PERCUTANEOUS APPROACH: ICD-10-PCS | Performed by: INTERNAL MEDICINE

## 2018-02-10 PROCEDURE — 80048 BASIC METABOLIC PNL TOTAL CA: CPT | Performed by: INTERNAL MEDICINE

## 2018-02-10 PROCEDURE — 74011250637 HC RX REV CODE- 250/637: Performed by: INTERNAL MEDICINE

## 2018-02-10 PROCEDURE — 85018 HEMOGLOBIN: CPT | Performed by: INTERNAL MEDICINE

## 2018-02-10 PROCEDURE — 84100 ASSAY OF PHOSPHORUS: CPT | Performed by: INTERNAL MEDICINE

## 2018-02-10 PROCEDURE — 83735 ASSAY OF MAGNESIUM: CPT | Performed by: INTERNAL MEDICINE

## 2018-02-10 PROCEDURE — P9016 RBC LEUKOCYTES REDUCED: HCPCS | Performed by: INTERNAL MEDICINE

## 2018-02-10 PROCEDURE — 65660000000 HC RM CCU STEPDOWN

## 2018-02-10 RX ORDER — SODIUM CHLORIDE 9 MG/ML
250 INJECTION, SOLUTION INTRAVENOUS AS NEEDED
Status: DISPENSED | OUTPATIENT
Start: 2018-02-10 | End: 2018-02-11

## 2018-02-10 RX ADMIN — CARBAMAZEPINE 200 MG: 200 TABLET ORAL at 10:13

## 2018-02-10 RX ADMIN — Medication 10 ML: at 21:07

## 2018-02-10 RX ADMIN — DOCUSATE SODIUM AND SENNOSIDES 1 TABLET: 8.6; 5 TABLET, FILM COATED ORAL at 10:13

## 2018-02-10 RX ADMIN — AMLODIPINE BESYLATE 5 MG: 5 TABLET ORAL at 10:13

## 2018-02-10 RX ADMIN — HYDRALAZINE HYDROCHLORIDE 10 MG: 20 INJECTION INTRAMUSCULAR; INTRAVENOUS at 13:23

## 2018-02-10 RX ADMIN — FINASTERIDE 5 MG: 5 TABLET, FILM COATED ORAL at 10:13

## 2018-02-10 RX ADMIN — ACETAMINOPHEN 650 MG: 325 TABLET ORAL at 19:54

## 2018-02-10 RX ADMIN — CARBAMAZEPINE 200 MG: 200 TABLET ORAL at 17:29

## 2018-02-10 RX ADMIN — Medication 10 ML: at 07:31

## 2018-02-10 RX ADMIN — TAMSULOSIN HYDROCHLORIDE 0.4 MG: 0.4 CAPSULE ORAL at 10:13

## 2018-02-10 RX ADMIN — CARBAMAZEPINE 200 MG: 200 TABLET ORAL at 21:07

## 2018-02-10 RX ADMIN — HYDRALAZINE HYDROCHLORIDE 10 MG: 20 INJECTION INTRAMUSCULAR; INTRAVENOUS at 19:54

## 2018-02-10 RX ADMIN — Medication 10 ML: at 14:00

## 2018-02-10 NOTE — PROGRESS NOTES
Problem: Falls - Risk of  Goal: *Absence of Falls  Document Sara Fall Risk and appropriate interventions in the flowsheet.    Outcome: Progressing Towards Goal  Fall Risk Interventions:  Mobility Interventions: Communicate number of staff needed for ambulation/transfer, Patient to call before getting OOB         Medication Interventions: Patient to call before getting OOB, Teach patient to arise slowly    Elimination Interventions: Call light in reach, Patient to call for help with toileting needs

## 2018-02-10 NOTE — PROGRESS NOTES
[facility] [address]  [address], [place], [address] [facility]  [phone]      Medical Progress Note      NAME: Nargis Li   :  1935  MRM:  [id number]    Date/Time: 2/10/2018          Subjective:     Chief Complaint:  F/u GI bleed    Chart/notes/labs/studies reviewed, patient examined at bedside. No apparent further bleeding (melena, hematochezia, BRBPR) but further drop in Hg. No CP or SOB or dizziness or palpitations. Objective:       Vitals:          Last 24hrs VS reviewed since prior progress note.  Most recent are:    Visit Vitals    /50    Pulse 90    Temp 98.2 °F (36.8 °C)    Resp 18    Ht 5' 9\" (1.753 m)    Wt 70.2 kg (154 lb 11.2 oz)    SpO2 100%    BMI 22.85 kg/m2     SpO2 Readings from Last 6 Encounters:   02/10/18 100%   17 99%   10/24/17 99%   10/19/17 100%   10/18/17 100%   10/13/17 100%    O2 Flow Rate (L/min): 2 l/min       Intake/Output Summary (Last 24 hours) at 02/10/18 1808  Last data filed at 02/10/18 1546   Gross per 24 hour   Intake              240 ml   Output              650 ml   Net             -410 ml          Exam:     Physical Exam:    Gen:  Elderly, thin, pleasant, NAD  HEENT:  Pink conjunctivae, hearing intact to voice, moist mucous membranes  Neck:  Supple, without masses  Resp:  No accessory muscle use, clear breath sounds without wheezes rales or rhonchi  Card:  No murmurs, normal S1, S2 without thrills, bruits or peripheral edema  Abd:  Soft, non-tender, non-distended, normoactive bowel sounds are present  Musc:  No cyanosis or clubbing  Skin:  No rashes  Neuro:  Cranial nerves 3-12 are grossly intact, follows commands appropriately  Psych:  fair insight, oriented to person, place and time, alert        Medications Reviewed: (see below)    Lab Data Reviewed: (see below)    ______________________________________________________________________    Medications:     Current Facility-Administered Medications Medication Dose Route Frequency    0.9% sodium chloride infusion 250 mL  250 mL IntraVENous PRN    amLODIPine (NORVASC) tablet 5 mg  5 mg Oral DAILY    hydrALAZINE (APRESOLINE) 20 mg/mL injection 10 mg  10 mg IntraVENous Q6H PRN    sodium chloride (NS) flush 5-10 mL  5-10 mL IntraVENous Q8H    sodium chloride (NS) flush 5-10 mL  5-10 mL IntraVENous PRN    acetaminophen (TYLENOL) tablet 650 mg  650 mg Oral Q4H PRN    morphine injection 2 mg  2 mg IntraVENous Q4H PRN    naloxone (NARCAN) injection 0.4 mg  0.4 mg IntraVENous PRN    prochlorperazine (COMPAZINE) injection 5 mg  5 mg IntraVENous Q8H PRN    finasteride (PROSCAR) tablet 5 mg  5 mg Oral DAILY    senna-docusate (PERICOLACE) 8.6-50 mg per tablet 1 Tab  1 Tab Oral DAILY    tamsulosin (FLOMAX) capsule 0.4 mg  0.4 mg Oral DAILY    carBAMazepine (TEGretol) tablet 200 mg  200 mg Oral TID    0.9% sodium chloride infusion 250 mL  250 mL IntraVENous PRN            Lab Review:     Recent Labs      02/10/18   1548  02/10/18   0105  02/09/18   0944  02/09/18   0122   WBC   --   5.3  5.8  5.4   HGB  8.4*  6.6*  8.3*  7.3*   HCT   --   20.1*  25.7*  22.6*   PLT   --   120*  139*  124*     Recent Labs      02/10/18   0105  02/09/18   0944  02/09/18   0122   NA  139   --   140   K  4.4   --   4.4   CL  107   --   109*   CO2  23   --   24   GLU  90   --   72   BUN  38*   --   39*   CREA  2.83*   --   2.70*   CA  8.0*   --   8.0*   MG  1.9   --   1.9   PHOS  3.7   --   3.5   INR   --   1.2*   --      No components found for: GLPOC  No results for input(s): PH, PCO2, PO2, HCO3, FIO2 in the last 72 hours.   Recent Labs      02/09/18 0944   INR  1.2*     No results found for: SDES  Lab Results   Component Value Date/Time    Culture result: NO GROWTH 2 DAYS 08/20/2015 04:34 PM            Assessment / Plan:     81 yo AAM w/ hx of HTN, CAD s/p CABG, HFrEF 30%, chronic afib on coumadin, PUD, CKD 4, BPH p/w BRBPR, admitted for acute blood loss anemia     Acute GI bleeding: due to diverticulosis evident on colonoscopy, precipitated by coumadin and ASA. Holding antithrombotics given severe blood anemia. No ulcers on EGD, stopped IV PPI. Further drop in Hg but no melena or hematochezia or BRBPR. Transfuse 1 unit today     Acute blood loss anemia: due to above. Hg dropped again today, transfusing another unit today. Follow Hg, if stable overnight and tomorrow will plan to discharge     HTN/CAD: not taking BB, statin, defer to cardiology. ASA on hold     Chronic afib/chronic anticoagulation: s/p pacer. Due to potentially life-threatening GI bleed, coumadin on hold and was given vitamin K. Rate controlled. Appreciate cardiology evaluation, may be good candidate for WATCHMAN device, to follow up with Dr. Benito SCHULER     HFrEF: EF 30%. Echo 10/10/17 with LVEF 30%, lexiscan cardiolite 11/15/17 with LVEF 50%.  Given improvement in LVEF, no ICD upggrade     CKD 4: Cr at baseline, monitor BMP     Hx of seizures: cont Tegretol    Total time spent with patient:  25 minutes                  Care Plan discussed with: Patient, Nursing Staff and >50% of time spent in counseling and coordination of care    Discussed:  Care Plan    Prophylaxis:  SCD's    Disposition:  Home w/Family           ___________________________________________________    Attending Physician: Forest Saeed MD

## 2018-02-10 NOTE — PROGRESS NOTES
No anticoagulation for Afib. Discussed with Dr. Roena Schwab. He is going to setup patient for Watchman percutaneous closure of CARSON as OP. From Cardiac standpoint he is OK to go home. He will FU with Dr. Ori Wright and Dr. Roena Schwab. Iam Raphael MD, [facility]

## 2018-02-10 NOTE — PROGRESS NOTES
According to Dima Sky night shift RN, who notified Dr. Petra Mac of HGB of 6.6, orders were tranfusion then recheck HGB two hours post transfusion. No need to draw HGB now.

## 2018-02-10 NOTE — PROGRESS NOTES
Bedside and Verbal shift change report given to Yoselin Pinto RN (oncoming nurse) by Cj Rutledge RN (offgoing nurse). Report included the following information SBAR, Kardex, Procedure Summary, Accordion and Cardiac Rhythm NSR/paced. Pt resting in bed supine at shift change. He was emphatic about not taking blood thinners. I reassured him that I had none to administer to him tonight. He was pleasant and conversational and looking forward to potential d/c tomorrow. 0715:  Hgb result 6.6 and no call received from lab. Value noted at shift change. RN called Dr Kimber Zhang to advise. Orders for transfusion of 1 unit and Hgb check 2 hrs post transfusion placed in computer by Dr Kimber Zhang. RN taking report for shift change aware. Bedside and Verbal shift change report given to [place], RN (oncoming nurse) by Yoselin Pinto RN (offgoing nurse). Report included the following information SBAR, Kardex, Intake/Output, Accordion, Recent Results and Cardiac Rhythm paced.

## 2018-02-11 ENCOUNTER — APPOINTMENT (OUTPATIENT)
Dept: GENERAL RADIOLOGY | Age: 83
DRG: 378 | End: 2018-02-11
Attending: INTERNAL MEDICINE
Payer: MEDICARE

## 2018-02-11 VITALS
HEART RATE: 81 BPM | SYSTOLIC BLOOD PRESSURE: 144 MMHG | BODY MASS INDEX: 21.55 KG/M2 | DIASTOLIC BLOOD PRESSURE: 47 MMHG | OXYGEN SATURATION: 99 % | RESPIRATION RATE: 21 BRPM | TEMPERATURE: 99.6 F | WEIGHT: 145.5 LBS | HEIGHT: 69 IN

## 2018-02-11 LAB
ABO + RH BLD: NORMAL
ANION GAP SERPL CALC-SCNC: 10 MMOL/L (ref 5–15)
APPEARANCE UR: ABNORMAL
BACTERIA URNS QL MICRO: ABNORMAL /HPF
BASOPHILS # BLD: 0 K/UL (ref 0–0.1)
BASOPHILS NFR BLD: 0 % (ref 0–1)
BILIRUB UR QL: NEGATIVE
BLD PROD TYP BPU: NORMAL
BLOOD GROUP ANTIBODIES SERPL: NORMAL
BPU ID: NORMAL
BUN SERPL-MCNC: 41 MG/DL (ref 6–20)
BUN/CREAT SERPL: 12 (ref 12–20)
CALCIUM SERPL-MCNC: 8.1 MG/DL (ref 8.5–10.1)
CHLORIDE SERPL-SCNC: 106 MMOL/L (ref 97–108)
CO2 SERPL-SCNC: 22 MMOL/L (ref 21–32)
COLOR UR: ABNORMAL
CREAT SERPL-MCNC: 3.36 MG/DL (ref 0.7–1.3)
CROSSMATCH RESULT,%XM: NORMAL
DIFFERENTIAL METHOD BLD: ABNORMAL
EOSINOPHIL # BLD: 0 K/UL (ref 0–0.4)
EOSINOPHIL NFR BLD: 0 % (ref 0–7)
EPITH CASTS URNS QL MICRO: ABNORMAL /LPF
ERYTHROCYTE [DISTWIDTH] IN BLOOD BY AUTOMATED COUNT: 14.2 % (ref 11.5–14.5)
ERYTHROCYTE [DISTWIDTH] IN BLOOD BY AUTOMATED COUNT: 14.4 % (ref 11.5–14.5)
GLUCOSE SERPL-MCNC: 112 MG/DL (ref 65–100)
GLUCOSE UR STRIP.AUTO-MCNC: NEGATIVE MG/DL
HCT VFR BLD AUTO: 23 % (ref 36.6–50.3)
HCT VFR BLD AUTO: 26.7 % (ref 36.6–50.3)
HGB BLD-MCNC: 7.6 G/DL (ref 12.1–17)
HGB BLD-MCNC: 7.6 G/DL (ref 12.1–17)
HGB BLD-MCNC: 8.6 G/DL (ref 12.1–17)
HGB UR QL STRIP: NEGATIVE
IMM GRANULOCYTES # BLD: 0 K/UL
IMM GRANULOCYTES NFR BLD AUTO: 0 %
KETONES UR QL STRIP.AUTO: NEGATIVE MG/DL
LEUKOCYTE ESTERASE UR QL STRIP.AUTO: ABNORMAL
LYMPHOCYTES # BLD: 0 K/UL (ref 0.8–3.5)
LYMPHOCYTES NFR BLD: 0 % (ref 12–49)
MCH RBC QN AUTO: 31.5 PG (ref 26–34)
MCH RBC QN AUTO: 31.7 PG (ref 26–34)
MCHC RBC AUTO-ENTMCNC: 32.2 G/DL (ref 30–36.5)
MCHC RBC AUTO-ENTMCNC: 33 G/DL (ref 30–36.5)
MCV RBC AUTO: 95.8 FL (ref 80–99)
MCV RBC AUTO: 97.8 FL (ref 80–99)
MONOCYTES # BLD: 1.1 K/UL (ref 0–1)
MONOCYTES NFR BLD: 11 % (ref 5–13)
NEUTS SEG # BLD: 9.2 K/UL (ref 1.8–8)
NEUTS SEG NFR BLD: 89 % (ref 32–75)
NITRITE UR QL STRIP.AUTO: POSITIVE
NRBC # BLD: 0 K/UL (ref 0–0.01)
NRBC # BLD: 0 K/UL (ref 0–0.01)
NRBC BLD-RTO: 0 PER 100 WBC
NRBC BLD-RTO: 0 PER 100 WBC
PH UR STRIP: 6 [PH] (ref 5–8)
PLATELET # BLD AUTO: 104 K/UL (ref 150–400)
PLATELET # BLD AUTO: 108 K/UL (ref 150–400)
PMV BLD AUTO: 9.5 FL (ref 8.9–12.9)
PMV BLD AUTO: 9.8 FL (ref 8.9–12.9)
POTASSIUM SERPL-SCNC: 4.2 MMOL/L (ref 3.5–5.1)
PROT UR STRIP-MCNC: 100 MG/DL
RBC # BLD AUTO: 2.4 M/UL (ref 4.1–5.7)
RBC # BLD AUTO: 2.73 M/UL (ref 4.1–5.7)
RBC #/AREA URNS HPF: ABNORMAL /HPF (ref 0–5)
RBC MORPH BLD: ABNORMAL
SODIUM SERPL-SCNC: 138 MMOL/L (ref 136–145)
SP GR UR REFRACTOMETRY: 1.01 (ref 1–1.03)
SPECIMEN EXP DATE BLD: NORMAL
STATUS OF UNIT,%ST: NORMAL
UA: UC IF INDICATED,UAUC: ABNORMAL
UNIT DIVISION, %UDIV: 0
UROBILINOGEN UR QL STRIP.AUTO: 0.2 EU/DL (ref 0.2–1)
WBC # BLD AUTO: 10.3 K/UL (ref 4.1–11.1)
WBC # BLD AUTO: 8.8 K/UL (ref 4.1–11.1)
WBC URNS QL MICRO: ABNORMAL /HPF (ref 0–4)

## 2018-02-11 PROCEDURE — 85025 COMPLETE CBC W/AUTO DIFF WBC: CPT | Performed by: INTERNAL MEDICINE

## 2018-02-11 PROCEDURE — 85027 COMPLETE CBC AUTOMATED: CPT | Performed by: INTERNAL MEDICINE

## 2018-02-11 PROCEDURE — 74011250636 HC RX REV CODE- 250/636: Performed by: INTERNAL MEDICINE

## 2018-02-11 PROCEDURE — 71045 X-RAY EXAM CHEST 1 VIEW: CPT

## 2018-02-11 PROCEDURE — 80048 BASIC METABOLIC PNL TOTAL CA: CPT | Performed by: INTERNAL MEDICINE

## 2018-02-11 PROCEDURE — 81001 URINALYSIS AUTO W/SCOPE: CPT | Performed by: INTERNAL MEDICINE

## 2018-02-11 PROCEDURE — 74011250637 HC RX REV CODE- 250/637: Performed by: INTERNAL MEDICINE

## 2018-02-11 PROCEDURE — 85018 HEMOGLOBIN: CPT | Performed by: INTERNAL MEDICINE

## 2018-02-11 PROCEDURE — 87086 URINE CULTURE/COLONY COUNT: CPT | Performed by: INTERNAL MEDICINE

## 2018-02-11 PROCEDURE — 36415 COLL VENOUS BLD VENIPUNCTURE: CPT | Performed by: INTERNAL MEDICINE

## 2018-02-11 RX ORDER — CEFDINIR 300 MG/1
300 CAPSULE ORAL 2 TIMES DAILY
Qty: 8 CAP | Refills: 0 | Status: SHIPPED | OUTPATIENT
Start: 2018-02-11 | End: 2018-02-15

## 2018-02-11 RX ORDER — AMLODIPINE BESYLATE 5 MG/1
5 TABLET ORAL DAILY
Qty: 30 TAB | Refills: 0 | Status: SHIPPED | OUTPATIENT
Start: 2018-02-12 | End: 2018-02-14

## 2018-02-11 RX ADMIN — AMLODIPINE BESYLATE 5 MG: 5 TABLET ORAL at 09:09

## 2018-02-11 RX ADMIN — DOCUSATE SODIUM AND SENNOSIDES 1 TABLET: 8.6; 5 TABLET, FILM COATED ORAL at 09:09

## 2018-02-11 RX ADMIN — CARBAMAZEPINE 200 MG: 200 TABLET ORAL at 09:09

## 2018-02-11 RX ADMIN — TAMSULOSIN HYDROCHLORIDE 0.4 MG: 0.4 CAPSULE ORAL at 09:09

## 2018-02-11 RX ADMIN — CEFTRIAXONE SODIUM 1 G: 1 INJECTION, POWDER, FOR SOLUTION INTRAMUSCULAR; INTRAVENOUS at 14:16

## 2018-02-11 RX ADMIN — FINASTERIDE 5 MG: 5 TABLET, FILM COATED ORAL at 09:09

## 2018-02-11 RX ADMIN — Medication 10 ML: at 14:16

## 2018-02-11 NOTE — PROGRESS NOTES
Bedside and Verbal shift change report given to Dipesh Alcantar RN (oncoming nurse) by Bonnie Lara RN (offgoing nurse). Report included the following information SBAR, Kardex, Intake/Output, MAR, Recent Results and Cardiac Rhythm Paced.

## 2018-02-11 NOTE — DISCHARGE SUMMARY
Physician Discharge Summary     Patient ID:  Lane Alexis  [id number]  84 y.o.  1935    Admit date: 2/7/2018    Discharge date: 2/11/2018    Admission Diagnoses: Acute GI bleeding  GI bleed    Principal Discharge Diagnoses:    GI Bleed  UTI    OTHER PROBLEMS ADDRESSEDS  Principal Diagnosis Acute GI bleeding                                            Principal Problem:    Acute GI bleeding (2/7/2018)    Active Problems:    Coronary atherosclerosis of native coronary artery (9/2/2011)      CKD (chronic kidney disease) stage 4, GFR 15-29 ml/min ([address] (9/5/2017)      Benign prostatic hyperplasia (9/5/2017)      Anemia (10/10/2017)      CAD (coronary artery disease) ()      Overview: 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 ([facility])      Hypertension ()      Systolic CHF, chronic (HCC) ()      Dyslipidemia (10/24/2017)      Dilated cardiomyopathy ([address] (10/24/2017)      Atrial fibrillation with controlled ventricular rate ([facility] (10/24/2017)      Overview: New discovered 10/19/17      Pacemaker (10/24/2017)      Overview: Implanted Feb 2017 (Dr Mikey Damon at [place] [id number]) - [employer]       Patient Active Problem List   Diagnosis Code    Coronary atherosclerosis of native coronary artery I25.10    CKD (chronic kidney disease) stage 4, GFR 15-29 ml/min (HCC) N18.4    Benign prostatic hyperplasia N40.0    Hydronephrosis N13.30    H pylori ulcer K27.9, B96.81    Urine retention R33.9    Elevated brain natriuretic peptide (BNP) level R79.89    Anemia D64.9    CAD (coronary artery disease) I25.10    Hypertension F42    Systolic CHF, chronic (HCC) I50.22    Dyslipidemia E78.5    Dilated cardiomyopathy (HCC) I42.0    Atrial fibrillation with controlled ventricular rate (HCC) I48.91    Pacemaker Z95.0    Acute GI bleeding K92.2         Hospital Course:   Mr. Dann Britt is a 79 yo AAM w/ hx of HTN, CAD s/p CABG, HFrEF 30%, chronic afib on coumadin, PUD, CKD 4, BPH p/w BRBPR, admitted for acute blood loss anemia      Acute GI bleeding: due to diverticulosis evident on colonoscopy, precipitated by coumadin and ASA. No antithrombotics given severe blood anemia. No ulcers on EGD, stopped IV PPI. No further bleeding. Repeating Hg again today, if remains stable, plan for discharge later     Acute blood loss anemia: due to above. No further bleeding. No melena, hematochezia, BRBPR. Also noted mild thrombocytopenia. Repeating CBC, if stable, can follow up outpatient with CBC. Repeat labs outpatient and further workup, if needed     UTI: UA and CXR checked due to Tmax 100.1 overnight. WBC 10.3, no leukocytosis. Pt feels well. No fevers or chills. CXR negative. UA was consistent with UTI. Will give a dose of IV ceftriaxone and then d/c home on PO Omnicef. Will follow up on urine culture    HTN/CAD: not taking BB, statin, defer to cardiology. ASA on hold. BP was as high as 190s yesterday, much better today on Norvasc. Will d/c home on Norvasc, with holding parameters      Chronic afib/chronic anticoagulation: s/p pacer. Due to potentially life-threatening GI bleed, coumadin on hold and was given vitamin K. Rate controlled. Appreciate cardiology evaluation, may be good candidate for WATCHMAN device, to follow up with Dr. Antwon SCHULER      HFrEF: EF 30%. Echo 10/10/17 with LVEF 30%, lexiscan cardiolite 11/15/17 which improved LVEF 50%. Given improvement in LVEF, no ICD upggrade. Should still f/u with Dr. Dorina Lincoln as above      CKD 4: Cr fluctuates, in baseline range, monitor BMP      Hx of seizures: cont Tegretol       Pt discharged in improved and stable condition. Procedures performed: see above    Imaging studies:   CXR 2/11  No acute process.         PCP: Opal Freedman MD    Consults: Cardiology and GI    Discharge Exam:  Patient Vitals for the past 12 hrs:   Temp Pulse Resp BP SpO2   02/11/18 1046 99.6 °F (37.6 °C) 85 21 144/47 99 %   02/11/18 0907 99.7 °F (37.6 °C) 76 18 138/58 100 %   02/11/18 0843 - 80 18 - -   02/11/18 0655 - 74 - - -   02/11/18 0600 - 70 18 121/51 -   02/11/18 0500 - 71 19 104/44 -   02/11/18 0400 99 °F (37.2 °C) 77 17 125/45 99 %   02/11/18 0300 - 80 16 122/51 -   02/11/18 0200 - 82 23 112/59 -       Gen:  Elderly, thin, pleasant  HEENT:  Pink conjunctivae, hearing intact to voice, moist mucous membranes  Neck:  Supple, without masses  Resp:  No accessory muscle use, clear breath sounds without wheezes rales or rhonchi  Card:  No murmurs, normal S1, S2 without thrills, bruits or peripheral edema  Abd:  Soft, non-tender, non-distended, normoactive bowel sounds are present      Disposition: home    Patient Instructions:   Current Discharge Medication List      START taking these medications    Details   amLODIPine (NORVASC) 5 mg tablet Take 1 Tab by mouth daily. Do not take if systolic blood pressure is less than 140  Qty: 30 Tab, Refills: 0      cefdinir (OMNICEF) 300 mg capsule Take 1 Cap by mouth two (2) times a day for 4 days. Qty: 8 Cap, Refills: 0         CONTINUE these medications which have NOT CHANGED    Details   senna-docusate (PERICOLACE) 8.6-50 mg per tablet Take 1 Tab by mouth daily. finasteride (PROSCAR) 5 mg tablet Take 1 Tab by mouth daily. Qty: 30 Tab, Refills: 1      tamsulosin (FLOMAX) 0.4 mg capsule Take 1 Cap by mouth daily. Qty: 30 Cap, Refills: 1      acetaminophen (TYLENOL) 325 mg tablet Take 325 mg by mouth every four (4) hours as needed for Pain. carbamazepine (TEGRETOL) 200 mg tablet Take 200 mg by mouth three (3) times daily.          STOP taking these medications       warfarin (COUMADIN) 5 mg tablet Comments:   Reason for Stopping:         aspirin delayed-release 81 mg tablet Comments:   Reason for Stopping:               Activity: See discharge instructions  Diet: See discharge instructions  Wound Care: See discharge instructions    Follow-up Information     Follow up With Details Comments 355 Pavan Tellez MD In 3 days recheck lab (complete blood count) [id number] [address] [place] [address] 95  130 Anusha Sierra MD In 2 weeks  [address]  Tito Dykes MD  as previously scheduled [address]  [phone]            I spent 35 minutes on this discharge.     Signed:  Keith Nails MD  2/11/2018  1:33 PM

## 2018-02-11 NOTE — DISCHARGE INSTRUCTIONS
HOSPITALIST DISCHARGE INSTRUCTIONS  NAME: Johnathan Mullins   :  1935   MRN:  [id number]     Date/Time:  2018 1:32 PM    ADMIT DATE: 2018     DISCHARGE DATE: 2018     PRINCIPAL DISCHARGE DIAGNOSES:  Diverticular bleeding    MEDICATIONS:  · It is important that you take the medication exactly as they are prescribed. Note the changes and additions to your medications. Be sure you understand these changes before you are discharged today. · Keep your medication in the bottles provided by the pharmacist and keep a list of the medication names, dosages, and times to be taken in your wallet. · Do not take other medications without consulting your doctor. Pain Management: per above medications    What to do at Home    Recommended diet:  Heart healthy diet    Recommended activity: Activity as tolerated    If you experience any of the following symptoms then please call your primary care physician or return to the emergency room if you cannot get hold of your doctor:  Fever, chills, severe abdominal pain, nausea, vomiting, diarrhea, confusion, bleeding, severe chest pain, shortness of breath, severe weakness or other concerning symptoms that brought you to the hospital in the first place    Follow Up: Follow-up Information     Follow up With Details Comments 355 Pavan Tellez MD In 3 days  [facility]      Mindi Jane MD In 2 weeks  [address] [facility]      Lydia Rodrigues MD  as previously scheduled [address] [place] [address]  [phone]              Information obtained by :  I understand that if any problems occur once I am at home I am to contact my physician. I understand and acknowledge receipt of the instructions indicated above. Physician's or R.N.'s Signature                                                                  Date/Time                                                                                                                                              Patient or Representative Signature                                                          Date/Time

## 2018-02-11 NOTE — PROGRESS NOTES
1:55 PM  CBC drawn prior to start of abx    3:41 PM  I have reviewed discharge instructions with the patient and son. The patient and son verbalized understanding.

## 2018-02-12 LAB
BACTERIA SPEC CULT: NORMAL
CC UR VC: NORMAL
SERVICE CMNT-IMP: NORMAL

## 2018-02-14 ENCOUNTER — HOSPITAL ENCOUNTER (EMERGENCY)
Age: 83
Discharge: HOME | End: 2018-02-14
Attending: EMERGENCY MEDICINE
Payer: MEDICARE

## 2018-02-14 VITALS
SYSTOLIC BLOOD PRESSURE: 188 MMHG | WEIGHT: 170 LBS | DIASTOLIC BLOOD PRESSURE: 82 MMHG | BODY MASS INDEX: 25.18 KG/M2 | RESPIRATION RATE: 14 BRPM | HEIGHT: 69 IN | TEMPERATURE: 98 F | HEART RATE: 74 BPM | OXYGEN SATURATION: 100 %

## 2018-02-14 DIAGNOSIS — I10 ESSENTIAL HYPERTENSION: Primary | ICD-10-CM

## 2018-02-14 DIAGNOSIS — R77.8 ELEVATED TROPONIN: ICD-10-CM

## 2018-02-14 DIAGNOSIS — N18.4 CKD (CHRONIC KIDNEY DISEASE) STAGE 4, GFR 15-29 ML/MIN (HCC): ICD-10-CM

## 2018-02-14 LAB
ALBUMIN SERPL-MCNC: 3.2 G/DL (ref 3.5–5)
ALBUMIN/GLOB SERPL: 0.8 {RATIO} (ref 1.1–2.2)
ALP SERPL-CCNC: 78 U/L (ref 45–117)
ALT SERPL-CCNC: 14 U/L (ref 12–78)
ANION GAP SERPL CALC-SCNC: 6 MMOL/L (ref 5–15)
AST SERPL-CCNC: 19 U/L (ref 15–37)
ATRIAL RATE: 73 BPM
BASOPHILS # BLD: 0 K/UL (ref 0–0.1)
BASOPHILS NFR BLD: 0 % (ref 0–1)
BILIRUB SERPL-MCNC: 0.2 MG/DL (ref 0.2–1)
BUN SERPL-MCNC: 39 MG/DL (ref 6–20)
BUN/CREAT SERPL: 14 (ref 12–20)
CALCIUM SERPL-MCNC: 8.8 MG/DL (ref 8.5–10.1)
CALCULATED P AXIS, ECG09: 23 DEGREES
CALCULATED R AXIS, ECG10: 45 DEGREES
CALCULATED T AXIS, ECG11: -173 DEGREES
CHLORIDE SERPL-SCNC: 103 MMOL/L (ref 97–108)
CO2 SERPL-SCNC: 24 MMOL/L (ref 21–32)
CREAT SERPL-MCNC: 2.74 MG/DL (ref 0.7–1.3)
DIAGNOSIS, 93000: NORMAL
DIFFERENTIAL METHOD BLD: ABNORMAL
EOSINOPHIL # BLD: 0 K/UL (ref 0–0.4)
EOSINOPHIL NFR BLD: 1 % (ref 0–7)
ERYTHROCYTE [DISTWIDTH] IN BLOOD BY AUTOMATED COUNT: 13.6 % (ref 11.5–14.5)
GLOBULIN SER CALC-MCNC: 3.9 G/DL (ref 2–4)
GLUCOSE SERPL-MCNC: 97 MG/DL (ref 65–100)
HCT VFR BLD AUTO: 26 % (ref 36.6–50.3)
HGB BLD-MCNC: 8.3 G/DL (ref 12.1–17)
IMM GRANULOCYTES # BLD: 0 K/UL (ref 0–0.04)
IMM GRANULOCYTES NFR BLD AUTO: 0 % (ref 0–0.5)
INR PPP: 1 (ref 0.9–1.1)
LYMPHOCYTES # BLD: 0.9 K/UL (ref 0.8–3.5)
LYMPHOCYTES NFR BLD: 16 % (ref 12–49)
MCH RBC QN AUTO: 31.3 PG (ref 26–34)
MCHC RBC AUTO-ENTMCNC: 31.9 G/DL (ref 30–36.5)
MCV RBC AUTO: 98.1 FL (ref 80–99)
MONOCYTES # BLD: 0.5 K/UL (ref 0–1)
MONOCYTES NFR BLD: 9 % (ref 5–13)
NEUTS SEG # BLD: 4 K/UL (ref 1.8–8)
NEUTS SEG NFR BLD: 74 % (ref 32–75)
NRBC # BLD: 0 K/UL (ref 0–0.01)
NRBC BLD-RTO: 0 PER 100 WBC
P-R INTERVAL, ECG05: 228 MS
PLATELET # BLD AUTO: 166 K/UL (ref 150–400)
PMV BLD AUTO: 10 FL (ref 8.9–12.9)
POTASSIUM SERPL-SCNC: 4.6 MMOL/L (ref 3.5–5.1)
PROT SERPL-MCNC: 7.1 G/DL (ref 6.4–8.2)
PROTHROMBIN TIME: 10.1 SEC (ref 9–11.1)
Q-T INTERVAL, ECG07: 368 MS
QRS DURATION, ECG06: 100 MS
QTC CALCULATION (BEZET), ECG08: 405 MS
RBC # BLD AUTO: 2.65 M/UL (ref 4.1–5.7)
SODIUM SERPL-SCNC: 133 MMOL/L (ref 136–145)
TROPONIN I SERPL-MCNC: 0.97 NG/ML
TROPONIN I SERPL-MCNC: 0.97 NG/ML
VENTRICULAR RATE, ECG03: 73 BPM
WBC # BLD AUTO: 5.5 K/UL (ref 4.1–11.1)

## 2018-02-14 PROCEDURE — 80053 COMPREHEN METABOLIC PANEL: CPT | Performed by: NURSE PRACTITIONER

## 2018-02-14 PROCEDURE — 85025 COMPLETE CBC W/AUTO DIFF WBC: CPT | Performed by: NURSE PRACTITIONER

## 2018-02-14 PROCEDURE — 74011250637 HC RX REV CODE- 250/637: Performed by: NURSE PRACTITIONER

## 2018-02-14 PROCEDURE — 93005 ELECTROCARDIOGRAM TRACING: CPT

## 2018-02-14 PROCEDURE — 99285 EMERGENCY DEPT VISIT HI MDM: CPT

## 2018-02-14 PROCEDURE — 36415 COLL VENOUS BLD VENIPUNCTURE: CPT | Performed by: NURSE PRACTITIONER

## 2018-02-14 PROCEDURE — 85610 PROTHROMBIN TIME: CPT | Performed by: NURSE PRACTITIONER

## 2018-02-14 PROCEDURE — 84484 ASSAY OF TROPONIN QUANT: CPT | Performed by: NURSE PRACTITIONER

## 2018-02-14 RX ORDER — AMLODIPINE BESYLATE 5 MG/1
10 TABLET ORAL DAILY
Qty: 30 TAB | Refills: 0 | Status: SHIPPED | OUTPATIENT
Start: 2018-02-14 | End: 2018-04-23

## 2018-02-14 RX ORDER — BENAZEPRIL HYDROCHLORIDE 40 MG/1
40 TABLET ORAL DAILY
COMMUNITY
End: 2018-09-17 | Stop reason: CLARIF

## 2018-02-14 RX ORDER — AMLODIPINE BESYLATE 5 MG/1
5 TABLET ORAL
Status: COMPLETED | OUTPATIENT
Start: 2018-02-14 | End: 2018-02-14

## 2018-02-14 RX ADMIN — AMLODIPINE BESYLATE 5 MG: 5 TABLET ORAL at 17:18

## 2018-02-14 NOTE — CONSULTS
Reason for Consult: Accelerated Hypertension. HPI: Betzy Gardiner is a 80 y.o. male with past medical history significant for CAD, CKD, atrial fibrillation, congestive heart failure, hypertension. He was recently admitted last week for lower GI bleed while on anticoagulation. His anticoagulation was interrupted and he was deemed not a candidate for anticoagulation due to life-threatening bleeds. He was rather placed on the list for watchman device implantation for left atrial appendage occlusion. He has this scheduled for March 20. Today he took his medications as usual however minutes later when he checked his blood pressure it was very much elevated. He presents to the hospital for high blood pressure. He denies any symptoms of chest pain, shortness of breath, lightheadedness or dizziness. Troponin was drawn which was positive at 0.97 and repeat troponin was positive at 0.97. He is known to have minimally elevated troponin at 0.06 on previous admission when he had severe anemia. EKG in the ER demonstrated normal sinus rhythm with nonspecific T-wave changes in the lateral leads. This is unchanged from the prior EKG. Echo 10/10/17- LVEF 30%, global HK, PA systolic 54  Lexiscan Cardiolite 11/15/17 - Normal perfusion. EF 50%. Plan:    1. Positive troponin: He is asymptomatic from chest pain or shortness of breath standpoint. Positive troponin which is flat without any significant trend could be from accelerated hypertension. Since EKG is nonischemic and he does not have significant symptoms I believe it would be all right to take a conservative approach. He could be discharged if his blood pressure is better controlled. He will need close follow-up as outpatient. I will have Dr. Lenard Sales follow him up. 2.  Accelerated hypertension: He is taking his medication as prescribed. I will increase his amlodipine to 10 mg p.o. daily. Continue benazepril.   He cannot be on diuretics due to CKD. He may need beta-blockers. Close follow-up as outpatient. 3.  Paroxysmal atrial fibrillation: Currently remain in sinus rhythm. He is off anticoagulation due to recent significant GI bleed. He is scheduled for watchman device. 4.  Cardiomyopathy: Most recent stress test in November 2017 was nonischemic. EF was 50%. Currently holding off plans for AICD. Past Medical History:   Diagnosis Date    CAD (coronary artery disease)     CHF (congestive heart failure) (HCC)     CKD (chronic kidney disease), stage IV (HCC)     Hyperlipidemia     Hypertension     Systolic CHF, chronic ([facility]             Past Surgical History:   Procedure Laterality Date    COLONOSCOPY N/A 9/6/2017    COLON[facility]COPY performed by Merna Marin MD at [facility] COLONOSCOPY N/A 2/8/2018    COLONOSCOPY performed by Suki Barreto MD at [address] [place] [address] HX CORONARY ARTERY BYPASS GRAFT      HX PACEMAKER               Family History   Problem Relation Age of Onset    Heart Disease Mother     Hypertension Mother            Social History     Social History    Marital status:      Spouse name: N/A    Number of children: N/A    Years of education: N/A     Occupational History    Not on file. Social History Main Topics    Smoking status: Former Smoker    Smokeless tobacco: Never Used    Alcohol use No    Drug use: No    Sexual activity: Not on file     Other Topics Concern    Not on file     Social History Narrative         No Known Allergies         Current Outpatient Prescriptions   Medication Sig Dispense Refill    benazepril (LOTENSIN) 40 mg tablet Take 40 mg by mouth daily.  amLODIPine (NORVASC) 5 mg tablet Take 1 Tab by mouth daily. Do not take if systolic blood pressure is less than 140 30 Tab 0    cefdinir (OMNICEF) 300 mg capsule Take 1 Cap by mouth two (2) times a day for 4 days. 8 Cap 0    finasteride (PROSCAR) 5 mg tablet Take 1 Tab by mouth daily.  30 Tab 1    tamsulosin (FLOMAX) 0.4 mg capsule Take 1 Cap by mouth daily. 30 Cap 1    carbamazepine (TEGRETOL) 200 mg tablet Take 200 mg by mouth three (3) times daily.  senna-docusate (PERICOLACE) 8.6-50 mg per tablet Take 1 Tab by mouth daily.  acetaminophen (TYLENOL) 325 mg tablet Take 325 mg by mouth every four (4) hours as needed for Pain. ROS:  12 point review of systems was performed.  All negative except for HPI     Physical Exam:  Visit Vitals    /65 (BP 1 Location: Left arm, BP Patient Position: At rest)    Pulse 68    Temp 98 °F (36.7 °C)    Resp 15    Ht 5' 9\" (1.753 m)    Wt 170 lb (77.1 kg)    SpO2 100%    BMI 25.1 kg/m2       Gen:  Well-developed, well-nourished, in no acute distress  HEENT:  Pink conjunctivae, PERRL, hearing intact to voice, moist mucous membranes  Neck:  Supple, without masses, thyroid non-tender  Resp:  No accessory muscle use, clear breath sounds without wheezes rales or rhonchi  Card:  No murmurs, normal S1, S2 without thrills, bruits or peripheral edema  Abd:  Soft, non-tender, non-distended, normoactive bowel sounds are present, no palpable organomegaly and no detectable hernias  Lymph:  No cervical or inguinal adenopathy  Musc:  No cyanosis or clubbing  Skin:  No rashes or ulcers, skin turgor is good  Neuro:  Cranial nerves are grossly intact, no focal motor weakness, follows commands appropriately  Psych:  Good insight, oriented to person, place and time, alert     Labs:     Lab Results   Component Value Date/Time    WBC 5.5 02/14/2018 12:55 PM    HGB 8.3 (L) 02/14/2018 12:55 PM    Hemoglobin (POC) 7.5 (L) 09/07/2011 06:16 PM    HCT 26.0 (L) 02/14/2018 12:55 PM    Hematocrit (POC) 22 (L) 09/07/2011 06:16 PM    PLATELET 312 27/91/4471 12:55 PM    MCV 98.1 02/14/2018 12:55 PM     Lab Results   Component Value Date/Time    Glucose 97 02/14/2018 12:55 PM    Glucose (POC) 128 (H) 09/07/2011 06:16 PM    Glucose (POC) 170 (H) 09/02/2011 08:39 PM    Creatinine (POC) 2.2 (H) 09/07/2011 06:16 PM    Creatinine 2.74 (H) 02/14/2018 12:55 PM      No results found for: CHOL, CHOLPOCT, HDL, LDL, LDLC, LDLCPOC, LDLCEXT, TRIGL, TGLPOCT, CHHD, CHHDX  Lab Results   Component Value Date/Time    ALT (SGPT) 14 02/14/2018 12:55 PM    AST (SGOT) 19 02/14/2018 12:55 PM    Alk.  phosphatase 78 02/14/2018 12:55 PM    Bilirubin, direct 0.1 09/07/2011 06:09 PM    Bilirubin, total 0.2 02/14/2018 12:55 PM    Albumin 3.2 (L) 02/14/2018 12:55 PM    Protein, total 7.1 02/14/2018 12:55 PM    INR 1.0 02/14/2018 12:55 PM    Prothrombin time 10.1 02/14/2018 12:55 PM    PLATELET 778 97/93/4156 12:55 PM     Lab Results   Component Value Date/Time    INR 1.0 02/14/2018 12:55 PM    INR 1.2 (H) 02/09/2018 09:44 AM    INR 2.1 (H) 02/07/2018 01:40 PM    Prothrombin time 10.1 02/14/2018 12:55 PM    Prothrombin time 12.0 (H) 02/09/2018 09:44 AM    Prothrombin time 21.4 (H) 02/07/2018 01:40 PM      Lab Results   Component Value Date/Time    GFR est non-AA 22 (L) 02/14/2018 12:55 PM    GFRNA, POC 31 (L) 09/07/2011 06:16 PM    GFR est AA 27 (L) 02/14/2018 12:55 PM    GFRAA, POC 38 (L) 09/07/2011 06:16 PM    Creatinine 2.74 (H) 02/14/2018 12:55 PM    Creatinine (POC) 2.2 (H) 09/07/2011 06:16 PM    BUN 39 (H) 02/14/2018 12:55 PM    BUN (POC) 25 (H) 09/07/2011 06:16 PM    Sodium 133 (L) 02/14/2018 12:55 PM    Sodium (POC) 137 09/07/2011 06:16 PM    Potassium 4.6 02/14/2018 12:55 PM    Potassium (POC) 4.4 09/07/2011 06:16 PM    Chloride 103 02/14/2018 12:55 PM    Chloride (POC) 104 09/07/2011 06:16 PM    CO2 24 02/14/2018 12:55 PM    Magnesium 1.9 02/10/2018 01:05 AM    Phosphorus 3.7 02/10/2018 01:05 AM     No results found for: PSA, Aneita Double, PSAR3, [id number], [id number], PSALT  Lab Results   Component Value Date/Time    TSH 2.47 10/12/2017 12:41 AM      Lab Results   Component Value Date/Time    Glucose 97 02/14/2018 12:55 PM    Glucose (POC) 128 (H) 09/07/2011 06:16 PM    Glucose (POC) 170 (H) 09/02/2011 08:39 PM      Lab Results   Component Value Date/Time     02/07/2018 01:40 PM    CK - MB 2.3 02/07/2018 01:40 PM    CK-MB Index 1.0 02/07/2018 01:40 PM    Troponin-I, Qt. 0.97 (H) 02/14/2018 12:55 PM      Lab Results   Component Value Date/Time    NT pro-BNP 69606 (H) 10/10/2017 11:36 AM      Lab Results   Component Value Date/Time    Sodium 133 (L) 02/14/2018 12:55 PM    Potassium 4.6 02/14/2018 12:55 PM    Chloride 103 02/14/2018 12:55 PM    CO2 24 02/14/2018 12:55 PM    Anion gap 6 02/14/2018 12:55 PM    Glucose 97 02/14/2018 12:55 PM    BUN 39 (H) 02/14/2018 12:55 PM    Creatinine 2.74 (H) 02/14/2018 12:55 PM    BUN/Creatinine ratio 14 02/14/2018 12:55 PM    GFR est AA 27 (L) 02/14/2018 12:55 PM    GFR est non-AA 22 (L) 02/14/2018 12:55 PM    Calcium 8.8 02/14/2018 12:55 PM      Lab Results   Component Value Date/Time    Sodium 133 (L) 02/14/2018 12:55 PM    Potassium 4.6 02/14/2018 12:55 PM    Chloride 103 02/14/2018 12:55 PM    CO2 24 02/14/2018 12:55 PM    Anion gap 6 02/14/2018 12:55 PM    Glucose 97 02/14/2018 12:55 PM    BUN 39 (H) 02/14/2018 12:55 PM    Creatinine 2.74 (H) 02/14/2018 12:55 PM    BUN/Creatinine ratio 14 02/14/2018 12:55 PM    GFR est AA 27 (L) 02/14/2018 12:55 PM    GFR est non-AA 22 (L) 02/14/2018 12:55 PM    Calcium 8.8 02/14/2018 12:55 PM    Bilirubin, total 0.2 02/14/2018 12:55 PM    ALT (SGPT) 14 02/14/2018 12:55 PM    AST (SGOT) 19 02/14/2018 12:55 PM    Alk.  phosphatase 78 02/14/2018 12:55 PM    Protein, total 7.1 02/14/2018 12:55 PM    Albumin 3.2 (L) 02/14/2018 12:55 PM    Globulin 3.9 02/14/2018 12:55 PM    A-G Ratio 0.8 (L) 02/14/2018 12:55 PM      No results found for: HBA1C, JAQ1QEAL, HGBE8, NRS1MOOB, MXT9QTXB      Recent Labs      02/14/18   1255   TROIQ  0.97*           Problem List:     Problem List  Date Reviewed: 11/8/2017          Codes Class Noted    Acute GI bleeding ICD-10-CM: K92.2  ICD-9-CM: 578.9  2/7/2018        Dyslipidemia ICD-10-CM: E78.5  ICD-9-CM: 272.4  10/24/2017        Dilated cardiomyopathy ([facility] ICD-10-CM: I42.0  ICD-9-CM: 425.4  10/24/2017        Atrial fibrillation with controlled ventricular rate ([facility] ICD-10-CM: I48.91  ICD-9-CM: 427.31  10/24/2017    Overview Signed 10/24/2017  2:10 PM by Timothy Muñoz MD     New discovered 10/19/17             Pacemaker ICD-10-CM: Z95.0  ICD-9-CM: V45.01  10/24/2017    Overview Signed 10/24/2017  2:12 PM by Timothy Muñoz MD     Implanted Feb 2017 (Dr Soy Mckeon at [place] [id number]) - [employer]             CAD (coronary artery disease) ICD-10-CM: I25.10  ICD-9-CM: 414.00  Unknown    Overview Signed 10/24/2017  2:11 PM by Timothy Muñoz MD     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 ([facility])             Hypertension ICD-10-CM: I10  ICD-9-CM: 401.9  Unknown        Systolic CHF, chronic ([facility] ICD-10-CM: I50.22  ICD-9-CM: 428.22, 428.0  Unknown        Elevated brain natriuretic peptide (BNP) level ICD-10-CM: R79.89  ICD-9-CM: 790.99  10/10/2017        Anemia ICD-10-CM: D64.9  ICD-9-CM: 285.9  10/10/2017        H pylori ulcer ICD-10-CM: K27.9, B96.81  ICD-9-CM: 533.90, 041.86  9/6/2017        Urine retention ICD-10-CM: R33.9  ICD-9-CM: 788.20  9/6/2017        CKD (chronic kidney disease) stage 4, GFR 15-29 ml/min ([facility]) ICD-10-CM: N18.4  ICD-9-CM: 585.4  9/5/2017        Benign prostatic hyperplasia ICD-10-CM: N40.0  ICD-9-CM: 600.00  9/5/2017        Hydronephrosis ICD-10-CM: N13.30  ICD-9-CM: 906  9/5/2017        Coronary atherosclerosis of native coronary artery ICD-10-CM: I25.10  ICD-9-CM: 414.01  9/2/2011                Iam Lea MD, [facility] [place]

## 2018-02-14 NOTE — ED NOTES
Bedside and Verbal shift change report given to [facility] (oncoming nurse) by Jonathan Young (offgoing nurse). Report included the following information SBAR, ED Summary, MAR and Recent Results.

## 2018-02-14 NOTE — PROGRESS NOTES
2/14/2018   CARE MANAGEMENT NOTE:   CM is following pt in the ER for potential readmission. EMR reviewed. Per chart hx, pt was hospitalized at [facility] from 2/7/2018 - 2/11/2018 with dx of GIB. Pt discharged home without any post discharge needs. Initial Assessment (hx taken from previous notes)  Reportedly, pt lives with his son Micki Hinson  ([phone]) in a single story home with one entry step to the home. PTA pt was ambulatory, and was independent with ADL's, and drives. Pt has RX drug coverage and uses [employer]. He had [facility] in the past but no homecare currently. DME in the home includes a cane. PCP is Dr. Sara Suero. Today, pt presents to the ER with cc: elevated BP at home. He is being examined.   Rell

## 2018-02-14 NOTE — ED PROVIDER NOTES
HPI Comments: 80 y.o. male with past medical history significant for CAD and HTN who presents from home with chief complaint of HTN. The patient states that his BP was elevated this am.  He took his medication this am at 0900 and then he took his BP. The patient denies CP/SOB/Abd pain/N/V or any other sx. The patient states he wants to make sure that he is ok. The patient was recently released from this facility with a GI bleed, HTN and UTI. There are no other acute medical concerns at this time. PCP: Odilon Nina MD    Past Medical History:  No date: CAD (coronary artery disease)  No date: CHF (congestive heart failure) (HCC)  No date: CKD (chronic kidney disease), stage IV (HCC)  No date: Hyperlipidemia  No date: Hypertension  No date: Systolic CHF, chronic ([address]      The history is provided by the patient. No  was used. Past Medical History:   Diagnosis Date    CAD (coronary artery disease)     CHF (congestive heart failure) (HCC)     CKD (chronic kidney disease), stage IV (HCC)     Hyperlipidemia     Hypertension     Systolic CHF, chronic ([address]        Past Surgical History:   Procedure Laterality Date    COLONOSCOPY N/A 9/6/2017    COLONOSCOPY performed by Joylene Holstein, MD at [address] [place] [address] COLONOSCOPY N/A 2/8/2018    COLONOSCOPY performed by Crystal Edwards MD at [address] [place] [address] HX CORONARY ARTERY BYPASS GRAFT      HX PACEMAKER           Family History:   Problem Relation Age of Onset    Heart Disease Mother     Hypertension Mother        Social History     Social History    Marital status:      Spouse name: N/A    Number of children: N/A    Years of education: N/A     Occupational History    Not on file.      Social History Main Topics    Smoking status: Former Smoker    Smokeless tobacco: Never Used    Alcohol use No    Drug use: No    Sexual activity: Not on file     Other Topics Concern    Not on file     Social History Narrative         ALLERGIES: Review of patient's allergies indicates no known allergies. Review of Systems   Constitutional: Negative. Negative for chills, diaphoresis and fever. HENT: Negative. Negative for congestion, rhinorrhea and trouble swallowing. Eyes: Negative. Respiratory: Negative. Negative for shortness of breath. Cardiovascular: Negative. Gastrointestinal: Negative. Negative for abdominal pain, nausea and vomiting. Endocrine: Negative. Musculoskeletal: Negative for arthralgias, myalgias, neck pain and neck stiffness. Skin: Negative. Negative for rash. Allergic/Immunologic: Negative. Neurological: Negative. Negative for dizziness, syncope, weakness and headaches. Hematological: Negative. Psychiatric/Behavioral: Negative. Vitals:    02/14/18 1415 02/14/18 1430 02/14/18 1441 02/14/18 1500   BP: 196/77 178/61 180/65 189/70   Pulse: 70 63 68 68   Resp: 19 16 15 14   Temp:   98 °F (36.7 °C)    SpO2: 100% 100% 100% 100%   Weight:       Height:                Physical Exam   Constitutional: He is oriented to person, place, and time. Vital signs are normal. He appears well-developed and well-nourished. Non-toxic appearance. He does not have a sickly appearance. He does not appear ill. HENT:   Head: Normocephalic and atraumatic. Eyes: Conjunctivae, EOM and lids are normal. Pupils are equal, round, and reactive to light. Neck: Trachea normal, normal range of motion and full passive range of motion without pain. Neck supple. Cardiovascular: Normal rate, regular rhythm, normal heart sounds and normal pulses. Pulmonary/Chest: Effort normal and breath sounds normal.   Abdominal: Soft. Normal appearance and bowel sounds are normal.   Musculoskeletal: Normal range of motion. Neurological: He is alert and oriented to person, place, and time. He has normal strength. GCS eye subscore is 4. GCS verbal subscore is 5. GCS motor subscore is 6.    Skin: Skin is warm, dry and intact. Psychiatric: He has a normal mood and affect. His speech is normal and behavior is normal. Judgment and thought content normal. Cognition and memory are normal.   Nursing note and vitals reviewed. MDM  Number of Diagnoses or Management Options  CKD (chronic kidney disease) stage 4, GFR 15-29 ml/min (HCC): new and requires workup  Elevated troponin: new and requires workup  Essential hypertension: new and requires workup     Amount and/or Complexity of Data Reviewed  Clinical lab tests: ordered  Discuss the patient with other providers: yes (Valdez Dee)    Risk of Complications, Morbidity, and/or Mortality  Presenting problems: moderate  Diagnostic procedures: moderate  Management options: moderate    Patient Progress  Patient progress: improved        ED Course       Procedures    ED EKG interpretation:  Rhythm: Sinus rhythm with first degree AV Block; and regular . Rate (approx.): 73; Axis: normal; P wave: normal; QRS interval: normal ; ST/T wave: non-specific changes; in  Lead: II; Other findings: abnormal ekg. EKG documented by Noah Mccartney NP, as interpreted by Velma Kaur MD, ED MD.    CONSULT NOTE:   3:35 PM  Joe COSTELLO-BC spoke with Azul Motta NP,   Specialty: Cards  Discussed pt's hx, disposition, and available diagnostic and imaging results. Reviewed care plans. Consultant agrees with plans as outlined. Agrees that the trop needs to be evaluated. Please have the hospitalist admit and Cards will consult. .     CONSULT NOTE:   4:10 PM  Ed Akers. Lainey COSTELLO-BC spoke with Dr Apryl Arteaga,   Specialty: Cards  Discussed pt's hx, disposition, and available diagnostic and imaging results. Reviewed care plans. Consultant agrees with plans as outlined. Please TigarText the Trop results. If elevated, plan on admission. CONSULT NOTE:   4:28 PM  Ed Akers.  Lainey COSTELLO-BC spoke with Dr Apryl Arteaga,   Specialty: Cards  Discussed pt's hx, disposition, and available diagnostic and imaging results. Reviewed care plans. Consultant agrees with plans as outlined. Please increase his Norvasc to 10 mg every day. Tonya Waters LABORATORY TESTS:  Recent Results (from the past 12 hour(s))   EKG, 12 LEAD, INITIAL    Collection Time: 02/14/18 12:49 PM   Result Value Ref Range    Ventricular Rate 73 BPM    Atrial Rate 73 BPM    P-R Interval 228 ms    QRS Duration 100 ms    Q-T Interval 368 ms    QTC Calculation (Bezet) 405 ms    Calculated P Axis 23 degrees    Calculated R Axis 45 degrees    Calculated T Axis -173 degrees    Diagnosis       Sinus rhythm with 1st degree AV block  Nonspecific ST and T wave abnormality  Abnormal ECG  When compared with ECG of 07-FEB-2018 13:49,  NM interval has increased  Confirmed by Noemi SHER, Madeyln Busby ([id number]) on 2/14/2018 5:14:01 PM     CBC WITH AUTOMATED DIFF    Collection Time: 02/14/18 12:55 PM   Result Value Ref Range    WBC 5.5 4.1 - 11.1 K/uL    RBC 2.65 (L) 4.10 - 5.70 M/uL    HGB 8.3 (L) 12.1 - 17.0 g/dL    HCT 26.0 (L) 36.6 - 50.3 %    MCV 98.1 80.0 - 99.0 FL    MCH 31.3 26.0 - 34.0 PG    MCHC 31.9 30.0 - 36.5 g/dL    RDW 13.6 11.5 - 14.5 %    PLATELET 589 947 - 897 K/uL    MPV 10.0 8.9 - 12.9 FL    NRBC 0.0 0  WBC    ABSOLUTE NRBC 0.00 0.00 - 0.01 K/uL    NEUTROPHILS 74 32 - 75 %    LYMPHOCYTES 16 12 - 49 %    MONOCYTES 9 5 - 13 %    EOSINOPHILS 1 0 - 7 %    BASOPHILS 0 0 - 1 %    IMMATURE GRANULOCYTES 0 0.0 - 0.5 %    ABS. NEUTROPHILS 4.0 1.8 - 8.0 K/UL    ABS. LYMPHOCYTES 0.9 0.8 - 3.5 K/UL    ABS. MONOCYTES 0.5 0.0 - 1.0 K/UL    ABS. EOSINOPHILS 0.0 0.0 - 0.4 K/UL    ABS. BASOPHILS 0.0 0.0 - 0.1 K/UL    ABS. IMM.  GRANS. 0.0 0.00 - 0.04 K/UL    DF AUTOMATED     METABOLIC PANEL, COMPREHENSIVE    Collection Time: 02/14/18 12:55 PM   Result Value Ref Range    Sodium 133 (L) 136 - 145 mmol/L    Potassium 4.6 3.5 - 5.1 mmol/L    Chloride 103 97 - 108 mmol/L    CO2 24 21 - 32 mmol/L    Anion gap 6 5 - 15 mmol/L    Glucose 97 65 - 100 mg/dL    BUN 39 (H) 6 - 20 MG/DL    Creatinine 2.74 (H) 0.70 - 1.30 MG/DL    BUN/Creatinine ratio 14 12 - 20      GFR est AA 27 (L) >60 ml/min/1.73m2    GFR est non-AA 22 (L) >60 ml/min/1.73m2    Calcium 8.8 8.5 - 10.1 MG/DL    Bilirubin, total 0.2 0.2 - 1.0 MG/DL    ALT (SGPT) 14 12 - 78 U/L    AST (SGOT) 19 15 - 37 U/L    Alk. phosphatase 78 45 - 117 U/L    Protein, total 7.1 6.4 - 8.2 g/dL    Albumin 3.2 (L) 3.5 - 5.0 g/dL    Globulin 3.9 2.0 - 4.0 g/dL    A-G Ratio 0.8 (L) 1.1 - 2.2     TROPONIN I    Collection Time: 02/14/18 12:55 PM   Result Value Ref Range    Troponin-I, Qt. 0.97 (H) <0.05 ng/mL   PROTHROMBIN TIME + INR    Collection Time: 02/14/18 12:55 PM   Result Value Ref Range    INR 1.0 0.9 - 1.1      Prothrombin time 10.1 9.0 - 11.1 sec   TROPONIN I    Collection Time: 02/14/18  3:29 PM   Result Value Ref Range    Troponin-I, Qt. 0.97 (H) <0.05 ng/mL       IMAGING RESULTS:    CT Results  (Last 48 hours)    None        PFT Results  (Last 48 hours)    None        Echo Results  (Last 48 hours)    None        CXR Results  (Last 48 hours)    None        VENOUS DOPPLER results  (Last 48 hours)    None            MEDICATIONS GIVEN:  Medications   amLODIPine (NORVASC) tablet 5 mg (5 mg Oral Given 2/14/18 1718)       IMPRESSION:  1. Essential hypertension    2. Elevated troponin    3. CKD (chronic kidney disease) stage 4, GFR 15-29 ml/min ([facility])        PLAN:  1. Increase Norvasc to 10 mg QD  2. F/U with PCP, Cards and nephrology  Return to ED if worse    Discharge Note  4:55 PM  The patient is ready for discharge. The patient's signs, symptoms, diagnosis, and discharge instructions have been discussed and the patient has conveyed their understanding. The patient is to follow up as recommended or return to the ER should their symptoms worsen. Plan has been discussed and the patient is in agreement. Elfego oPlk Pagé FNP-BC.

## 2018-02-14 NOTE — DISCHARGE INSTRUCTIONS
Acute High Blood Pressure: Care Instructions  Your Care Instructions    Acute high blood pressure is very high blood pressure. It's a serious problem. Very high blood pressure can damage your brain, heart, eyes, and kidneys. You may have been given medicines to lower your blood pressure. You may have gotten them as pills or through a needle in one of your veins. This is called an IV. And maybe you were given other medicines too. These can be needed when high blood pressure causes other problems. To keep your blood pressure at a lower level, you may need to make healthy lifestyle changes. And you will probably need to take medicines. Be sure to follow up with your doctor about your blood pressure and what you can do about it. Follow-up care is a key part of your treatment and safety. Be sure to make and go to all appointments, and call your doctor if you are having problems. It's also a good idea to know your test results and keep a list of the medicines you take. How can you care for yourself at home? · See your doctor as often as he or she recommends. This is to make sure your blood pressure is under control. You will probably go at least 2 times a year. But it may be more often at first.  · Take your blood pressure medicine exactly as prescribed. You may take one or more types. They include diuretics, beta-blockers, ACE inhibitors, calcium channel blockers, and angiotensin II receptor blockers. Call your doctor if you think you are having a problem with your medicine. · If you take blood pressure medicine, talk to your doctor before you take decongestants or anti-inflammatory medicine, such as ibuprofen. These can raise blood pressure. · Learn how to check your blood pressure at home. Check it often. · Ask your doctor if it's okay to drink alcohol. · Talk to your doctor about lifestyle changes that can help blood pressure. These include being active and not smoking.   When should you call for help?  Call 911 anytime you think you may need emergency care. This may mean having symptoms that suggest that your blood pressure is causing a serious heart or blood vessel problem. Your blood pressure may be over 180/110. ? For example, call 911 if:  ? · You have symptoms of a heart attack. These may include:  ¨ Chest pain or pressure, or a strange feeling in the chest.  ¨ Sweating. ¨ Shortness of breath. ¨ Nausea or vomiting. ¨ Pain, pressure, or a strange feeling in the back, neck, jaw, or upper belly or in one or both shoulders or arms. ¨ Lightheadedness or sudden weakness. ¨ A fast or irregular heartbeat. ? · You have symptoms of a stroke. These may include:  ¨ Sudden numbness, tingling, weakness, or loss of movement in your face, arm, or leg, especially on only one side of your body. ¨ Sudden vision changes. ¨ Sudden trouble speaking. ¨ Sudden confusion or trouble understanding simple statements. ¨ Sudden problems with walking or balance. ¨ A sudden, severe headache that is different from past headaches. ? · You have severe back or belly pain. ?Do not wait until your blood pressure comes down on its own. Get help right away. ?Call your doctor now or seek immediate care if:  ? · Your blood pressure is much higher than normal (such as 180/110 or higher), but you don't have symptoms. ? · You think high blood pressure is causing symptoms, such as:  ¨ Severe headache. ¨ Blurry vision. ? Watch closely for changes in your health, and be sure to contact your doctor if:  ? · Your blood pressure measures 140/90 or higher at least 2 times. That means the top number is 140 or higher or the bottom number is 90 or higher, or both. ? · You think you may be having side effects from your blood pressure medicine. ? · Your blood pressure is usually normal, but it goes above normal at least 2 times. Where can you learn more? Go to http://oscar-kamari.info/.   Enter K132 in the search box to learn more about \"Acute High Blood Pressure: Care Instructions. \"  Current as of: September 21, 2016  Content Version: 11.4  © 7135-7953 [employer]. Care instructions adapted under license by [employer] (which disclaims liability or warranty for this information). If you have questions about a medical condition or this instruction, always ask your healthcare professional. [address] any warranty or liability for your use of this information. Chronic Kidney Disease: Care Instructions  Your Care Instructions    Chronic kidney disease happens when your kidneys don't work as well as they should. Your kidneys have a few important jobs. They remove waste from your blood. This waste leaves your body in your urine. They also balance your body's fluids and chemicals. When your kidneys don't work well, extra waste and fluid can build up. This can poison the body and sometimes cause death. The most common causes of this disease are diabetes and high blood pressure. In some cases, the disease develops in 2 to 3 months. But it usually develops over many years. If you take medicine and make healthy changes to your lifestyle, you may be able to prevent the disease from getting worse. But if your kidney damage gets worse, you may need dialysis or a kidney transplant. Dialysis uses a machine to filter waste from the blood. A transplant is surgery to give you a healthy kidney from another person. Follow-up care is a key part of your treatment and safety. Be sure to make and go to all appointments, and call your doctor if you are having problems. It's also a good idea to know your test results and keep a list of the medicines you take. How can you care for yourself at home? ? Treatments and appointments  ? · Be safe with medicines. Take your medicines exactly as prescribed. Call your doctor if you have any problems with your medicine.  You also may take medicine to control your blood pressure or to treat diabetes. Many people who have diabetes take blood pressure medicine. ? · If you have diabetes, do your best to keep your blood sugar in your target range. You may do this by eating healthy food and exercising. You may also take medicines. ? · Go to your dialysis appointments if you have this treatment. ? · Do not take ibuprofen (Advil, Motrin), naproxen (Aleve), or similar medicines, unless your doctor tells you to. These may make the disease worse. ? · Do not take any vitamins, over-the-counter medicines, or herbal products without talking to your doctor first.   ? · Do not smoke or use other tobacco products. Smoking can reduce blood flow to the kidneys. If you need help quitting, talk to your doctor about stop-smoking programs and medicines. These can increase your chances of quitting for good. ? · Do not drink alcohol or use illegal drugs. ? · Talk to your doctor about an exercise plan. Exercise helps lower your blood pressure. It also makes you feel better. ? · If you have an advance directive, let your doctor know. It may include a living will and a durable power of  for health care. If you don't have one, you may want to prepare one. It lets your doctor and loved ones know your health care wishes if you become unable to speak for yourself. Diet  ? · Talk to a registered dietitian. He or she can help you make a meal plan that is right for you. Most people with kidney disease need to limit salt (sodium), fluids, and protein. Some also have to limit potassium and phosphorus. ? · You may have to give up many foods you like. But try to focus on the fact that this will help you stay healthy for as long as possible. ? · If you have a hard time eating enough, talk to your doctor or dietitian about ways to add calories to your diet. ? · Your diet may change as your disease changes. See your doctor for regular testing.  And work with a dietitian to change your diet as needed. When should you call for help? Call 911 anytime you think you may need emergency care. For example, call if:  ? · You passed out (lost consciousness). ?Call your doctor now or seek immediate medical care if:  ? · You have less urine than normal or no urine. ? · You have trouble urinating or can urinate only very small amounts. ? · You are confused or have trouble thinking clearly. ? · You feel weaker or more tired than usual.   ? · You are very thirsty, lightheaded, or dizzy. ? · You have nausea and vomiting. ? · You have new swelling of your arms or feet, or your swelling is worse. ? · You have blood in your urine. ? · You have new or worse trouble breathing. ? Watch closely for changes in your health, and be sure to contact your doctor if:  ? · You have any problems with your medicine or other treatment. Where can you learn more? Go to http://oscar-kamari.info/. Enter N276 in the search box to learn more about \"Chronic Kidney Disease: Care Instructions. \"  Current as of: May 12, 2017  Content Version: 11.4  © 0978-5399 [employer]. Care instructions adapted under license by [employer] (which disclaims liability or warranty for this information). If you have questions about a medical condition or this instruction, always ask your healthcare professional. [place] [address] any warranty or liability for your use of this information. We hope that we have addressed all of your medical concerns. The examination and treatment you received in the Emergency Department were for an emergent problem and were not intended as complete care. It is important that you follow up with your healthcare provider(s) for ongoing care. If your symptoms worsen or do not improve as expected, and you are unable to reach your usual health care provider(s), you should return to the Emergency Department.       Today's healthcare is undergoing tremendous change, and patient satisfaction surveys are one of the many tools to assess the quality of medical care. You may receive a survey from the [employer] organization regarding your experience in the Emergency Department. I hope that your experience has been completely positive, particularly the medical care that I provided. As such, please participate in the survey; anything less than excellent does not meet my expectations or intentions. [facility] and [facility] participate in nationally recognized quality of care measures. If your blood pressure is greater than 120/80, as reported below, we urge that you seek medical care to address the potential of high blood pressure, commonly known as hypertension. Hypertension can be hereditary or can be caused by certain medical conditions, pain, stress, or \"white coat syndrome. \"       Please make an appointment with your health care provider(s) for follow up of your Emergency Department visit. VITALS:   Patient Vitals for the past 8 hrs:   Temp Pulse Resp BP SpO2   02/14/18 1625 - - - 188/82 -   02/14/18 1500 - 68 14 189/70 100 %   02/14/18 1441 98 °F (36.7 °C) 68 15 180/65 100 %   02/14/18 1430 - 63 16 178/61 100 %   02/14/18 1415 - 70 19 196/77 100 %   02/14/18 1331 - 67 19 193/86 100 %   02/14/18 1149 97.9 °F (36.6 °C) 75 18 (!) 220/85 100 %          Thank you for allowing us to provide you with medical care today. We realize that you have many choices for your emergency care needs. Please choose us in the future for any continued health care needs. Antoine Crocker NP    [facility]   Office: [phone]            Recent Results (from the past 24 hour(s))   EKG, 12 LEAD, INITIAL    Collection Time: 02/14/18 12:49 PM   Result Value Ref Range    Ventricular Rate 73 BPM    Atrial Rate 73 BPM    P-R Interval 228 ms    QRS Duration 100 ms    Q-T Interval 368 ms    QTC Calculation (Bezet) 405 ms    Calculated P Axis 23 degrees    Calculated R Axis 45 degrees    Calculated T Axis -173 degrees    Diagnosis       Sinus rhythm with 1st degree AV block  Nonspecific ST and T wave abnormality  Abnormal ECG  When compared with ECG of 07-FEB-2018 13:49,  IN interval has increased     CBC WITH AUTOMATED DIFF    Collection Time: 02/14/18 12:55 PM   Result Value Ref Range    WBC 5.5 4.1 - 11.1 K/uL    RBC 2.65 (L) 4.10 - 5.70 M/uL    HGB 8.3 (L) 12.1 - 17.0 g/dL    HCT 26.0 (L) 36.6 - 50.3 %    MCV 98.1 80.0 - 99.0 FL    MCH 31.3 26.0 - 34.0 PG    MCHC 31.9 30.0 - 36.5 g/dL    RDW 13.6 11.5 - 14.5 %    PLATELET 975 945 - 619 K/uL    MPV 10.0 8.9 - 12.9 FL    NRBC 0.0 0  WBC    ABSOLUTE NRBC 0.00 0.00 - 0.01 K/uL    NEUTROPHILS 74 32 - 75 %    LYMPHOCYTES 16 12 - 49 %    MONOCYTES 9 5 - 13 %    EOSINOPHILS 1 0 - 7 %    BASOPHILS 0 0 - 1 %    IMMATURE GRANULOCYTES 0 0.0 - 0.5 %    ABS. NEUTROPHILS 4.0 1.8 - 8.0 K/UL    ABS. LYMPHOCYTES 0.9 0.8 - 3.5 K/UL    ABS. MONOCYTES 0.5 0.0 - 1.0 K/UL    ABS. EOSINOPHILS 0.0 0.0 - 0.4 K/UL    ABS. BASOPHILS 0.0 0.0 - 0.1 K/UL    ABS. IMM. GRANS. 0.0 0.00 - 0.04 K/UL    DF AUTOMATED     METABOLIC PANEL, COMPREHENSIVE    Collection Time: 02/14/18 12:55 PM   Result Value Ref Range    Sodium 133 (L) 136 - 145 mmol/L    Potassium 4.6 3.5 - 5.1 mmol/L    Chloride 103 97 - 108 mmol/L    CO2 24 21 - 32 mmol/L    Anion gap 6 5 - 15 mmol/L    Glucose 97 65 - 100 mg/dL    BUN 39 (H) 6 - 20 MG/DL    Creatinine 2.74 (H) 0.70 - 1.30 MG/DL    BUN/Creatinine ratio 14 12 - 20      GFR est AA 27 (L) >60 ml/min/1.73m2    GFR est non-AA 22 (L) >60 ml/min/1.73m2    Calcium 8.8 8.5 - 10.1 MG/DL    Bilirubin, total 0.2 0.2 - 1.0 MG/DL    ALT (SGPT) 14 12 - 78 U/L    AST (SGOT) 19 15 - 37 U/L    Alk.  phosphatase 78 45 - 117 U/L    Protein, total 7.1 6.4 - 8.2 g/dL    Albumin 3.2 (L) 3.5 - 5.0 g/dL    Globulin 3.9 2.0 - 4.0 g/dL    A-G Ratio 0.8 (L) 1.1 - 2.2     TROPONIN I    Collection Time: 02/14/18 12:55 PM   Result Value Ref Range    Troponin-I, Qt. 0.97 (H) <0.05 ng/mL   PROTHROMBIN TIME + INR    Collection Time: 02/14/18 12:55 PM   Result Value Ref Range    INR 1.0 0.9 - 1.1      Prothrombin time 10.1 9.0 - 11.1 sec   TROPONIN I    Collection Time: 02/14/18  3:29 PM   Result Value Ref Range    Troponin-I, Qt. 0.97 (H) <0.05 ng/mL       No results found.

## 2018-03-28 ENCOUNTER — CLINICAL SUPPORT (OUTPATIENT)
Dept: CARDIOLOGY CLINIC | Age: 83
End: 2018-03-28

## 2018-03-28 ENCOUNTER — OFFICE VISIT (OUTPATIENT)
Dept: CARDIOLOGY CLINIC | Age: 83
End: 2018-03-28

## 2018-03-28 VITALS
HEIGHT: 69 IN | DIASTOLIC BLOOD PRESSURE: 78 MMHG | WEIGHT: 158 LBS | HEART RATE: 60 BPM | RESPIRATION RATE: 18 BRPM | BODY MASS INDEX: 23.4 KG/M2 | SYSTOLIC BLOOD PRESSURE: 170 MMHG | OXYGEN SATURATION: 99 %

## 2018-03-28 DIAGNOSIS — Z95.0 PACEMAKER: Primary | ICD-10-CM

## 2018-03-28 DIAGNOSIS — I48.91 ATRIAL FIBRILLATION WITH CONTROLLED VENTRICULAR RATE (HCC): Primary | ICD-10-CM

## 2018-03-28 RX ORDER — MELOXICAM 15 MG/1
TABLET ORAL
Refills: 0 | COMMUNITY
Start: 2018-02-22 | End: 2018-04-23

## 2018-03-28 NOTE — PATIENT INSTRUCTIONS
You are scheduled for a JYOTI (transesophageal echocardiogram) at [facility] on Friday, April 13th in preparation for [facility] device implant. Please arrive at the patient registration desk located on the 2nd floor of the main building at 11:30am.    Do not eat or drink anything after midnight the night before your procedure. You will need a . You may take your normal medications with a sip of water the morning of your procedure. Please call Molina Polo or Tito Littlejohn if you have any questions at [phone]. Please call the office if you develop any type of illness prior to your procedure. For information about the Watchman Device go to: [email or web address]    Transesophageal Echocardiogram: Before Your Procedure  What is a transesophageal echocardiogram?    A transesophageal echocardiogram is a test to help your doctor look at the inside of your heart. A small device called a transducer directs sound waves toward your heart. The sound waves make a picture of the heart's valves and chambers. Your doctor may do this test to look for certain types of heart disease. Or it may be done to see how disease is affecting your heart. You will be given medicine to make you sleepy and comfortable during the test.  The doctor puts a small, flexible tube into your throat and guides it to the esophagus. This is the tube that connects your mouth to your stomach. The doctor will ask you to swallow as the tube goes down. The transducer is at the tip of the tube. It gets close to your heart to make clear pictures. The doctor will look at the ultrasound pictures on a screen. You will not be able to eat or drink until the numbness from the throat spray wears off. Your throat may be sore for a few days after the test.  Follow-up care is a key part of your treatment and safety. Be sure to make and go to all appointments, and call your doctor if you are having problems.  It's also a good idea to know your test results and keep a list of the medicines you take. What happens before the procedure? ?Preparing for the procedure  ? · Understand exactly what procedure is planned, along with the risks, benefits, and other options. · Tell your doctors ALL the medicines, vitamins, supplements, and herbal remedies you take. Some of these can increase the risk of bleeding or interact with anesthesia. ? · If you take blood thinners, such as warfarin (Coumadin), clopidogrel (Plavix), or aspirin, be sure to talk to your doctor. He or she will tell you if you should stop taking these medicines before your procedure. Make sure that you understand exactly what your doctor wants you to do.   ? · Your doctor will tell you which medicines to take or stop before your procedure. You may need to stop taking certain medicines a week or more before the procedure. So talk to your doctor as soon as you can.   ? · If you have an advance directive, let your doctor know. It may include a living will and a durable power of  for health care. Bring a copy to the hospital. If you don't have one, you may want to prepare one. It lets your doctor and loved ones know your health care wishes. Doctors advise that everyone prepare these papers before any type of surgery or procedure. ? · Be sure to tell your doctor about any problems you have with your stomach or esophagus. Procedures can be stressful. This information will help you understand what you can expect. And it will help you safely prepare for your procedure. What happens on the day of the procedure? · Follow the instructions exactly about when to stop eating and drinking. If you don't, your procedure may be canceled. If your doctor told you to take your medicines on the day of the procedure, take them with only a sip of water. ? · Take a bath or shower before you come in for your procedure. Do not apply lotions, perfumes, deodorants, or nail polish.    ? · Take off all jewelry and piercings. And take out contact lenses, if you wear them. ? At the hospital or surgery center   · Bring a picture ID. ? · You will be kept comfortable and safe by your anesthesia provider. You may get medicine that relaxes you or puts you in a light sleep. The area being worked on will be numb. ? · You will get some medicine sprayed in your throat. This will numb your throat to prevent you from gagging when the transducer is moved into your esophagus. ? · You will lie on your left side on an exam table. You may get a mouth guard to protect your teeth. ? · The procedure will take about 2 hours. During that time, the tube is in your throat for 10 to 20 minutes. Going home   · Be sure you have someone to drive you home. Anesthesia and pain medicine make it unsafe for you to drive. ? · You will be given more specific instructions about recovering from your procedure. They will cover things like diet, wound care, follow-up care, driving, and getting back to your normal routine. When should you call your doctor? · You have questions or concerns. ? · You don't understand how to prepare for your procedure. ? · You become ill before the procedure (such as fever, flu, or a cold). ? · You need to reschedule or have changed your mind about having the procedure. Where can you learn more? Go to http://oscar-kamari.info/. Enter K500 in the search box to learn more about \"Transesophageal Echocardiogram: Before Your Procedure. \"  Current as of: September 21, 2016  Content Version: 11.4  © 0738-8795 [employer]. Care instructions adapted under license by [employer] (which disclaims liability or warranty for this information). If you have questions about a medical condition or this instruction, always ask your healthcare professional. [address] any warranty or liability for your use of this information.

## 2018-03-28 NOTE — PROGRESS NOTES
HISTORY OF PRESENTING ILLNESS      Kamala Lund is a 80 y.o. male with history of CAD/CABG, CKD stage IV, pacemaker, paroxysmal atrial fibrillation with recent episode of GI bleeding while on warfarin. His anticoagulation was discontinued and is referred to discuss options for left atrial appendage occlusion. Previously we considered the potential need for upgrade of his pacemaker to a CRTD system; Prateek Self nuclear stress in 11/17 failed to demonstrate ischemia but showed preserved LV function. He denies cardiac complaints.       ACTIVE PROBLEM LIST     Patient Active Problem List    Diagnosis Date Noted    Acute GI bleeding 02/07/2018    Dyslipidemia 10/24/2017    Dilated cardiomyopathy ([facility] 10/24/2017    Atrial fibrillation with controlled ventricular rate ([facility] 10/24/2017    Pacemaker 10/24/2017    CAD (coronary artery disease)     Hypertension     Systolic CHF, chronic ([facility])     Elevated brain natriuretic peptide (BNP) level 10/10/2017    Anemia 10/10/2017    H pylori ulcer 09/06/2017    Urine retention 09/06/2017    CKD (chronic kidney disease) stage 4, GFR 15-29 ml/min ([facility]) 09/05/2017    Benign prostatic hyperplasia 09/05/2017    Hydronephrosis 09/05/2017    Coronary atherosclerosis of native coronary artery 09/02/2011           PAST MEDICAL HISTORY     Past Medical History:   Diagnosis Date    CAD (coronary artery disease)     CHF (congestive heart failure) (HCC)     CKD (chronic kidney disease), stage IV ([facility])     Hyperlipidemia     Hypertension     Systolic CHF, chronic ([address]            PAST SURGICAL HISTORY     Past Surgical History:   Procedure Laterality Date    COLONOSCOPY N/A 9/6/2017    COLONOSCOPY performed by Claudia Brower MD at [address] [place] [address]  N/A 2/8/2018    COLONOSCOPY performed by Janet Lima MD at [address] HX CORONARY ARTERY BYPASS GRAFT      HX PACEMAKER            ALLERGIES     No Known Allergies       FAMILY HISTORY Family History   Problem Relation Age of Onset    Heart Disease Mother     Hypertension Mother     negative for cardiac disease       SOCIAL HISTORY     Social History     Social History    Marital status:      Spouse name: N/A    Number of children: N/A    Years of education: N/A     Social History Main Topics    Smoking status: Former Smoker    Smokeless tobacco: Never Used    Alcohol use No    Drug use: No    Sexual activity: Not on file     Other Topics Concern    Not on file     Social History Narrative         MEDICATIONS     Current Outpatient Prescriptions   Medication Sig    benazepril (LOTENSIN) 40 mg tablet Take 40 mg by mouth daily.  amLODIPine (NORVASC) 5 mg tablet Take 2 Tabs by mouth daily. Do not take if systolic blood pressure is less than 140    senna-docusate (PERICOLACE) 8.6-50 mg per tablet Take 1 Tab by mouth daily.  finasteride (PROSCAR) 5 mg tablet Take 1 Tab by mouth daily.  tamsulosin (FLOMAX) 0.4 mg capsule Take 1 Cap by mouth daily.  acetaminophen (TYLENOL) 325 mg tablet Take 325 mg by mouth every four (4) hours as needed for Pain.  carbamazepine (TEGRETOL) 200 mg tablet Take 200 mg by mouth three (3) times daily. No current facility-administered medications for this visit. I have reviewed the nurses notes, vitals, problem list, allergy list, medical history, family, social history and medications. REVIEW OF SYMPTOMS      General: Pt denies excessive weight gain or loss. Pt is able to conduct ADL's  HEENT: Denies blurred vision, headaches, hearing loss, epistaxis and difficulty swallowing. Respiratory: Denies cough, congestion, shortness of breath, COOPER, wheezing or stridor.   Cardiovascular: Denies precordial pain, palpitations, edema or PND  Gastrointestinal: Denies poor appetite, indigestion, abdominal pain or blood in stool  Genitourinary: Denies hematuria, dysuria, increased urinary frequency  Musculoskeletal: Denies joint pain or swelling from muscles or joints  Neurologic: Denies tremor, paresthesias, headache, or sensory motor disturbance  Psychiatric: Denies confusion, insomnia, depression  Integumentray: Denies rash, itching or ulcers. Hematologic: Denies easy bruising, bleeding       PHYSICAL EXAMINATION      There were no vitals filed for this visit. General: Well developed, in no acute distress. HEENT: No jaundice, oral mucosa moist, no oral ulcers  Neck: Supple, no stiffness, no lymphadenopathy, supple  Heart:  Normal S1/S2 negative S3 or S4. Regular, no murmur, gallop or rub, no jugular venous distention  Respiratory: Clear bilaterally x 4, no wheezing or rales  Abdomen:   Soft, non-tender, bowel sounds are active.   Extremities:  No edema, normal cap refill, no cyanosis. Musculoskeletal: No clubbing, no deformities  Neuro: A&Ox3, speech clear, gait stable, cooperative, no focal neurologic deficits  Skin: Skin color is normal. No rashes or lesions.  Non diaphoretic, moist.  Vascular: 2+ pulses symmetric in all extremities       DIAGNOSTIC DATA      EKG:        LABORATORY DATA      Lab Results   Component Value Date/Time    WBC 5.5 02/14/2018 12:55 PM    Hemoglobin (POC) 7.5 (L) 09/07/2011 06:16 PM    HGB 8.3 (L) 02/14/2018 12:55 PM    Hematocrit (POC) 22 (L) 09/07/2011 06:16 PM    HCT 26.0 (L) 02/14/2018 12:55 PM    PLATELET 087 09/29/0700 12:55 PM    MCV 98.1 02/14/2018 12:55 PM      Lab Results   Component Value Date/Time    Sodium 133 (L) 02/14/2018 12:55 PM    Potassium 4.6 02/14/2018 12:55 PM    Chloride 103 02/14/2018 12:55 PM    CO2 24 02/14/2018 12:55 PM    Anion gap 6 02/14/2018 12:55 PM    Glucose 97 02/14/2018 12:55 PM    BUN 39 (H) 02/14/2018 12:55 PM    Creatinine 2.74 (H) 02/14/2018 12:55 PM    BUN/Creatinine ratio 14 02/14/2018 12:55 PM    GFR est AA 27 (L) 02/14/2018 12:55 PM    GFR est non-AA 22 (L) 02/14/2018 12:55 PM    Calcium 8.8 02/14/2018 12:55 PM    Bilirubin, total 0.2 02/14/2018 12:55 PM    AST (SGOT) 19 02/14/2018 12:55 PM    Alk. phosphatase 78 02/14/2018 12:55 PM    Protein, total 7.1 02/14/2018 12:55 PM    Albumin 3.2 (L) 02/14/2018 12:55 PM    Globulin 3.9 02/14/2018 12:55 PM    A-G Ratio 0.8 (L) 02/14/2018 12:55 PM    ALT (SGPT) 14 02/14/2018 12:55 PM           ASSESSMENT      1. Atrial fibrillation   A. Paroxysmal   B. CHADSVASC 4   C. HASBLED 4-5  2. Hypertension  3. Coronary artery disease, native  4. CKD  5. History of GI bleed  6. Cardiomyopathy   A. Nonischemic   B. Resolved  7. CABG  8. History of gastric ulcer  9. Pacemaker   A. Dual-chamber   B. Right sided   C. Medtronic  10. Cardiomyopathy   A. Resolved       PLAN     He is a candidate for a watchman device for LAAO (CHADS-VASC 4 and previous GI bleed). Will plan for JYOTI to assess CARSON. FOLLOW-UP   Post procedure    Thank you, Gary Humphrey MD and Dr. Dewayne Sullivan for allowing me to participate in the care of this extraordinarily pleasant male. Please do not hesitate to contact me for further questions/concerns.      JEFFREY Orozco MD  Cardiac Electrophysiology / Cardiology    [place] [address].  [address], Suite [facility], [address] [place] [address], [address] [place] [address]  [phone] / [phone] Fax   [phone] / [phone] Fax

## 2018-03-28 NOTE — PROGRESS NOTES
Chief Complaint   Patient presents with    Follow-up     A-fib, cardiomyopathy,     1. Have you been to the ER, urgent care clinic since your last visit? Hospitalized since your last visit? [facility] ED on 2/14/18 for tx of htn Elevated troponin. 2. Have you seen or consulted any other health care providers outside of the [address] [place] [address] since your last visit? Include any pap smears or colon screening.  No

## 2018-04-06 RX ORDER — SODIUM CHLORIDE 0.9 % (FLUSH) 0.9 %
5-10 SYRINGE (ML) INJECTION EVERY 8 HOURS
Status: CANCELLED | OUTPATIENT
Start: 2018-04-06

## 2018-04-06 RX ORDER — SODIUM CHLORIDE 0.9 % (FLUSH) 0.9 %
5-10 SYRINGE (ML) INJECTION AS NEEDED
Status: CANCELLED | OUTPATIENT
Start: 2018-04-06

## 2018-04-13 ENCOUNTER — HOSPITAL ENCOUNTER (OUTPATIENT)
Dept: NON INVASIVE DIAGNOSTICS | Age: 83
Discharge: HOME | End: 2018-04-13
Attending: INTERNAL MEDICINE | Admitting: INTERNAL MEDICINE
Payer: MEDICARE

## 2018-04-13 VITALS
TEMPERATURE: 97.5 F | WEIGHT: 156.53 LBS | OXYGEN SATURATION: 99 % | HEART RATE: 61 BPM | DIASTOLIC BLOOD PRESSURE: 43 MMHG | SYSTOLIC BLOOD PRESSURE: 113 MMHG | BODY MASS INDEX: 23.18 KG/M2 | HEIGHT: 69 IN | RESPIRATION RATE: 20 BRPM

## 2018-04-13 PROCEDURE — 74011250636 HC RX REV CODE- 250/636: Performed by: INTERNAL MEDICINE

## 2018-04-13 PROCEDURE — 74011000250 HC RX REV CODE- 250: Performed by: INTERNAL MEDICINE

## 2018-04-13 PROCEDURE — 99152 MOD SED SAME PHYS/QHP 5/>YRS: CPT | Performed by: INTERNAL MEDICINE

## 2018-04-13 PROCEDURE — 93312 ECHO TRANSESOPHAGEAL: CPT

## 2018-04-13 RX ORDER — SODIUM CHLORIDE 0.9 % (FLUSH) 0.9 %
5-10 SYRINGE (ML) INJECTION AS NEEDED
Status: DISCONTINUED | OUTPATIENT
Start: 2018-04-13 | End: 2018-04-13 | Stop reason: HOSPADM

## 2018-04-13 RX ORDER — HYDRALAZINE HYDROCHLORIDE 20 MG/ML
INJECTION INTRAMUSCULAR; INTRAVENOUS
Status: DISCONTINUED
Start: 2018-04-13 | End: 2018-04-13 | Stop reason: HOSPADM

## 2018-04-13 RX ORDER — MIDAZOLAM HYDROCHLORIDE 1 MG/ML
.5-1 INJECTION, SOLUTION INTRAMUSCULAR; INTRAVENOUS
Status: DISCONTINUED | OUTPATIENT
Start: 2018-04-13 | End: 2018-04-13 | Stop reason: HOSPADM

## 2018-04-13 RX ORDER — LIDOCAINE HYDROCHLORIDE 20 MG/ML
JELLY TOPICAL ONCE
Status: DISCONTINUED | OUTPATIENT
Start: 2018-04-13 | End: 2018-04-13 | Stop reason: HOSPADM

## 2018-04-13 RX ORDER — BENAZEPRIL HYDROCHLORIDE 10 MG/1
40 TABLET ORAL ONCE
Status: DISCONTINUED | OUTPATIENT
Start: 2018-04-13 | End: 2018-04-13 | Stop reason: HOSPADM

## 2018-04-13 RX ORDER — FENTANYL CITRATE 50 UG/ML
25-200 INJECTION, SOLUTION INTRAMUSCULAR; INTRAVENOUS
Status: DISCONTINUED | OUTPATIENT
Start: 2018-04-13 | End: 2018-04-13 | Stop reason: HOSPADM

## 2018-04-13 RX ORDER — SODIUM CHLORIDE 0.9 % (FLUSH) 0.9 %
5-10 SYRINGE (ML) INJECTION EVERY 8 HOURS
Status: DISCONTINUED | OUTPATIENT
Start: 2018-04-13 | End: 2018-04-13 | Stop reason: HOSPADM

## 2018-04-13 RX ORDER — LIDOCAINE 50 MG/G
OINTMENT TOPICAL AS NEEDED
Status: DISCONTINUED | OUTPATIENT
Start: 2018-04-13 | End: 2018-04-13 | Stop reason: HOSPADM

## 2018-04-13 RX ORDER — HYDRALAZINE HYDROCHLORIDE 25 MG/1
25 TABLET, FILM COATED ORAL 3 TIMES DAILY
Qty: 90 TAB | Refills: 3 | Status: SHIPPED | OUTPATIENT
Start: 2018-04-13 | End: 2018-09-17 | Stop reason: CLARIF

## 2018-04-13 RX ORDER — DIPHENHYDRAMINE HYDROCHLORIDE 50 MG/ML
25-50 INJECTION, SOLUTION INTRAMUSCULAR; INTRAVENOUS ONCE
Status: COMPLETED | OUTPATIENT
Start: 2018-04-13 | End: 2018-04-13

## 2018-04-13 RX ORDER — HYDRALAZINE HYDROCHLORIDE 20 MG/ML
20 INJECTION INTRAMUSCULAR; INTRAVENOUS ONCE
Status: COMPLETED | OUTPATIENT
Start: 2018-04-13 | End: 2018-04-13

## 2018-04-13 RX ORDER — LIDOCAINE HYDROCHLORIDE 20 MG/ML
15 SOLUTION OROPHARYNGEAL AS NEEDED
Status: DISCONTINUED | OUTPATIENT
Start: 2018-04-13 | End: 2018-04-13 | Stop reason: HOSPADM

## 2018-04-13 RX ADMIN — DIPHENHYDRAMINE HYDROCHLORIDE 25 MG: 50 INJECTION, SOLUTION INTRAMUSCULAR; INTRAVENOUS at 12:44

## 2018-04-13 RX ADMIN — FENTANYL CITRATE 25 MCG: 50 INJECTION, SOLUTION INTRAMUSCULAR; INTRAVENOUS at 12:52

## 2018-04-13 RX ADMIN — LIDOCAINE HYDROCHLORIDE 15 ML: 20 SOLUTION ORAL; TOPICAL at 12:39

## 2018-04-13 RX ADMIN — LIDOCAINE: 50 OINTMENT TOPICAL at 12:41

## 2018-04-13 RX ADMIN — MIDAZOLAM HYDROCHLORIDE 1 MG: 1 INJECTION, SOLUTION INTRAMUSCULAR; INTRAVENOUS at 12:55

## 2018-04-13 RX ADMIN — MIDAZOLAM HYDROCHLORIDE 2 MG: 1 INJECTION, SOLUTION INTRAMUSCULAR; INTRAVENOUS at 12:52

## 2018-04-13 RX ADMIN — HYDRALAZINE HYDROCHLORIDE 20 MG: 20 INJECTION INTRAMUSCULAR; INTRAVENOUS at 13:04

## 2018-04-13 RX ADMIN — BENZOCAINE 2 SPRAY: 200 SPRAY DENTAL; ORAL; PERIODONTAL at 12:46

## 2018-04-13 NOTE — IP AVS SNAPSHOT
[facility] 
 
 
 [address] 
[phone] Patient: Patrick White Sr. MRN: [id number] :1935 About your hospitalization You were admitted on:  2018 You last received care in the:  [facility] PACU You were discharged on:  2018 Why you were hospitalized Your primary diagnosis was:  Not on File Follow-up Information Follow up With Details Comments Contact Info [place]   [address] [facility] [address] 
[phone] Discharge Orders None A check ray indicates which time of day the medication should be taken. My Medications START taking these medications Instructions Each Dose to Equal  
 Morning Noon Evening Bedtime  
 hydrALAZINE 25 mg tablet Commonly known as:  APRESOLINE Your last dose was: Your next dose is: Take 1 Tab by mouth three (3) times daily. 25 mg CONTINUE taking these medications Instructions Each Dose to Equal  
 Morning Noon Evening Bedtime  
 acetaminophen 325 mg tablet Commonly known as:  TYLENOL Your last dose was: Your next dose is: Take 325 mg by mouth every four (4) hours as needed for Pain. 325 mg  
    
   
   
   
  
 amLODIPine 5 mg tablet Commonly known as:  Ellis Lute Your last dose was: Your next dose is: Take 2 Tabs by mouth daily. Do not take if systolic blood pressure is less than 140  
 10 mg  
    
   
   
   
  
 benazepril 40 mg tablet Commonly known as:  LOTENSIN Your last dose was: Your next dose is: Take 40 mg by mouth daily. 40 mg  
    
   
   
   
  
 carBAMazepine 200 mg tablet Commonly known as:  TEGretol Your last dose was: Your next dose is: Take 200 mg by mouth three (3) times daily.   
 200 mg  
    
   
   
   
 finasteride 5 mg tablet Commonly known as:  PROSCAR Your last dose was: Your next dose is: Take 1 Tab by mouth daily. 5 mg  
    
   
   
   
  
 meloxicam 15 mg tablet Commonly known as:  MOBIC Your last dose was: Your next dose is:    
   
   
 take 1 tablet by mouth once daily  
     
   
   
   
  
 senna-docusate 8.6-50 mg per tablet Commonly known as:  Steva Neavitt Your last dose was: Your next dose is: Take 1 Tab by mouth daily. 1 Tab  
    
   
   
   
  
 tamsulosin 0.4 mg capsule Commonly known as:  FLOMAX Your last dose was: Your next dose is: Take 1 Cap by mouth daily. 0.4 mg Where to Get Your Medications These medications were sent to [address], [address] [facility] [address] Phone:  [phone]  
  hydrALAZINE 25 mg tablet Discharge Instructions None Introducing [facility]! Alisson Holguin introduces AppsFlyer patient portal. Now you can access parts of your medical record, email your doctor's office, and request medication refills online. 1. In your internet browser, go to [email or web address] 2. Click on the First Time User? Click Here link in the Sign In box. You will see the New Member Sign Up page. 3. Enter your AppsFlyer Access Code exactly as it appears below. You will not need to use this code after youve completed the sign-up process. If you do not sign up before the expiration date, you must request a new code. · AppsFlyer Access Code: [email or web address] Expires: 6/7/2018  6:31 AM 
 
4. Enter the last four digits of your Social Security Number (xxxx) and Date of Birth (mm/dd/yyyy) as indicated and click Submit. You will be taken to the next sign-up page. 5. Create a [employer] ID. This will be your Gifi login ID and cannot be changed, so think of one that is secure and easy to remember. 6. Create a Gifi password. You can change your password at any time. 7. Enter your Password Reset Question and Answer. This can be used at a later time if you forget your password. 8. Enter your e-mail address. You will receive e-mail notification when new information is available in [address] 9. Click Sign Up. You can now view and download portions of your medical record. 10. Click the Download Summary menu link to download a portable copy of your medical information. If you have questions, please visit the Frequently Asked Questions section of the Gifi website. Remember, Gifi is NOT to be used for urgent needs. For medical emergencies, dial 911. Now available from your iPhone and Android! Introducing Jonnathan Gambino As a [facility] patient, I wanted to make you aware of our electronic visit tool called Jonnathan Gambino. [facility] 24/7 allows you to connect within minutes with a medical provider 24 hours a day, seven days a week via a mobile device or tablet or logging into a secure website from your computer. You can access Jonnathan Gambino from anywhere in the [place]. A virtual visit might be right for you when you have a simple condition and feel like you just dont want to get out of bed, or cant get away from work for an appointment, when your regular [facility] provider is not available (evenings, weekends or holidays), or when youre out of town and need minor care. Electronic visits cost only $49 and if the [facility] 24/7 provider determines a prescription is needed to treat your condition, one can be electronically transmitted to a nearby pharmacy*. Please take a moment to enroll today if you have not already done so. The enrollment process is free and takes just a few minutes.   To enroll, please download the [employer] 24/7 deb to your tablet or phone, or visit [email or web address] to enroll on your computer. And, as an [address] [place] [address] patient with a T[employer] account, the results of your visits will be scanned into your electronic medical record and your primary care provider will be able to view the scanned results. We urge you to continue to see your regular [facility] [employer] [facility] provider for your ongoing medical care. And while your primary care provider may not be the one available when you seek a [facility] virtual visit, the peace of mind you get from getting a real diagnosis real time can be priceless. For more information on [facility], view our Frequently Asked Questions (FAQs) at [email or web address]. Sincerely, 
 
Nicole Hameed MD 
Chief Medical Officer [facility] Bree Ahn *:  certain medications cannot be prescribed via [facility] Providers Seen During Your Hospitalization Provider Specialty Primary office phone Gracy Godfrey MD Cardiology [phone] Your Primary Care Physician (PCP) Primary Care Physician Office Phone Office Fax William Stanford [phone] You are allergic to the following No active allergies Recent Documentation Height Weight BMI Smoking Status 1.753 m 71 kg 23.11 kg/m2 Former Smoker Emergency Contacts Name Discharge Info Relation Home Work [facility] DISCHARGE CAREGIVER [3] Son [22] [phone] [facility] DISCHARGE CAREGIVER [3] Daughter [21] [phone] Patient Belongings The following personal items are in your possession at time of discharge: 
     Visual Aid: None Discharge Instructions Attachments/References ECHOCARDIOGRAM: TRANSESOPHAGEAL: POST-OP (ENGLISH) Patient Handouts Transesophageal Echocardiogram: What to Expect at [facility] Your Recovery A transesophageal echocardiogram is a test to help your doctor look at the inside of your heart. A small device called a transducer directs sound waves toward your heart. The sound waves make a picture of the heart's valves and chambers. Before the test, your throat was sprayed with medicine to numb it. You won't be able to eat or drink until the numbness wears off. Your throat may be sore for a few days. You may have had a sedative to help you relax. You may be unsteady after having sedation. It can take a few hours for the medicine's effects to wear off. Common side effects include nausea, vomiting, and feeling sleepy or tired. This care sheet gives you a general idea about how long it will take for you to recover. But each person recovers at a different pace. Follow the steps below to feel better as quickly as possible. How can you care for yourself at home? Activity ? · If a sedative was used, your doctor will tell you when it is safe for you to do your normal activities. ? · For your safety, do not drive or operate any machinery that could be dangerous. Wait until the medicine wears off and you can think clearly and react easily. Diet ? · You can eat your normal diet. Follow-up care is a key part of your treatment and safety. Be sure to make and go to all appointments, and call your doctor if you are having problems. It's also a good idea to know your test results and keep a list of the medicines you take. When should you call for help? Watch closely for changes in your health, and be sure to contact your doctor if you have any problems. Where can you learn more? Go to http://oscar-kamari.info/. Enter G507 in the search box to learn more about \"Transesophageal Echocardiogram: What to Expect at Home. \" Current as of: September 21, 2016 Content Version: 11.4 © 9743-3116 Healthwise, Incorporated. Care instructions adapted under license by "[employer] (which disclaims liability or warranty for this information). If you have questions about a medical condition or this instruction, always ask your healthcare professional. [address] any warranty or liability for your use of this information. Please provide this summary of care documentation to your next provider. Signatures-by signing, you are acknowledging that this After Visit Summary has been reviewed with you and you have received a copy. Patient Signature:  ____________________________________________________________ Date:  ____________________________________________________________  
  
Brenda Bronson Provider Signature:  ____________________________________________________________ Date:  ____________________________________________________________

## 2018-04-13 NOTE — PROCEDURES
Cardiac Electrophysiology Report        PATIENT INFORMATION     Patient Name: Fausto Alvarado Sr. MRN: [id number]                  Study Date: 2018    YOB: 1935    Age: 80 y.o. Gender: male      Procedure:  Transesophageal EchocardiogramRa    Referring Physician:  Son Gamez MD        STAFF     Duty Name   Electrophysiologist Alla Welch MD   Monitor Carlos Steel RN   Circulator Stefano Moyer RN; Mariella Hamilton RN       PATIENT HISTORY     80year old male presenting for JYOTI evaluation in consideration for possible left atrial appendage occlusion. PROCEDURE     The patient was brought to the Cardiac Electrophysiology laboratory in a post-absorptive, fasting state. Informed consent was obtained. A peripheral IV was in place. Continuous electrocardiographic, blood pressure, O2 saturation and  CO2 monitoring was initiated. Once conscious sedation was achieved, a multi-planar lubricated JYOTI probe was advanced to the oropharynx and into the esophagus without difficulty. Limited views were obtained visualizing the left atrium and left atrial appendage. The left atrial appendage was visualized in multiple views and demonstrated the LA appendage was free of visualized thrombus. The JYOTI probe was then removed without difficulty. The patient remained hemodynamically stable, tolerated the procedure well and was transferred in stable condition. There were no immediate complications encountered during the procedure. MEDICATION SUMMARY     See anesthesia report      CONCLUSIONS     1.  Windsock shape morphology and left atrial appendage was free of visualized thrombus           Alla Welch MD  Cardiac Electrophysiology / Cardiology    [address], [address] [place] [address], [address], [address] [place] [address]  [phone] / [phone] Fax [phone] / [phone] Fax                Norman Lees MD

## 2018-04-13 NOTE — H&P
HISTORY OF PRESENTING ILLNESS       Nathalia Peña is a 80 y.o. male with history of CAD/CABG, CKD stage IV, pacemaker, paroxysmal atrial fibrillation with recent episode of GI bleeding while on warfarin. His anticoagulation was discontinued and is referred to discuss options for left atrial appendage occlusion. Previously we considered the potential need for upgrade of his pacemaker to a CRTD system; Saul Bulla nuclear stress in 11/17 failed to demonstrate ischemia but showed preserved LV function.  He denies cardiac complaints.       ACTIVE PROBLEM LIST           Patient Active Problem List     Diagnosis Date Noted    Acute GI bleeding 02/07/2018    Dyslipidemia 10/24/2017    Dilated cardiomyopathy ([facility] 10/24/2017    Atrial fibrillation with controlled ventricular rate ([facility] 10/24/2017    Pacemaker 10/24/2017    CAD (coronary artery disease)      Hypertension      Systolic CH[facility], chronic ([facility])      Elevated brain natriuretic peptide (BNP) level 10/10/2017    Anemia 10/10/2017    H pylori ulcer 09/06/2017    Urine retention 09/06/2017    CKD (chronic kidney disease) stage 4, GFR 15-29 ml/min ([facility]) 09/05/2017    Benign prostatic hyperplasia 09/05/2017    Hydronephrosis 09/05/2017    Coronary atherosclerosis of native coronary artery 09/02/2011             PAST MEDICAL HISTORY           Past Medical History:   Diagnosis Date    CAD (coronary artery disease)      CHF (congestive heart failure) (HCC)      CKD (chronic kidney disease), stage IV ([facility])      Hyperlipidemia      Hypertension      Systolic CHF, chronic ([address]               PAST SURGICAL HISTORY            Past Surgical History:   Procedure Laterality Date    COLONOSCOPY N/A 9/6/2017     COLONOSCOPY performed by Gurjit Sheridan MD at [address] [place] [address] COLONOSCOPY N/A 2/8/2018     COLONOSCOPY performed by Tram Blackmon MD at [address] [place] [address] HX CORONARY ARTERY BYPASS GRAFT        HX PACEMAKER                 ALLERGIES    No Known Allergies         FAMILY HISTORY            Family History   Problem Relation Age of Onset    Heart Disease Mother      Hypertension Mother      negative for cardiac disease         SOCIAL HISTORY       Social History                Social History    Marital status:        Spouse name: N/A    Number of children: N/A    Years of education: N/A           Social History Main Topics    Smoking status: Former Smoker    Smokeless tobacco: Never Used    Alcohol use No    Drug use: No    Sexual activity: Not on file           Other Topics Concern    Not on file      Social History Narrative               MEDICATIONS           Current Outpatient Prescriptions   Medication Sig    benazepril (LOTENSIN) 40 mg tablet Take 40 mg by mouth daily.  amLODIPine (NORVASC) 5 mg tablet Take 2 Tabs by mouth daily. Do not take if systolic blood pressure is less than 140    senna-docusate (PERICOLACE) 8.6-50 mg per tablet Take 1 Tab by mouth daily.  finasteride (PROSCAR) 5 mg tablet Take 1 Tab by mouth daily.  tamsulosin (FLOMAX) 0.4 mg capsule Take 1 Cap by mouth daily.  acetaminophen (TYLENOL) 325 mg tablet Take 325 mg by mouth every four (4) hours as needed for Pain.  carbamazepine (TEGRETOL) 200 mg tablet Take 200 mg by mouth three (3) times daily.      No current facility-administered medications for this visit.          I have reviewed the nurses notes, vitals, problem list, allergy list, medical history, family, social history and medications.         REVIEW OF SYMPTOMS       General: Pt denies excessive weight gain or loss. Pt is able to conduct ADL's  HEENT: Denies blurred vision, headaches, hearing loss, epistaxis and difficulty swallowing. Respiratory: Denies cough, congestion, shortness of breath, COOPER, wheezing or stridor.   Cardiovascular: Denies precordial pain, palpitations, edema or PND  Gastrointestinal: Denies poor appetite, indigestion, abdominal pain or blood in stool  Genitourinary: Denies hematuria, dysuria, increased urinary frequency  Musculoskeletal: Denies joint pain or swelling from muscles or joints  Neurologic: Denies tremor, paresthesias, headache, or sensory motor disturbance  Psychiatric: Denies confusion, insomnia, depression  Integumentray: Denies rash, itching or ulcers. Hematologic: Denies easy bruising, bleeding         PHYSICAL EXAMINATION       There were no vitals filed for this visit. General: Well developed, in no acute distress. HEENT: No jaundice, oral mucosa moist, no oral ulcers  Neck: Supple, no stiffness, no lymphadenopathy, supple  Heart:  Normal S1/S2 negative S3 or S4. Regular, no murmur, gallop or rub, no jugular venous distention  Respiratory: Clear bilaterally x 4, no wheezing or rales  Abdomen:   Soft, non-tender, bowel sounds are active.   Extremities:  No edema, normal cap refill, no cyanosis. Musculoskeletal: No clubbing, no deformities  Neuro: A&Ox3, speech clear, gait stable, cooperative, no focal neurologic deficits  Skin: Skin color is normal. No rashes or lesions.  Non diaphoretic, moist.  Vascular: 2+ pulses symmetric in all extremities         DIAGNOSTIC DATA       EKG:          LABORATORY DATA             Lab Results   Component Value Date/Time     WBC 5.5 02/14/2018 12:55 PM     Hemoglobin (POC) 7.5 (L) 09/07/2011 06:16 PM     HGB 8.3 (L) 02/14/2018 12:55 PM     Hematocrit (POC) 22 (L) 09/07/2011 06:16 PM     HCT 26.0 (L) 02/14/2018 12:55 PM     PLATELET 013 07/01/6539 12:55 PM     MCV 98.1 02/14/2018 12:55 PM            Lab Results   Component Value Date/Time     Sodium 133 (L) 02/14/2018 12:55 PM     Potassium 4.6 02/14/2018 12:55 PM     Chloride 103 02/14/2018 12:55 PM     CO2 24 02/14/2018 12:55 PM     Anion gap 6 02/14/2018 12:55 PM     Glucose 97 02/14/2018 12:55 PM     BUN 39 (H) 02/14/2018 12:55 PM     Creatinine 2.74 (H) 02/14/2018 12:55 PM     BUN/Creatinine ratio 14 02/14/2018 12:55 PM     GFR est AA 27 (L) 02/14/2018 12:55 PM     GFR est non-AA 22 (L) 02/14/2018 12:55 PM     Calcium 8.8 02/14/2018 12:55 PM     Bilirubin, total 0.2 02/14/2018 12:55 PM     AST (SGOT) 19 02/14/2018 12:55 PM     Alk. phosphatase 78 02/14/2018 12:55 PM     Protein, total 7.1 02/14/2018 12:55 PM     Albumin 3.2 (L) 02/14/2018 12:55 PM     Globulin 3.9 02/14/2018 12:55 PM     A-G Ratio 0.8 (L) 02/14/2018 12:55 PM     ALT (SGPT) 14 02/14/2018 12:55 PM             ASSESSMENT       1. Atrial fibrillation                        A. Paroxysmal                        B. CHADSVASC 4                        C. HASBLED 4-5  2. Hypertension  3. Coronary artery disease, native  4. CKD  5. History of GI bleed  6. Cardiomyopathy                        A. Nonischemic                        B. Resolved  7. CABG  8. History of gastric ulcer  9. Pacemaker                        A. Dual-chamber                        B. Right sided                        C. Medtronic  10.  Cardiomyopathy                        A. Resolved         PLAN       Will plan for JYOTI to assess CARSON.             Janet Patton MD  Cardiac Electrophysiology / Cardiology     [facility], [address], [address]  [place], [address] [place] [address]  [phone] / [phone] Fax                                                                  [phone] / [phone] Fax

## 2018-04-13 NOTE — PROGRESS NOTES
1130    Patient arrived. ID and allergies verified verbally with patient. Pt voices understanding of procedure to be performed. Consent obtained. Pt prepped for procedure. JYOTI pre Watchman    1235    TRANSFER - OUT REPORT:    Verbal report given to Marino Barros RN(name) on [facility].  being transferred to  (unit) for routine progression of care       Report consisted of patients Situation, Background, Assessment and   Recommendations(SBAR). Information from the following report(s) SBAR was reviewed with the receiving nurse. Lines:   Peripheral IV 04/13/18 Right Antecubital (Active)        Opportunity for questions and clarification was provided. Patient transported with:   Registered Nurse        ROXANA Rizvi 14 REPORT:    Verbal report received from Marino Barros RN(name) on [facility].  being received from  (unit) for routine progression of care      Report consisted of patients Situation, Background, Assessment and   Recommendations(SBAR). Information from the following report(s) SBAR was reviewed with the receiving nurse. Opportunity for questions and clarification was provided. Assessment completed upon patients arrival to unit and care assumed. Pt voices understanding of post procedure bedrest instructions. 1330    Held Lotensen 40 mg PO due to 20 mg IV hydralazine dropped SBP 90s-100s abnd DBP to 40S-50S    1415    Pt discharged via wheeled chairt with Son Personal belongings with patient upon discharge.

## 2018-04-23 ENCOUNTER — HOSPITAL ENCOUNTER (OUTPATIENT)
Dept: PREADMISSION TESTING | Age: 83
Discharge: HOME | End: 2018-04-23
Attending: INTERNAL MEDICINE
Payer: MEDICARE

## 2018-04-23 ENCOUNTER — HOSPITAL ENCOUNTER (OUTPATIENT)
Dept: GENERAL RADIOLOGY | Age: 83
Discharge: HOME | End: 2018-04-23
Attending: INTERNAL MEDICINE
Payer: MEDICARE

## 2018-04-23 VITALS
BODY MASS INDEX: 23.97 KG/M2 | HEIGHT: 69 IN | HEART RATE: 74 BPM | SYSTOLIC BLOOD PRESSURE: 180 MMHG | TEMPERATURE: 97.5 F | RESPIRATION RATE: 20 BRPM | DIASTOLIC BLOOD PRESSURE: 76 MMHG | WEIGHT: 161.82 LBS | OXYGEN SATURATION: 100 %

## 2018-04-23 LAB
ANION GAP SERPL CALC-SCNC: 3 MMOL/L (ref 5–15)
APPEARANCE UR: CLEAR
ATRIAL RATE: 66 BPM
BACTERIA URNS QL MICRO: ABNORMAL /HPF
BILIRUB UR QL: NEGATIVE
BUN SERPL-MCNC: 55 MG/DL (ref 6–20)
BUN/CREAT SERPL: 17 (ref 12–20)
CALCIUM SERPL-MCNC: 8.8 MG/DL (ref 8.5–10.1)
CALCULATED P AXIS, ECG09: -20 DEGREES
CALCULATED R AXIS, ECG10: 31 DEGREES
CALCULATED T AXIS, ECG11: 91 DEGREES
CHLORIDE SERPL-SCNC: 112 MMOL/L (ref 97–108)
CO2 SERPL-SCNC: 24 MMOL/L (ref 21–32)
COLOR UR: ABNORMAL
CREAT SERPL-MCNC: 3.25 MG/DL (ref 0.7–1.3)
DIAGNOSIS, 93000: NORMAL
EPITH CASTS URNS QL MICRO: ABNORMAL /LPF
ERYTHROCYTE [DISTWIDTH] IN BLOOD BY AUTOMATED COUNT: 14.3 % (ref 11.5–14.5)
GLUCOSE SERPL-MCNC: 84 MG/DL (ref 65–100)
GLUCOSE UR STRIP.AUTO-MCNC: NEGATIVE MG/DL
HCT VFR BLD AUTO: 30.6 % (ref 36.6–50.3)
HGB BLD-MCNC: 9.7 G/DL (ref 12.1–17)
HGB UR QL STRIP: NEGATIVE
KETONES UR QL STRIP.AUTO: NEGATIVE MG/DL
LEUKOCYTE ESTERASE UR QL STRIP.AUTO: ABNORMAL
MCH RBC QN AUTO: 31.3 PG (ref 26–34)
MCHC RBC AUTO-ENTMCNC: 31.7 G/DL (ref 30–36.5)
MCV RBC AUTO: 98.7 FL (ref 80–99)
NITRITE UR QL STRIP.AUTO: NEGATIVE
NRBC # BLD: 0 K/UL (ref 0–0.01)
NRBC BLD-RTO: 0 PER 100 WBC
P-R INTERVAL, ECG05: 236 MS
PH UR STRIP: 5 [PH] (ref 5–8)
PLATELET # BLD AUTO: 187 K/UL (ref 150–400)
PMV BLD AUTO: 10.2 FL (ref 8.9–12.9)
POTASSIUM SERPL-SCNC: 5 MMOL/L (ref 3.5–5.1)
PROT UR STRIP-MCNC: 100 MG/DL
Q-T INTERVAL, ECG07: 394 MS
QRS DURATION, ECG06: 106 MS
QTC CALCULATION (BEZET), ECG08: 413 MS
RBC # BLD AUTO: 3.1 M/UL (ref 4.1–5.7)
RBC #/AREA URNS HPF: ABNORMAL /HPF (ref 0–5)
SODIUM SERPL-SCNC: 139 MMOL/L (ref 136–145)
SP GR UR REFRACTOMETRY: 1.01 (ref 1–1.03)
UA: UC IF INDICATED,UAUC: ABNORMAL
UROBILINOGEN UR QL STRIP.AUTO: 0.2 EU/DL (ref 0.2–1)
VENTRICULAR RATE, ECG03: 66 BPM
WBC # BLD AUTO: 5.2 K/UL (ref 4.1–11.1)
WBC URNS QL MICRO: ABNORMAL /HPF (ref 0–4)

## 2018-04-23 PROCEDURE — 81001 URINALYSIS AUTO W/SCOPE: CPT | Performed by: INTERNAL MEDICINE

## 2018-04-23 PROCEDURE — 36415 COLL VENOUS BLD VENIPUNCTURE: CPT | Performed by: INTERNAL MEDICINE

## 2018-04-23 PROCEDURE — 85027 COMPLETE CBC AUTOMATED: CPT | Performed by: INTERNAL MEDICINE

## 2018-04-23 PROCEDURE — 80048 BASIC METABOLIC PNL TOTAL CA: CPT | Performed by: INTERNAL MEDICINE

## 2018-04-23 PROCEDURE — 87077 CULTURE AEROBIC IDENTIFY: CPT | Performed by: INTERNAL MEDICINE

## 2018-04-23 PROCEDURE — 71046 X-RAY EXAM CHEST 2 VIEWS: CPT

## 2018-04-23 PROCEDURE — 87086 URINE CULTURE/COLONY COUNT: CPT | Performed by: INTERNAL MEDICINE

## 2018-04-23 PROCEDURE — 93005 ELECTROCARDIOGRAM TRACING: CPT

## 2018-04-23 PROCEDURE — 87186 SC STD MICRODIL/AGAR DIL: CPT | Performed by: INTERNAL MEDICINE

## 2018-04-23 RX ORDER — AMLODIPINE BESYLATE 5 MG/1
5 TABLET ORAL DAILY
COMMUNITY
End: 2018-11-30

## 2018-04-23 NOTE — PERIOP NOTES
Patient has instructions for day of procedure,medications to take,NPO after midnight. Encouraged to call physician for any questions,changes.

## 2018-04-23 NOTE — PERIOP NOTES
During pre-op visit review of medications--patient ha been out of amlodipine for at least three days /possibly longer per son. Encouraged monitoring/supervision of taking blood pressure medications as ordered with son. Patient acknowledged understanding of taking blood pressure medications regularly every day as prescribed.

## 2018-04-24 LAB
BACTERIA SPEC CULT: NORMAL
BACTERIA SPEC CULT: NORMAL
SERVICE CMNT-IMP: NORMAL

## 2018-04-26 LAB
BACTERIA SPEC CULT: ABNORMAL
CC UR VC: ABNORMAL
SERVICE CMNT-IMP: ABNORMAL

## 2018-04-26 NOTE — ADVANCED PRACTICE NURSE
PC to Ying Lassiter NP for [id number] [place] [address] regarding pt's pending urine cx and request for tx prior to procedure. Malini Juarez will treat pt's UTI as soon as results are available.

## 2018-04-27 ENCOUNTER — TELEPHONE (OUTPATIENT)
Dept: CARDIOLOGY CLINIC | Age: 83
End: 2018-04-27

## 2018-04-27 DIAGNOSIS — N39.0 URINARY TRACT INFECTION WITHOUT HEMATURIA, SITE UNSPECIFIED: Primary | ICD-10-CM

## 2018-04-27 RX ORDER — NITROFURANTOIN 25; 75 MG/1; MG/1
100 CAPSULE ORAL 2 TIMES DAILY
Qty: 10 CAP | Refills: 0 | Status: SHIPPED | OUTPATIENT
Start: 2018-04-27 | End: 2018-05-02

## 2018-04-27 NOTE — TELEPHONE ENCOUNTER
Called patient. Informed him of UTI results. Amy sent macrobid in to treat. Repeat testing needed after 5 day treatment is completed. Watchman procedure plan for Tuesday, May 1st cancelled. Scheduled Watchman device for May 15th at [facility]. Patient asked for his son to be called later today with instructions.

## 2018-04-27 NOTE — TELEPHONE ENCOUNTER
Returned call. Unable to leave voicemail. Mailbox not set up. Spoke to patient's son. Gave instructions for antibiotic needed to treat UTI and rescheduling plans for Watchman implant. Understanding expressed.

## 2018-05-07 DIAGNOSIS — N39.0 URINARY TRACT INFECTION WITHOUT HEMATURIA, SITE UNSPECIFIED: ICD-10-CM

## 2018-05-14 ENCOUNTER — TELEPHONE (OUTPATIENT)
Dept: CARDIOLOGY CLINIC | Age: 83
End: 2018-05-14

## 2018-05-14 NOTE — TELEPHONE ENCOUNTER
Called patient. Lab order sent last week for repeat urine culture post antibiotic completion. Mr. Shayy Prado sr does not recall having follow up labs done. Informed patient procedure will have to be cancelled pending results. Asked him to have his son, Fatmata Brown, to give me a call to discuss.

## 2018-05-17 LAB — BACTERIA UR CULT: NORMAL

## 2018-06-21 ENCOUNTER — HOSPITAL ENCOUNTER (OUTPATIENT)
Dept: GENERAL RADIOLOGY | Age: 83
Discharge: HOME | End: 2018-06-21
Attending: INTERNAL MEDICINE
Payer: MEDICARE

## 2018-06-21 ENCOUNTER — APPOINTMENT (OUTPATIENT)
Dept: GENERAL RADIOLOGY | Age: 83
End: 2018-06-21
Attending: INTERNAL MEDICINE
Payer: MEDICARE

## 2018-06-21 ENCOUNTER — HOSPITAL ENCOUNTER (OUTPATIENT)
Dept: PREADMISSION TESTING | Age: 83
Discharge: HOME | End: 2018-06-21
Attending: INTERNAL MEDICINE
Payer: MEDICARE

## 2018-06-21 VITALS
WEIGHT: 162.04 LBS | HEART RATE: 75 BPM | BODY MASS INDEX: 24 KG/M2 | OXYGEN SATURATION: 100 % | RESPIRATION RATE: 20 BRPM | TEMPERATURE: 98.3 F | DIASTOLIC BLOOD PRESSURE: 63 MMHG | HEIGHT: 69 IN | SYSTOLIC BLOOD PRESSURE: 185 MMHG

## 2018-06-21 LAB
ANION GAP SERPL CALC-SCNC: 9 MMOL/L (ref 5–15)
APPEARANCE UR: CLEAR
ATRIAL RATE: 67 BPM
BACTERIA URNS QL MICRO: NEGATIVE /HPF
BILIRUB UR QL: NEGATIVE
BUN SERPL-MCNC: 43 MG/DL (ref 6–20)
BUN/CREAT SERPL: 15 (ref 12–20)
CALCIUM SERPL-MCNC: 8.9 MG/DL (ref 8.5–10.1)
CALCULATED P AXIS, ECG09: 45 DEGREES
CALCULATED R AXIS, ECG10: 24 DEGREES
CALCULATED T AXIS, ECG11: -119 DEGREES
CHLORIDE SERPL-SCNC: 109 MMOL/L (ref 97–108)
CO2 SERPL-SCNC: 22 MMOL/L (ref 21–32)
COLOR UR: ABNORMAL
CREAT SERPL-MCNC: 2.85 MG/DL (ref 0.7–1.3)
DIAGNOSIS, 93000: NORMAL
EPITH CASTS URNS QL MICRO: ABNORMAL /LPF
ERYTHROCYTE [DISTWIDTH] IN BLOOD BY AUTOMATED COUNT: 14.1 % (ref 11.5–14.5)
GLUCOSE SERPL-MCNC: 87 MG/DL (ref 65–100)
GLUCOSE UR STRIP.AUTO-MCNC: NEGATIVE MG/DL
HCT VFR BLD AUTO: 30.7 % (ref 36.6–50.3)
HGB BLD-MCNC: 9.8 G/DL (ref 12.1–17)
HGB UR QL STRIP: NEGATIVE
HYALINE CASTS URNS QL MICRO: ABNORMAL /LPF (ref 0–5)
INR PPP: 1 (ref 0.9–1.1)
KETONES UR QL STRIP.AUTO: NEGATIVE MG/DL
LEUKOCYTE ESTERASE UR QL STRIP.AUTO: NEGATIVE
MCH RBC QN AUTO: 31.3 PG (ref 26–34)
MCHC RBC AUTO-ENTMCNC: 31.9 G/DL (ref 30–36.5)
MCV RBC AUTO: 98.1 FL (ref 80–99)
NITRITE UR QL STRIP.AUTO: NEGATIVE
NRBC # BLD: 0 K/UL (ref 0–0.01)
NRBC BLD-RTO: 0 PER 100 WBC
P-R INTERVAL, ECG05: 186 MS
PH UR STRIP: 5.5 [PH] (ref 5–8)
PLATELET # BLD AUTO: 179 K/UL (ref 150–400)
PMV BLD AUTO: 10 FL (ref 8.9–12.9)
POTASSIUM SERPL-SCNC: 4.6 MMOL/L (ref 3.5–5.1)
PROT UR STRIP-MCNC: 100 MG/DL
PROTHROMBIN TIME: 10.5 SEC (ref 9–11.1)
Q-T INTERVAL, ECG07: 374 MS
QRS DURATION, ECG06: 100 MS
QTC CALCULATION (BEZET), ECG08: 395 MS
RBC # BLD AUTO: 3.13 M/UL (ref 4.1–5.7)
RBC #/AREA URNS HPF: ABNORMAL /HPF (ref 0–5)
SODIUM SERPL-SCNC: 140 MMOL/L (ref 136–145)
SP GR UR REFRACTOMETRY: 1.01 (ref 1–1.03)
UA: UC IF INDICATED,UAUC: ABNORMAL
UROBILINOGEN UR QL STRIP.AUTO: 0.2 EU/DL (ref 0.2–1)
VENTRICULAR RATE, ECG03: 67 BPM
WBC # BLD AUTO: 4.3 K/UL (ref 4.1–11.1)
WBC URNS QL MICRO: ABNORMAL /HPF (ref 0–4)

## 2018-06-21 PROCEDURE — 85027 COMPLETE CBC AUTOMATED: CPT | Performed by: INTERNAL MEDICINE

## 2018-06-21 PROCEDURE — 36415 COLL VENOUS BLD VENIPUNCTURE: CPT | Performed by: INTERNAL MEDICINE

## 2018-06-21 PROCEDURE — 71046 X-RAY EXAM CHEST 2 VIEWS: CPT

## 2018-06-21 PROCEDURE — 93005 ELECTROCARDIOGRAM TRACING: CPT

## 2018-06-21 PROCEDURE — 81001 URINALYSIS AUTO W/SCOPE: CPT | Performed by: INTERNAL MEDICINE

## 2018-06-21 PROCEDURE — 85610 PROTHROMBIN TIME: CPT | Performed by: INTERNAL MEDICINE

## 2018-06-21 PROCEDURE — 80048 BASIC METABOLIC PNL TOTAL CA: CPT | Performed by: INTERNAL MEDICINE

## 2018-06-22 LAB
BACTERIA SPEC CULT: NORMAL
BACTERIA SPEC CULT: NORMAL
SERVICE CMNT-IMP: NORMAL

## 2018-06-22 RX ORDER — SODIUM CHLORIDE 0.9 % (FLUSH) 0.9 %
5-10 SYRINGE (ML) INJECTION EVERY 8 HOURS
Status: CANCELLED | OUTPATIENT
Start: 2018-06-22

## 2018-06-22 RX ORDER — SODIUM CHLORIDE 0.9 % (FLUSH) 0.9 %
5-10 SYRINGE (ML) INJECTION AS NEEDED
Status: CANCELLED | OUTPATIENT
Start: 2018-06-22

## 2018-06-28 ENCOUNTER — DOCUMENTATION ONLY (OUTPATIENT)
Dept: CARDIOLOGY CLINIC | Age: 83
End: 2018-06-28

## 2018-06-28 ENCOUNTER — HOSPITAL ENCOUNTER (INPATIENT)
Dept: NON INVASIVE DIAGNOSTICS | Age: 83
LOS: 1 days | Discharge: HOME | DRG: 274 | End: 2018-06-29
Attending: INTERNAL MEDICINE | Admitting: INTERNAL MEDICINE
Payer: MEDICARE

## 2018-06-28 ENCOUNTER — ANESTHESIA EVENT (OUTPATIENT)
Dept: NON INVASIVE DIAGNOSTICS | Age: 83
DRG: 274 | End: 2018-06-28
Payer: MEDICARE

## 2018-06-28 ENCOUNTER — ANESTHESIA (OUTPATIENT)
Dept: NON INVASIVE DIAGNOSTICS | Age: 83
DRG: 274 | End: 2018-06-28
Payer: MEDICARE

## 2018-06-28 PROBLEM — Z98.890 S/P CARDIAC CATH: Status: ACTIVE | Noted: 2018-06-28

## 2018-06-28 PROBLEM — I48.91 ATRIAL FIBRILLATION (HCC): Status: ACTIVE | Noted: 2018-06-28

## 2018-06-28 LAB
ABO + RH BLD: NORMAL
BLOOD GROUP ANTIBODIES SERPL: NORMAL
SPECIMEN EXP DATE BLD: NORMAL

## 2018-06-28 PROCEDURE — C1893 INTRO/SHEATH, FIXED,NON-PEEL: HCPCS

## 2018-06-28 PROCEDURE — 77030002996 HC SUT SLK J&J -A

## 2018-06-28 PROCEDURE — 77030019908 HC STETH ESOPH SIMS -A: Performed by: ANESTHESIOLOGY

## 2018-06-28 PROCEDURE — C1894 INTRO/SHEATH, NON-LASER: HCPCS

## 2018-06-28 PROCEDURE — B24BZZ4 ULTRASONOGRAPHY OF HEART WITH AORTA, TRANSESOPHAGEAL: ICD-10-PCS | Performed by: ANESTHESIOLOGY

## 2018-06-28 PROCEDURE — 76210000016 HC OR PH I REC 1 TO 1.5 HR

## 2018-06-28 PROCEDURE — 86900 BLOOD TYPING SEROLOGIC ABO: CPT | Performed by: INTERNAL MEDICINE

## 2018-06-28 PROCEDURE — 77030011640 HC PAD GRND REM COVD -A

## 2018-06-28 PROCEDURE — 77030020506 HC NDL TRNSPTL NRG BAYL -F

## 2018-06-28 PROCEDURE — 93462 L HRT CATH TRNSPTL PUNCTURE: CPT

## 2018-06-28 PROCEDURE — 77030008684 HC TU ET CUF COVD -B: Performed by: ANESTHESIOLOGY

## 2018-06-28 PROCEDURE — 74011250636 HC RX REV CODE- 250/636: Performed by: INTERNAL MEDICINE

## 2018-06-28 PROCEDURE — 74011000250 HC RX REV CODE- 250

## 2018-06-28 PROCEDURE — 77030026438 HC STYL ET INTUB CARD -A: Performed by: ANESTHESIOLOGY

## 2018-06-28 PROCEDURE — 77030020061 HC IV BLD WRMR ADMIN SET 3M -B: Performed by: ANESTHESIOLOGY

## 2018-06-28 PROCEDURE — 36415 COLL VENOUS BLD VENIPUNCTURE: CPT | Performed by: INTERNAL MEDICINE

## 2018-06-28 PROCEDURE — 33340 PERQ CLSR TCAT L ATR APNDGE: CPT

## 2018-06-28 PROCEDURE — 4A133B1 MONITORING OF ARTERIAL PRESSURE, PERIPHERAL, PERCUTANEOUS APPROACH: ICD-10-PCS | Performed by: ANESTHESIOLOGY

## 2018-06-28 PROCEDURE — 76060000036 HC ANESTHESIA 2.5 TO 3 HR

## 2018-06-28 PROCEDURE — 93325 DOPPLER ECHO COLOR FLOW MAPG: CPT

## 2018-06-28 PROCEDURE — 77030021678 HC GLIDESCP STAT DISP VERT -B: Performed by: ANESTHESIOLOGY

## 2018-06-28 PROCEDURE — 74011636320 HC RX REV CODE- 636/320

## 2018-06-28 PROCEDURE — 77030013797 HC KT TRNSDUC PRSSR EDWD -A

## 2018-06-28 PROCEDURE — 65660000000 HC RM CCU STEPDOWN

## 2018-06-28 PROCEDURE — 4A133J1 MONITORING OF ARTERIAL PULSE, PERIPHERAL, PERCUTANEOUS APPROACH: ICD-10-PCS | Performed by: ANESTHESIOLOGY

## 2018-06-28 PROCEDURE — 85347 COAGULATION TIME ACTIVATED: CPT

## 2018-06-28 PROCEDURE — 77030016707 HC CATH ANGI DX SUPT1 CARD -B

## 2018-06-28 PROCEDURE — C1769 GUIDE WIRE: HCPCS

## 2018-06-28 PROCEDURE — 77030018729 HC ELECTRD DEFIB PAD CARD -B

## 2018-06-28 PROCEDURE — 03HY32Z INSERTION OF MONITORING DEVICE INTO UPPER ARTERY, PERCUTANEOUS APPROACH: ICD-10-PCS | Performed by: ANESTHESIOLOGY

## 2018-06-28 PROCEDURE — 77030008543 HC TBNG MON PRSS MRTM -A

## 2018-06-28 PROCEDURE — 74011250637 HC RX REV CODE- 250/637: Performed by: INTERNAL MEDICINE

## 2018-06-28 PROCEDURE — 77030038113 HC IMPL LAA WATCHMAN 33MM BSC -L

## 2018-06-28 PROCEDURE — 02L73DK OCCLUSION OF LEFT ATRIAL APPENDAGE WITH INTRALUMINAL DEVICE, PERCUTANEOUS APPROACH: ICD-10-PCS | Performed by: INTERNAL MEDICINE

## 2018-06-28 PROCEDURE — 74011250636 HC RX REV CODE- 250/636

## 2018-06-28 PROCEDURE — 99218 HC RM OBSERVATION: CPT

## 2018-06-28 RX ORDER — SODIUM CHLORIDE 0.9 % (FLUSH) 0.9 %
5-10 SYRINGE (ML) INJECTION AS NEEDED
Status: DISCONTINUED | OUTPATIENT
Start: 2018-06-28 | End: 2018-06-29 | Stop reason: HOSPADM

## 2018-06-28 RX ORDER — TAMSULOSIN HYDROCHLORIDE 0.4 MG/1
0.4 CAPSULE ORAL DAILY
Status: DISCONTINUED | OUTPATIENT
Start: 2018-06-29 | End: 2018-06-29 | Stop reason: HOSPADM

## 2018-06-28 RX ORDER — HYDROMORPHONE HYDROCHLORIDE 1 MG/ML
0.2 INJECTION, SOLUTION INTRAMUSCULAR; INTRAVENOUS; SUBCUTANEOUS
Status: DISCONTINUED | OUTPATIENT
Start: 2018-06-28 | End: 2018-06-28 | Stop reason: HOSPADM

## 2018-06-28 RX ORDER — SODIUM CHLORIDE, SODIUM LACTATE, POTASSIUM CHLORIDE, CALCIUM CHLORIDE 600; 310; 30; 20 MG/100ML; MG/100ML; MG/100ML; MG/100ML
125 INJECTION, SOLUTION INTRAVENOUS CONTINUOUS
Status: DISCONTINUED | OUTPATIENT
Start: 2018-06-28 | End: 2018-06-28 | Stop reason: HOSPADM

## 2018-06-28 RX ORDER — SODIUM CHLORIDE 0.9 % (FLUSH) 0.9 %
5-10 SYRINGE (ML) INJECTION EVERY 8 HOURS
Status: DISCONTINUED | OUTPATIENT
Start: 2018-06-28 | End: 2018-06-29 | Stop reason: HOSPADM

## 2018-06-28 RX ORDER — HEPARIN SODIUM 1000 [USP'U]/ML
INJECTION, SOLUTION INTRAVENOUS; SUBCUTANEOUS AS NEEDED
Status: DISCONTINUED | OUTPATIENT
Start: 2018-06-28 | End: 2018-06-28 | Stop reason: HOSPADM

## 2018-06-28 RX ORDER — MORPHINE SULFATE 10 MG/ML
INJECTION, SOLUTION INTRAMUSCULAR; INTRAVENOUS
Status: COMPLETED
Start: 2018-06-28 | End: 2018-06-28

## 2018-06-28 RX ORDER — BENAZEPRIL HYDROCHLORIDE 10 MG/1
40 TABLET ORAL DAILY
Status: DISCONTINUED | OUTPATIENT
Start: 2018-06-29 | End: 2018-06-29 | Stop reason: HOSPADM

## 2018-06-28 RX ORDER — CARBAMAZEPINE 200 MG/1
200 TABLET ORAL 3 TIMES DAILY
Status: DISCONTINUED | OUTPATIENT
Start: 2018-06-28 | End: 2018-06-29 | Stop reason: HOSPADM

## 2018-06-28 RX ORDER — PROTAMINE SULFATE 10 MG/ML
INJECTION, SOLUTION INTRAVENOUS AS NEEDED
Status: DISCONTINUED | OUTPATIENT
Start: 2018-06-28 | End: 2018-06-28 | Stop reason: HOSPADM

## 2018-06-28 RX ORDER — HEPARIN SODIUM 1000 [USP'U]/ML
1000-20000 INJECTION, SOLUTION INTRAVENOUS; SUBCUTANEOUS
Status: DISCONTINUED | OUTPATIENT
Start: 2018-06-28 | End: 2018-06-28 | Stop reason: HOSPADM

## 2018-06-28 RX ORDER — HEPARIN SODIUM 200 [USP'U]/100ML
500 INJECTION, SOLUTION INTRAVENOUS ONCE
Status: DISCONTINUED | OUTPATIENT
Start: 2018-06-28 | End: 2018-06-28 | Stop reason: HOSPADM

## 2018-06-28 RX ORDER — FENTANYL CITRATE 50 UG/ML
25 INJECTION, SOLUTION INTRAMUSCULAR; INTRAVENOUS
Status: DISCONTINUED | OUTPATIENT
Start: 2018-06-28 | End: 2018-06-28 | Stop reason: HOSPADM

## 2018-06-28 RX ORDER — AMLODIPINE BESYLATE 5 MG/1
5 TABLET ORAL DAILY
Status: DISCONTINUED | OUTPATIENT
Start: 2018-06-29 | End: 2018-06-29 | Stop reason: HOSPADM

## 2018-06-28 RX ORDER — MIDAZOLAM HYDROCHLORIDE 1 MG/ML
INJECTION, SOLUTION INTRAMUSCULAR; INTRAVENOUS
Status: DISPENSED
Start: 2018-06-28 | End: 2018-06-28

## 2018-06-28 RX ORDER — HYDRALAZINE HYDROCHLORIDE 50 MG/1
25 TABLET, FILM COATED ORAL 3 TIMES DAILY
Status: DISCONTINUED | OUTPATIENT
Start: 2018-06-28 | End: 2018-06-29 | Stop reason: HOSPADM

## 2018-06-28 RX ORDER — ONDANSETRON 2 MG/ML
4 INJECTION INTRAMUSCULAR; INTRAVENOUS AS NEEDED
Status: DISCONTINUED | OUTPATIENT
Start: 2018-06-28 | End: 2018-06-28 | Stop reason: HOSPADM

## 2018-06-28 RX ORDER — ACETAMINOPHEN 325 MG/1
650 TABLET ORAL
Status: DISCONTINUED | OUTPATIENT
Start: 2018-06-28 | End: 2018-06-29 | Stop reason: HOSPADM

## 2018-06-28 RX ORDER — HEPARIN SODIUM 200 [USP'U]/100ML
500 INJECTION, SOLUTION INTRAVENOUS ONCE
Status: COMPLETED | OUTPATIENT
Start: 2018-06-28 | End: 2018-06-28

## 2018-06-28 RX ORDER — HEPARIN SODIUM 10000 [USP'U]/100ML
1000-20000 INJECTION, SOLUTION INTRAVENOUS
Status: DISCONTINUED | OUTPATIENT
Start: 2018-06-28 | End: 2018-06-28 | Stop reason: HOSPADM

## 2018-06-28 RX ORDER — SUCCINYLCHOLINE CHLORIDE 20 MG/ML
INJECTION INTRAMUSCULAR; INTRAVENOUS AS NEEDED
Status: DISCONTINUED | OUTPATIENT
Start: 2018-06-28 | End: 2018-06-28 | Stop reason: HOSPADM

## 2018-06-28 RX ORDER — WARFARIN SODIUM 5 MG/1
5 TABLET ORAL ONCE
Status: COMPLETED | OUTPATIENT
Start: 2018-06-28 | End: 2018-06-28

## 2018-06-28 RX ORDER — ROCURONIUM BROMIDE 10 MG/ML
INJECTION, SOLUTION INTRAVENOUS AS NEEDED
Status: DISCONTINUED | OUTPATIENT
Start: 2018-06-28 | End: 2018-06-28 | Stop reason: HOSPADM

## 2018-06-28 RX ORDER — PROTAMINE SULFATE 10 MG/ML
INJECTION, SOLUTION INTRAVENOUS
Status: COMPLETED
Start: 2018-06-28 | End: 2018-06-28

## 2018-06-28 RX ORDER — ONDANSETRON 2 MG/ML
4 INJECTION INTRAMUSCULAR; INTRAVENOUS
Status: DISCONTINUED | OUTPATIENT
Start: 2018-06-28 | End: 2018-06-29 | Stop reason: HOSPADM

## 2018-06-28 RX ORDER — OXYCODONE AND ACETAMINOPHEN 5; 325 MG/1; MG/1
1 TABLET ORAL AS NEEDED
Status: DISCONTINUED | OUTPATIENT
Start: 2018-06-28 | End: 2018-06-28 | Stop reason: HOSPADM

## 2018-06-28 RX ORDER — MIDAZOLAM HYDROCHLORIDE 1 MG/ML
0.5 INJECTION, SOLUTION INTRAMUSCULAR; INTRAVENOUS
Status: DISCONTINUED | OUTPATIENT
Start: 2018-06-28 | End: 2018-06-28 | Stop reason: HOSPADM

## 2018-06-28 RX ORDER — LIDOCAINE HYDROCHLORIDE 20 MG/ML
INJECTION, SOLUTION EPIDURAL; INFILTRATION; INTRACAUDAL; PERINEURAL AS NEEDED
Status: DISCONTINUED | OUTPATIENT
Start: 2018-06-28 | End: 2018-06-28 | Stop reason: HOSPADM

## 2018-06-28 RX ORDER — HEPARIN SODIUM 200 [USP'U]/100ML
INJECTION, SOLUTION INTRAVENOUS
Status: DISPENSED
Start: 2018-06-28 | End: 2018-06-29

## 2018-06-28 RX ORDER — CEFAZOLIN SODIUM IN 0.9 % NACL 2 G/100 ML
PLASTIC BAG, INJECTION (ML) INTRAVENOUS AS NEEDED
Status: DISCONTINUED | OUTPATIENT
Start: 2018-06-28 | End: 2018-06-28 | Stop reason: HOSPADM

## 2018-06-28 RX ORDER — MORPHINE SULFATE 10 MG/ML
2 INJECTION, SOLUTION INTRAMUSCULAR; INTRAVENOUS
Status: DISCONTINUED | OUTPATIENT
Start: 2018-06-28 | End: 2018-06-28 | Stop reason: HOSPADM

## 2018-06-28 RX ORDER — DIPHENHYDRAMINE HYDROCHLORIDE 50 MG/ML
12.5 INJECTION, SOLUTION INTRAMUSCULAR; INTRAVENOUS AS NEEDED
Status: DISCONTINUED | OUTPATIENT
Start: 2018-06-28 | End: 2018-06-28 | Stop reason: HOSPADM

## 2018-06-28 RX ORDER — MAG HYDROX/ALUMINUM HYD/SIMETH 200-200-20
30 SUSPENSION, ORAL (FINAL DOSE FORM) ORAL
Status: DISCONTINUED | OUTPATIENT
Start: 2018-06-28 | End: 2018-06-29 | Stop reason: HOSPADM

## 2018-06-28 RX ORDER — SODIUM CHLORIDE 9 MG/ML
INJECTION, SOLUTION INTRAVENOUS
Status: DISCONTINUED | OUTPATIENT
Start: 2018-06-28 | End: 2018-06-28 | Stop reason: HOSPADM

## 2018-06-28 RX ORDER — HYDROCODONE BITARTRATE AND ACETAMINOPHEN 5; 325 MG/1; MG/1
1 TABLET ORAL
Status: DISCONTINUED | OUTPATIENT
Start: 2018-06-28 | End: 2018-06-29 | Stop reason: HOSPADM

## 2018-06-28 RX ORDER — DEXAMETHASONE SODIUM PHOSPHATE 100 MG/10ML
INJECTION INTRAMUSCULAR; INTRAVENOUS AS NEEDED
Status: DISCONTINUED | OUTPATIENT
Start: 2018-06-28 | End: 2018-06-28 | Stop reason: HOSPADM

## 2018-06-28 RX ORDER — PROPOFOL 10 MG/ML
INJECTION, EMULSION INTRAVENOUS AS NEEDED
Status: DISCONTINUED | OUTPATIENT
Start: 2018-06-28 | End: 2018-06-28 | Stop reason: HOSPADM

## 2018-06-28 RX ORDER — FENTANYL CITRATE 50 UG/ML
INJECTION, SOLUTION INTRAMUSCULAR; INTRAVENOUS
Status: COMPLETED
Start: 2018-06-28 | End: 2018-06-28

## 2018-06-28 RX ORDER — FINASTERIDE 5 MG/1
5 TABLET, FILM COATED ORAL DAILY
Status: DISCONTINUED | OUTPATIENT
Start: 2018-06-29 | End: 2018-06-29 | Stop reason: HOSPADM

## 2018-06-28 RX ORDER — LIDOCAINE HYDROCHLORIDE 10 MG/ML
1-40 INJECTION INFILTRATION; PERINEURAL
Status: DISCONTINUED | OUTPATIENT
Start: 2018-06-28 | End: 2018-06-28 | Stop reason: HOSPADM

## 2018-06-28 RX ORDER — SODIUM CHLORIDE 0.9 % (FLUSH) 0.9 %
5-10 SYRINGE (ML) INJECTION AS NEEDED
Status: DISCONTINUED | OUTPATIENT
Start: 2018-06-28 | End: 2018-06-28 | Stop reason: HOSPADM

## 2018-06-28 RX ORDER — FENTANYL CITRATE 50 UG/ML
INJECTION, SOLUTION INTRAMUSCULAR; INTRAVENOUS AS NEEDED
Status: DISCONTINUED | OUTPATIENT
Start: 2018-06-28 | End: 2018-06-28 | Stop reason: HOSPADM

## 2018-06-28 RX ADMIN — LIDOCAINE HYDROCHLORIDE 20 MG: 20 INJECTION, SOLUTION EPIDURAL; INFILTRATION; INTRACAUDAL; PERINEURAL at 13:03

## 2018-06-28 RX ADMIN — ROCURONIUM BROMIDE 5 MG: 10 INJECTION, SOLUTION INTRAVENOUS at 13:01

## 2018-06-28 RX ADMIN — CARBAMAZEPINE 200 MG: 200 TABLET ORAL at 22:04

## 2018-06-28 RX ADMIN — FENTANYL CITRATE 50 MCG: 50 INJECTION, SOLUTION INTRAMUSCULAR; INTRAVENOUS at 13:04

## 2018-06-28 RX ADMIN — MORPHINE SULFATE 2 MG: 10 INJECTION, SOLUTION INTRAMUSCULAR; INTRAVENOUS at 16:40

## 2018-06-28 RX ADMIN — PROTAMINE SULFATE 10 MG: 10 INJECTION, SOLUTION INTRAVENOUS at 14:57

## 2018-06-28 RX ADMIN — HEPARIN SODIUM 2000 UNITS: 200 INJECTION, SOLUTION INTRAVENOUS at 13:39

## 2018-06-28 RX ADMIN — PROTAMINE SULFATE 5 MG: 10 INJECTION, SOLUTION INTRAVENOUS at 14:51

## 2018-06-28 RX ADMIN — MORPHINE SULFATE 2 MG: 10 INJECTION, SOLUTION INTRAMUSCULAR; INTRAVENOUS at 16:07

## 2018-06-28 RX ADMIN — MORPHINE SULFATE 2 MG: 10 INJECTION, SOLUTION INTRAMUSCULAR; INTRAVENOUS at 16:33

## 2018-06-28 RX ADMIN — PROPOFOL 50 MG: 10 INJECTION, EMULSION INTRAVENOUS at 13:17

## 2018-06-28 RX ADMIN — PROPOFOL 100 MG: 10 INJECTION, EMULSION INTRAVENOUS at 13:04

## 2018-06-28 RX ADMIN — WARFARIN SODIUM 5 MG: 5 TABLET ORAL at 18:53

## 2018-06-28 RX ADMIN — SUCCINYLCHOLINE CHLORIDE 140 MG: 20 INJECTION INTRAMUSCULAR; INTRAVENOUS at 13:04

## 2018-06-28 RX ADMIN — HEPARIN SODIUM 2000 UNITS: 1000 INJECTION, SOLUTION INTRAVENOUS; SUBCUTANEOUS at 14:33

## 2018-06-28 RX ADMIN — MORPHINE SULFATE 2 MG: 10 INJECTION, SOLUTION INTRAMUSCULAR; INTRAVENOUS at 15:56

## 2018-06-28 RX ADMIN — MORPHINE SULFATE 2 MG: 10 INJECTION INTRAMUSCULAR; INTRAVENOUS; SUBCUTANEOUS at 16:07

## 2018-06-28 RX ADMIN — PROTAMINE SULFATE 5 MG: 10 INJECTION, SOLUTION INTRAVENOUS at 14:50

## 2018-06-28 RX ADMIN — DEXAMETHASONE SODIUM PHOSPHATE 4 MG: 100 INJECTION INTRAMUSCULAR; INTRAVENOUS at 13:51

## 2018-06-28 RX ADMIN — SODIUM CHLORIDE: 9 INJECTION, SOLUTION INTRAVENOUS at 14:00

## 2018-06-28 RX ADMIN — ROCURONIUM BROMIDE 10 MG: 10 INJECTION, SOLUTION INTRAVENOUS at 13:04

## 2018-06-28 RX ADMIN — Medication 2 G: at 13:06

## 2018-06-28 RX ADMIN — HEPARIN SODIUM 7000 UNITS: 1000 INJECTION, SOLUTION INTRAVENOUS; SUBCUTANEOUS at 13:41

## 2018-06-28 RX ADMIN — Medication 10 ML: at 22:04

## 2018-06-28 RX ADMIN — IOPAMIDOL 100 ML: 755 INJECTION, SOLUTION INTRAVENOUS at 14:55

## 2018-06-28 RX ADMIN — FENTANYL CITRATE 50 MCG: 50 INJECTION, SOLUTION INTRAMUSCULAR; INTRAVENOUS at 13:01

## 2018-06-28 RX ADMIN — HEPARIN SODIUM 5000 UNITS: 1000 INJECTION, SOLUTION INTRAVENOUS; SUBCUTANEOUS at 14:07

## 2018-06-28 RX ADMIN — HYDRALAZINE HYDROCHLORIDE 25 MG: 50 TABLET, FILM COATED ORAL at 22:03

## 2018-06-28 RX ADMIN — PROTAMINE SULFATE 10 MG: 10 INJECTION, SOLUTION INTRAVENOUS at 14:52

## 2018-06-28 RX ADMIN — SODIUM CHLORIDE: 9 INJECTION, SOLUTION INTRAVENOUS at 12:59

## 2018-06-28 RX ADMIN — HYDROCODONE BITARTRATE AND ACETAMINOPHEN 1 TABLET: 5; 325 TABLET ORAL at 20:34

## 2018-06-28 RX ADMIN — MORPHINE SULFATE 2 MG: 10 INJECTION, SOLUTION INTRAMUSCULAR; INTRAVENOUS at 16:17

## 2018-06-28 NOTE — PERIOP NOTES
Handoff Report from Operating Room to PACU    Report received from Adriane Metz  and Edin Dia CRNA  regarding Naima Fine Sr. Jaime Farley      Surgeon(s):  Case Anesthesia  And Procedure(s) (LRB):  PACU/RECOVERY                    EP/LAB (N/A)  confirmed   with dressings discussed. Anesthesia type, drugs, patient history, complications, estimated blood loss, vital signs, intake and output, and last pain medication were reviewed.

## 2018-06-28 NOTE — PROGRESS NOTES
Cardiac Cath Lab Recovery Arrival Note:      Makeda Vasquez arrived to Cardiac Cath Lab, Recovery Area. Staff introduced to patient. Patient identifiers verified with NAME and DATE OF BIRTH. Procedure verified with patient. Consent forms reviewed and signed by patient or authorized representative and verified. Allergies verified. Patient and family oriented to department. Patient and family informed of procedure and plan of care. Questions answered with review. Patient prepped for procedure, per orders from physician, prior to arrival.    Patient on cardiac monitor, non-invasive blood pressure, SPO2 monitor. On Room Air. Patient is A&Ox 4. Patient reports no complaints. Patient in stretcher, in low position, with side rails up, call bell within reach, patient instructed to call if assistance as needed. Patient prep in: [address], [place]. Patient family has pager #   Family in: Waiting area.    Prep by: Shayy Zarco, JOS and Alfonzo Welsh RN

## 2018-06-28 NOTE — PERIOP NOTES
TRANSFER - OUT REPORT:    Verbal report given to Sarmad Huff (name) on [facility].  being transferred to [facility](unit) for routine post - op       Report consisted of patients Situation, Background, Assessment and   Recommendations(SBAR). Information from the following report(s) SBAR, Kardex, OR Summary, Intake/Output and MAR was reviewed with the receiving nurse. Opportunity for questions and clarification was provided.       Patient transported with:   Monitor  Registered Nurse  Tech

## 2018-06-28 NOTE — ANESTHESIA PREPROCEDURE EVALUATION
Anesthetic History   No history of anesthetic complications            Review of Systems / Medical History  Patient summary reviewed, nursing notes reviewed and pertinent labs reviewed    Pulmonary  Within defined limits                 Neuro/Psych     seizures         Cardiovascular    Hypertension: well controlled        Dysrhythmias   Past MI, CAD and CABG    Exercise tolerance: <4 METS     GI/Hepatic/Renal     GERD    Renal disease: CRI       Endo/Other        Arthritis     Other Findings            Physical Exam    Airway  Mallampati: II  TM Distance: > 6 cm  Neck ROM: normal range of motion   Mouth opening: Normal     Cardiovascular          Murmur: Grade 2, Mitral area     Dental    Dentition: Poor dentition     Pulmonary  Breath sounds clear to auscultation               Abdominal  GI exam deferred       Other Findings            Anesthetic Plan    ASA: 3  Anesthesia type: general    Monitoring Plan: Arterial line      Induction: Intravenous  Anesthetic plan and risks discussed with: Patient

## 2018-06-28 NOTE — H&P
HISTORY OF PRESENTING ILLNESS       Forest Randolph is a 80 y.o. male with history of CAD/CABG, CKD stage IV, pacemaker, paroxysmal atrial fibrillation with recent episode of GI bleeding while on warfarin. His anticoagulation was discontinued and is referred to discuss options for left atrial appendage occlusion. Previously we considered the potential need for upgrade of his pacemaker to a CRTD system; Kandee Izabel nuclear stress in 11/17 failed to demonstrate ischemia but showed preserved LV function.  He denies cardiac complaints.         ACTIVE PROBLEM LIST           Patient Active Problem List     Diagnosis Date Noted    Acute GI bleeding 02/07/2018    Dyslipidemia 10/24/2017    Dilated cardiomyopathy ([facility] 10/24/2017    Atrial fibrillation with controlled ventricular rate ([facility] 10/24/2017    Pacemaker 10/24/2017    CAD (coronary artery disease)      Hypertension      Systolic [facility], [facility]      Elevated brain natriuretic peptide (BNP) level 10/10/2017    Anemia 10/10/2017    H pylori ulcer 09/06/2017    Urine retention 09/06/2017    CKD (chronic kidney disease) stage 4, GFR 15-29 ml/min ([facility]) 09/05/2017    Benign prostatic hyperplasia 09/05/2017    Hydronephrosis 09/05/2017    Coronary atherosclerosis of native coronary artery 09/02/2011             PAST MEDICAL HISTORY           Past Medical History:   Diagnosis Date    CAD (coronary artery disease)      CHF (congestive heart failure) (HCC)      CKD (chronic kidney disease), stage IV ([facility])      Hyperlipidemia      Hypertension      Systolic CHF, chronic ([address]               PAST SURGICAL HISTORY            Past Surgical History:   Procedure Laterality Date    COLONOSCOPY N/A 9/6/2017     COLONOSCOPY performed by Nicole Ball MD at [place] [address] COLONOSCOPY N/A 2/8/2018     COLONOSCOPY performed by Almas Yun MD at [place] [address] HX CORONARY ARTERY BYPASS GRAFT        HX PACEMAKER                 ALLERGIES    No Known Allergies         FAMILY HISTORY      Family History   Problem Relation Age of Onset    Heart Disease Mother      Hypertension Mother      negative for cardiac disease         SOCIAL HISTORY       Social History                Social History    Marital status:        Spouse name: N/A    Number of children: N/A    Years of education: N/A           Social History Main Topics    Smoking status: Former Smoker    Smokeless tobacco: Never Used    Alcohol use No    Drug use: No    Sexual activity: Not on file           Other Topics Concern    Not on file      Social History Narrative               MEDICATIONS           Current Outpatient Prescriptions   Medication Sig    benazepril (LOTENSIN) 40 mg tablet Take 40 mg by mouth daily.  amLODIPine (NORVASC) 5 mg tablet Take 2 Tabs by mouth daily. Do not take if systolic blood pressure is less than 140    senna-docusate (PERICOLACE) 8.6-50 mg per tablet Take 1 Tab by mouth daily.  finasteride (PROSCAR) 5 mg tablet Take 1 Tab by mouth daily.  tamsulosin (FLOMAX) 0.4 mg capsule Take 1 Cap by mouth daily.  acetaminophen (TYLENOL) 325 mg tablet Take 325 mg by mouth every four (4) hours as needed for Pain.  carbamazepine (TEGRETOL) 200 mg tablet Take 200 mg by mouth three (3) times daily.      No current facility-administered medications for this visit.          I have reviewed the nurses notes, vitals, problem list, allergy list, medical history, family, social history and medications.         REVIEW OF SYMPTOMS       General: Pt denies excessive weight gain or loss. Pt is able to conduct ADL's  HEENT: Denies blurred vision, headaches, hearing loss, epistaxis and difficulty swallowing. Respiratory: Denies cough, congestion, shortness of breath, COOPER, wheezing or stridor.   Cardiovascular: Denies precordial pain, palpitations, edema or PND  Gastrointestinal: Denies poor appetite, indigestion, abdominal pain or blood in stool  Genitourinary: Denies hematuria, dysuria, increased urinary frequency  Musculoskeletal: Denies joint pain or swelling from muscles or joints  Neurologic: Denies tremor, paresthesias, headache, or sensory motor disturbance  Psychiatric: Denies confusion, insomnia, depression  Integumentray: Denies rash, itching or ulcers. Hematologic: Denies easy bruising, bleeding         PHYSICAL EXAMINATION       There were no vitals filed for this visit. General: Well developed, in no acute distress. HEENT: No jaundice, oral mucosa moist, no oral ulcers  Neck: Supple, no stiffness, no lymphadenopathy, supple  Heart:  Normal S1/S2 negative S3 or S4. Regular, no murmur, gallop or rub, no jugular venous distention  Respiratory: Clear bilaterally x 4, no wheezing or rales  Abdomen:   Soft, non-tender, bowel sounds are active.   Extremities:  No edema, normal cap refill, no cyanosis. Musculoskeletal: No clubbing, no deformities  Neuro: A&Ox3, speech clear, gait stable, cooperative, no focal neurologic deficits  Skin: Skin color is normal. No rashes or lesions.  Non diaphoretic, moist.  Vascular: 2+ pulses symmetric in all extremities         DIAGNOSTIC DATA       EKG:          LABORATORY DATA             Lab Results   Component Value Date/Time     WBC 5.5 02/14/2018 12:55 PM     Hemoglobin (POC) 7.5 (L) 09/07/2011 06:16 PM     HGB 8.3 (L) 02/14/2018 12:55 PM     Hematocrit (POC) 22 (L) 09/07/2011 06:16 PM     HCT 26.0 (L) 02/14/2018 12:55 PM     PLATELET 572 50/74/9161 12:55 PM     MCV 98.1 02/14/2018 12:55 PM            Lab Results   Component Value Date/Time     Sodium 133 (L) 02/14/2018 12:55 PM     Potassium 4.6 02/14/2018 12:55 PM     Chloride 103 02/14/2018 12:55 PM     CO2 24 02/14/2018 12:55 PM     Anion gap 6 02/14/2018 12:55 PM     Glucose 97 02/14/2018 12:55 PM     BUN 39 (H) 02/14/2018 12:55 PM     Creatinine 2.74 (H) 02/14/2018 12:55 PM     BUN/Creatinine ratio 14 02/14/2018 12:55 PM     GFR est AA 27 (L) 02/14/2018 12:55 PM     GFR est non-AA 22 (L) 02/14/2018 12:55 PM     Calcium 8.8 02/14/2018 12:55 PM     Bilirubin, total 0.2 02/14/2018 12:55 PM     AST (SGOT) 19 02/14/2018 12:55 PM     Alk. phosphatase 78 02/14/2018 12:55 PM     Protein, total 7.1 02/14/2018 12:55 PM     Albumin 3.2 (L) 02/14/2018 12:55 PM     Globulin 3.9 02/14/2018 12:55 PM     A-G Ratio 0.8 (L) 02/14/2018 12:55 PM     ALT (SGPT) 14 02/14/2018 12:55 PM             ASSESSMENT       1. Atrial fibrillation                        A. Paroxysmal                        B. CHADSVASC 4                        C. HASBLED 4-5  2. Hypertension  3. Coronary artery disease, native  4. CKD  5. History of GI bleed  6. Cardiomyopathy                        A. Nonischemic                        B. Resolved  7. CABG  8. History of gastric ulcer  9. Pacemaker                        A. Dual-chamber                        B. Right sided                        C. Medtronic  10.  Cardiomyopathy                        A. Resolved         PLAN      Plan for watchman device implantation        Megha Bryant MD  Cardiac Electrophysiology / Cardiology     [address], [address], [address]  [facility]                                                                                 [place]  [phone] / [phone] Fax                                                                  [phone] / [phone] Fax

## 2018-06-28 NOTE — PROCEDURES
Cardiac Electrophysiology Report      PATIENT INFORMATION        Patient Name: Yeyo Clarke Sr. MRN: [id number]           Study Date: 2018    YOB: 1935     Age: 80 y.o. Gender: male      Procedure:  Endocardial Left Atrial Appendage OcclusionRa    Referring Physician:  Suni Boykin MD and Dr. Derek Greco Name   Electrophysiologist Marcia Centeno MD   Monitor Anesthesia Service   Circulator Alirio Chávez RN; Ruth Pavon RN; Bakari Romeo RN; Bravo Singletary RN; Anahy Mejia RN; Jose Davison, JOS; Casi Damico RN       PATIENT HISTORY     Yeyo Clarke Sr. is a 80 y. o. male with history of CAD/CABG, CKD stage IV, pacemaker, paroxysmal atrial fibrillation with recent episode of GI bleeding while on warfarin.  His anticoagulation was discontinued and is referred for left atrial appendage occlusion. PROCEDURE     The patient was brought to the Cardiac Electrophysiology laboratory in a post-absorptive, fasting state. Informed consent was obtained. A peripheral IV was in place. Continuous electrocardiographic, blood pressure, O2 saturation and  CO2 monitoring was initiated. Self-adhesive cardioversion patches were positioned on the chest. 500 cc of normal saline was administered intravenously. General anesthesia was effectuated by the anesthesia service. Intraoperative transesophageal imaging was performed by the anesthesia service. There was no evidence of visualized thrombus in the left atrial appendage. Measurements of the CARSON ostium width and CARSON depth were performed. The patient was then prepped and draped in the usual sterile fashion. Both groins were infiltrated with a 50/50 mixture of Lidocaine (1%) and bupivicaine (0.5%). Vascular access was obtained and an SL1 sheath and an 8F sheath were placed in the right common femoral vein using the modified Seldinger technique.  Through the SL1 sheath, a 0.032, 145-cm WebTebon wire was advanced to the level of the superior vena cava. After the guidewire was withdrawn, a Jatin transseptal needle was introduced into the sheath. The needle/sheath assembly was withdrawn under fluoroscopic and intraoperative JYOTI guidance until the tip of the sheath prolapsed into the fossa ovalis. Appropriate positioning was confirmed with multiple fluoroscopic views and JYOTI imaging. Transseptal access was obtained following delivery of RF energy with the [facility] needle. Systemic heparinization was initiated and the sheath was flushed continuously with heparinized saline. Anticoagulation status was monitored with frequent ACT measurements. Heparin was given in interrupted doses to maintain an ACT > 300 sec. The dilator was advanced over the wire, followed by the sheath. Mean left arterial pressure was measured and was found to be 22 mm Hg. The 0.032 wire was then exchanged for an 0.035 mm extrastiff Amplatz wire which was positioned into the left superior pulmonary vein. The dilator was then removed over the wire. A Maraquia double curve access sheath was then advanced over the wire into the left atrium. A 6F pigtail was then advanced over the wire and was then positioned into the left atrial appendage. Arteriogram of the left atrial appendage was performed. The pigtail was then removed. The WATCHMAN device delivery system was then advanced to the tip of the access sheath. The WATCHMAN device was then deployed. Device release criteria were met and the device was deployed. The access sheath/device delivery system was then withdrawn to the right atrium. There was no pericardial effusion noted at the conclusion of the procedure. Following a test dose, protamine 40 mg was administered and once the ACT was found to be < 180 seconds, all catheters and sheaths were removed and hemostasis obtained by direct manual compression.  The patient was extubated, hemodynamically stable, tolerated the procedure well and was transferred in stable condition. There were no immediate complications encountered during the procedure. There was minimal blood loss and no specimen were removed. WATCHMAN DEVICE DATA      Size Serial #   ADOP 33 mm [id number]       FINDINGS     1. The baseline ECG revealed sinus rhythm  2. Endocardial left atrial appendage occlusion was performed utilizing a 33 mm WATCHMAN device. 3. All pass criteria for position, anchor, size and seal were met (positive tug test, no evidence of para-device leak by color flow Doppler ultrasound imaging on JYOTI, no evidence of para-device flow leak on arteriogram on fluoroscopy and there was > 8% compression of the device on JYOTI). 4. No evidence of early pericardial effusion on JYOTI post-device deployment. CONCLUSIONS      1. Successful left atrial appendage occlusion utilizing WATCHMAN device. 2. Monitor on telemetry overnight. 3. Start coumadin 5 mg oral daily x 45 days. Follow up in coumadin clinic. 4. Limited echocardiogram tomorrow to evaluate for late pericardial effusion. 5. Resume all other home medications  6. Follow up in 2 weeks in EP clinic with Dr. Noel Marquez   7. Repeat JYOTI in 45 days. 8. Follow up with Mahad Gonzalez MD and Dr. Candance Corning as scheduled. Thank you, Mahad Gonzalez MD and Dr. Candance Corning for allowing me to participate in the care of this extraordinarily pleasant male.        Edgard Chandler MD  Cardiac Electrophysiology / Cardiology    [address]  [address], [address], [address]  [facility]                Natali Hoyos  [phone] / [phone] Fax   [phone] / [phone] Fax

## 2018-06-28 NOTE — IP AVS SNAPSHOT
[address] [facility] 
[phone] Patient: Presley Bailey Sr. MRN: [id number] :1935 About your hospitalization You were admitted on:  2018 You last received care in the:  [facility] 2 INTRVNTNL CARDIO You were discharged on:  2018 Why you were hospitalized Your primary diagnosis was:  Not on File Your diagnoses also included:  S/P Cardiac Cath, Atrial Fibrillation (Hcc) Follow-up Information Follow up With Details Comments Contact Info Thong Frank on 2018 Hospital follow-up scheduled at 3:30pm ( If you have questions or need to reschedule please call [address] [place] [address] 
[phone] Vini Pacheco MD Go on 2018 Hospital follow-up scheduled at 9:40am ( If you have questions or need to reschedule please call [place] [address] [address] [place], [place] [address] 
[phone] Your Scheduled Appointments 2018  9:30 AM EDT  
PACEMAKER with PACEMAKERCHAD  
[facility] ([facility] SCHEDULING) [address] [facility] [address] [facility]  
[phone] 2018  9:40 AM EDT  
ESTABLISHED PATIENT with Vini Pacheco MD  
[facility] ([facility]) [address] [facility] [address] [facility]  
[phone] Discharge Orders None A check ray indicates which time of day the medication should be taken. My Medications START taking these medications Instructions Each Dose to Equal  
 Morning Noon Evening Bedtime  
 warfarin 1 mg tablet Commonly known as:  COUMADIN Your last dose was: Your next dose is: Take 2 Tabs by mouth daily. 2 mg CHANGE how you take these medications Instructions Each Dose to Equal  
 Morning Noon Evening Bedtime  
 tamsulosin 0.4 mg capsule Commonly known as:  FLOMAX What changed:  when to take this Your last dose was: Your next dose is: Take 1 Cap by mouth daily. 0.4 mg  
    
   
   
   
  
  
CONTINUE taking these medications Instructions Each Dose to Equal  
 Morning Noon Evening Bedtime  
 acetaminophen 325 mg tablet Commonly known as:  TYLENOL Your last dose was: Your next dose is: Take 325 mg by mouth every four (4) hours as needed for Pain. 325 mg  
    
   
   
   
  
 amLODIPine 5 mg tablet Commonly known as:  Love Breach Your last dose was: Your next dose is: Take 5 mg by mouth daily. 5 mg  
    
   
   
   
  
 benazepril 40 mg tablet Commonly known as:  LOTENSIN Your last dose was: Your next dose is: Take 40 mg by mouth daily. 40 mg  
    
   
   
   
  
 carBAMazepine 200 mg tablet Commonly known as:  TEGretol Your last dose was: Your next dose is: Take 200 mg by mouth three (3) times daily. 200 mg  
    
   
   
   
  
 finasteride 5 mg tablet Commonly known as:  PROSCAR Your last dose was: Your next dose is: Take 1 Tab by mouth daily. 5 mg  
    
   
   
   
  
 hydrALAZINE 25 mg tablet Commonly known as:  APRESOLINE Your last dose was: Your next dose is: Take 1 Tab by mouth three (3) times daily. 25 mg Where to Get Your Medications These medications were sent to [address] [facility] [address] Phone:  [phone]  
  warfarin 1 mg tablet Discharge Instructions LEFT ATRIAL APPENDAGE OCCLUSION PROCEDURE Discharge Instructions You have just had a Watchman procedure performed. There were catheters temporarily placed in your heart through a puncture in the Veins and/or Arteries in your groin. WHAT TO EXPECT ? Please be aware that you may experience mild chest pain that will resolve within 24 hours. If the Chest Pain begins radiating down your shoulders or arms, becomes severe or breathing becomes difficult, call 911 or go to the closest emergency room. ? Mild to moderate, non-painful, bruising at the puncture site is not un-common, and will resolve in 7  10 days. ? If a closure device was used to seal your artery or vein, a separate pamphlet will be given to you with these discharge instructions. It is very important that you review the information in the pamphlet for different restrictions and precautions. ? You have a small gauze dressing applied to the puncture site in your groin. You may remove this the following morning. MEDICATIONS ? Take only the medications prescribed to you at discharge. ACTIVITY ? A responsible adult must take you home. Do not drive a car for 24 hours. ? Rest quietly for the remainder of the day. ? Do not lift anything greater than 10 pounds for 3 days. ? Limit bending at the puncture site and use of stairs for at least 3 days. ? You may remove the bandage and shower the morning after the procedure. Do not take a bath for 3 days. SYMPTOMS THAT NEED TO BE REPORTED IMMEDIATELY  
 
? Bleeding at the puncture site. If there is bleeding, lie down and hold firm direct pressure for at least 5 minutes. If the bleeding does not stop, go to the closest emergency room, or call 911. ? Temperature more than 100.5 F. 
? Redness or warmth at the puncture site. ? Increasing pain, numbness, coolness or blue discoloration of the extremity where the puncture is located. ? Pulsating mass at the puncture site. ? A new lump at the puncture site, or increasing swelling at the site. ? Bruising at the puncture site that enlarges or becomes painful (some bruising at the site is common and will go away in 7  10 days). ? Rapid heart rate or palpitations. ? Dizziness, lightheadedness, fainting. ? REMEMBER: If you feel something is an emergency or cannot be handled over the phone, call 911 or go to the closest emergency room. FOLLOW UP CARE Dr. Naz Gregg' nurse in 2 weeks ANGELIQUE ROWE MD 
Cardiac Electrophysiology / Cardiology [address], [address] [facility], [address] [place], [address] [place] [address] [facility] 
[phone] / [phone] Fax   [phone] / [phone] Fax MyChart Announcement We are excited to announce that we are making your provider's discharge notes available to you in [employer]. You will see these notes when they are completed and signed by the physician that discharged you from your recent hospital stay. If you have any questions or concerns about any information you see in [employer], please call the Health Information Department where you were seen or reach out to your Primary Care Provider for more information about your plan of care. Introducing [facility] HE[facility]! Migel Ceja introduces [employer] patient portal. Now you can access parts of your medical record, email your doctor's office, and request medication refills online. 1. In your internet browser, go to [email or web address] 2. Click on the First Time User? Click Here link in the Sign In box. You will see the New Member Sign Up page. 3. Enter your [employer] Access Code exactly as it appears below. You will not need to use this code after youve completed the sign-up process.  If you do not sign up before the expiration date, you must request a new code. · [employer] Access Code: [email or web address] Expires: 9/4/2018  5:23 AM 
 
4. Enter the last four digits of your Social Security Number (xxxx) and Date of Birth (mm/dd/yyyy) as indicated and click Submit. You will be taken to the next sign-up page. 5. Create a [employer] ID. This will be your [employer] login ID and cannot be changed, so think of one that is secure and easy to remember. 6. Create a [employer] password. You can change your password at any time. 7. Enter your Password Reset Question and Answer. This can be used at a later time if you forget your password. 8. Enter your e-mail address. You will receive e-mail notification when new information is available in [address] 9. Click Sign Up. You can now view and download portions of your medical record. 10. Click the Download Summary menu link to download a portable copy of your medical information. If you have questions, please visit the Frequently Asked Questions section of the [employer] website. Remember, [employer] is NOT to be used for urgent needs. For medical emergencies, dial 911. Now available from your iPhone and Android! Introducing Jonnathan Gambino As a [facility] patient, I wanted to make you aware of our electronic visit tool called Jonnathan Gambino. [facility] 24/7 allows you to connect within minutes with a medical provider 24 hours a day, seven days a week via a mobile device or tablet or logging into a secure website from your computer. You can access Jonnathan Gambino from anywhere in the [place].  
 
A virtual visit might be right for you when you have a simple condition and feel like you just dont want to get out of bed, or cant get away from work for an appointment, when your regular [facility] provider is not available (evenings, weekends or holidays), or when youre out of town and need minor care. Electronic visits cost only $49 and if the [employer] 24/7 provider determines a prescription is needed to treat your condition, one can be electronically transmitted to a nearby pharmacy*. Please take a moment to enroll today if you have not already done so. The enrollment process is free and takes just a few minutes. To enroll, please download the [employer] 24/7 deb to your tablet or phone, or visit [email or web address] to enroll on your computer. And, as an [address] [place] [address] patient with a [employer] account, the results of your visits will be scanned into your electronic medical record and your primary care provider will be able to view the scanned results. We urge you to continue to see your regular [facility] provider for your ongoing medical care. And while your primary care provider may not be the one available when you seek a [employer] virtual visit, the peace of mind you get from getting a real diagnosis real time can be priceless. For more information on [employer], view our Frequently Asked Questions (FAQs) at [email or web address]. Sincerely, 
 
Glenis Mai MD 
Chief Medical Officer [address] *:  certain medications cannot be prescribed via [employer] Providers Seen During Your Hospitalization Provider Specialty Primary office phone Shira Younger MD Cardiology [phone] Your Primary Care Physician (PCP) Primary Care Physician Office Phone Office Fax Celestina Essex [phone] You are allergic to the following No active allergies Recent Documentation Height Weight BMI Smoking Status 1.778 m 77.1 kg 24.39 kg/m2 Former Smoker Emergency Contacts Name Discharge Info Relation Home Work Mobile [facility] DISCHARGE CAREGIVER [3] Son [22] [phone] [facility] DISCHARGE CAREGIVER [3] Daughter [21] [phone] Kyraalejandra [place]  Child [2] [phone] Patient Belongings The following personal items are in your possession at time of discharge: 
                   Clothing: At bedside Please provide this summary of care documentation to your next provider. Signatures-by signing, you are acknowledging that this After Visit Summary has been reviewed with you and you have received a copy. Patient Signature:  ____________________________________________________________ Date:  ____________________________________________________________  
  
[facility] Provider Signature:  ____________________________________________________________ Date:  ____________________________________________________________

## 2018-06-28 NOTE — PROGRESS NOTES
Bedside and Verbal shift change report given to Cooper Rivera (oncoming nurse) by Jordan Tracy (offgoing nurse). Report included the following information SBAR, Kardex, Intake/Output, MAR, Accordion and Recent Results.

## 2018-06-28 NOTE — PROGRESS NOTES
Pharmacy Daily Dosing of Warfarin    Indication: AF    Goal INR: 2-3    PTA Warfarin Dose: 5 mg daily    Concurrent anticoagulants: None    Concurrent antiplatelet:     Major Interacting Medications    Drugs that may increase INR: None   Drugs that may decrease INR: Tegretol    Conditions that may increase/decrease INR (CHF, C. diff, cirrhosis, thyroid disorder, hypoalbuminemia):     Labs:  No results for input(s): INR, HGB, PLT, SGOT, TBILI, ALB, HGBEXT, PLTEXT in the last 72 hours. No lab exists for component: INREXT        Impression/Plan: Warfarin 5 mg PO x 1 dose. Daily INR  CBC w/o diff QOD     Pharmacy will follow daily and adjust the dose as appropriate. Thanks  José Ordonez [facility]      [email or web address]/[email or web address]/[email or web address]/[email or web address]. pdf

## 2018-06-28 NOTE — ANESTHESIA PROCEDURE NOTES
Arterial Line Placement    Start time: 6/28/2018 12:01 PM  End time: 6/28/2018 12:11 PM  Performed by: Elle Duffy  Authorized by: Elle Duffy     Pre-Procedure  Indications:  Arterial pressure monitoring and blood sampling  Preanesthetic Checklist: patient identified, risks and benefits discussed, anesthesia consent, site marked, patient being monitored, timeout performed and patient being monitored    Timeout Time: 12:01        Procedure:   Prep:  ChloraPrep and alcohol  Seldinger Technique?: Yes    Orientation:  Right  Location:  Radial artery  Catheter size:  20 G  Number of attempts:  1  Cont Cardiac Output Sensor: No      Assessment:   Post-procedure:  Line secured and sterile dressing applied  Patient Tolerance:  Patient tolerated the procedure well with no immediate complications

## 2018-06-28 NOTE — ANESTHESIA POSTPROCEDURE EVALUATION
Post-Anesthesia Evaluation and Assessment    Patient: Audelia Alonzo Sr. MRN: [id number]  SSN: [id number]    YOB: 1935  Age: 80 y.o. Sex: male       Cardiovascular Function/Vital Signs  Visit Vitals    /77 (BP 1 Location: Left arm, BP Patient Position: At rest)    Pulse 60    Temp 36.3 °C (97.4 °F)    Resp 15    Ht 5' 10\" (1.778 m)    Wt 77.1 kg (170 lb)    SpO2 100%    BMI 24.39 kg/m2       Patient is status post general anesthesia for * No procedures listed *. Nausea/Vomiting: None    Postoperative hydration reviewed and adequate. Pain:  Pain Scale 1: Numeric (0 - 10) (06/28/18 1704)  Pain Intensity 1: 3 (06/28/18 1704)   Managed    Neurological Status:   Neuro (WDL): Within Defined Limits (06/28/18 1645)   At baseline    Mental Status and Level of Consciousness: Arousable    Pulmonary Status:   O2 Device: Room air (06/28/18 1704)   Adequate oxygenation and airway patent    Complications related to anesthesia: None    Post-anesthesia assessment completed.  No concerns    Signed By: Judi Busch MD     June 28, 2018

## 2018-06-29 VITALS
HEART RATE: 66 BPM | HEIGHT: 70 IN | RESPIRATION RATE: 16 BRPM | TEMPERATURE: 97.9 F | DIASTOLIC BLOOD PRESSURE: 69 MMHG | WEIGHT: 170 LBS | BODY MASS INDEX: 24.34 KG/M2 | SYSTOLIC BLOOD PRESSURE: 163 MMHG | OXYGEN SATURATION: 97 %

## 2018-06-29 LAB
ERYTHROCYTE [DISTWIDTH] IN BLOOD BY AUTOMATED COUNT: 14.1 % (ref 11.5–14.5)
HCT VFR BLD AUTO: 22.8 % (ref 36.6–50.3)
HGB BLD-MCNC: 7.3 G/DL (ref 12.1–17)
INR PPP: 1.1 (ref 0.9–1.1)
MCH RBC QN AUTO: 30.9 PG (ref 26–34)
MCHC RBC AUTO-ENTMCNC: 32 G/DL (ref 30–36.5)
MCV RBC AUTO: 96.6 FL (ref 80–99)
NRBC # BLD: 0 K/UL (ref 0–0.01)
NRBC BLD-RTO: 0 PER 100 WBC
PLATELET # BLD AUTO: 147 K/UL (ref 150–400)
PMV BLD AUTO: 10.3 FL (ref 8.9–12.9)
PROTHROMBIN TIME: 11 SEC (ref 9–11.1)
RBC # BLD AUTO: 2.36 M/UL (ref 4.1–5.7)
WBC # BLD AUTO: 6.9 K/UL (ref 4.1–11.1)

## 2018-06-29 PROCEDURE — 85027 COMPLETE CBC AUTOMATED: CPT | Performed by: INTERNAL MEDICINE

## 2018-06-29 PROCEDURE — 74011250637 HC RX REV CODE- 250/637: Performed by: INTERNAL MEDICINE

## 2018-06-29 PROCEDURE — 85610 PROTHROMBIN TIME: CPT | Performed by: INTERNAL MEDICINE

## 2018-06-29 PROCEDURE — 36415 COLL VENOUS BLD VENIPUNCTURE: CPT | Performed by: INTERNAL MEDICINE

## 2018-06-29 PROCEDURE — 93308 TTE F-UP OR LMTD: CPT

## 2018-06-29 RX ORDER — WARFARIN 1 MG/1
2 TABLET ORAL DAILY
Qty: 45 TAB | Refills: 3 | Status: SHIPPED | OUTPATIENT
Start: 2018-06-29 | End: 2018-07-18 | Stop reason: SDUPTHER

## 2018-06-29 RX ADMIN — Medication 1 LOZENGE: at 04:49

## 2018-06-29 RX ADMIN — FINASTERIDE 5 MG: 5 TABLET, FILM COATED ORAL at 08:23

## 2018-06-29 RX ADMIN — HYDRALAZINE HYDROCHLORIDE 25 MG: 50 TABLET, FILM COATED ORAL at 08:24

## 2018-06-29 RX ADMIN — CARBAMAZEPINE 200 MG: 200 TABLET ORAL at 08:22

## 2018-06-29 RX ADMIN — Medication 1 LOZENGE: at 08:26

## 2018-06-29 RX ADMIN — TAMSULOSIN HYDROCHLORIDE 0.4 MG: 0.4 CAPSULE ORAL at 08:23

## 2018-06-29 RX ADMIN — BENAZEPRIL HYDROCHLORIDE 40 MG: 10 TABLET, FILM COATED ORAL at 08:22

## 2018-06-29 RX ADMIN — Medication 10 ML: at 04:49

## 2018-06-29 RX ADMIN — AMLODIPINE BESYLATE 5 MG: 5 TABLET ORAL at 08:24

## 2018-06-29 NOTE — PROGRESS NOTES
Problem: Pressure Injury - Risk of  Goal: *Prevention of pressure injury  Document Gareth Scale and appropriate interventions in the flowsheet. Pressure Injury Interventions: Activity Interventions: Assess need for specialty bed, Increase time out of bed, Pressure redistribution bed/mattress(bed type)    Mobility Interventions: Assess need for specialty bed, Pressure redistribution bed/mattress (bed type), Turn and reposition approx.  every two hours(pillow and wedges)

## 2018-06-29 NOTE — PROGRESS NOTES
I have reviewed discharge instructions with the patient and son. The patient and son verbalized understanding. RN have reviewed medication and side effects with pt. And family. Pt. Demonstrates understanding. Allowed adequate time to ask question, all questions answered. Discharged instruction reviewed with pt. And family. Printed copy of AVS given to pt. All belongings gathered, IV and Tele discontinue. Transported pt. Via wheelchair to main entrance and into care of family.

## 2018-06-29 NOTE — PROGRESS NOTES
Mr. Kenneth Pearce is s/p Watchman device implant  Patient has no c/o SOB, CP. Echo this AM shows no pericardial effusion. PE:  Chest - clear  Heart - RRR, no m/S3  Abd - +BS, no distention  Ext - right groin site D/I, no hematoma, soft, no bruit, +DP/PT    Reviewed new medication and dosing of warfarin  F/u scheduled with EP at [facility].     OK for discharge

## 2018-06-29 NOTE — PROGRESS NOTES
Problem: Falls - Risk of  Goal: *Absence of Falls  Document Sara Fall Risk and appropriate interventions in the flowsheet. Outcome: Progressing Towards Goal  Fall Risk Interventions:  Mobility Interventions: Assess mobility with egress test, Communicate number of staff needed for ambulation/transfer, Patient to call before getting OOB, Utilize walker, cane, or other assitive device, Strengthening exercises (ROM-active/passive)         Medication Interventions: Assess postural VS orthostatic hypotension, Evaluate medications/consider consulting pharmacy, Patient to call before getting OOB, Teach patient to arise slowly                  Problem: Pressure Injury - Risk of  Goal: *Prevention of pressure injury  Document Gareth Scale and appropriate interventions in the flowsheet. Outcome: Progressing Towards Goal  Pressure Injury Interventions: Activity Interventions: Assess need for specialty bed, Increase time out of bed, Pressure redistribution bed/mattress(bed type)    Mobility Interventions: Assess need for specialty bed, Pressure redistribution bed/mattress (bed type), Turn and reposition approx.  every two hours(pillow and wedges)

## 2018-06-29 NOTE — PROGRESS NOTES
Problem: Falls - Risk of  Goal: *Absence of Falls  Document Sara Fall Risk and appropriate interventions in the flowsheet.    Outcome: Progressing Towards Goal  Fall Risk Interventions:  Mobility Interventions: Assess mobility with egress test, Patient to call before getting OOB         Medication Interventions: Assess postural VS orthostatic hypotension, Evaluate medications/consider consulting pharmacy, Patient to call before getting OOB, Teach patient to arise slowly

## 2018-06-29 NOTE — DISCHARGE SUMMARY
Cardiac Electrophysiology Discharge Summary       Patient ID:  Radha Mejia  [id number]  89 y.o.  1935    Admit Date: 6/28/2018    Discharge Date: 6/29/2018     Admitting Physician: Lokesh Page MD     Discharge Physician: Lokesh Page MD    Admission Diagnoses: cath;S/P cardiac cath;Atrial fibrillation (HCC);WATCHMAN    Discharge Diagnoses: Active Problems:    S/P cardiac cath (6/28/2018)      Atrial fibrillation ([address] (6/28/2018)        Discharge Condition: stable    Cardiology Procedures this Admission: Left atrial appendage occlusion performed    Hospital Course: Patient underwent above procedure without complication and remained stable without acute events. Discharge Exam:  Visit Vitals    /70    Pulse 60    Temp 98.3 °F (36.8 °C)    Resp 16    Ht 5' 10\" (1.778 m)    Wt 170 lb (77.1 kg)    SpO2 98%    BMI 24.39 kg/m2       Abdomen: soft, non-tender  Extremities: extremities normal  Heart: regular rate and rhythm  Lungs: clear to auscultation bilaterally  Neck: no JVD  Neurologic: Grossly normal  Pulses: 2+ and symmetric    Disposition: Home if echocardiogram without pericardial effusion    Patient Instructions:   Current Discharge Medication List      START taking these medications    Details   warfarin (COUMADIN) 1 mg tablet Take 2 Tabs by mouth daily. Qty: 45 Tab, Refills: 3         CONTINUE these medications which have NOT CHANGED    Details   amLODIPine (NORVASC) 5 mg tablet Take 5 mg by mouth daily. hydrALAZINE (APRESOLINE) 25 mg tablet Take 1 Tab by mouth three (3) times daily. Qty: 90 Tab, Refills: 3      benazepril (LOTENSIN) 40 mg tablet Take 40 mg by mouth daily. finasteride (PROSCAR) 5 mg tablet Take 1 Tab by mouth daily. Qty: 30 Tab, Refills: 1      tamsulosin (FLOMAX) 0.4 mg capsule Take 1 Cap by mouth daily. Qty: 30 Cap, Refills: 1      carbamazepine (TEGRETOL) 200 mg tablet Take 200 mg by mouth three (3) times daily.       acetaminophen (TYLENOL) 325 mg tablet Take 325 mg by mouth every four (4) hours as needed for Pain. Referenced discharge instructions provided by nursing for diet and activity.     Follow-up with Leyla Mendes MD             Signed:  Mahogany Hoffmann MD  Cardiac Electrophysiology  6/29/2018  8:21 AM

## 2018-06-29 NOTE — PROGRESS NOTES
First Initial PCP [facility] appt scheduled with Dr. Cally Phelps on 7/6/2018 at 3:30pm Appt added to [address] CM Specialist  Speacialist appt scheduled with DR. Plata on 7/13/2018 at 9:40am. Appt added to AVS. Romie Orosco CM Specialist

## 2018-06-29 NOTE — PROGRESS NOTES
Spiritual Care Partner Volunteer visited patient in [place] on 6/29/18. Documented by:  SIERRA Bartholomew

## 2018-06-29 NOTE — DISCHARGE INSTRUCTIONS
LEFT ATRIAL APPENDAGE OCCLUSION PROCEDURE   Discharge Instructions      You have just had a Watchman procedure performed. There were catheters temporarily placed in your heart through a puncture in the Veins and/or Arteries in your groin. WHAT TO EXPECT      Please be aware that you may experience mild chest pain that will resolve within 24 hours. If the Chest Pain begins radiating down your shoulders or arms, becomes severe or breathing becomes difficult, call 911 or go to the closest emergency room.  Mild to moderate, non-painful, bruising at the puncture site is not un-common, and will resolve in 7 - 10 days.  If a closure device was used to seal your artery or vein, a separate pamphlet will be given to you with these discharge instructions. It is very important that you review the information in the pamphlet for different restrictions and precautions.  You have a small gauze dressing applied to the puncture site in your groin. You may remove this the following morning. MEDICATIONS      Take only the medications prescribed to you at discharge. ACTIVITY      A responsible adult must take you home. Do not drive a car for 24 hours.  Rest quietly for the remainder of the day.  Do not lift anything greater than 10 pounds for 3 days.  Limit bending at the puncture site and use of stairs for at least 3 days.  You may remove the bandage and shower the morning after the procedure. Do not take a bath for 3 days. SYMPTOMS THAT NEED TO BE REPORTED IMMEDIATELY      Bleeding at the puncture site. If there is bleeding, lie down and hold firm direct pressure for at least 5 minutes. If the bleeding does not stop, go to the closest emergency room, or call 911.  Temperature more than 100.5 F.   Redness or warmth at the puncture site.  Increasing pain, numbness, coolness or blue discoloration of the extremity where the puncture is located.    Pulsating mass at the puncture site.  A new lump at the puncture site, or increasing swelling at the site.  Bruising at the puncture site that enlarges or becomes painful (some bruising at the site is common and will go away in 7 - 10 days).  Rapid heart rate or palpitations.  Dizziness, lightheadedness, fainting.  REMEMBER: If you feel something is an emergency or cannot be handled over the phone, call 911 or go to the closest emergency room.       Dania Lynch' nurse in 2 weeks            Noel Hernandez MD  Cardiac Electrophysiology / Cardiology    [facility].  [address] [facility], [address]  [phone] / [phone] Fax   [phone] / [phone] Fax

## 2018-07-02 LAB
ACT BLD: 147 SECS (ref 79–138)
ACT BLD: 191 SECS (ref 79–138)
ACT BLD: 246 SECS (ref 79–138)

## 2018-07-13 ENCOUNTER — CLINICAL SUPPORT (OUTPATIENT)
Dept: CARDIOLOGY CLINIC | Age: 83
End: 2018-07-13

## 2018-07-13 ENCOUNTER — OFFICE VISIT (OUTPATIENT)
Dept: CARDIOLOGY CLINIC | Age: 83
End: 2018-07-13

## 2018-07-13 VITALS
WEIGHT: 165.6 LBS | SYSTOLIC BLOOD PRESSURE: 184 MMHG | BODY MASS INDEX: 23.71 KG/M2 | HEIGHT: 70 IN | OXYGEN SATURATION: 98 % | HEART RATE: 76 BPM | DIASTOLIC BLOOD PRESSURE: 72 MMHG | RESPIRATION RATE: 16 BRPM

## 2018-07-13 DIAGNOSIS — I10 ESSENTIAL HYPERTENSION: ICD-10-CM

## 2018-07-13 DIAGNOSIS — I42.0 DILATED CARDIOMYOPATHY (HCC): ICD-10-CM

## 2018-07-13 DIAGNOSIS — I48.91 ATRIAL FIBRILLATION, UNSPECIFIED TYPE (HCC): Primary | ICD-10-CM

## 2018-07-13 DIAGNOSIS — Z95.0 PACEMAKER: ICD-10-CM

## 2018-07-13 DIAGNOSIS — Z95.0 PACEMAKER: Primary | ICD-10-CM

## 2018-07-13 DIAGNOSIS — Z79.01 LONG TERM (CURRENT) USE OF ANTICOAGULANTS: Primary | ICD-10-CM

## 2018-07-13 DIAGNOSIS — Z95.9 CARDIAC DEVICE IN SITU: ICD-10-CM

## 2018-07-13 DIAGNOSIS — I48.91 ATRIAL FIBRILLATION WITH CONTROLLED VENTRICULAR RATE (HCC): ICD-10-CM

## 2018-07-13 LAB
INR BLD: 1.4
INR, EXTERNAL: 1.4 (ref 2–3)
PT POC: 16.3 SECONDS
VALID INTERNAL CONTROL?: YES

## 2018-07-13 NOTE — PROGRESS NOTES
Visit Vitals    /72 (BP 1 Location: Left arm, BP Patient Position: Sitting)    Pulse 76    Resp 16    Ht 5' 10\" (1.778 m)    Wt 165 lb 9.6 oz (75.1 kg)    SpO2 98%    BMI 23.76 kg/m2

## 2018-07-13 NOTE — PATIENT INSTRUCTIONS
Coumadin Dosing Instructions:  · Take 2 tablets every Sunday and Tuesday. · Take 4 tablets every Monday, Friday and Saturday. · Return to office for coumadin check on: July 18th @ 9:20am  · Contact our office with any concerns. Anticoagulants: After Your Visit  Your Care Instructions  Your doctor prescribed an anticoagulant medicine. Anticoagulants, often called blood thinners, prevent new blood clots from forming and keep existing clots from getting larger. They do not actually thin the blood, but they make the blood take longer to clot. This lowers the risk of a blood clot moving to the lungs (pulmonary embolism) or moving to the brain and causing a stroke. Blood thinners come in two forms. Heparin is given by shot, either under your skin or through a needle in your vein, and starts working right away. Warfarin (Coumadin) comes in pill form and takes longer to work. Your doctor may have you begin taking both forms at the same time. As soon as the pills start to work, you will stop the shots but continue to take the pills. If you have a blood clot in your leg, you may need to take warfarin for several months. People who have heart conditions such as atrial fibrillation often need to take it for the rest of their lives. The right dose of this medicine is different for each person. You will need regular blood tests to see if your dose is correct. Follow-up care is a key part of your treatment and safety. Be sure to make and go to all appointments, and call your doctor if you are having problems. Itâs also a good idea to know your test results and keep a list of the medicines you take. How do you take blood thinners? · Take your medicines exactly as prescribed. Call your doctor if you think you are having a problem with your medicine. · Call your doctor if you are not sure what to do if you missed a dose of blood thinner.   ¨ Your doctor can tell you exactly what to do so you do not take too much or too little blood thinner. Then you will be as safe as possible. · Some general rules for what to do if you miss a dose:  ¨ If you remember it in the same day, take the missed dose. Then go back to your regular schedule. ¨ If it is the next day, or almost time to take the next dose, do not take the missed dose. Do not double the dose to make up for the missed one. At your next regularly scheduled time, take your normal dose. ¨ If you miss your dose for 2 or more days, call your doctor. · To help you stay on schedule, use a calendar to remind you when to take your blood thinner. When you take the medicine, note it on the calendar. · If you are going to give yourself shots, your doctor will give you instructions for how to safely inject the medicine. Follow the directions carefully. · Do not take any vitamins, over-the-counter medicines, or herbal products without talking to your doctor first.  · Avoid contact sports and other activities that could lead to injury. Make your home safe and take other measures to reduce your risk of falling. Always wear a seat belt while in a car. · Do not suddenly change your intake of vitamin K rich foods, such as broccoli, cabbage, asparagus, lettuce, and spinach. This will help blood thinners work evenly from day to day. · Limit alcohol to 2 drinks a day for men and 1 drink a day for women. Alcohol may interfere with blood thinners. It also increases your risk of falls, which can cause bruising and bleeding. · Tell your dentist, pharmacist, and other health professionals that you take blood thinners. Wear medical alert jewelry that says you take blood thinners. You can buy this at most [employer]. When should you call for help? Call 911 anytime you think you may need emergency care. For example, call if:  · You cough up blood. · You vomit blood or what looks like coffee grounds. · You pass maroon or very bloody stools.   Call your doctor now or seek immediate medical care if:  · You have new bruises or blood spots under your skin. · You have a nosebleed. · Your gums bleed when you brush your teeth. · You have blood in your urine. · Your stools are black and tarlike or have streaks of blood. · You have vaginal bleeding when you are not having your period, or heavy period bleeding. Watch closely for changes in your health, and be sure to contact your doctor if:  · You have questions about your medicine. Where can you learn more? Go to [employer] FLIP [employer].be  Enter S723 in the search box to learn more about \"Anticoagulants: After Your Visit. \"   Â© 6695-9173 Healthwise, Incorporated. Care instructions adapted under license by [employer] (which disclaims liability or warranty for this information). This care instruction is for use with your licensed healthcare professional. If you have questions about a medical condition or this instruction, always ask your healthcare professional. [place] [address] any warranty or liability for your use of this information. Content Version: 7.1.43259; Last Revised: July 1, 2009    Transesophageal Echocardiogram: Before Your Procedure  What is a transesophageal echocardiogram?    A transesophageal echocardiogram is a test to help your doctor look at the inside of your heart. A small device called a transducer directs sound waves toward your heart. The sound waves make a picture of the heart's valves and chambers. Your doctor may do this test to look for certain types of heart disease. Or it may be done to see how disease is affecting your heart. You will be given medicine to make you sleepy and comfortable during the test.  The doctor puts a small, flexible tube into your throat and guides it to the esophagus. This is the tube that connects your mouth to your stomach. The doctor will ask you to swallow as the tube goes down. The transducer is at the tip of the tube.  It gets close to your heart to make clear pictures. The doctor will look at the ultrasound pictures on a screen. You will not be able to eat or drink until the numbness from the throat spray wears off. Your throat may be sore for a few days after the test.  Follow-up care is a key part of your treatment and safety. Be sure to make and go to all appointments, and call your doctor if you are having problems. It's also a good idea to know your test results and keep a list of the medicines you take. What happens before the procedure?   Preparing for the procedure    · Understand exactly what procedure is planned, along with the risks, benefits, and other options. · Tell your doctors ALL the medicines, vitamins, supplements, and herbal remedies you take. Some of these can increase the risk of bleeding or interact with anesthesia.     · If you take blood thinners, such as warfarin (Coumadin), clopidogrel (Plavix), or aspirin, be sure to talk to your doctor. He or she will tell you if you should stop taking these medicines before your procedure. Make sure that you understand exactly what your doctor wants you to do.     · Your doctor will tell you which medicines to take or stop before your procedure. You may need to stop taking certain medicines a week or more before the procedure. So talk to your doctor as soon as you can.     · If you have an advance directive, let your doctor know. It may include a living will and a durable power of  for health care. Bring a copy to the hospital. If you don't have one, you may want to prepare one. It lets your doctor and loved ones know your health care wishes. Doctors advise that everyone prepare these papers before any type of surgery or procedure.     · Be sure to tell your doctor about any problems you have with your stomach or esophagus. Procedures can be stressful. This information will help you understand what you can expect. And it will help you safely prepare for your procedure.   What happens on the day of the procedure? · Follow the instructions exactly about when to stop eating and drinking. If you don't, your procedure may be canceled. If your doctor told you to take your medicines on the day of the procedure, take them with only a sip of water.     · Take a bath or shower before you come in for your procedure. Do not apply lotions, perfumes, deodorants, or nail polish.     · Take off all jewelry and piercings. And take out contact lenses, if you wear them.    At the hospital or surgery center   · Bring a picture ID.     · You will be kept comfortable and safe by your anesthesia provider. You may get medicine that relaxes you or puts you in a light sleep. The area being worked on will be numb.     · You will get some medicine sprayed in your throat. This will numb your throat to prevent you from gagging when the transducer is moved into your esophagus.     · You will lie on your left side on an exam table. You may get a mouth guard to protect your teeth.     · The procedure will take about 2 hours. During that time, the tube is in your throat for 10 to 20 minutes. Going home   · Be sure you have someone to drive you home. Anesthesia and pain medicine make it unsafe for you to drive.     · You will be given more specific instructions about recovering from your procedure. They will cover things like diet, wound care, follow-up care, driving, and getting back to your normal routine. When should you call your doctor? · You have questions or concerns.     · You don't understand how to prepare for your procedure.     · You become ill before the procedure (such as fever, flu, or a cold).     · You need to reschedule or have changed your mind about having the procedure. Where can you learn more? Go to http://oscar-kamari.info/. Enter K500 in the search box to learn more about \"Transesophageal Echocardiogram: Before Your Procedure. \"  Current as of: December 6, 2017  Content Version: 11.7  © 6388-0054 Healthwise, Incorporated. Care instructions adapted under license by [employer] (which disclaims liability or warranty for this information). If you have questions about a medical condition or this instruction, always ask your healthcare professional. [address] any warranty or liability for your use of this information.

## 2018-07-13 NOTE — PROGRESS NOTES
HISTORY OF PRESENTING ILLNESS      Gena Dixon is a 80 y.o. male with history of CAD/CABG, CKD stage IV, pacemaker, paroxysmal atrial fibrillation with recent episode of GI bleeding while on warfarin.  His anticoagulation was discontinued; he underwent WATCHMAN implant and now presents for follow up. Device interrogation today demonstrates normal functioning. He is doing well and denies cardiac complaints.       ACTIVE PROBLEM LIST     Patient Active Problem List    Diagnosis Date Noted    S/P cardiac cath 06/28/2018    Atrial fibrillation ([facility] 06/28/2018    Acute GI bleeding 02/07/2018    Dyslipidemia 10/24/2017    Dilated cardiomyopathy ([address] 10/24/2017    Atrial fibrillation with controlled ventricular rate ([address] 10/24/2017    Pacemaker 10/24/2017    CAD (coronary artery disease)     Hypertension     Systolic CHF, chronic ([facility])     Elevated brain natriuretic peptide (BNP) level 10/10/2017    Anemia 10/10/2017    H pylori ulcer 09/06/2017    Urine retention 09/06/2017    CKD (chronic kidney disease) stage 4, GFR 15-29 ml/min ([facility]) 09/05/2017    Benign prostatic hyperplasia 09/05/2017    Hydronephrosis 09/05/2017    Coronary atherosclerosis of native coronary artery 09/02/2011           PAST MEDICAL HISTORY     Past Medical History:   Diagnosis Date    Arrhythmia     atrial fib    Arthritis     CAD (coronary artery disease)     hx mi    CHF (congestive heart failure) ([facility])     Chronic kidney disease     no dialysis    CKD (chronic kidney disease), stage IV ([facility])     Hyperlipidemia     Hypertension     Ill-defined condition     bronchitis    Ill-defined condition 02/2018    blood transfusion--gi bleed on coumadin    Seizures ([facility])     Systolic CHF, chronic ([address]            PAST SURGICAL HISTORY     Past Surgical History:   Procedure Laterality Date    COLONOSCOPY N/A 9/6/2017    COLONOSCOPY performed by Concha Rai MD at [facility] 2/8/2018    COLONOSCOPY performed by Yuly Levin MD at [facility] HX CATARACT REMOVAL      right    HX CORONARY ARTERY BYPASS GRAFT      x2 vessels    HX HEART CATHETERIZATION      HX PACEMAKER      right chest          ALLERGIES     No Known Allergies       FAMILY HISTORY     Family History   Problem Relation Age of Onset    Heart Disease Mother     Hypertension Mother     negative for cardiac disease       SOCIAL HISTORY     Social History     Social History    Marital status:      Spouse name: N/A    Number of children: N/A    Years of education: N/A     Social History Main Topics    Smoking status: Former Smoker     Packs/day: 0.50     Years: 40.00     Quit date: 6/21/1988    Smokeless tobacco: Never Used    Alcohol use No    Drug use: No    Sexual activity: No     Other Topics Concern    Not on file     Social History Narrative         MEDICATIONS     Current Outpatient Prescriptions   Medication Sig    warfarin (COUMADIN) 1 mg tablet Take 2 Tabs by mouth daily.  amLODIPine (NORVASC) 5 mg tablet Take 5 mg by mouth daily.  hydrALAZINE (APRESOLINE) 25 mg tablet Take 1 Tab by mouth three (3) times daily.  benazepril (LOTENSIN) 40 mg tablet Take 40 mg by mouth daily.  finasteride (PROSCAR) 5 mg tablet Take 1 Tab by mouth daily.  tamsulosin (FLOMAX) 0.4 mg capsule Take 1 Cap by mouth daily. (Patient taking differently: Take 0.4 mg by mouth daily (after dinner). )    acetaminophen (TYLENOL) 325 mg tablet Take 325 mg by mouth every four (4) hours as needed for Pain.  carbamazepine (TEGRETOL) 200 mg tablet Take 200 mg by mouth three (3) times daily. No current facility-administered medications for this visit. I have reviewed the nurses notes, vitals, problem list, allergy list, medical history, family, social history and medications. REVIEW OF SYMPTOMS      General: Pt denies excessive weight gain or loss.  Pt is able to conduct ADL's  HEENT: Denies blurred vision, headaches, hearing loss, epistaxis and difficulty swallowing. Respiratory: Denies cough, congestion, shortness of breath, COOPER, wheezing or stridor. Cardiovascular: Denies precordial pain, palpitations, edema or PND  Gastrointestinal: Denies poor appetite, indigestion, abdominal pain or blood in stool  Genitourinary: Denies hematuria, dysuria, increased urinary frequency  Musculoskeletal: Denies joint pain or swelling from muscles or joints  Neurologic: Denies tremor, paresthesias, headache, or sensory motor disturbance  Psychiatric: Denies confusion, insomnia, depression  Integumentray: Denies rash, itching or ulcers. Hematologic: Denies easy bruising, bleeding       PHYSICAL EXAMINATION      There were no vitals filed for this visit. General: Well developed, in no acute distress. HEENT: No jaundice, oral mucosa moist, no oral ulcers  Neck: Supple, no stiffness, no lymphadenopathy, supple  Heart:  Normal S1/S2 negative S3 or S4. Regular, no murmur, gallop or rub, no jugular venous distention  Respiratory: Clear bilaterally x 4, no wheezing or rales  Abdomen:   Soft, non-tender, bowel sounds are active.   Extremities:  No edema, normal cap refill, no cyanosis. Musculoskeletal: No clubbing, no deformities  Neuro: A&Ox3, speech clear, gait stable, cooperative, no focal neurologic deficits  Skin: Skin color is normal. No rashes or lesions.  Non diaphoretic, moist.  Vascular: 2+ pulses symmetric in all extremities       DIAGNOSTIC DATA      EKG:        LABORATORY DATA      Lab Results   Component Value Date/Time    WBC 6.9 06/29/2018 04:55 AM    Hemoglobin (POC) 7.5 (L) 09/07/2011 06:16 PM    HGB 7.3 (L) 06/29/2018 04:55 AM    Hematocrit (POC) 22 (L) 09/07/2011 06:16 PM    HCT 22.8 (L) 06/29/2018 04:55 AM    PLATELET 455 (L) 89/64/9499 04:55 AM    MCV 96.6 06/29/2018 04:55 AM      Lab Results   Component Value Date/Time    Sodium 140 06/21/2018 11:36 AM    Potassium 4.6 06/21/2018 11:36 AM Chloride 109 (H) 06/21/2018 11:36 AM    CO2 22 06/21/2018 11:36 AM    Anion gap 9 06/21/2018 11:36 AM    Glucose 87 06/21/2018 11:36 AM    BUN 43 (H) 06/21/2018 11:36 AM    Creatinine 2.85 (H) 06/21/2018 11:36 AM    BUN/Creatinine ratio 15 06/21/2018 11:36 AM    GFR est AA 26 (L) 06/21/2018 11:36 AM    GFR est non-AA 21 (L) 06/21/2018 11:36 AM    Calcium 8.9 06/21/2018 11:36 AM    Bilirubin, total 0.2 02/14/2018 12:55 PM    AST (SGOT) 19 02/14/2018 12:55 PM    Alk. phosphatase 78 02/14/2018 12:55 PM    Protein, total 7.1 02/14/2018 12:55 PM    Albumin 3.2 (L) 02/14/2018 12:55 PM    Globulin 3.9 02/14/2018 12:55 PM    A-G Ratio 0.8 (L) 02/14/2018 12:55 PM    ALT (SGPT) 14 02/14/2018 12:55 PM           ASSESSMENT      1.  Atrial fibrillation                        A. Paroxysmal                        X. CHADSVASC 4                        C. HASBLED 4-5  2.  Hypertension  3.  Coronary artery disease, native  4.  CKD  5.  History of GI bleed  6.  Cardiomyopathy                        I. Nonischemic                        B. Resolved  7. CABG  8. History of gastric ulcer  9.  Pacemaker                        G. Dual-chamber                        J. Right sided                        C. [employer]  10. Cardiomyopathy                        A. Resolved       PLAN      Will schedule 45 day post Watchman JYOTI with plans to transition from Coumadin to ASA/Plavix regimen at that time. FOLLOW-UP    45 day JYOTI    Thank you, Lorna Garcia MD and Dr. Shalini Edwards for allowing me to participate in the care of this extraordinarily pleasant male. Please do not hesitate to contact me for further questions/concerns. Sharonda Quinones NP  .   [address] [facility]. [facility], [address], [address] [place] [address]  [phone] / [phone] Fax   [phone] / [phone] Fax

## 2018-07-13 NOTE — LETTER
7/13/2018 10:29 AM 
 
Mr. Jimy Almanzar [address] [place] [id number] You are scheduled for a JYOTI (transesophageal echocardiogram) at [facility] on Friday, August 17th. Please arrive at the patient registration desk located on the 2nd floor of the main building at 10:00am. 
 
Do not eat or drink anything after midnight the night before your procedure. You will need a . You may take your normal medications with a sip of water the morning of your procedure. Please call Marisabel Brooks or Francisco Javier Sim if you have any questions at [phone]. Please call the office if you develop any type of illness prior to your procedure. Sincerely, Dhaval Bowers MD/Stephenie Crabtree

## 2018-07-18 ENCOUNTER — CLINICAL SUPPORT (OUTPATIENT)
Dept: CARDIOLOGY CLINIC | Age: 83
End: 2018-07-18

## 2018-07-18 DIAGNOSIS — I10 ESSENTIAL HYPERTENSION: ICD-10-CM

## 2018-07-18 DIAGNOSIS — I42.0 DILATED CARDIOMYOPATHY (HCC): ICD-10-CM

## 2018-07-18 DIAGNOSIS — I48.91 ATRIAL FIBRILLATION WITH CONTROLLED VENTRICULAR RATE (HCC): ICD-10-CM

## 2018-07-18 DIAGNOSIS — Z79.01 LONG TERM (CURRENT) USE OF ANTICOAGULANTS: Primary | ICD-10-CM

## 2018-07-18 LAB
INR BLD: 1.4
INR, EXTERNAL: 1.4 (ref 2–3)
PT POC: 16.9 SECONDS
VALID INTERNAL CONTROL?: YES

## 2018-07-18 RX ORDER — WARFARIN 2 MG/1
2 TABLET ORAL DAILY
Qty: 30 TAB | Refills: 3 | Status: SHIPPED | OUTPATIENT
Start: 2018-07-18 | End: 2018-08-29

## 2018-07-18 NOTE — PATIENT INSTRUCTIONS
Coumadin Instructions: 
· Prescription for 2mg tablets will be sent to the pharmacy. · Take 6mg today, July 18th · Take 4mg tomorrow July 19th · Take 6mg on Friday, July 20th. · Take 4mg daily starting Saturday July 21st 
 
 
Anticoagulants: After Your Visit Your Care Instructions Your doctor prescribed an anticoagulant medicine. Anticoagulants, often called blood thinners, prevent new blood clots from forming and keep existing clots from getting larger. They do not actually thin the blood, but they make the blood take longer to clot. This lowers the risk of a blood clot moving to the lungs (pulmonary embolism) or moving to the brain and causing a stroke. Blood thinners come in two forms. Heparin is given by shot, either under your skin or through a needle in your vein, and starts working right away. Warfarin (Coumadin) comes in pill form and takes longer to work. Your doctor may have you begin taking both forms at the same time. As soon as the pills start to work, you will stop the shots but continue to take the pills. If you have a blood clot in your leg, you may need to take warfarin for several months. People who have heart conditions such as atrial fibrillation often need to take it for the rest of their lives. The right dose of this medicine is different for each person. You will need regular blood tests to see if your dose is correct. Follow-up care is a key part of your treatment and safety. Be sure to make and go to all appointments, and call your doctor if you are having problems. Itâs also a good idea to know your test results and keep a list of the medicines you take. How do you take blood thinners? · Take your medicines exactly as prescribed. Call your doctor if you think you are having a problem with your medicine. · Call your doctor if you are not sure what to do if you missed a dose of blood thinner.  
¨ Your doctor can tell you exactly what to do so you do not take too much or too little blood thinner. Then you will be as safe as possible. · Some general rules for what to do if you miss a dose: ¨ If you remember it in the same day, take the missed dose. Then go back to your regular schedule. ¨ If it is the next day, or almost time to take the next dose, do not take the missed dose. Do not double the dose to make up for the missed one. At your next regularly scheduled time, take your normal dose. ¨ If you miss your dose for 2 or more days, call your doctor. · To help you stay on schedule, use a calendar to remind you when to take your blood thinner. When you take the medicine, note it on the calendar. · If you are going to give yourself shots, your doctor will give you instructions for how to safely inject the medicine. Follow the directions carefully. · Do not take any vitamins, over-the-counter medicines, or herbal products without talking to your doctor first. 
· Avoid contact sports and other activities that could lead to injury. Make your home safe and take other measures to reduce your risk of falling. Always wear a seat belt while in a car. · Do not suddenly change your intake of vitamin Kârich foods, such as broccoli, cabbage, asparagus, lettuce, and spinach. This will help blood thinners work evenly from day to day. · Limit alcohol to 2 drinks a day for men and 1 drink a day for women. Alcohol may interfere with blood thinners. It also increases your risk of falls, which can cause bruising and bleeding. · Tell your dentist, pharmacist, and other health professionals that you take blood thinners. Wear medical alert jewelry that says you take blood thinners. You can buy this at most [employer]. When should you call for help? Call 911 anytime you think you may need emergency care. For example, call if: 
· You cough up blood. · You vomit blood or what looks like coffee grounds. · You pass maroon or very bloody stools.  
Call your doctor now or seek immediate medical care if: 
· You have new bruises or blood spots under your skin. · You have a nosebleed. · Your gums bleed when you brush your teeth. · You have blood in your urine. · Your stools are black and tarlike or have streaks of blood. · You have vaginal bleeding when you are not having your period, or heavy period bleeding. Watch closely for changes in your health, and be sure to contact your doctor if: 
· You have questions about your medicine. Where can you learn more? Go to [employer] Enter Q604 in the search box to learn more about \"Anticoagulants: After Your Visit. \" Â© 9565-7009 [employer]wise, Incorporated. Care instructions adapted under license by Shilpa Mancilla (which disclaims liability or warranty for this information). This care instruction is for use with your licensed healthcare professional. If you have questions about a medical condition or this instruction, always ask your healthcare professional. [place] [address] any warranty or liability for your use of this information. Content Version: 0.0.05070; Last Revised: July 1, 2009

## 2018-07-25 ENCOUNTER — CLINICAL SUPPORT (OUTPATIENT)
Dept: CARDIOLOGY CLINIC | Age: 83
End: 2018-07-25

## 2018-07-25 DIAGNOSIS — I48.91 ATRIAL FIBRILLATION, UNSPECIFIED TYPE (HCC): ICD-10-CM

## 2018-07-25 DIAGNOSIS — I42.0 DILATED CARDIOMYOPATHY (HCC): ICD-10-CM

## 2018-07-25 DIAGNOSIS — Z79.01 LONG TERM (CURRENT) USE OF ANTICOAGULANTS: Primary | ICD-10-CM

## 2018-07-25 DIAGNOSIS — I48.91 ATRIAL FIBRILLATION WITH CONTROLLED VENTRICULAR RATE (HCC): ICD-10-CM

## 2018-07-25 DIAGNOSIS — I10 ESSENTIAL HYPERTENSION: ICD-10-CM

## 2018-07-25 LAB
INR BLD: 2.6
INR, EXTERNAL: 2.1 (ref 2–3)
PT POC: 30.8 SECONDS
VALID INTERNAL CONTROL?: YES

## 2018-07-31 ENCOUNTER — DOCUMENTATION ONLY (OUTPATIENT)
Dept: CARDIOLOGY CLINIC | Age: 83
End: 2018-07-31

## 2018-08-10 RX ORDER — SODIUM CHLORIDE 0.9 % (FLUSH) 0.9 %
5-10 SYRINGE (ML) INJECTION AS NEEDED
Status: CANCELLED | OUTPATIENT
Start: 2018-08-10

## 2018-08-10 RX ORDER — SODIUM CHLORIDE 0.9 % (FLUSH) 0.9 %
5-10 SYRINGE (ML) INJECTION EVERY 8 HOURS
Status: CANCELLED | OUTPATIENT
Start: 2018-08-10

## 2018-08-15 ENCOUNTER — CLINICAL SUPPORT (OUTPATIENT)
Dept: CARDIOLOGY CLINIC | Age: 83
End: 2018-08-15

## 2018-08-15 DIAGNOSIS — I48.91 ATRIAL FIBRILLATION, UNSPECIFIED TYPE (HCC): ICD-10-CM

## 2018-08-15 DIAGNOSIS — I10 ESSENTIAL HYPERTENSION: ICD-10-CM

## 2018-08-15 DIAGNOSIS — Z79.01 LONG TERM (CURRENT) USE OF ANTICOAGULANTS: Primary | ICD-10-CM

## 2018-08-15 DIAGNOSIS — I48.91 ATRIAL FIBRILLATION WITH CONTROLLED VENTRICULAR RATE (HCC): ICD-10-CM

## 2018-08-15 DIAGNOSIS — I42.0 DILATED CARDIOMYOPATHY (HCC): ICD-10-CM

## 2018-08-15 LAB
INR BLD: 2
INR, EXTERNAL: 2 (ref 2–3)
PT POC: 20.6 SECONDS
VALID INTERNAL CONTROL?: YES

## 2018-08-23 RX ORDER — SODIUM CHLORIDE 0.9 % (FLUSH) 0.9 %
5-10 SYRINGE (ML) INJECTION AS NEEDED
Status: CANCELLED | OUTPATIENT
Start: 2018-08-23

## 2018-08-23 RX ORDER — SODIUM CHLORIDE 0.9 % (FLUSH) 0.9 %
5-10 SYRINGE (ML) INJECTION EVERY 8 HOURS
Status: CANCELLED | OUTPATIENT
Start: 2018-08-23

## 2018-08-28 NOTE — PROGRESS NOTES
12:18 PM 
 
Pt chart accessed to review all areas including but not limited to pt history, test results, labs, and orders in preparation for possible Angio/Cath/EP procedure.

## 2018-08-29 ENCOUNTER — HOSPITAL ENCOUNTER (OUTPATIENT)
Dept: NON INVASIVE DIAGNOSTICS | Age: 83
Discharge: HOME | End: 2018-08-29
Attending: INTERNAL MEDICINE | Admitting: INTERNAL MEDICINE
Payer: MEDICARE

## 2018-08-29 VITALS
TEMPERATURE: 97.6 F | BODY MASS INDEX: 21.3 KG/M2 | OXYGEN SATURATION: 100 % | RESPIRATION RATE: 13 BRPM | HEIGHT: 70 IN | DIASTOLIC BLOOD PRESSURE: 73 MMHG | WEIGHT: 148.81 LBS | HEART RATE: 60 BPM | SYSTOLIC BLOOD PRESSURE: 160 MMHG

## 2018-08-29 LAB
ERYTHROCYTE [DISTWIDTH] IN BLOOD BY AUTOMATED COUNT: 13 % (ref 11.5–14.5)
HCT VFR BLD AUTO: 30.6 % (ref 36.6–50.3)
HGB BLD-MCNC: 9.8 G/DL (ref 12.1–17)
INR PPP: 1.4 (ref 0.9–1.1)
MCH RBC QN AUTO: 31.8 PG (ref 26–34)
MCHC RBC AUTO-ENTMCNC: 32 G/DL (ref 30–36.5)
MCV RBC AUTO: 99.4 FL (ref 80–99)
NRBC # BLD: 0 K/UL (ref 0–0.01)
NRBC BLD-RTO: 0 PER 100 WBC
PLATELET # BLD AUTO: 155 K/UL (ref 150–400)
PMV BLD AUTO: 10.2 FL (ref 8.9–12.9)
PROTHROMBIN TIME: 14.5 SEC (ref 9–11.1)
RBC # BLD AUTO: 3.08 M/UL (ref 4.1–5.7)
WBC # BLD AUTO: 4.8 K/UL (ref 4.1–11.1)

## 2018-08-29 PROCEDURE — 36415 COLL VENOUS BLD VENIPUNCTURE: CPT | Performed by: INTERNAL MEDICINE

## 2018-08-29 PROCEDURE — 93312 ECHO TRANSESOPHAGEAL: CPT

## 2018-08-29 PROCEDURE — 85610 PROTHROMBIN TIME: CPT | Performed by: INTERNAL MEDICINE

## 2018-08-29 PROCEDURE — 85027 COMPLETE CBC AUTOMATED: CPT | Performed by: INTERNAL MEDICINE

## 2018-08-29 PROCEDURE — 99152 MOD SED SAME PHYS/QHP 5/>YRS: CPT

## 2018-08-29 PROCEDURE — 74011250636 HC RX REV CODE- 250/636: Performed by: INTERNAL MEDICINE

## 2018-08-29 PROCEDURE — 74011000250 HC RX REV CODE- 250: Performed by: INTERNAL MEDICINE

## 2018-08-29 RX ORDER — ASPIRIN 81 MG/1
81 TABLET ORAL DAILY
Qty: 30 TAB | Refills: 3 | Status: ON HOLD
Start: 2018-08-29 | End: 2018-09-18

## 2018-08-29 RX ORDER — CLOPIDOGREL BISULFATE 75 MG/1
75 TABLET ORAL DAILY
Qty: 30 TAB | Refills: 6 | Status: ON HOLD | OUTPATIENT
Start: 2018-08-29 | End: 2018-09-18

## 2018-08-29 RX ORDER — SODIUM CHLORIDE 0.9 % (FLUSH) 0.9 %
5-10 SYRINGE (ML) INJECTION EVERY 8 HOURS
Status: DISCONTINUED | OUTPATIENT
Start: 2018-08-29 | End: 2018-08-29

## 2018-08-29 RX ORDER — FENTANYL CITRATE 50 UG/ML
25-200 INJECTION, SOLUTION INTRAMUSCULAR; INTRAVENOUS
Status: DISCONTINUED | OUTPATIENT
Start: 2018-08-29 | End: 2018-08-29

## 2018-08-29 RX ORDER — LIDOCAINE 50 MG/G
OINTMENT TOPICAL AS NEEDED
Status: DISCONTINUED | OUTPATIENT
Start: 2018-08-29 | End: 2018-08-29

## 2018-08-29 RX ORDER — MIDAZOLAM HYDROCHLORIDE 1 MG/ML
.5-1 INJECTION, SOLUTION INTRAMUSCULAR; INTRAVENOUS
Status: DISCONTINUED | OUTPATIENT
Start: 2018-08-29 | End: 2018-08-29

## 2018-08-29 RX ORDER — SODIUM CHLORIDE 0.9 % (FLUSH) 0.9 %
5-10 SYRINGE (ML) INJECTION AS NEEDED
Status: DISCONTINUED | OUTPATIENT
Start: 2018-08-29 | End: 2018-08-29

## 2018-08-29 RX ORDER — DIPHENHYDRAMINE HYDROCHLORIDE 50 MG/ML
25-50 INJECTION, SOLUTION INTRAMUSCULAR; INTRAVENOUS ONCE
Status: COMPLETED | OUTPATIENT
Start: 2018-08-29 | End: 2018-08-29

## 2018-08-29 RX ORDER — LIDOCAINE HYDROCHLORIDE 20 MG/ML
15 SOLUTION OROPHARYNGEAL AS NEEDED
Status: DISCONTINUED | OUTPATIENT
Start: 2018-08-29 | End: 2018-08-29

## 2018-08-29 RX ORDER — LIDOCAINE HYDROCHLORIDE 20 MG/ML
JELLY TOPICAL ONCE
Status: COMPLETED | OUTPATIENT
Start: 2018-08-29 | End: 2018-08-29

## 2018-08-29 RX ADMIN — BENZOCAINE 2 SPRAY: 200 SPRAY DENTAL; ORAL; PERIODONTAL at 11:34

## 2018-08-29 RX ADMIN — MIDAZOLAM HYDROCHLORIDE 1 MG: 1 INJECTION, SOLUTION INTRAMUSCULAR; INTRAVENOUS at 11:44

## 2018-08-29 RX ADMIN — LIDOCAINE 2 G: 50 OINTMENT TOPICAL at 11:30

## 2018-08-29 RX ADMIN — FENTANYL CITRATE 25 MCG: 50 INJECTION, SOLUTION INTRAMUSCULAR; INTRAVENOUS at 11:41

## 2018-08-29 RX ADMIN — LIDOCAINE HYDROCHLORIDE: 20 JELLY TOPICAL at 11:44

## 2018-08-29 RX ADMIN — DIPHENHYDRAMINE HYDROCHLORIDE 25 MG: 50 INJECTION, SOLUTION INTRAMUSCULAR; INTRAVENOUS at 11:28

## 2018-08-29 RX ADMIN — LIDOCAINE: 50 OINTMENT TOPICAL at 11:28

## 2018-08-29 RX ADMIN — LIDOCAINE HYDROCHLORIDE 15 ML: 20 SOLUTION ORAL; TOPICAL at 11:27

## 2018-08-29 RX ADMIN — MIDAZOLAM HYDROCHLORIDE 2 MG: 1 INJECTION, SOLUTION INTRAMUSCULAR; INTRAVENOUS at 11:41

## 2018-08-29 NOTE — DISCHARGE INSTRUCTIONS
AFTER YOU TRANSESOPHAGEAL ECHOCARDIOGRAM    Be sure someone else drives you home. You may feel drowsy for several hours. Do not eat or drink for at least two hours after your procedure. Your throat will be numb and there is a risk you might have difficulty swallowing for a while. Be careful when you do eat or drink for the first time especially with hot fluids since you could easily burn your throat. Call your doctor if:    · You are bleeding from your throat or mouth. · You have trouble breathing all of a sudden. · You have chest pain or any pain that spreads to your neck, jaw, or arms. · You have questions or concerns. · You have a fever greater than 101°F.    Doctor: Silvia Lawton    Special Instructions:    No driving for 24 hours.   ***

## 2018-08-29 NOTE — PROCEDURES
Cardiac Electrophysiology Report        PATIENT INFORMATION     Patient Name: Joy Mueller Sr. MRN: [id number]                  Study Date: 2018    YOB: 1935    Age: 80 y.o. Gender: male      Procedure:  Transesophageal EchocardiogramRa    Referring Physician:  Yessy Hill MD        STAFF     Duty Name   Electrophysiologist Malinda Purcell MD   Monitor Coty Enrique RN   Circulator Rigo Mike RN; Carlton Oliveros RT       PATIENT HISTORY     80year old male with history of CAD/CABG, CKD stage IV, pacemaker, paroxysmal atrial fibrillation with recent episode of GI bleeding while on warfarin.  His anticoagulation was discontinued; he underwent WATCHMAN implant presenting for post-WATCHMAN JYOTI. PROCEDURE     The patient was brought to the Cardiac Electrophysiology laboratory in a post-absorptive, fasting state. Informed consent was obtained. A peripheral IV was in place. Continuous electrocardiographic, blood pressure, O2 saturation and  CO2 monitoring was initiated. Once conscious sedation was achieved, a multi-planar lubricated JYOTI probe was advanced to the oropharynx and into the esophagus without difficulty. Limited views were obtained visualizing the left atrium and left atrial appendage. The left atrial appendage was visualized in multiple views and demonstrated the LA appendage was free of visualized thrombus. The JYOTI probe was then removed without difficulty. The patient remained hemodynamically stable, tolerated the procedure well and was transferred in stable condition. There were no immediate complications encountered during the procedure. MEDICATION SUMMARY     See anesthesia report      CONCLUSIONS     1. Limited JYOTI demonstrating the left atrium and left atrial appendage were free of visualized thrombus  2. WATCHMAN well seated without thrombus or flow leaks.             Malinda Purcell MD  Cardiac Electrophysiology / Cardiology    [address]  [address], [address] [facility], [address] [facility], [facility]   Natali Hoyos  [phone] / [phone] Fax [phone] / [phone] Fax

## 2018-08-29 NOTE — H&P
HISTORY OF PRESENTING ILLNESS  
   
Ryan Reed is a 80 y.o. male with history of CAD/CABG, CKD stage IV, pacemaker, paroxysmal atrial fibrillation with recent episode of GI bleeding while on warfarin.  His anticoagulation was discontinued; he underwent WATCHMAN implant and now presents for follow up. Device interrogation today demonstrates normal functioning. He is doing well and denies cardiac complaints.  
  
 ACTIVE PROBLEM LIST  
  
    
Patient Active Problem List  
  Diagnosis Date Noted  S/P cardiac cath 06/28/2018  Atrial fibrillation ([facility] 06/28/2018  Acute GI bleeding 02/07/2018  Dyslipidemia 10/24/2017  Dilated cardiomyopathy ([facility] 10/24/2017  Atrial fibrillation with controlled ventricular rate ([facility] 10/24/2017  Pacemaker 10/24/2017  CAD (coronary artery disease)    
 Hypertension    
 Systolic CHF, chronic ([facility])    
 Elevated brain natriuretic peptide (BNP) level 10/10/2017  Anemia 10/10/2017  H pylori ulcer 09/06/2017  Urine retention 09/06/2017  CKD (chronic kidney disease) stage 4, GFR 15-29 ml/min ([facility]) 09/05/2017  Benign prostatic hyperplasia 09/05/2017  Hydronephrosis 09/05/2017  Coronary atherosclerosis of native coronary artery 09/02/2011  
  
  
  
 PAST MEDICAL HISTORY  
  
Past Medical History:  
Diagnosis Date  Arrhythmia    
  atrial fib  Arthritis    
 CAD (coronary artery disease)    
  hx mi  
 CHF (congestive heart failure) (HCC)    
 Chronic kidney disease    
  no dialysis  CKD (chronic kidney disease), stage IV ([facility])    
 Hyperlipidemia    
 Hypertension    
 Ill-defined condition    
  bronchitis  Ill-defined condition 02/2018  
  blood transfusion--gi bleed on coumadin  Seizures (HCC)    
 Systolic CHF, chronic ([facility])    
  
  
  
 PAST SURGICAL HISTORY  
  
     
Past Surgical History:  
Procedure Laterality Date  COLONOSCOPY N/A 9/6/2017   COLONOSCOPY performed by Toshia Castellanos MD at [address] [place] [address] 10  COLONOSCOPY N/A 2/8/2018  
  COLONOSCOPY performed by Armando Velásquez MD at [facility]      
  right  HX CORONARY ARTERY BYPASS GRAFT      
  x2 vessels  HX HEART CATHETERIZATION      
 HX PACEMAKER      
  right chest  
  
  
  
 ALLERGIES  
  
No Known Allergies  
  
  
FAMILY HISTORY  
  
Family History Problem Relation Age of Onset  Heart Disease Mother    
 Hypertension Mother    
 negative for cardiac disease 
  
  
 SOCIAL HISTORY  
  
 Social History  
  
   
     
Social History  Marital status:   
    Spouse name: N/A  
 Number of children: N/A  
 Years of education: N/A  
  
     
Social History Main Topics  Smoking status: Former Smoker  
    Packs/day: 0.50  
    Years: 40.00  
    Quit date: 6/21/1988  Smokeless tobacco: Never Used  Alcohol use No  
 Drug use: No  
 Sexual activity: No  
  
    
Other Topics Concern  Not on file  
  
Social History Narrative   
  
  
MEDICATIONS  
  
    
Current Outpatient Prescriptions Medication Sig  warfarin (COUMADIN) 1 mg tablet Take 2 Tabs by mouth daily.  amLODIPine (NORVASC) 5 mg tablet Take 5 mg by mouth daily.  hydrALAZINE (APRESOLINE) 25 mg tablet Take 1 Tab by mouth three (3) times daily.  benazepril (LOTENSIN) 40 mg tablet Take 40 mg by mouth daily.  finasteride (PROSCAR) 5 mg tablet Take 1 Tab by mouth daily.  tamsulosin (FLOMAX) 0.4 mg capsule Take 1 Cap by mouth daily. (Patient taking differently: Take 0.4 mg by mouth daily (after dinner). )  acetaminophen (TYLENOL) 325 mg tablet Take 325 mg by mouth every four (4) hours as needed for Pain.   
 carbamazepine (TEGRETOL) 200 mg tablet Take 200 mg by mouth three (3) times daily.  
  
No current facility-administered medications for this visit.   
  
  
I have reviewed the nurses notes, vitals, problem list, allergy list, medical history, family, social history and medications. 
  
  
 REVIEW OF SYMPTOMS  
   
General: Pt denies excessive weight gain or loss. Pt is able to conduct ADL's HEENT: Denies blurred vision, headaches, hearing loss, epistaxis and difficulty swallowing. Respiratory: Denies cough, congestion, shortness of breath, COOPER, wheezing or stridor. Cardiovascular: Denies precordial pain, palpitations, edema or PND Gastrointestinal: Denies poor appetite, indigestion, abdominal pain or blood in stool Genitourinary: Denies hematuria, dysuria, increased urinary frequency Musculoskeletal: Denies joint pain or swelling from muscles or joints Neurologic: Denies tremor, paresthesias, headache, or sensory motor disturbance Psychiatric: Denies confusion, insomnia, depression Integumentray: Denies rash, itching or ulcers. Hematologic: Denies easy bruising, bleeding 
  
  
 PHYSICAL EXAMINATION  
   
There were no vitals filed for this visit. General: Well developed, in no acute distress. HEENT: No jaundice, oral mucosa moist, no oral ulcers Neck: Supple, no stiffness, no lymphadenopathy, supple Heart:  Normal S1/S2 negative S3 or S4. Regular, no murmur, gallop or rub, no jugular venous distention Respiratory: Clear bilaterally x 4, no wheezing or rales Abdomen:   Soft, non-tender, bowel sounds are active.  
Extremities:  No edema, normal cap refill, no cyanosis. Musculoskeletal: No clubbing, no deformities Neuro: A&Ox3, speech clear, gait stable, cooperative, no focal neurologic deficits Skin: Skin color is normal. No rashes or lesions. Non diaphoretic, moist. 
Vascular: 2+ pulses symmetric in all extremities 
  
  
 DIAGNOSTIC DATA  
   
EKG:  
  
  
 LABORATORY DATA  
   
     
Lab Results Component Value Date/Time  
  WBC 6.9 06/29/2018 04:55 AM  
  Hemoglobin (POC) 7.5 (L) 09/07/2011 06:16 PM  
  HGB 7.3 (L) 06/29/2018 04:55 AM  
  Hematocrit (POC) 22 (L) 09/07/2011 06:16 PM  
   HCT 22.8 (L) 06/29/2018 04:55 AM  
  PLATELET 258 (L) 07/68/9328 04:55 AM  
  MCV 96.6 06/29/2018 04:55 AM  
  
Lab Results Component Value Date/Time  
  Sodium 140 06/21/2018 11:36 AM  
  Potassium 4.6 06/21/2018 11:36 AM  
  Chloride 109 (H) 06/21/2018 11:36 AM  
  CO2 22 06/21/2018 11:36 AM  
  Anion gap 9 06/21/2018 11:36 AM  
  Glucose 87 06/21/2018 11:36 AM  
  BUN 43 (H) 06/21/2018 11:36 AM  
  Creatinine 2.85 (H) 06/21/2018 11:36 AM  
  BUN/Creatinine ratio 15 06/21/2018 11:36 AM  
  GFR est AA 26 (L) 06/21/2018 11:36 AM  
  GFR est non-AA 21 (L) 06/21/2018 11:36 AM  
  Calcium 8.9 06/21/2018 11:36 AM  
  Bilirubin, total 0.2 02/14/2018 12:55 PM  
  AST (SGOT) 19 02/14/2018 12:55 PM  
  Alk. phosphatase 78 02/14/2018 12:55 PM  
  Protein, total 7.1 02/14/2018 12:55 PM  
  Albumin 3.2 (L) 02/14/2018 12:55 PM  
  Globulin 3.9 02/14/2018 12:55 PM  
  A-G Ratio 0.8 (L) 02/14/2018 12:55 PM  
  ALT (SGPT) 14 02/14/2018 12:55 PM  
  
  
  
 ASSESSMENT  
   
1.  Atrial fibrillation 
                      A. Paroxysmal 
                      M. CHADSVASC 4 
                      C. HASBLED 4-5 2.  Hypertension 3.  Coronary artery disease, native 4.  CKD 5.  History of GI bleed 6.  Cardiomyopathy 
                      X. Nonischemic 
                      B. Resolved 7. CABG 8. History of gastric ulcer 9.  Pacemaker                       L. Dual-chamber 
                      G. Right sided 
                      C. [employer] 10. Cardiomyopathy 
                      A. Resolved 
  
  
 PLAN  
  
JYOTI [facility] [address] [facility] 
[address] [facility], [address] [place] [address] Rd, [address] [facility], [facility] 
[phone] / [phone] Fax                                                                  [phone] / [phone] Fax

## 2018-08-29 NOTE — PROGRESS NOTES
9:48 AM 
Patient arrived. ID and allergies verified verbally with patient. Pt voices understanding of procedure to be performed. Consent obtained. Pt prepped for procedure. Patient and family oriented to fall prevention protocol. Understanding verbalized. Yellow band and socks applied [address] TRANSFER - OUT REPORT: 
 
Verbal report given to Vic(name) on [facility].  being transferred to EP(unit) for ordered procedure Report consisted of patients Situation, Background, Assessment and  
Recommendations(SBAR). Information from the following report(s) SBAR was reviewed with the receiving nurse. Lines:  
Peripheral IV 08/29/18 Right Forearm (Active) Opportunity for questions and clarification was provided. Patient transported with: 
 Registered Nurse 11:50 AM 
TRANSFER - IN REPORT: 
 
Verbal report received from Vic(name) on [facility].  being received from (unit) for routine post - op Report consisted of patients Situation, Background, Assessment and  
Recommendations(SBAR). Information from the following report(s) SBAR, Procedure Summary and MAR was reviewed with the receiving nurse. Opportunity for questions and clarification was provided. Assessment completed upon patients arrival to unit and care assumed. Pt voices understanding of post procedure bedrest instructions. [address] Discharge instructions reviewed with patient and family. Voiced understanding. Patient given copy of discharge instructions to take home. 1:45 PM 
Pt discharged off unit via wheelchair into care of daughter

## 2018-08-29 NOTE — IP AVS SNAPSHOT
[address] 
[phone] Patient: Janel Del Cid Sr. MRN: [id number] :1935 About your hospitalization You were admitted on:  2018 You last received care in the:  [facility] PACU You were discharged on:  2018 Why you were hospitalized Your primary diagnosis was:  Not on File Follow-up Information Follow up With Details Comments Contact Info [place]ardenas [place]   [address] [facility] [address] [place] [address] 
[phone] Your Scheduled Appointments 2018 10:20 AM EDT  
COUMADIN CLINIC with COUMADINCONNER  
CARD[facility]OVASCULAR ASSOCIATES [facility] ([facility] SCHEDULING) [address]  
[phone] 2018  9:45 AM EDT  
PACEMAKER with PACEMAKERCHAD  
CARDIOVASCULAR [facility] ([place] SCHEDULING) [address]  
[phone] Discharge Orders None A check ray indicates which time of day the medication should be taken. My Medications START taking these medications Instructions Each Dose to Equal  
 Morning Noon Evening Bedtime  
 aspirin delayed-release 81 mg tablet Your last dose was: Your next dose is: Take 1 Tab by mouth daily. 81 mg  
    
   
   
   
  
 clopidogrel 75 mg Tab Commonly known as:  PLAVIX Your last dose was: Your next dose is: Take 1 Tab by mouth daily. 75 mg CHANGE how you take these medications Instructions Each Dose to Equal  
 Morning Noon Evening Bedtime  
 tamsulosin 0.4 mg capsule Commonly known as:  FLOMAX What changed:  when to take this Your last dose was: Your next dose is: Take 1 Cap by mouth daily.   
 0.4 mg  
    
 CONTINUE taking these medications Instructions Each Dose to Equal  
 Morning Noon Evening Bedtime  
 acetaminophen 325 mg tablet Commonly known as:  TYLENOL Your last dose was: Your next dose is: Take 325 mg by mouth every four (4) hours as needed for Pain. 325 mg  
    
   
   
   
  
 amLODIPine 5 mg tablet Commonly known as:  Yen Fanti Your last dose was: Your next dose is: Take 5 mg by mouth daily. 5 mg  
    
   
   
   
  
 benazepril 40 mg tablet Commonly known as:  LOTENSIN Your last dose was: Your next dose is: Take 40 mg by mouth daily. 40 mg  
    
   
   
   
  
 carBAMazepine 200 mg tablet Commonly known as:  TEGretol Your last dose was: Your next dose is: Take 200 mg by mouth three (3) times daily. 200 mg  
    
   
   
   
  
 finasteride 5 mg tablet Commonly known as:  PROSCAR Your last dose was: Your next dose is: Take 1 Tab by mouth daily. 5 mg  
    
   
   
   
  
 hydrALAZINE 25 mg tablet Commonly known as:  APRESOLINE Your last dose was: Your next dose is: Take 1 Tab by mouth three (3) times daily. 25 mg  
    
   
   
   
  
  
STOP taking these medications   
 warfarin 2 mg tablet Commonly known as:  COUMADIN Where to Get Your Medications These medications were sent to [address] [facility] [address] Phone:  [phone]  
  clopidogrel 75 mg Tab Information on where to get these meds will be given to you by the nurse or doctor. ! Ask your nurse or doctor about these medications  
  aspirin delayed-release 81 mg tablet Discharge Instructions AFTER YOU TRANSESOPHAGEAL ECHOCARDIOGRAM 
 
 Be sure someone else drives you home. You may feel drowsy for several hours. Do not eat or drink for at least two hours after your procedure. Your throat will be numb and there is a risk you might have difficulty swallowing for a while. Be careful when you do eat or drink for the first time especially with hot fluids since you could easily burn your throat. Call your doctor if: 
 
· You are bleeding from your throat or mouth. · You have trouble breathing all of a sudden. · You have chest pain or any pain that spreads to your neck, jaw, or arms. · You have questions or concerns. · You have a fever greater than 101°F. 
 
Doctor: Fifi Zapata Special Instructions: 
 
No driving for 24 hours. *** Introducing [facility] & HEALTH SERVICES! [employer] introduces [employer] patient portal. Now you can access parts of your medical record, email your doctor's office, and request medication refills online. 1. In your internet browser, go to [email or web address] 2. Click on the First Time User? Click Here link in the Sign In box. You will see the New Member Sign Up page. 3. Enter your [employer] Access Code exactly as it appears below. You will not need to use this code after youve completed the sign-up process. If you do not sign up before the expiration date, you must request a new code. · [employer] Access Code: [email or web address] Expires: 9/4/2018  5:23 AM 
 
4. Enter the last four digits of your Social Security Number (xxxx) and Date of Birth (mm/dd/yyyy) as indicated and click Submit. You will be taken to the next sign-up page. 5. Create a OpenGammat ID. This will be your [employer] login ID and cannot be changed, so think of one that is secure and easy to remember. 6. Create a [employer] password. You can change your password at any time. 7. Enter your Password Reset Question and Answer. This can be used at a later time if you forget your password. 8. Enter your e-mail address. You will receive e-mail notification when new information is available in [address] 9. Click Sign Up. You can now view and download portions of your medical record. 10. Click the Download Summary menu link to download a portable copy of your medical information. If you have questions, please visit the Frequently Asked Questions section of the Re2yout website. Remember, citibuddies is NOT to be used for urgent needs. For medical emergencies, dial 911. Now available from your iPhone and Android! Introducing Jonnathan Gambino As a Gisela Maritza patient, I wanted to make you aware of our electronic visit tool called Jonnathan Gambino. Gisela RingCentral 24/7 allows you to connect within minutes with a medical provider 24 hours a day, seven days a week via a mobile device or tablet or logging into a secure website from your computer. You can access Jonnathan Gambino from anywhere in the [place]. A virtual visit might be right for you when you have a simple condition and feel like you just dont want to get out of bed, or cant get away from work for an appointment, when your regular [facility] provider is not available (evenings, weekends or holidays), or when youre out of town and need minor care. Electronic visits cost only $49 and if the Gisela RingCentral 24/7 provider determines a prescription is needed to treat your condition, one can be electronically transmitted to a nearby pharmacy*. Please take a moment to enroll today if you have not already done so. The enrollment process is free and takes just a few minutes. To enroll, please download the Code Scouts 24/MValve technologies deb to your tablet or phone, or visit [email or web address] to enroll on your computer.    
And, as an [address] [place] [address] patient with a Shipey account, the results of your visits will be scanned into your electronic medical record and your primary care provider will be able to view the scanned results. We urge you to continue to see your regular Marci Card provider for your ongoing medical care. And while your primary care provider may not be the one available when you seek a Jonnathan Joseernst virtual visit, the peace of mind you get from getting a real diagnosis real time can be priceless. For more information on Jonnathan Garciabeccafin, view our Frequently Asked Questions (FAQs) at [email or web address]. Sincerely, 
 
Christine Butts MD 
Chief Medical Officer [employer] *:  certain medications cannot be prescribed via Jonnathan Gambino Providers Seen During Your Hospitalization Provider Specialty Primary office phone Christy Kayser, MD Cardiology [phone] Your Primary Care Physician (PCP) Primary Care Physician Office Phone Office Fax Aysha Armijo [phone] You are allergic to the following No active allergies Recent Documentation Height Weight BMI Smoking Status 1.778 m 67.5 kg 21.35 kg/m2 Former Smoker Emergency Contacts Name Discharge Info Relation Home Work Mobile [facility] DISCHARGE CAREGIVER [3] Daughter [21] [phone] [facility] DISCHARGE CAREGIVER [3] Son [22] [phone] Patient Belongings The following personal items are in your possession at time of discharge: 
     Visual Aid: Glasses Please provide this summary of care documentation to your next provider. Signatures-by signing, you are acknowledging that this After Visit Summary has been reviewed with you and you have received a copy. Patient Signature:  ____________________________________________________________ Date:  ____________________________________________________________  
  
Aleyda Chao Provider Signature:  ____________________________________________________________ Date:  ____________________________________________________________

## 2018-09-17 ENCOUNTER — HOSPITAL ENCOUNTER (OUTPATIENT)
Age: 83
Setting detail: OBSERVATION
Discharge: HOME | End: 2018-09-18
Attending: EMERGENCY MEDICINE | Admitting: INTERNAL MEDICINE
Payer: MEDICARE

## 2018-09-17 DIAGNOSIS — K62.5 RECTAL BLEEDING: Primary | ICD-10-CM

## 2018-09-17 PROBLEM — D62 ACUTE BLOOD LOSS ANEMIA: Status: ACTIVE | Noted: 2018-09-17

## 2018-09-17 PROBLEM — N17.9 AKI (ACUTE KIDNEY INJURY) (HCC): Status: ACTIVE | Noted: 2018-09-17

## 2018-09-17 LAB
ABO + RH BLD: NORMAL
ALBUMIN SERPL-MCNC: 3.3 G/DL (ref 3.5–5)
ALBUMIN/GLOB SERPL: 0.7 {RATIO} (ref 1.1–2.2)
ALP SERPL-CCNC: 115 U/L (ref 45–117)
ALT SERPL-CCNC: 13 U/L (ref 12–78)
ANION GAP SERPL CALC-SCNC: 10 MMOL/L (ref 5–15)
APPEARANCE UR: CLEAR
AST SERPL-CCNC: 11 U/L (ref 15–37)
ATRIAL RATE: 70 BPM
BACTERIA URNS QL MICRO: NEGATIVE /HPF
BASOPHILS # BLD: 0.1 K/UL (ref 0–0.1)
BASOPHILS NFR BLD: 1 % (ref 0–1)
BILIRUB SERPL-MCNC: 0.2 MG/DL (ref 0.2–1)
BILIRUB UR QL: NEGATIVE
BLOOD GROUP ANTIBODIES SERPL: NORMAL
BUN SERPL-MCNC: 49 MG/DL (ref 6–20)
BUN/CREAT SERPL: 15 (ref 12–20)
CALCIUM SERPL-MCNC: 8.9 MG/DL (ref 8.5–10.1)
CALCULATED P AXIS, ECG09: 8 DEGREES
CALCULATED R AXIS, ECG10: 31 DEGREES
CALCULATED T AXIS, ECG11: 86 DEGREES
CHLORIDE SERPL-SCNC: 109 MMOL/L (ref 97–108)
CO2 SERPL-SCNC: 22 MMOL/L (ref 21–32)
COLOR UR: ABNORMAL
CREAT SERPL-MCNC: 3.19 MG/DL (ref 0.7–1.3)
CREAT UR-MCNC: 47.5 MG/DL
DIAGNOSIS, 93000: NORMAL
DIFFERENTIAL METHOD BLD: ABNORMAL
EOSINOPHIL # BLD: 0.1 K/UL (ref 0–0.4)
EOSINOPHIL NFR BLD: 1 % (ref 0–7)
EPITH CASTS URNS QL MICRO: ABNORMAL /LPF
ERYTHROCYTE [DISTWIDTH] IN BLOOD BY AUTOMATED COUNT: 13.1 % (ref 11.5–14.5)
FERRITIN SERPL-MCNC: 38 NG/ML (ref 26–388)
GLOBULIN SER CALC-MCNC: 4.5 G/DL (ref 2–4)
GLUCOSE SERPL-MCNC: 97 MG/DL (ref 65–100)
GLUCOSE UR STRIP.AUTO-MCNC: NEGATIVE MG/DL
HCT VFR BLD AUTO: 29.3 % (ref 36.6–50.3)
HEMOCCULT STL QL: POSITIVE
HGB BLD-MCNC: 9.1 G/DL (ref 12.1–17)
HGB UR QL STRIP: NEGATIVE
HYALINE CASTS URNS QL MICRO: ABNORMAL /LPF (ref 0–5)
IMM GRANULOCYTES # BLD: 0 K/UL (ref 0–0.04)
IMM GRANULOCYTES NFR BLD AUTO: 0 % (ref 0–0.5)
IRON SATN MFR SERPL: 19 % (ref 20–50)
IRON SERPL-MCNC: 41 UG/DL (ref 35–150)
KETONES UR QL STRIP.AUTO: NEGATIVE MG/DL
LEUKOCYTE ESTERASE UR QL STRIP.AUTO: NEGATIVE
LYMPHOCYTES # BLD: 0.6 K/UL (ref 0.8–3.5)
LYMPHOCYTES NFR BLD: 12 % (ref 12–49)
MCH RBC QN AUTO: 30.7 PG (ref 26–34)
MCHC RBC AUTO-ENTMCNC: 31.1 G/DL (ref 30–36.5)
MCV RBC AUTO: 99 FL (ref 80–99)
MONOCYTES # BLD: 0.4 K/UL (ref 0–1)
MONOCYTES NFR BLD: 8 % (ref 5–13)
NEUTS SEG # BLD: 4 K/UL (ref 1.8–8)
NEUTS SEG NFR BLD: 78 % (ref 32–75)
NITRITE UR QL STRIP.AUTO: NEGATIVE
NRBC # BLD: 0 K/UL (ref 0–0.01)
NRBC BLD-RTO: 0 PER 100 WBC
P-R INTERVAL, ECG05: 224 MS
PH UR STRIP: 5.5 [PH] (ref 5–8)
PLATELET # BLD AUTO: 182 K/UL (ref 150–400)
PMV BLD AUTO: 9.5 FL (ref 8.9–12.9)
POTASSIUM SERPL-SCNC: 4.7 MMOL/L (ref 3.5–5.1)
PROT SERPL-MCNC: 7.8 G/DL (ref 6.4–8.2)
PROT UR STRIP-MCNC: 100 MG/DL
Q-T INTERVAL, ECG07: 396 MS
QRS DURATION, ECG06: 94 MS
QTC CALCULATION (BEZET), ECG08: 427 MS
RBC # BLD AUTO: 2.96 M/UL (ref 4.1–5.7)
RBC #/AREA URNS HPF: ABNORMAL /HPF (ref 0–5)
RBC MORPH BLD: ABNORMAL
SODIUM SERPL-SCNC: 141 MMOL/L (ref 136–145)
SODIUM UR-SCNC: 99 MMOL/L
SP GR UR REFRACTOMETRY: 1.01 (ref 1–1.03)
SPECIMEN EXP DATE BLD: NORMAL
TIBC SERPL-MCNC: 218 UG/DL (ref 250–450)
UR CULT HOLD, URHOLD: NORMAL
UROBILINOGEN UR QL STRIP.AUTO: 0.2 EU/DL (ref 0.2–1)
VENTRICULAR RATE, ECG03: 70 BPM
WBC # BLD AUTO: 5.2 K/UL (ref 4.1–11.1)
WBC URNS QL MICRO: ABNORMAL /HPF (ref 0–4)

## 2018-09-17 PROCEDURE — 84300 ASSAY OF URINE SODIUM: CPT | Performed by: INTERNAL MEDICINE

## 2018-09-17 PROCEDURE — 96361 HYDRATE IV INFUSION ADD-ON: CPT

## 2018-09-17 PROCEDURE — 36415 COLL VENOUS BLD VENIPUNCTURE: CPT | Performed by: EMERGENCY MEDICINE

## 2018-09-17 PROCEDURE — 93005 ELECTROCARDIOGRAM TRACING: CPT

## 2018-09-17 PROCEDURE — 81001 URINALYSIS AUTO W/SCOPE: CPT | Performed by: PHYSICIAN ASSISTANT

## 2018-09-17 PROCEDURE — 99284 EMERGENCY DEPT VISIT MOD MDM: CPT

## 2018-09-17 PROCEDURE — 94761 N-INVAS EAR/PLS OXIMETRY MLT: CPT

## 2018-09-17 PROCEDURE — 82570 ASSAY OF URINE CREATININE: CPT | Performed by: INTERNAL MEDICINE

## 2018-09-17 PROCEDURE — 74011250637 HC RX REV CODE- 250/637: Performed by: INTERNAL MEDICINE

## 2018-09-17 PROCEDURE — 80053 COMPREHEN METABOLIC PANEL: CPT | Performed by: EMERGENCY MEDICINE

## 2018-09-17 PROCEDURE — 82272 OCCULT BLD FECES 1-3 TESTS: CPT | Performed by: PHYSICIAN ASSISTANT

## 2018-09-17 PROCEDURE — 82728 ASSAY OF FERRITIN: CPT | Performed by: INTERNAL MEDICINE

## 2018-09-17 PROCEDURE — 83540 ASSAY OF IRON: CPT | Performed by: INTERNAL MEDICINE

## 2018-09-17 PROCEDURE — 65390000012 HC CONDITION CODE 44 OBSERVATION

## 2018-09-17 PROCEDURE — 86901 BLOOD TYPING SEROLOGIC RH(D): CPT | Performed by: EMERGENCY MEDICINE

## 2018-09-17 PROCEDURE — 74011000258 HC RX REV CODE- 258: Performed by: INTERNAL MEDICINE

## 2018-09-17 PROCEDURE — 96374 THER/PROPH/DIAG INJ IV PUSH: CPT

## 2018-09-17 PROCEDURE — 77030027138 HC INCENT SPIROMETER -A

## 2018-09-17 PROCEDURE — 65660000000 HC RM CCU STEPDOWN

## 2018-09-17 PROCEDURE — 74011250636 HC RX REV CODE- 250/636: Performed by: INTERNAL MEDICINE

## 2018-09-17 PROCEDURE — 85025 COMPLETE CBC W/AUTO DIFF WBC: CPT | Performed by: EMERGENCY MEDICINE

## 2018-09-17 RX ORDER — DEXTROSE MONOHYDRATE AND SODIUM CHLORIDE 5; .45 G/100ML; G/100ML
75 INJECTION, SOLUTION INTRAVENOUS CONTINUOUS
Status: DISCONTINUED | OUTPATIENT
Start: 2018-09-17 | End: 2018-09-18

## 2018-09-17 RX ORDER — TAMSULOSIN HYDROCHLORIDE 0.4 MG/1
0.4 CAPSULE ORAL
Status: DISCONTINUED | OUTPATIENT
Start: 2018-09-18 | End: 2018-09-18 | Stop reason: HOSPADM

## 2018-09-17 RX ORDER — DOCUSATE SODIUM 100 MG/1
100 CAPSULE, LIQUID FILLED ORAL 2 TIMES DAILY
Status: DISCONTINUED | OUTPATIENT
Start: 2018-09-17 | End: 2018-09-18 | Stop reason: HOSPADM

## 2018-09-17 RX ORDER — HYDRALAZINE HYDROCHLORIDE 20 MG/ML
20 INJECTION INTRAMUSCULAR; INTRAVENOUS
Status: DISCONTINUED | OUTPATIENT
Start: 2018-09-17 | End: 2018-09-18 | Stop reason: HOSPADM

## 2018-09-17 RX ORDER — ACETAMINOPHEN 325 MG/1
650 TABLET ORAL
Status: DISCONTINUED | OUTPATIENT
Start: 2018-09-17 | End: 2018-09-18 | Stop reason: HOSPADM

## 2018-09-17 RX ORDER — SODIUM CHLORIDE 0.9 % (FLUSH) 0.9 %
5-10 SYRINGE (ML) INJECTION AS NEEDED
Status: DISCONTINUED | OUTPATIENT
Start: 2018-09-17 | End: 2018-09-18 | Stop reason: HOSPADM

## 2018-09-17 RX ORDER — PANTOPRAZOLE SODIUM 40 MG/1
40 TABLET, DELAYED RELEASE ORAL
Status: ON HOLD | COMMUNITY
End: 2019-10-09 | Stop reason: SDUPTHER

## 2018-09-17 RX ORDER — HYDROCODONE BITARTRATE AND ACETAMINOPHEN 5; 325 MG/1; MG/1
1 TABLET ORAL
Status: DISCONTINUED | OUTPATIENT
Start: 2018-09-17 | End: 2018-09-18 | Stop reason: HOSPADM

## 2018-09-17 RX ORDER — FINASTERIDE 5 MG/1
5 TABLET, FILM COATED ORAL DAILY
Status: DISCONTINUED | OUTPATIENT
Start: 2018-09-18 | End: 2018-09-18 | Stop reason: HOSPADM

## 2018-09-17 RX ORDER — ONDANSETRON 2 MG/ML
4 INJECTION INTRAMUSCULAR; INTRAVENOUS
Status: DISCONTINUED | OUTPATIENT
Start: 2018-09-17 | End: 2018-09-18 | Stop reason: HOSPADM

## 2018-09-17 RX ORDER — SODIUM CHLORIDE 0.9 % (FLUSH) 0.9 %
5-10 SYRINGE (ML) INJECTION EVERY 8 HOURS
Status: DISCONTINUED | OUTPATIENT
Start: 2018-09-17 | End: 2018-09-17

## 2018-09-17 RX ORDER — DIPHENHYDRAMINE HYDROCHLORIDE 50 MG/ML
12.5 INJECTION, SOLUTION INTRAMUSCULAR; INTRAVENOUS
Status: DISCONTINUED | OUTPATIENT
Start: 2018-09-17 | End: 2018-09-18 | Stop reason: HOSPADM

## 2018-09-17 RX ORDER — HYDROMORPHONE HYDROCHLORIDE 2 MG/ML
0.5 INJECTION, SOLUTION INTRAMUSCULAR; INTRAVENOUS; SUBCUTANEOUS
Status: DISCONTINUED | OUTPATIENT
Start: 2018-09-17 | End: 2018-09-18 | Stop reason: HOSPADM

## 2018-09-17 RX ORDER — CARBAMAZEPINE 200 MG/1
200 TABLET ORAL 3 TIMES DAILY
Status: DISCONTINUED | OUTPATIENT
Start: 2018-09-17 | End: 2018-09-18 | Stop reason: HOSPADM

## 2018-09-17 RX ORDER — HYDRALAZINE HYDROCHLORIDE 25 MG/1
25 TABLET, FILM COATED ORAL 3 TIMES DAILY
Status: DISCONTINUED | OUTPATIENT
Start: 2018-09-17 | End: 2018-09-17

## 2018-09-17 RX ORDER — SODIUM CHLORIDE 0.9 % (FLUSH) 0.9 %
5-10 SYRINGE (ML) INJECTION EVERY 8 HOURS
Status: DISCONTINUED | OUTPATIENT
Start: 2018-09-17 | End: 2018-09-18 | Stop reason: HOSPADM

## 2018-09-17 RX ORDER — TAMSULOSIN HYDROCHLORIDE 0.4 MG/1
0.4 CAPSULE ORAL
Status: ON HOLD | COMMUNITY
End: 2019-10-09 | Stop reason: SDUPTHER

## 2018-09-17 RX ORDER — AMLODIPINE BESYLATE 5 MG/1
5 TABLET ORAL DAILY
Status: DISCONTINUED | OUTPATIENT
Start: 2018-09-18 | End: 2018-09-18 | Stop reason: HOSPADM

## 2018-09-17 RX ORDER — NALOXONE HYDROCHLORIDE 0.4 MG/ML
0.4 INJECTION, SOLUTION INTRAMUSCULAR; INTRAVENOUS; SUBCUTANEOUS AS NEEDED
Status: DISCONTINUED | OUTPATIENT
Start: 2018-09-17 | End: 2018-09-18 | Stop reason: HOSPADM

## 2018-09-17 RX ADMIN — CARBAMAZEPINE 200 MG: 200 TABLET ORAL at 16:48

## 2018-09-17 RX ADMIN — Medication 10 ML: at 15:09

## 2018-09-17 RX ADMIN — HYDRALAZINE HYDROCHLORIDE 20 MG: 20 INJECTION INTRAMUSCULAR; INTRAVENOUS at 19:52

## 2018-09-17 RX ADMIN — Medication 10 ML: at 22:00

## 2018-09-17 RX ADMIN — CARBAMAZEPINE 200 MG: 200 TABLET ORAL at 22:29

## 2018-09-17 RX ADMIN — DEXTROSE MONOHYDRATE AND SODIUM CHLORIDE 75 ML/HR: 5; .45 INJECTION, SOLUTION INTRAVENOUS at 14:58

## 2018-09-17 NOTE — ED TRIAGE NOTES
Pt noted blood in stool this am.  Dark red in color. Just stopped blood thinners 2 weeks ago per vladimir. Does take Plavix.

## 2018-09-17 NOTE — IP AVS SNAPSHOT
303 [facility] 
 
 
 [address] 
[phone] Patient: Jacob Tran Sr. MRN: [id number] :1935 About your hospitalization You were admitted on:  2018 You [facility] in the:  [facility] 5M1 MED SURG 1 You were discharged on:  2018 Why you were hospitalized Your primary diagnosis was:  Brbpr (Bright Red Blood Per Rectum) Your diagnoses also included:  Acute Gi Bleeding, Acute Blood Loss Anemia, Robles (Acute Kidney Injury) (Hcc), Coronary Atherosclerosis Of Native Coronary Artery, Ckd (Chronic Kidney Disease) Stage 4, Gfr 15-29 Ml/Min (Hcc), Benign Prostatic Hyperplasia, Cad (Coronary Artery Disease), Systolic Chf, Chronic (Hcc), Hypertension, Atrial Fibrillation (Hcc), Anemia, Dilated Cardiomyopathy (Hcc), Hydronephrosis, H Pylori Ulcer, S/P Cardiac Cath, Pacemaker, Dyslipidemia Follow-up Information Follow up With Details Comments Contact Info Pennie Beckett MD Schedule an appointment as soon as possible for a visit in 1 week  [address] [place] [address]. [address] [place] [address] 
[phone] Wilian Crockett MD Schedule an appointment as soon as possible for a visit As needed [address] [place] [address] [address] [place] [address] 
[phone] Your Scheduled Appointments 2018  9:45 AM EDT  
PACEMAKER with PACEMAKER, [facility]  
CARDIOVASCULAR ASSOCIATES [facility] ([facility] SCHEDULING) [address]  
[phone] Discharge Orders None A check ray indicates which time of day the medication should be taken. My Medications CHANGE how you take these medications Instructions Each Dose to Equal  
 Morning Noon Evening Bedtime  
 aspirin delayed-release 81 mg tablet What changed:  additional instructions Take 1 Tab by mouth daily.  DO NOT TAKE for 1 WEEK  
 81 mg  
 clopidogrel 75 mg Tab Commonly known as:  PLAVIX What changed:  additional instructions Take 1 Tab by mouth daily. DO  NOT TAKE for 1 WEEK  
 75 mg CONTINUE taking these medications Instructions Each Dose to Equal  
 Morning Noon Evening Bedtime  
 acetaminophen 325 mg tablet Commonly known as:  TYLENOL Take 325 mg by mouth every four (4) hours as needed for Pain. 325 mg  
    
   
   
   
  
 amLODIPine 5 mg tablet Commonly known as:  Jeovany Grand Take 5 mg by mouth daily. 5 mg  
    
  
   
   
   
  
 carBAMazepine 200 mg tablet Commonly known as:  TEGretol Take 200 mg by mouth every eight (8) hours. 200 mg  
    
  
   
  
   
   
  
  
 finasteride 5 mg tablet Commonly known as:  PROSCAR Take 1 Tab by mouth daily. 5 mg  
    
  
   
   
   
  
 pantoprazole 40 mg tablet Commonly known as:  PROTONIX Take 40 mg by mouth Daily (before breakfast). 40 mg  
    
  
   
   
   
  
 tamsulosin 0.4 mg capsule Commonly known as:  FLOMAX Take 0.4 mg by mouth daily (after breakfast). 0.4 mg Where to Get Your Medications These medications were sent to [address] [facility] [address] Phone:  [phone]  
  aspirin delayed-release 81 mg tablet  
 clopidogrel 75 mg Tab Discharge Instructions Patient Discharge Instructions Matthew Juares / [id number] : 1935 Admitted 2018 Discharged: 2018 Primary Diagnoses Problem List as of 2018  Date Reviewed: 2018 Codes Class Noted - Resolved * (Principal)BRBPR (bright red blood per rectum) Acute blood loss anemia TINA (acute kidney injury) ([facility] [address]  
S/P cardiac cath Atrial fibrillation ([facility] [address] Acute GI bleeding Dyslipidemia Pacemaker CAD (coronary artery disease) Hypertension Anemia CKD (chronic kidney disease) stage 4, GFR 15-29 ml/min ([facility]) Benign prostatic hyperplasia Take Home Medications · It is important that you take the medication exactly as they are prescribed. · Keep your medication in the bottles provided by the pharmacist and keep a list of the medication names, dosages, and times to be taken in your wallet. · Do not take other medications without consulting your doctor. What to do at [facility] Recommended diet: Cardiac Diet and Low fat, Low cholesterol Recommended activity: Activity as tolerated If you experience worse bleeding, please follow up with your PCP or GI doctor. Follow-up with your PCP and cardiology in a few weeks Information obtained by : 
I understand that if any problems occur once I am at home I am to contact my physician. I understand and acknowledge receipt of the instructions indicated above. Physician's or R.N.'s Signature                                                                  Date/Time Patient or Representative Signature                                                          Date/Time [employer] Announcement We are excited to announce that we are making your provider's discharge notes available to you in Hip Innovation Technology. You will see these notes when they are completed and signed by the physician that discharged you from your recent hospital stay.   If you have any questions or concerns about any information you see in [employer], please call the Health Information Department where you were seen or reach out to your Primary Care Provider for more information about your plan of care. Introducing [facility] & HEALTH SERVICES! Hernando Lozoya introduces TransEngen patient portal. Now you can access parts of your medical record, email your doctor's office, and request medication refills online. 1. In your internet browser, go to [email or web address] 2. Click on the First Time User? Click Here link in the Sign In box. You will see the New Member Sign Up page. 3. Enter your TransEngen Access Code exactly as it appears below. You will not need to use this code after youve completed the sign-up process. If you do not sign up before the expiration date, you must request a new code. · TransEngen Access Code: [email or web address] Expires: 12/16/2018 10:56 AM 
 
4. Enter the last four digits of your Social Security Number (xxxx) and Date of Birth (mm/dd/yyyy) as indicated and click Submit. You will be taken to the next sign-up page. 5. Create a TransEngen ID. This will be your TransEngen login ID and cannot be changed, so think of one that is secure and easy to remember. 6. Create a TransEngen password. You can change your password at any time. 7. Enter your Password Reset Question and Answer. This can be used at a later time if you forget your password. 8. Enter your e-mail address. You will receive e-mail notification when new information is available in [address] 9. Click Sign Up. You can now view and download portions of your medical record. 10. Click the Download Summary menu link to download a portable copy of your medical information. If you have questions, please visit the Frequently Asked Questions section of the TransEngen website. Remember, TransEngen is NOT to be used for urgent needs. For medical emergencies, dial 911. Now available from your iPhone and Android! Introducing Jonnathan Gambino As a Hernando Lozoya patient, I wanted to make you aware of our electronic visit tool called Jonnathan Gambino. [employer] Solutions allows you to connect within minutes with a medical provider 24 hours a day, seven days a week via a mobile device or tablet or logging into a secure website from your computer. You can access AcademixDirect from anywhere in the [place]. A virtual visit might be right for you when you have a simple condition and feel like you just dont want to get out of bed, or cant get away from work for an appointment, when your regular [facility] provider is not available (evenings, weekends or holidays), or when youre out of town and need minor care. Electronic visits cost only $49 and if the Smartisan/ScaleArc provider determines a prescription is needed to treat your condition, one can be electronically transmitted to a nearby pharmacy*. Please take a moment to enroll today if you have not already done so. The enrollment process is free and takes just a few minutes. To enroll, please download the [employer] Energy Solutions deb to your tablet or phone, or visit [email or web address] to enroll on your computer. And, as an [address] [place] [address] patient with a DISKOVRe account, the results of your visits will be scanned into your electronic medical record and your primary care provider will be able to view the scanned results. We urge you to continue to see your regular [facility] provider for your ongoing medical care. And while your primary care provider may not be the one available when you seek a Nokterbeccafin virtual visit, the peace of mind you get from getting a real diagnosis real time can be priceless. For more information on Nokterbeccafin, view our Frequently Asked Questions (FAQs) at [email or web address]. Sincerely, 
 
Aman Mena MD 
Chief Medical Officer Lavinia8 Bree Ahn *:  certain medications cannot be prescribed via AcademixDirect Unresulted Labs-Please follow up with your PCP about these lab tests Order Current Status HEMOGLOBIN A1C WITH EAG In process Providers Seen During Your Hospitalization Provider Specialty Primary office phone Leonie Meyers MD Emergency Medicine [phone] Dada Sales MD Internal Medicine [phone] Gary Morrison MD Internal Medicine [phone] Your Primary Care Physician (PCP) Primary Care Physician Office Phone Office Fax Eliseo Dia [phone] You are allergic to the following No active allergies Recent Documentation Height Weight BMI Smoking Status 1.753 m 69.5 kg 22.63 kg/m2 Former Smoker Emergency Contacts Name Discharge Info Relation Home [facility] DISCHAR[facility]ARE[facility]IVER [3] Daughter [21] [phone] [facility] DISCHARGE CAREGIVER [3] Son [22] [phone] [facility]  Child [2] [phone] Patient Belongings The following personal items are in your possession at time of discharge: 
  Dental Appliances: None  Visual Aid: None      Home Medications: None   Jewelry: None  Clothing: At bedside, Footwear, Undergarments, Pants, Shirt, Socks    Other Valuables: [employer] Please provide this summary of care documentation to your next provider. Signatures-by signing, you are acknowledging that this After Visit Summary has been reviewed with you and you have received a copy. Patient Signature:  ____________________________________________________________ Date:  ____________________________________________________________  
  
Tanya Watts Provider Signature:  ____________________________________________________________ Date:  ____________________________________________________________

## 2018-09-17 NOTE — ED NOTES
TRANSFER - OUT REPORT: 
 
Verbal report given to [facility], RN(name) on [employer]  being transferred to  508(unit) for routine progression of care Report consisted of patients Situation, Background, Assessment and  
Recommendations(SBAR). Information from the following report(s) SBAR, Kardex, ED Summary, Intake/Output, MAR and Recent Results was reviewed with the receiving nurse. Lines:  
Peripheral IV 09/17/18 Left Antecubital (Active) Site Assessment Clean, dry, & intact 9/17/2018 11:15 AM  
Phlebitis Assessment 0 9/17/2018 11:15 AM  
Infiltration Assessment 0 9/17/2018 11:15 AM  
Dressing Type Tape;Transparent 9/17/2018 11:15 AM  
Hub Color/Line Status Pink;Flushed;Patent 9/17/2018 11:15 AM  
Action Taken Blood drawn 9/17/2018 11:15 AM  
  
 
Opportunity for questions and clarification was provided. Patient transported with: 
 Monitor Registered Nurse

## 2018-09-17 NOTE — PROGRESS NOTES
[facility]: MED RECONCILIATION Comments/Recommendations:  
Patient brought his prescription medication bottles with him. Medications added:  
 
· Pantoprazole 40 mg by mouth daily before breakfast 
 
Medications removed: · Benazepril · Hydralazine Medications adjusted: · Tamsulosin 0.4 mg by mouth daily after breakfast 
 
Information obtained from: patient and patient's prescription medication bottles Allergies: Review of patient's allergies indicates no known allergies. Prior to Admission Medications Prescriptions Last Dose Informant Patient Reported? Taking?  
acetaminophen (TYLENOL) 325 mg tablet  Self Yes Yes Sig: Take 325 mg by mouth every four (4) hours as needed for Pain. amLODIPine (NORVASC) 5 mg tablet 9/17/2018 at Unknown time Self Yes Yes Sig: Take 5 mg by mouth daily. aspirin delayed-release 81 mg tablet 9/17/2018 at Unknown time Self No Yes Sig: Take 1 Tab by mouth daily. carbamazepine (TEGRETOL) 200 mg tablet 9/17/2018 at Unknown time Self Yes Yes Sig: Take 200 mg by mouth every eight (8) hours. clopidogrel (PLAVIX) 75 mg tab 9/17/2018 at Unknown time Self No Yes Sig: Take 1 Tab by mouth daily. finasteride (PROSCAR) 5 mg tablet 9/17/2018 at Unknown time Self No Yes Sig: Take 1 Tab by mouth daily. pantoprazole (PROTONIX) 40 mg tablet 9/17/2018 at Unknown time Self Yes Yes Sig: Take 40 mg by mouth Daily (before breakfast). tamsulosin (FLOMAX) 0.4 mg capsule 9/17/2018 at Unknown time Self Yes Yes Sig: Take 0.4 mg by mouth daily (after breakfast).   
  
 
Pema Castaneda, Pharmacist

## 2018-09-17 NOTE — PROGRESS NOTES
9/17/2018 
2:01 PM 
Case management note Met with patient to discuss discharge planning. Confirmed demographics. Patient lives with son and 2 steps to enter. He uses a cane. Patient drives and performs ADL's independently. Patient uses [facility] in [place] for RX needs Patient follows with Dr Zina Devlin for medial management. NO NN. Patient does have a advance directive and does not want to speak to anyone regarding this. Date of previous inpatient admission/ ED visit? none What brought the patient back to ED? Blood in stool Did patient decline recommended services during last admission/ ED visit (if yes, what)? Has patient seen a provider since their last inpatient admission/ED visit (if yes, when)? yes CM Interventions: 
From previous inpatient admission/ED visit: 
From current inpatient admission/ED visit:unable to determine at this time. Care Management Interventions PCP Verified by CM: Yes (dr. Ming Knight) Transition of Care Consult (CM Consult): Discharge Planning Current Support Network: Relative's Home Confirm Follow Up Transport: Family Discharge Location Discharge Placement: Unable to determine at this time Tae Wilde, Oliver N Mendoza Shirley

## 2018-09-17 NOTE — ED PROVIDER NOTES
HPI Comments: 10:55 AM 
I have evaluated the patient as the Provider in Triage. I have reviewed His vital signs and the triage nurse assessment. I have talked with the patient and any available family and advised that I am the provider in triage and have ordered the appropriate study to initiate their work up based on the clinical presentation during my assessment. I have advised that the patient will be accommodated in the Main ED as soon as possible. I have also requested to contact the triage nurse or myself immediately if the patient experiences any changes in their condition during this brief waiting period. Raffy Clark MD 
 
Dark red blood on toilet tissue this morning; no abd pain; no dizziness; also noted blood mixed in with stool; off blood-thinners times 2 weeks but states blood thinner replaced with Plavix. On ASA and Plavix. Raffy Clark MD 
 
 
80 y.o. male with extensive past medical history, please see list, significant for HTN, systolic CHF, HLD, CKD, a-fib, CAD, arthritis, who presents ambulatory to the ED with cc of blood in stool. Pt reports new dark red blood and bright red blood in loose stool this morning. He endorses cramping abdominal pain earlier today that has resolved. Pt states his anticoagulation was changed 2 weeks ago from Coumadin to Plavix with ASA daily. He endorses history of GI bleed and constipation. He notes defibrillator in place. He states NKDA. Pt specifically ramin any nausea, vomiting, fever, difficulty urinating, chest pain, or shortness of breath. There are no other acute medical concerns at this time. Pt is a poor historian. Social Hx: former Tobacco use, denies EtOH use, denies Illicit Drug use PCP: Leelee Ro MD 
 
Note written by Elsa Hayes, as dictated by Nadia Remy PA-C 11:24 AM 
 
 
The history is provided by the patient. No  was used. Past Medical History:  
Diagnosis Date  Arrhythmia   
 atrial fib  Arthritis  CAD (coronary artery disease)   
 hx mi  
 CHF (congestive heart failure) (HCC)  Chronic kidney disease   
 no dialysis  CKD (chronic kidney disease), stage IV ([facility] [address]  Hyperlipidemia  Hypertension  Ill-defined condition   
 bronchitis  Ill-defined condition 02/2018  
 blood transfusion--gi bleed on coumadin  Seizures ([facility] [address]  Systolic CHF, chronic ([facility] [address] Past Surgical History:  
Procedure Laterality Date  COLONOSCOPY N/A 9/6/2017 COLONOSCOPY performed by Elyssa Rich MD at [address] [place] [address]  COLONOSCOPY N/A 2/8/2018 COLONOSCOPY performed by Hanna Moura MD at [address] [place] [address]    
 right  HX CORONARY ARTERY BYPASS GRAFT    
 x2 vessels  HX HEART CATHETERIZATION    
 HX PACEMAKER    
 right chest  
 
   
Family History:  
Problem Relation Age of Onset  Heart Disease Mother  Hypertension Mother Social History Social History  Marital status:  Spouse name: N/A  
 Number of children: N/A  
 Years of education: N/A Occupational History  Not on file. Social History Main Topics  Smoking status: Former Smoker Packs/day: 0.50 Years: 40.00 Quit date: 6/21/1988  Smokeless tobacco: Never Used  Alcohol use No  
 Drug use: No  
 Sexual activity: No  
 
Other Topics Concern  Not on file Social History Narrative ALLERGIES: Review of patient's allergies indicates no known allergies. Review of Systems Constitutional: Negative for fever. Respiratory: Negative for shortness of breath. Cardiovascular: Negative for chest pain. Gastrointestinal: Positive for abdominal pain, blood in stool (dark red and bright red) and diarrhea (loose stool). Negative for nausea and vomiting. Genitourinary: Negative for difficulty urinating. Musculoskeletal: Negative for neck stiffness. Skin: Negative for rash. Neurological: Negative for seizures. All other systems reviewed and are negative. Vitals:  
 09/17/18 1058 BP: (!) 232/105 Pulse: 77 Resp: 16 Temp: 98.3 °F (36.8 °C) SpO2: 100% Weight: 71.9 kg (158 lb 8.2 oz) Height: 5' 9\" (1.753 m) Physical Exam  
Constitutional: He is oriented to person, place, and time. He appears well-developed and well-nourished. No distress. Pleasant AA male HENT:  
Head: Normocephalic and atraumatic. Right Ear: External ear normal.  
Left Ear: External ear normal.  
Eyes: Conjunctivae are normal. No scleral icterus. Neck: Neck supple. No tracheal deviation present. Cardiovascular: Normal rate, regular rhythm and normal heart sounds. Exam reveals no gallop and no friction rub. No murmur heard. Pulmonary/Chest: Effort normal and breath sounds normal. No stridor. No respiratory distress. He has no wheezes. Abdominal: Soft. He exhibits no distension. There is no tenderness. Soft, benign Musculoskeletal: Normal range of motion. Neurological: He is alert and oriented to person, place, and time. Skin: Skin is warm and dry. Psychiatric: He has a normal mood and affect. His behavior is normal.  
Nursing note and vitals reviewed. MDM Number of Diagnoses or Management Options Diagnosis management comments: 80year old male on Plavix and ASA, hx diverticular bleed last admitted February presenting for GI bleed. Started with maroon stool per rectum this AM.  VS stable, HGB 9.1, at last admission pt dropped 3 grams in one afternoon. Given hx, plavix/ASA use, pt admitted to medicine service. Amount and/or Complexity of Data Reviewed Clinical lab tests: ordered and reviewed Review and summarize past medical records: yes Discuss the patient with other providers: yes (Dr. Nina Shaffer, ED attending. Dr. El Sneed, hospitalist) ED Course Procedures ED EKG interpretation: 1141 Rate 70, sinus rhythm, nonspecific T wave abnormality, normal axis. Note written by Elsa Barrientos, as dictated by Damian Briggs PA-C, as interpreted by Diana Gerardo MD 11:41 AM 
 
12:13 PM 
Rectal exam completed by Damian Briggs PA-C with chaperone Candida Michaels RN. Findings: Dark maroon blood mixed with stool around the anus. CONSULT NOTE: 
1:11 PM Damian Briggs PA-C spoke with Dr. Rodolfo Cordova, Consult for Hospitalist.  Discussed available diagnostic tests and clinical findings. Dr. Rodolfo Cordova will admit, would like pt consented for blood transfusion.

## 2018-09-17 NOTE — ACP (ADVANCE CARE PLANNING)
Advance Care Planning (ACP) Provider Note - Comprehensive     Date of ACP Conversation: 09/17/18  Persons included in Conversation:  patient  Length of ACP Conversation in minutes:  16 minutes    Authorized Decision Maker (if patient is incapable of making informed decisions): This person is:  son and daughter          General ACP for ALL Patients with Decision Making Capacity:   Importance of advance care planning, including choosing a healthcare agent to communicate patient's healthcare decisions if patient lost the ability to make decisions, such as after a sudden illness or accident    Review of Existing Advance Directive:  patient does not have document    For Serious or Chronic Illness:  Understanding of CPR, goals and expected outcomes, benefits and burdens discussed.     Interventions Provided:  Recommended completion of Advance Directive form after review of ACP materials and conversation with prospective healthcare agent

## 2018-09-17 NOTE — CONSULTS
Car Bull. Colleen Borjas MD  [phone] office  [phone] voicemail   Gastroenterology Consultation Note      Admit Date: 9/17/2018  Consult Date: 9/17/2018   I greatly appreciate your asking me to see Benedetto Boeck., thank you very much for the opportunity to participate in his care. Narrative Assessment and Plan   · Hematochezia  · Diverticulosis with bleeding in Feb 2018  · Chronic use antiplatelet therapy    He has much less bleeding on this occasion than he suffered previously. I suggest observation without plans for immediate endoscopic, angiographic, or surgical intervention. Following. OK for clears. Subjective:     Chief Complaint: Gastrointestinal Bleeding    History of Present Illness: Bleeding, bright red and marroon, present x1 day, several episodes, no vomiting, no melena, no abdominal pain.     PCP:  John Page MD    Past Medical History:   Diagnosis Date    Arrhythmia     atrial fib    Arthritis     CAD (coronary artery disease)     hx mi    CHF (congestive heart failure) (HCC)     Chronic kidney disease     no dialysis    CKD (chronic kidney disease), stage IV (HCC)     Hyperlipidemia     Hypertension     Ill-defined condition     bronchitis    Ill-defined condition 02/2018    blood transfusion--gi bleed on coumadin    Seizures (HCC)     Systolic CHF, chronic ([facility] [address]         Past Surgical History:   Procedure Laterality Date    COLONOSCOPY N/A 9/6/2017    COLONOSCOPY performed by Gonzalez Otero MD at [address] [place] [address] COLONOSCOPY N/A 2/8/2018    COLONOSCOPY performed by Kirk Gonzales MD at [address] [place] [address] HX CATARACT REMOVAL      right    HX CORONARY ARTERY BYPASS GRAFT      x2 vessels    HX HEART CATHETERIZATION      HX PACEMAKER      right chest       Social History   Substance Use Topics    Smoking status: Former Smoker     Packs/day: 0.50     Years: 40.00     Quit date: 6/21/1988    Smokeless tobacco: Never Used   [address] [place] [address] Alcohol use No        Family History   Problem Relation Age of Onset    Heart Disease Mother     Hypertension Mother         No Known Allergies         Home Medications:  Prior to Admission Medications   Prescriptions Last Dose Informant Patient Reported? Taking?   acetaminophen (TYLENOL) 325 mg tablet  Self Yes Yes   Sig: Take 325 mg by mouth every four (4) hours as needed for Pain. amLODIPine (NORVASC) 5 mg tablet 9/17/2018 at Unknown time Self Yes Yes   Sig: Take 5 mg by mouth daily. aspirin delayed-release 81 mg tablet 9/17/2018 at Unknown time Self No Yes   Sig: Take 1 Tab by mouth daily. carbamazepine (TEGRETOL) 200 mg tablet 9/17/2018 at Unknown time Self Yes Yes   Sig: Take 200 mg by mouth every eight (8) hours. clopidogrel (PLAVIX) 75 mg tab 9/17/2018 at Unknown time Self No Yes   Sig: Take 1 Tab by mouth daily. finasteride (PROSCAR) 5 mg tablet 9/17/2018 at Unknown time Self No Yes   Sig: Take 1 Tab by mouth daily. pantoprazole (PROTONIX) 40 mg tablet 9/17/2018 at Unknown time Self Yes Yes   Sig: Take 40 mg by mouth Daily (before breakfast). tamsulosin (FLOMAX) 0.4 mg capsule 9/17/2018 at Unknown time Self Yes Yes   Sig: Take 0.4 mg by mouth daily (after breakfast).       Facility-Administered Medications: None       Hospital Medications:  Current Facility-Administered Medications   Medication Dose Route Frequency    sodium chloride (NS) flush 5-10 mL  5-10 mL IntraVENous PRN    hydrALAZINE (APRESOLINE) 20 mg/mL injection 20 mg  20 mg IntraVENous Q6H PRN    [START ON 9/18/2018] amLODIPine (NORVASC) tablet 5 mg  5 mg Oral DAILY    carBAMazepine (TEGretol) tablet 200 mg  200 mg Oral TID    [START ON 9/18/2018] finasteride (PROSCAR) tablet 5 mg  5 mg Oral DAILY    [START ON 9/18/2018] tamsulosin (FLOMAX) capsule 0.4 mg  0.4 mg Oral DAILY AFTER BREAKFAST    dextrose 5 % - 0.45% NaCl infusion  75 mL/hr IntraVENous CONTINUOUS    sodium chloride (NS) flush 5-10 mL  5-10 mL IntraVENous Q8H  sodium chloride (NS) flush 5-10 mL  5-10 mL IntraVENous PRN    acetaminophen (TYLENOL) tablet 650 mg  650 mg Oral Q4H PRN    HYDROcodone-acetaminophen (NORCO) 5-325 mg per tablet 1 Tab  1 Tab Oral Q4H PRN    HYDROmorphone (PF) (DILAUDID) injection 0.5 mg  0.5 mg IntraVENous Q4H PRN    naloxone (NARCAN) injection 0.4 mg  0.4 mg IntraVENous PRN    diphenhydrAMINE (BENADRYL) injection 12.5 mg  12.5 mg IntraVENous Q4H PRN    ondansetron (ZOFRAN) injection 4 mg  4 mg IntraVENous Q6H PRN    docusate sodium (COLACE) capsule 100 mg  100 mg Oral BID     Current Outpatient Prescriptions   Medication Sig    tamsulosin (FLOMAX) 0.4 mg capsule Take 0.4 mg by mouth daily (after breakfast).  pantoprazole (PROTONIX) 40 mg tablet Take 40 mg by mouth Daily (before breakfast).  clopidogrel (PLAVIX) 75 mg tab Take 1 Tab by mouth daily.  aspirin delayed-release 81 mg tablet Take 1 Tab by mouth daily.  amLODIPine (NORVASC) 5 mg tablet Take 5 mg by mouth daily.  finasteride (PROSCAR) 5 mg tablet Take 1 Tab by mouth daily.  acetaminophen (TYLENOL) 325 mg tablet Take 325 mg by mouth every four (4) hours as needed for Pain.  carbamazepine (TEGRETOL) 200 mg tablet Take 200 mg by mouth every eight (8) hours.        Review of Systems: Admission ROS by Dennis Holland MD from 9/17/2018 were reviewed with the patient and changes (other than per HPI) include: none      Objective:     Physical Exam:  Visit Vitals    /79    Pulse 60    Temp 98.3 °F (36.8 °C)    Resp 12    Ht 5' 9\" (1.753 m)    Wt 71.9 kg (158 lb 8.2 oz)    SpO2 100%    BMI 23.41 kg/m2     SpO2 Readings from Last 6 Encounters:   09/17/18 100%   08/29/18 100%   07/13/18 98%   06/29/18 97%   06/21/18 100%   04/23/18 100%        No intake or output data in the 24 hours ending 09/17/18 1649   General: no distress, comfortable  Skin:  No rash or palpable dermatologic mass lesions  HEENT: Pupils equal, sclera anicteric, oropharynx with no gross lesions  Cardiovascular: No abnormal audible heart sounds, well perfused, no edema  Respiratory:  No abnormal audible breath sounds, normal respiratory effort, no throacic deformity  GI:  Abdomen nondistended, nontender, no mass, no free fluid, no rebound or guarding. Musculoskeletal:  No skeletal deformity nor acute arthritis noted. Neurological:  Motor and sensory function intact in upper extremeties  Psychiatric:  Normal affect, memory intact, appears to have insight into current illness  Lymphatic:  No cervical, supraclavicular, or periumbilic lymphadenopathy    Laboratory:    Recent Results (from the past 24 hour(s))   CBC WITH AUTOMATED DIFF    Collection Time: 09/17/18 11:14 AM   Result Value Ref Range    WBC 5.2 4.1 - 11.1 K/uL    RBC 2.96 (L) 4.10 - 5.70 M/uL    HGB 9.1 (L) 12.1 - 17.0 g/dL    HCT 29.3 (L) 36.6 - 50.3 %    MCV 99.0 80.0 - 99.0 FL    MCH 30.7 26.0 - 34.0 PG    MCHC 31.1 30.0 - 36.5 g/dL    RDW 13.1 11.5 - 14.5 %    PLATELET 409 781 - 152 K/uL    MPV 9.5 8.9 - 12.9 FL    NRBC 0.0 0  WBC    ABSOLUTE NRBC 0.00 0.00 - 0.01 K/uL    NEUTROPHILS 78 (H) 32 - 75 %    LYMPHOCYTES 12 12 - 49 %    MONOCYTES 8 5 - 13 %    EOSINOPHILS 1 0 - 7 %    BASOPHILS 1 0 - 1 %    IMMATURE GRANULOCYTES 0 0.0 - 0.5 %    ABS. NEUTROPHILS 4.0 1.8 - 8.0 K/UL    ABS. LYMPHOCYTES 0.6 (L) 0.8 - 3.5 K/UL    ABS. MONOCYTES 0.4 0.0 - 1.0 K/UL    ABS. EOSINOPHILS 0.1 0.0 - 0.4 K/UL    ABS. BASOPHILS 0.1 0.0 - 0.1 K/UL    ABS. IMM.  GRANS. 0.0 0.00 - 0.04 K/UL    DF SMEAR SCANNED      RBC COMMENTS NORMOCYTIC, NORMOCHROMIC     METABOLIC PANEL, COMPREHENSIVE    Collection Time: 09/17/18 11:14 AM   Result Value Ref Range    Sodium 141 136 - 145 mmol/L    Potassium 4.7 3.5 - 5.1 mmol/L    Chloride 109 (H) 97 - 108 mmol/L    CO2 22 21 - 32 mmol/L    Anion gap 10 5 - 15 mmol/L    Glucose 97 65 - 100 mg/dL    BUN 49 (H) 6 - 20 MG/DL    Creatinine 3.19 (H) 0.70 - 1.30 MG/DL    BUN/Creatinine ratio 15 12 - 20      GFR est AA 23 (L) >60 ml/min/1.73m2    GFR est non-AA 19 (L) >60 ml/min/1.73m2    Calcium 8.9 8.5 - 10.1 MG/DL    Bilirubin, total 0.2 0.2 - 1.0 MG/DL    ALT (SGPT) 13 12 - 78 U/L    AST (SGOT) 11 (L) 15 - 37 U/L    Alk.  phosphatase 115 45 - 117 U/L    Protein, total 7.8 6.4 - 8.2 g/dL    Albumin 3.3 (L) 3.5 - 5.0 g/dL    Globulin 4.5 (H) 2.0 - 4.0 g/dL    A-G Ratio 0.7 (L) 1.1 - 2.2     TYPE & SCREEN    Collection Time: 09/17/18 11:14 AM   Result Value Ref Range    Crossmatch Expiration 09/20/2018     ABO/Rh(D) A POSITIVE     Antibody screen NEG    IRON PROFILE    Collection Time: 09/17/18 11:14 AM   Result Value Ref Range    Iron 41 35 - 150 ug/dL    TIBC 218 (L) 250 - 450 ug/dL    Iron % saturation 19 (L) 20 - 50 %   FERRITIN    Collection Time: 09/17/18 11:14 AM   Result Value Ref Range    Ferritin 38 26 - 388 NG/ML   EKG, 12 LEAD, INITIAL    Collection Time: 09/17/18 11:41 AM   Result Value Ref Range    Ventricular Rate 70 BPM    Atrial Rate 70 BPM    P-R Interval 224 ms    QRS Duration 94 ms    Q-T Interval 396 ms    QTC Calculation (Bezet) 427 ms    Calculated P Axis 8 degrees    Calculated R Axis 31 degrees    Calculated T Axis 86 degrees    Diagnosis       Sinus rhythm with 1st degree AV block  Nonspecific ST segment changes  Abnormal ECG  When compared with ECG of 21-JUN-2018 11:44,  Nonspecific T wave abnormality has replaced inverted T waves in Anterolateral   leads  Confirmed by Natacha Frank M.D., [place] ([id number]) on 9/17/2018 2:37:24 PM     OCCULT BLOOD, STOOL    Collection Time: 09/17/18 11:50 AM   Result Value Ref Range    Occult blood, stool POSITIVE (A) NEG     URINALYSIS W/MICROSCOPIC    Collection Time: 09/17/18  2:33 PM   Result Value Ref Range    Color YELLOW/STRAW      Appearance CLEAR CLEAR      Specific gravity 1.009 1.003 - 1.030      pH (UA) 5.5 5.0 - 8.0      Protein 100 (A) NEG mg/dL    Glucose NEGATIVE  NEG mg/dL    Ketone NEGATIVE  NEG mg/dL    Bilirubin NEGATIVE  NEG      Blood NEGATIVE  NEG Urobilinogen 0.2 0.2 - 1.0 EU/dL    Nitrites NEGATIVE  NEG      Leukocyte Esterase NEGATIVE  NEG      WBC 0-4 0 - 4 /hpf    RBC 0-5 0 - 5 /hpf    Epithelial cells FEW FEW /lpf    Bacteria NEGATIVE  NEG /hpf    Hyaline cast 0-2 0 - 5 /lpf   URINE CULTURE HOLD SAMPLE    Collection Time: 09/17/18  2:33 PM   Result Value Ref Range    Urine culture hold        URINE ON HOLD IN MICROBIOLOGY DEPT FOR 3 DAYS. IF UNPRESERVED URINE IS SUBMITTED, IT CANNOT BE USED FOR ADDITIONAL TESTING AFTER 24 HRS, RECOLLECTION WILL BE REQUIRED. CREATININE, UR, RANDOM    Collection Time: 09/17/18  2:33 PM   Result Value Ref Range    Creatinine, urine 47.50 mg/dL         Assessment/Plan:     Principal Problem:    Acute GI bleeding (2/7/2018)    Active Problems:    Coronary atherosclerosis of native coronary artery (9/2/2011)      CKD (chronic kidney disease) stage 4, GFR 15-29 ml/min ([facility]) (9/5/2017)      Benign prostatic hyperplasia (9/5/2017)      CAD (coronary artery disease) ()      Overview: 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 ([facility])      Hypertension ()      Systolic CHF, chronic ([facility]) ()      Atrial fibrillation ([facility] [address] (6/28/2018)      Acute blood loss anemia (9/17/2018)      TINA (acute kidney injury) ([facility] [address] (9/17/2018)         See above narrative for full detail.

## 2018-09-17 NOTE — H&P
Jaspal Murrell [place] [address] 
[address] [place] [facility], [address] [place] [address] 
[phone] Admission History and Physical 
 
 
NAME:  Khadijah Mccarthy Sr.  
:   1935 MRN:  [id number] PCP:  Charol Hatchet, MD  
 
Date/Time:  2018 Subjective: CHIEF COMPLAINT: rectal bleeding HISTORY OF PRESENT ILLNESS:    
The patient is a 79 yo hx of HTN, CAD, chronic afib s/p pacer, CKD 4, seizures d/o, presented w/ rectal bleeding, anemia. The patient stated that he was trying to have a BM this AM when he noticed blood in the toilet. He denied chest pain, SOB, dizziness, nausea, vomiting, or abd pain. The patient has a hx of diverticular bleeding, but currently taking ASA and plavix. In the ED, Hgb was 9.1. No Known Allergies Prior to Admission medications Medication Sig Start Date End Date Taking? Authorizing Provider  
clopidogrel (PLAVIX) 75 mg tab Take 1 Tab by mouth daily. 18   Arvin Leonard MD  
aspirin delayed-release 81 mg tablet Take 1 Tab by mouth daily. 18   Arvin Leonard MD  
amLODIPine (NORVASC) 5 mg tablet Take 5 mg by mouth daily. Historical Provider  
hydrALAZINE (APRESOLINE) 25 mg tablet Take 1 Tab by mouth three (3) times daily. 18   Arvin Leonard MD  
benazepril (LOTENSIN) 40 mg tablet Take 40 mg by mouth daily. Pablo Engle MD  
finasteride (PROSCAR) 5 mg tablet Take 1 Tab by mouth daily. 17   Darrell CONWAY Do, MD  
tamsulosin (FLOMAX) 0.4 mg capsule Take 1 Cap by mouth daily. Patient taking differently: Take 0.4 mg by mouth daily (after dinner). 17   Darrell CONWAY Do, MD  
acetaminophen (TYLENOL) 325 mg tablet Take 325 mg by mouth every four (4) hours as needed for Pain. Historical Provider  
carbamazepine (TEGRETOL) 200 mg tablet Take 200 mg by mouth three (3) times daily. Pablo Engle MD  
 
 
Past Medical History:  
Diagnosis Date  Arrhythmia   
 atrial fib  Arthritis  CAD (coronary artery disease)   
 hx mi  CHF (congestive heart failure) ([facility]  Chronic kidney disease   
 no dialysis  CKD (chronic kidney disease), stage IV ([facility]  Hyperlipidemia  Hypertension  Ill-defined condition   
 bronchitis  Ill-defined condition 02/2018  
 blood transfusion--gi bleed on coumadin  Seizures ([facility]  Systolic CHF, chronic ([facility] Past Surgical History:  
Procedure Laterality Date  COLONOSCOPY N/A 9/6/2017 COLONOSCOPY performed by Linda Hernandez MD at [address] [place] [address]  COLONOSCOPY N/A 2/8/2018 COLONOSCOPY performed by Amber Azar MD at [address] [place] [address]    
 right  HX CORONARY ARTERY BYPASS GRAFT    
 x2 vessels  HX HEART CATHETERIZATION    
 HX PACEMAKER    
 right chest  
 
 
Social History Substance Use Topics  Smoking status: Former Smoker Packs/day: 0.50 Years: 40.00 Quit date: 6/21/1988  Smokeless tobacco: Never Used  Alcohol use No  
  
 
Family History Problem Relation Age of Onset  Heart Disease Mother  Hypertension Mother Review of Systems: 
(bold if positive, if negative) Gen:  Eyes:  ENT:  CVS:  Pulm:  GI:   
:   
MS:  Skin:  Psych:  Endo:   
Hem:  Renal:   
Neuro:    
 
  
Objective: VITALS:   
Vital signs reviewed; most recent are: 
 
Visit Vitals  BP (!) 232/105 (BP 1 Location: Left arm, BP Patient Position: At rest)  Pulse 77  Temp 98.3 °F (36.8 °C)  Resp 16  
 Ht 5' 9\" (1.753 m)  Wt 71.9 kg (158 lb 8.2 oz)  SpO2 100%  BMI 23.41 kg/m2 SpO2 Readings from Last 6 Encounters:  
09/17/18 100% 08/29/18 100% 07/13/18 98% 06/29/18 97% 06/21/18 100% 04/23/18 100% No intake or output data in the 24 hours ending 09/17/18 1356 Exam:  
 
Physical Exam: 
 
Gen:  Elderly, frail, NAD HEENT:  Pink conjunctivae, PERRL, hearing intact to voice, moist mucous membranes Neck:  Supple, without masses, thyroid non-tender Resp:  No accessory muscle use, clear breath sounds without wheezes rales or rhonchi 
Card:  No murmurs, normal S1, S2 without thrills, bruits or peripheral edema Abd:  Soft, non-tender, non-distended, normoactive bowel sounds are present Lymph:  No cervical adenopathy Musc:  No cyanosis or clubbing Skin:  No rashes Neuro:  Cranial nerves 3-12 are grossly intact, follows commands appropriately Psych:  Alert with fair insight. Oriented to person, place, and time Labs: 
 
Recent Labs  
   09/17/18 
 1114 WBC  5.2 HGB  9.1*  
HCT  29.3*  
PLT  182 Recent Labs  
   09/17/18 
 1114 NA  141  
K  4.7 CL  109* CO2  22 GLU  97 BUN  49* CREA  3.19* CA  8.9 ALB  3.3* TBILI  0.2 SGOT  11* ALT  13 Lab Results Component Value Date/Time Glucose (POC) 128 (H) 09/07/2011 06:16 PM  
 Glucose (POC) 99 09/05/2011 12:17 AM  
 Glucose (POC) 170 (H) 09/02/2011 08:39 PM  
 
No results for input(s): PH, PCO2, PO2, HCO3, FIO2 in the last 72 hours. No results for input(s): INR in the last 72 hours. No lab exists for component: INREXT Radiology and EKG reviewed:   pending **Old Records reviewed in [address] [facility]** Assessment/Plan:   
  
Principal Problem: 
 
79 yo hx of HTN, CAD, chronic afib s/p pacer, CKD 4, seizures d/o, presented w/ rectal bleeding, anemia 1) Acute GI bleeding: concerning for diverticular bleed. Will need to monitor Hgb closely. Hold ASA, plavix. Will keep NPO and consult GI. Defer further imaging or interventions to GI 
 
2) Acute blood loss anemia: due to above. Will check iron studies. Consent for possible transfusion. Will monitor Hgb closely 3) Chronic afib: has pacer. Rate controlled. Not on blood thinners due to prior GI bleed. Will monitor 4) CAD: hold ASA, plavix due to bleeding 5) TINA/CKD 4: could be volume depletion. Cont IVF, monitor 6) HTN: cont hydralazine, norvasc.   Start IV hydralazine prn 
 
 7) BPH: cont flomax, proscar Code: Full - discussed with patient Risk of deterioration: high Total time spent with patient: 70 Minutes Care Plan discussed with: Patient, nursing Discussed:  Care Plan Prophylaxis:  SCD's 
 
Probable Disposition:  Home w/Family 
        
___________________________________________________ Attending Physician: Kavon Campbell MD

## 2018-09-18 ENCOUNTER — TELEPHONE (OUTPATIENT)
Dept: CARDIOLOGY CLINIC | Age: 83
End: 2018-09-18

## 2018-09-18 VITALS
TEMPERATURE: 97.8 F | HEART RATE: 62 BPM | HEIGHT: 69 IN | BODY MASS INDEX: 22.69 KG/M2 | SYSTOLIC BLOOD PRESSURE: 186 MMHG | WEIGHT: 153.22 LBS | DIASTOLIC BLOOD PRESSURE: 78 MMHG | RESPIRATION RATE: 16 BRPM | OXYGEN SATURATION: 100 %

## 2018-09-18 PROBLEM — I48.91 ATRIAL FIBRILLATION WITH CONTROLLED VENTRICULAR RATE (HCC): Status: RESOLVED | Noted: 2017-10-24 | Resolved: 2018-09-18

## 2018-09-18 PROBLEM — R79.89 ELEVATED BRAIN NATRIURETIC PEPTIDE (BNP) LEVEL: Status: RESOLVED | Noted: 2017-10-10 | Resolved: 2018-09-18

## 2018-09-18 PROBLEM — I42.0 DILATED CARDIOMYOPATHY (HCC): Status: RESOLVED | Noted: 2017-10-24 | Resolved: 2018-09-18

## 2018-09-18 PROBLEM — K27.9 H PYLORI ULCER: Status: RESOLVED | Noted: 2017-09-06 | Resolved: 2018-09-18

## 2018-09-18 PROBLEM — K62.5 BRBPR (BRIGHT RED BLOOD PER RECTUM): Status: ACTIVE | Noted: 2018-09-18

## 2018-09-18 PROBLEM — R33.9 URINE RETENTION: Status: RESOLVED | Noted: 2017-09-06 | Resolved: 2018-09-18

## 2018-09-18 PROBLEM — B96.81 H PYLORI ULCER: Status: RESOLVED | Noted: 2017-09-06 | Resolved: 2018-09-18

## 2018-09-18 PROBLEM — N13.30 HYDRONEPHROSIS: Status: RESOLVED | Noted: 2017-09-05 | Resolved: 2018-09-18

## 2018-09-18 LAB
ALBUMIN SERPL-MCNC: 2.9 G/DL (ref 3.5–5)
ALBUMIN/GLOB SERPL: 0.7 {RATIO} (ref 1.1–2.2)
ALP SERPL-CCNC: 106 U/L (ref 45–117)
ALT SERPL-CCNC: 11 U/L (ref 12–78)
ANION GAP SERPL CALC-SCNC: 9 MMOL/L (ref 5–15)
AST SERPL-CCNC: 11 U/L (ref 15–37)
BILIRUB DIRECT SERPL-MCNC: 0.1 MG/DL (ref 0–0.2)
BILIRUB SERPL-MCNC: 0.3 MG/DL (ref 0.2–1)
BUN SERPL-MCNC: 45 MG/DL (ref 6–20)
BUN/CREAT SERPL: 16 (ref 12–20)
CALCIUM SERPL-MCNC: 8.6 MG/DL (ref 8.5–10.1)
CHLORIDE SERPL-SCNC: 109 MMOL/L (ref 97–108)
CO2 SERPL-SCNC: 21 MMOL/L (ref 21–32)
CREAT SERPL-MCNC: 2.74 MG/DL (ref 0.7–1.3)
ERYTHROCYTE [DISTWIDTH] IN BLOOD BY AUTOMATED COUNT: 13.2 % (ref 11.5–14.5)
EST. AVERAGE GLUCOSE BLD GHB EST-MCNC: 105 MG/DL
GLOBULIN SER CALC-MCNC: 4.1 G/DL (ref 2–4)
GLUCOSE SERPL-MCNC: 88 MG/DL (ref 65–100)
HBA1C MFR BLD: 5.3 % (ref 4.2–6.3)
HCT VFR BLD AUTO: 27.6 % (ref 36.6–50.3)
HGB BLD-MCNC: 8.8 G/DL (ref 12.1–17)
LIPASE SERPL-CCNC: 135 U/L (ref 73–393)
MAGNESIUM SERPL-MCNC: 2 MG/DL (ref 1.6–2.4)
MCH RBC QN AUTO: 31 PG (ref 26–34)
MCHC RBC AUTO-ENTMCNC: 31.9 G/DL (ref 30–36.5)
MCV RBC AUTO: 97.2 FL (ref 80–99)
NRBC # BLD: 0 K/UL (ref 0–0.01)
NRBC BLD-RTO: 0 PER 100 WBC
PHOSPHATE SERPL-MCNC: 3.8 MG/DL (ref 2.6–4.7)
PLATELET # BLD AUTO: 167 K/UL (ref 150–400)
PMV BLD AUTO: 8.9 FL (ref 8.9–12.9)
POTASSIUM SERPL-SCNC: 4.5 MMOL/L (ref 3.5–5.1)
PROT SERPL-MCNC: 7 G/DL (ref 6.4–8.2)
RBC # BLD AUTO: 2.84 M/UL (ref 4.1–5.7)
SODIUM SERPL-SCNC: 139 MMOL/L (ref 136–145)
WBC # BLD AUTO: 4.6 K/UL (ref 4.1–11.1)

## 2018-09-18 PROCEDURE — G8989 SELF CARE D/C STATUS: HCPCS

## 2018-09-18 PROCEDURE — 84100 ASSAY OF PHOSPHORUS: CPT | Performed by: INTERNAL MEDICINE

## 2018-09-18 PROCEDURE — 97535 SELF CARE MNGMENT TRAINING: CPT

## 2018-09-18 PROCEDURE — 65390000012 HC CONDITION CODE 44 OBSERVATION

## 2018-09-18 PROCEDURE — 74011000258 HC RX REV CODE- 258: Performed by: INTERNAL MEDICINE

## 2018-09-18 PROCEDURE — 80076 HEPATIC FUNCTION PANEL: CPT | Performed by: INTERNAL MEDICINE

## 2018-09-18 PROCEDURE — G8988 SELF CARE GOAL STATUS: HCPCS

## 2018-09-18 PROCEDURE — 97165 OT EVAL LOW COMPLEX 30 MIN: CPT

## 2018-09-18 PROCEDURE — 85027 COMPLETE CBC AUTOMATED: CPT | Performed by: INTERNAL MEDICINE

## 2018-09-18 PROCEDURE — G8987 SELF CARE CURRENT STATUS: HCPCS

## 2018-09-18 PROCEDURE — 80048 BASIC METABOLIC PNL TOTAL CA: CPT | Performed by: INTERNAL MEDICINE

## 2018-09-18 PROCEDURE — 97161 PT EVAL LOW COMPLEX 20 MIN: CPT

## 2018-09-18 PROCEDURE — 74011250637 HC RX REV CODE- 250/637: Performed by: INTERNAL MEDICINE

## 2018-09-18 PROCEDURE — 83735 ASSAY OF MAGNESIUM: CPT | Performed by: INTERNAL MEDICINE

## 2018-09-18 PROCEDURE — G8978 MOBILITY CURRENT STATUS: HCPCS | Performed by: PHYSICAL THERAPIST

## 2018-09-18 PROCEDURE — 96361 HYDRATE IV INFUSION ADD-ON: CPT

## 2018-09-18 PROCEDURE — 83036 HEMOGLOBIN GLYCOSYLATED A1C: CPT | Performed by: INTERNAL MEDICINE

## 2018-09-18 PROCEDURE — 94760 N-INVAS EAR/PLS OXIMETRY 1: CPT

## 2018-09-18 PROCEDURE — 83690 ASSAY OF LIPASE: CPT | Performed by: INTERNAL MEDICINE

## 2018-09-18 PROCEDURE — 36415 COLL VENOUS BLD VENIPUNCTURE: CPT | Performed by: INTERNAL MEDICINE

## 2018-09-18 PROCEDURE — 99218 HC RM OBSERVATION: CPT

## 2018-09-18 PROCEDURE — G8979 MOBILITY GOAL STATUS: HCPCS | Performed by: PHYSICAL THERAPIST

## 2018-09-18 PROCEDURE — G8980 MOBILITY D/C STATUS: HCPCS | Performed by: PHYSICAL THERAPIST

## 2018-09-18 PROCEDURE — 96374 THER/PROPH/DIAG INJ IV PUSH: CPT

## 2018-09-18 RX ORDER — CLOPIDOGREL BISULFATE 75 MG/1
75 TABLET ORAL DAILY
Qty: 30 TAB | Refills: 6 | Status: SHIPPED | OUTPATIENT
Start: 2018-09-18 | End: 2019-10-06

## 2018-09-18 RX ORDER — PANTOPRAZOLE SODIUM 40 MG/1
40 TABLET, DELAYED RELEASE ORAL
Status: DISCONTINUED | OUTPATIENT
Start: 2018-09-18 | End: 2018-09-18 | Stop reason: HOSPADM

## 2018-09-18 RX ORDER — ASPIRIN 81 MG/1
81 TABLET ORAL DAILY
Qty: 30 TAB | Refills: 3 | Status: SHIPPED | OUTPATIENT
Start: 2018-09-18 | End: 2019-10-06

## 2018-09-18 RX ADMIN — DEXTROSE MONOHYDRATE AND SODIUM CHLORIDE 75 ML/HR: 5; .45 INJECTION, SOLUTION INTRAVENOUS at 07:29

## 2018-09-18 RX ADMIN — TAMSULOSIN HYDROCHLORIDE 0.4 MG: 0.4 CAPSULE ORAL at 08:46

## 2018-09-18 RX ADMIN — DOCUSATE SODIUM 100 MG: 100 CAPSULE, LIQUID FILLED ORAL at 08:46

## 2018-09-18 RX ADMIN — PANTOPRAZOLE SODIUM 40 MG: 40 TABLET, DELAYED RELEASE ORAL at 08:53

## 2018-09-18 RX ADMIN — Medication 10 ML: at 06:00

## 2018-09-18 RX ADMIN — AMLODIPINE BESYLATE 5 MG: 5 TABLET ORAL at 08:47

## 2018-09-18 RX ADMIN — CARBAMAZEPINE 200 MG: 200 TABLET ORAL at 08:46

## 2018-09-18 RX ADMIN — FINASTERIDE 5 MG: 5 TABLET, FILM COATED ORAL at 08:46

## 2018-09-18 NOTE — ROUTINE PROCESS
Bedside shift change report given to Jamila (oncoming nurse) by Sae Castaneda (offgoing nurse). Report included the following information SBAR, Kardex, ED Summary, Intake/Output, MAR, Accordion, Recent Results and Med Rec Status.

## 2018-09-18 NOTE — TELEPHONE ENCOUNTER
Spoke with patient. Reviewed instructions for patient to hold aspirin and Plavix for 1 week. Understanding expressed.

## 2018-09-18 NOTE — DISCHARGE SUMMARY
Physician Discharge Summary     Patient ID:  Yris Carrillo  [id number]  80 y.o.  1935    Admit date: 9/17/2018    Discharge date and time: 9/18/2018    Admission Diagnoses: Acute GI bleeding  BRBPR (bright red blood per rectum)    Discharge Diagnoses:    Principal Diagnosis   BRBPR (bright red blood per rectum)                                             Other Diagnoses    CKD (chronic kidney disease) stage 4, GFR 15-29 ml/min (HCC) (9/5/2017)    Benign prostatic hyperplasia (9/5/2017)    Anemia (10/10/2017)    CAD (coronary artery disease) ()    Hypertension ()    Dyslipidemia (10/24/2017)    Pacemaker (10/24/2017)    Acute GI bleeding (2/7/2018)    S/P cardiac cath (6/28/2018)    Atrial fibrillation ([facility] [address] (6/28/2018)    Acute blood loss anemia (9/17/2018)    TINA (acute kidney injury) ([facility] [address] (9/17/2018)     Hospital Course:   BRBPR (bright red blood per rectum) / Acute GI bleeding - POA, mild, recurrent, resolved. Presumed diverticula again. Appreciate GI input. Conservative management. Advance diet and DC home. Hold ASA/Plavix 1 week.      Anemia / Acute blood loss anemia - POA, mild anemia, near baseline, and stable overnight after accounting for hydration. PCP to monitor. EPO per nephrology outpatient.     TINA (acute kidney injury) / Dehydration / CKD (chronic kidney disease) stage 4 - Mild dehydration on admit, now resolved. Stop IVF. PCP to monitor.     Atrial fibrillation / Pacemaker - Stable. Hold ASA/Plavix for 1 week. Not on rate control. Not on stronger anticoagulation per his cardiologist.     CAD (coronary artery disease) / Hypertension - No symptoms. Continue amlodipine. Resume ASA/Plavix in 1 week.     Benign prostatic hyperplasia - Continue Proscar and Flomax     Dyslipidemia - Not on statin.   PCP to monitor.     GERD / Hx H pyelori - Continue PPI     PCP: Shanta Johnson MD    Consults: GI    Significant Diagnostic Studies: See Hospital Course    Discharged home in improved condition. Discharge Exam:   /78 (BP 1 Location: Right arm, BP Patient Position: At rest)    Pulse 62    Temp 97.8 °F (36.6 °C)    Resp 16    Ht 5' 9\" (1.753 m)    Wt 69.5 kg (153 lb 3.5 oz)    SpO2 100%    BMI 22.63 kg/m2      Gen:  Thin, in no acute distress  HEENT:  Pink conjunctivae, PERRL, hearing intact to voice, moist mucous membranes  Neck:  Supple, without masses, thyroid non-tender  Resp:  No accessory muscle use, clear breath sounds without wheezes rales or rhonchi  Card:  No murmurs, normal S1, S2 without thrills, bruits or peripheral edema  Abd:  Soft, non-tender, non-distended, normoactive bowel sounds are present, no mass  Lymph:  No cervical or inguinal adenopathy  Musc:  No cyanosis or clubbing  Skin:  No rashes or ulcers, skin turgor is good  Neuro:  Cranial nerves are grossly intact, no focal motor weakness, follows commands   Psych:  Good insight, oriented to person, place and time, alert    Patient Instructions:   Current Discharge Medication List      CONTINUE these medications which have CHANGED    Details   aspirin delayed-release 81 mg tablet Take 1 Tab by mouth daily. DO NOT TAKE for 1 WEEK  Qty: 30 Tab, Refills: 3      clopidogrel (PLAVIX) 75 mg tab Take 1 Tab by mouth daily. DO  NOT TAKE for 1 WEEK  Qty: 30 Tab, Refills: 6         CONTINUE these medications which have NOT CHANGED    Details   tamsulosin (FLOMAX) 0.4 mg capsule Take 0.4 mg by mouth daily (after breakfast). pantoprazole (PROTONIX) 40 mg tablet Take 40 mg by mouth Daily (before breakfast). amLODIPine (NORVASC) 5 mg tablet Take 5 mg by mouth daily. finasteride (PROSCAR) 5 mg tablet Take 1 Tab by mouth daily. Qty: 30 Tab, Refills: 1      acetaminophen (TYLENOL) 325 mg tablet Take 325 mg by mouth every four (4) hours as needed for Pain. carbamazepine (TEGRETOL) 200 mg tablet Take 200 mg by mouth every eight (8) hours.            Activity: Activity as tolerated  Diet: Cardiac Diet and Low fat, Low cholesterol  Wound Care: None needed    Follow-up with your PCP and cardiology in a few weeks.   Follow-up tests/labs - none    Signed:  Merly Horton MD  9/18/2018  8:49 AM

## 2018-09-18 NOTE — DISCHARGE INSTRUCTIONS
Patient Discharge Instructions    Deedee Nuñez / [id number] : 1935    Admitted 2018 Discharged: 2018     Primary Diagnoses  Problem List as of 2018  Date Reviewed: 2018          Codes Class Noted - Resolved   * (Principal)BRBPR (bright red blood per rectum)   Acute blood loss anemia   TINA (acute kidney injury) ([facility] [address]   S/P cardiac cath   Atrial fibrillation (HCC)   Acute GI bleeding   Dyslipidemia   Pacemaker   CAD (coronary artery disease)   Hypertension   Anemia   CKD (chronic kidney disease) stage 4, GFR 15-29 ml/min (HCC)   Benign prostatic hyperplasia          Take Home Medications     · It is important that you take the medication exactly as they are prescribed. · Keep your medication in the bottles provided by the pharmacist and keep a list of the medication names, dosages, and times to be taken in your wallet. · Do not take other medications without consulting your doctor. What to do at Home    Recommended diet: Cardiac Diet and Low fat, Low cholesterol    Recommended activity: Activity as tolerated    If you experience worse bleeding, please follow up with your PCP or GI doctor. Follow-up with your PCP and cardiology in a few weeks        Information obtained by :  I understand that if any problems occur once I am at home I am to contact my physician. I understand and acknowledge receipt of the instructions indicated above.                                                                                                                                            Physician's or R.N.'s Signature                                                                  Date/Time                                                                                                                                              Patient or Representative Signature                                                          Date/Time

## 2018-09-18 NOTE — TELEPHONE ENCOUNTER
Pt called to discuss what medications he needs to take and what needs to be listed on his file.   Phone # [phone]  Thanks

## 2018-09-18 NOTE — PROGRESS NOTES
[facility] Progress Note NAME: Jojo Mckenna Sr.  
:  1935 MRM:  [id number] Date/Time: 2018  8:36 AM 
 
  
Assessment and Plan: BRBPR (bright red blood per rectum) / Acute GI bleeding - POA, mild, recurrent, resolved. Presumed diverticula again. Appreciate GI input. Conservative management. Advance diet and DC home. Hold ASA/Plavix 1 week. Anemia / Acute blood loss anemia - POA, mild anemia, near baseline, and stable overnight after accounting for hydration. PCP to monitor. EPO per nephrology outpatient. TINA (acute kidney injury) / Dehydration / CKD (chronic kidney disease) stage 4 - Mild dehydration on admit, now resolved. Stop IVF. PCP to monitor. Atrial fibrillation / Pacemaker - Stable. Hold ASA/Plavix for 1 week. Not on rate control. Not on stronger anticoagulation per his cardiologist. 
 
CAD (coronary artery disease) / Hypertension - No symptoms. Continue amlodipine. Resume ASA/Plavix in 1 week. Benign prostatic hyperplasia - Continue Proscar and Flomax Dyslipidemia - Not on statin. PCP to monitor. GERD / Hx H pyelori - Continue PPI Subjective: Chief Complaint:  Feels fine ROS: 
(bold if positive, if negative) Tolerating PT  Tolerating Diet Objective:  
 
Last 24hrs VS reviewed since prior progress note. Most recent are: 
 
Visit Vitals  /78 (BP 1 Location: Right arm, BP Patient Position: At rest)  Pulse 62  Temp 97.8 °F (36.6 °C)  Resp 16  
 Ht 5' 9\" (1.753 m)  Wt 69.5 kg (153 lb 3.5 oz)  SpO2 100%  BMI 22.63 kg/m2 SpO2 Readings from Last 6 Encounters:  
18 100% 18 100% 18 98% 18 97% 18 100% 18 100% Intake/Output Summary (Last 24 hours) at 18 6394 Last data filed at 18 5615 Gross per 24 hour Intake             1985 ml Output             1300 ml Net              685 ml Physical Exam: Gen:  Thin, in no acute distress HEENT:  Pink conjunctivae, PERRL, hearing intact to voice, moist mucous membranes Neck:  Supple, without masses, thyroid non-tender Resp:  No accessory muscle use, clear breath sounds without wheezes rales or rhonchi 
Card:  No murmurs, normal S1, S2 without thrills, bruits or peripheral edema Abd:  Soft, non-tender, non-distended, normoactive bowel sounds are present, no mass Lymph:  No cervical or inguinal adenopathy Musc:  No cyanosis or clubbing Skin:  No rashes or ulcers, skin turgor is good Neuro:  Cranial nerves are grossly intact, no focal motor weakness, follows commands Psych:  Good insight, oriented to person, place and time, alert Telemetry reviewed:   AFIB 
__________________________________________________________________ Medications Reviewed: (see below) Medications:  
 
Current Facility-Administered Medications Medication Dose Route Frequency  pantoprazole (PROTONIX) tablet 40 mg  40 mg Oral ACB  sodium chloride (NS) flush 5-10 mL  5-10 mL IntraVENous PRN  
 hydrALAZINE (APRESOLINE) 20 mg/mL injection 20 mg  20 mg IntraVENous Q6H PRN  
 amLODIPine (NORVASC) tablet 5 mg  5 mg Oral DAILY  carBAMazepine (TEGretol) tablet 200 mg  200 mg Oral TID  finasteride (PROSCAR) tablet 5 mg  5 mg Oral DAILY  tamsulosin (FLOMAX) capsule 0.4 mg  0.4 mg Oral DAILY AFTER BREAKFAST  sodium chloride (NS) flush 5-10 mL  5-10 mL IntraVENous Q8H  
 sodium chloride (NS) flush 5-10 mL  5-10 mL IntraVENous PRN  
 acetaminophen (TYLENOL) tablet 650 mg  650 mg Oral Q4H PRN  
 HYDROcodone-acetaminophen (NORCO) 5-325 mg per tablet 1 Tab  1 Tab Oral Q4H PRN  
 HYDROmorphone (PF) (DILAUDID) injection 0.5 mg  0.5 mg IntraVENous Q4H PRN  
 naloxone (NARCAN) injection 0.4 mg  0.4 mg IntraVENous PRN  
 diphenhydrAMINE (BENADRYL) injection 12.5 mg  12.5 mg IntraVENous Q4H PRN  
 ondansetron (ZOFRAN) injection 4 mg  4 mg IntraVENous Q6H PRN  
  docusate sodium (COLACE) capsule 100 mg  100 mg Oral BID Lab Data Reviewed: (see below) Lab Review:  
 
Recent Labs  
   09/18/18 
 0735  09/17/18 
 1114 WBC  4.6  5.2 HGB  8.8*  9.1*  
HCT  27.6*  29.3*  
PLT  167  182 Recent Labs  
   09/18/18 
 0735  09/17/18 
 1114 NA  139  141  
K  4.5  4.7 CL  109*  109* CO2  21  22 GLU  88  97 BUN  45*  49* CREA  2.74*  3.19* CA  8.6  8.9 MG  2.0   --   
PHOS  3.8   --   
ALB  2.9*  3.3* TBILI  0.3  0.2 SGOT  11*  11* ALT  11*  13 Lab Results Component Value Date/Time Glucose (POC) 128 (H) 09/07/2011 06:16 PM  
 Glucose (POC) 99 09/05/2011 12:17 AM  
 Glucose (POC) 170 (H) 09/02/2011 08:39 PM  
 Glucose (POC) 115 (H) 09/02/2011 07:42 AM  
 
No results for input(s): PH, PCO2, PO2, HCO3, FIO2 in the last 72 hours. No results for input(s): INR in the last 72 hours. No lab exists for component: INREXT All Micro Results Procedure Component Value Units Date/Time URINE CULTURE HOLD SAMPLE [[id number]] Collected:  09/17/18 1433 Order Status:  Completed Specimen:  Urine from Serum Updated:  09/17/18 1440 Urine culture hold URINE ON HOLD IN MICROBIOLOGY DEPT FOR 3 DAYS. IF UNPRESERVED URINE IS SUBMITTED, IT CANNOT BE USED FOR ADDITIONAL TESTING AFTER 24 HRS, RECOLLECTION WILL BE REQUIRED. I have reviewed notes of prior 24hr. Other pertinent lab: none Total time spent with patient: 45 Minutes Care Plan discussed with: Patient, Care Manager, Nursing Staff, Consultant/Specialist and >50% of time spent in counseling and coordination of care Discussed:  Care Plan and D/C Planning Prophylaxis:  H2B/PPI Disposition:  Home w/Family 
        
___________________________________________________ Attending Physician: Wai Ordonez MD

## 2018-09-18 NOTE — PROGRESS NOTES
Patient given discharge instructions. Prescriptions called into pharmacy. PIV removed. Patient verbalizes understanding of home care, medication and follow up. Patient's family driving patient to home.

## 2018-09-18 NOTE — PROGRESS NOTES
Gastroenterology Progress Note September 18, 2018 Admit Date: 9/17/2018 Narrative Assessment and Plan · Hematochezia - resolved, Hgb stable · Hx diverticular bleed Feb 2018 · Chronic antiplatelet use Plan 
- stable for discharge from GI standpoint 
- agree with holding ASA/Plavix x 1 week if no bleeding (patient understands to closely monitor for any signs of recurrent bleeding) Subjective: · No complaints. Denies abdominal pain, nausea or vomiting. No further episodes of hematochezia since admission. ROS:  The previous review of systems on initial consultation / H&P is noted and reviewed. Specific changes noted above in HPI. Current Medications:  
 
Current Facility-Administered Medications Medication Dose Route Frequency  pantoprazole (PROTONIX) tablet 40 mg  40 mg Oral ACB  sodium chloride (NS) flush 5-10 mL  5-10 mL IntraVENous PRN  
 hydrALAZINE (APRESOLINE) 20 mg/mL injection 20 mg  20 mg IntraVENous Q6H PRN  
 amLODIPine (NORVASC) tablet 5 mg  5 mg Oral DAILY  carBAMazepine (TEGretol) tablet 200 mg  200 mg Oral TID  finasteride (PROSCAR) tablet 5 mg  5 mg Oral DAILY  tamsulosin (FLOMAX) capsule 0.4 mg  0.4 mg Oral DAILY AFTER BREAKFAST  sodium chloride (NS) flush 5-10 mL  5-10 mL IntraVENous Q8H  
 sodium chloride (NS) flush 5-10 mL  5-10 mL IntraVENous PRN  
 acetaminophen (TYLENOL) tablet 650 mg  650 mg Oral Q4H PRN  
 HYDROcodone-acetaminophen (NORCO) 5-325 mg per tablet 1 Tab  1 Tab Oral Q4H PRN  
 HYDROmorphone (PF) (DILAUDID) injection 0.5 mg  0.5 mg IntraVENous Q4H PRN  
 naloxone (NARCAN) injection 0.4 mg  0.4 mg IntraVENous PRN  
 diphenhydrAMINE (BENADRYL) injection 12.5 mg  12.5 mg IntraVENous Q4H PRN  
 ondansetron (ZOFRAN) injection 4 mg  4 mg IntraVENous Q6H PRN  
 docusate sodium (COLACE) capsule 100 mg  100 mg Oral BID Objective: VITALS:  
Last 24hrs VS reviewed since prior progress note. Most recent are: Visit Vitals  /78 (BP 1 Location: Right arm, BP Patient Position: At rest)  Pulse 62  Temp 97.8 °F (36.6 °C)  Resp 16  
 Ht 5' 9\" (1.753 m)  Wt 69.5 kg (153 lb 3.5 oz)  SpO2 100%  BMI 22.63 kg/m2 Temp (24hrs), Av °F (36.7 °C), Min:97.5 °F (36.4 °C), Max:98.3 °F (36.8 °C) Intake/Output Summary (Last 24 hours) at 18 1005 Last data filed at 18 8180 Gross per 24 hour Intake             1985 ml Output             1300 ml Net              685 ml EXAM: 
General: Comfortable, no distress   
Abdomen:        Soft, nondistended, nontender Lab Data Reviewed:  
Recent Labs  
   18 
 0735  18 
 1114 WBC  4.6  5.2 HGB  8.8*  9.1*  
HCT  27.6*  29.3*  
PLT  167  182 Recent Labs  
   18 
 0735  18 
 1114 NA  139  141  
K  4.5  4.7 CL  109*  109* CO2  21  22 GLU  88  97 BUN  45*  49* CREA  2.74*  3.19* CA  8.6  8.9 MG  2.0   --   
PHOS  3.8   --   
ALB  2.9*  3.3* TBILI  0.3  0.2 SGOT  11*  11* ALT  11*  13 Lab Results Component Value Date/Time Glucose (POC) 128 (H) 2011 06:16 PM  
 Glucose (POC) 99 2011 12:17 AM  
 Glucose (POC) 170 (H) 2011 08:39 PM  
 Glucose (POC) 115 (H) 2011 07:42 AM  
 
No results for input(s): PH, PCO2, PO2, HCO3, FIO2 in the last 72 hours. No results for input(s): INR in the last 72 hours. No lab exists for component: INREXT Assessment: (See above) Principal Problem: BRBPR (bright red blood per rectum) (2018) Active Problems: 
  CKD (chronic kidney disease) stage 4, GFR 15-29 ml/min ([facility]) (2017) Benign prostatic hyperplasia (2017) Anemia (10/10/2017) CAD (coronary artery disease) () Overview: 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 ([facility]) Hypertension () Dyslipidemia (10/24/2017) Pacemaker (10/24/2017) Overview: Implanted 2017 (Dr Marine Borja at [facility]) - [employer] Acute GI bleeding (2/7/2018) S/P cardiac cath (6/28/2018) Atrial fibrillation ([facility] [address] (6/28/2018) Acute blood loss anemia (9/17/2018) TINA (acute kidney injury) ([facility] (9/17/2018) Plan: (See above) Signed By: 
Romayne Sequin, PA-C 
9/18/2018 10:05 AM 
 
Merlynn Fabry, MD

## 2018-09-18 NOTE — PROGRESS NOTES
Occupational Therapy EVALUATION/discharge Patient: Liliam Do SrYahir (80 y.o. male) Date: 9/18/2018 Primary Diagnosis: Acute GI bleeding BRBPR (bright red blood per rectum) Precautions:  Seizure ASSESSMENT:  
Cleared by RN to see pt for therapy session. Based on the objective data described below, the patient presents near baseline level of function with mildly decreased endurance following admission for GI bleeding. At baseline pt lives with his son (who works during the day), I with ADLs and uses [facility] for functional mobility as needed. Received supine in bed, agreeable to participate. Transferred supine>sit with modified independence, and able to use leg crossover technique to doff/don socks prior to transfer. Stood and ambulated to bathroom, no LOB observed although pt with mild path deviation. Performed toilet transfer and hygiene with modified independence, then returned to sitting EOB. Discussed energy conservation strategies with pt and he verbalized understanding. Pt sitting EOB eating breakfast at end of session, nurse informed. At this time pt denies need for further OT services and is functioning near his baseline level. Anticipate no further OT needs at discharge. Further skilled acute occupational therapy is not indicated at this time. Discharge Recommendations: None Further Equipment Recommendations for Discharge: none for OT SUBJECTIVE:  
Patient stated I think I'm doing everything as usual. OBJECTIVE DATA SUMMARY:  
HISTORY:  
Past Medical History:  
Diagnosis Date  Arrhythmia   
 atrial fib  Arthritis  CAD (coronary artery disease)   
 hx mi  Chronic kidney disease   
 no dialysis  CKD (chronic kidney disease), stage IV ([address]  Hyperlipidemia  Hypertension  Ill-defined condition   
 bronchitis  Ill-defined condition 02/2018  
 blood transfusion--gi bleed on coumadin  Seizures ([address]  Systolic CHF, chronic ([address] Past Surgical History:  
Procedure Laterality Date  COLONOSCOPY N/A 9/6/2017 COLONOSCOPY performed by Cleatrice Jeans, MD at [address] [place] [address] 10  COLONOSCOPY N/A 2/8/2018 COLONOSCOPY performed by Melvin Simmonds, MD at [address] [place] [address]    
 right  HX CORONARY ARTERY BYPASS GRAFT    
 x2 vessels  HX HEART CATHETERIZATION    
 HX PACEMAKER    
 right chest  
 
 
Prior Level of Function/Environment/Context: At baseline pt lives with his son (who works during the day), I with ADLs and uses [facility] for functional mobility as needed. Home Situation Home Environment: Private residence # Steps to Enter: 1 One/Two Story Residence: One story Living Alone: No (lives with son) Support Systems: Child(kenney), Family member(s) Patient Expects to be Discharged to[de-identified] Private residence Current DME Used/Available at Home: Cane, straight Tub or Shower Type: Tub/Shower combination Hand dominance: Left EXAMINATION OF PERFORMANCE DEFICITS: 
Cognitive/Behavioral Status: 
Neurologic State: Alert Orientation Level: Oriented X4 Cognition: Follows commands Perception: Appears intact Perseveration: No perseveration noted Safety/Judgement: Awareness of environment; Fall prevention Hearing: Auditory Auditory Impairment: None Vision/Perceptual:   
       
Acuity: Able to read clock/calendar on wall without difficulty; Able to read employee name badge without difficulty Range of Motion: 
AROM: Within functional limits PROM: Within functional limits Strength: 
Strength: Within functional limits Coordination: 
Coordination: Within functional limits Fine Motor Skills-Upper: Left Intact; Right Intact Gross Motor Skills-Upper: Left Intact; Right Intact Tone & Sensation: 
Tone: Normal 
Sensation: Intact Balance: 
Sitting: Intact Standing: Intact Functional Mobility and Transfers for ADLs:Bed Mobility: 
Supine to Sit: Modified independent Transfers: Sit to Stand: Modified independent Stand to Sit: Modified independent Toilet Transfer : Modified independent ADL Assessment: 
Feeding: Independent Oral Facial Hygiene/Grooming: Independent Bathing: Modified independent; Additional time Upper Body Dressing: Independent Lower Body Dressing: Modified independent Toileting: Modified independent ADL Intervention and task modifications: 
  Educated patient on energy conservation techniques, strategies to maximize quality of life. 1. Deep breathing 2. Educated on pacing and making sure he/she takes short frequent breaks (e.g. In the shower wash the upper body, rest for 1 minute, then wash the lower body, etc) 5. Educated on re-arranging his/her routine to allow for rest breaks in the morning routine 7. Educated on looking at the consequences of his/her actions before deciding he/she needs to take on a task (e.g not getting down on one's hands and knees to wash floors because it will take all of one's energy for the day and result in exhaustion). Lower Body Dressing Assistance Socks: Modified independent Leg Crossed Method Used: Yes Position Performed: Seated edge of bed Toileting Bladder Hygiene: Modified independent Cognitive Retraining Safety/Judgement: Awareness of environment; Fall prevention Functional Measure: 
Barthel Index: 
 
Bathin Bladder: 10 Bowels: 10 
Groomin Dressing: 10 Feeding: 10 Mobility: 10 Stairs: 5 Toilet Use: 10 Transfer (Bed to Chair and Back): 15 Total: 85 Barthel and G-code impairment scale: 
Percentage of impairment CH 
0% CI 
1-19% CJ 
20-39% CK 
40-59% CL 
60-79% CM 
80-99% CN 
100% Barthel Score 0-100 100 99-80 79-60 59-40 20-39 1-19 
 0 Barthel Score 0-20 20 17-19 13-16 9-12 5-8 1-4 0 The Barthel ADL Index: Guidelines 1. The index should be used as a record of what a patient does, not as a record of what a patient could do. 2. The main aim is to establish degree of independence from any help, physical or verbal, however minor and for whatever reason. 3. The need for supervision renders the patient not independent. 4. A patient's performance should be established using the best available evidence. Asking the patient, friends/relatives and nurses are the usual sources, but direct observation and common sense are also important. However direct testing is not needed. 5. Usually the patient's performance over the preceding 24-48 hours is important, but occasionally longer periods will be relevant. 6. Middle categories imply that the patient supplies over 50 per cent of the effort. 7. Use of aids to be independent is allowed. Francoise Ballesteros., Barthel, D.W. ([id number]). Functional evaluation: the Barthel Index. [facility] (14)2. Tod Iglesias polo DACIA Diamond, Machelle Roblero., Josie Isabel., [place], [facility] (1999). Measuring the change indisability after inpatient rehabilitation; comparison of the responsiveness of the Barthel Index and Functional [place] Measure. Journal of Neurology, Neurosurgery, and Psychiatry, 66(4), 030-576. Rosita Huffman, N.LAURYN.A, LIZ Mckeon, & Michael Olmos, MYahirA. (2004.) Assessment of post-stroke quality of life in cost-effectiveness studies: The usefulness of the Barthel Index and the EuroQoL-5D. [facility], 13, 605-57 G codes: In compliance with CMSs Claims Based Outcome Reporting, the following G-code set was chosen for this patient based on their primary functional limitation being treated: The outcome measure chosen to determine the severity of the functional limitation was the Barthel Index with a score of 85/100 which was correlated with the impairment scale. ? Self Care:  
  - CURRENT STATUS: CI - 1%-19% impaired, limited or restricted  - GOAL STATUS: CI - 1%-19% impaired, limited or restricted  - D/C STATUS:  CI - 1%-19% impaired, limited or restricted Occupational Therapy Evaluation Charge Determination History Examination Decision-Making LOW Complexity : Brief history review  LOW Complexity : 1-3 performance deficits relating to physical, cognitive , or psychosocial skils that result in activity limitations and / or participation restrictions  LOW Complexity : No comorbidities that affect functional and no verbal or physical assistance needed to complete eval tasks Based on the above components, the patient evaluation is determined to be of the following complexity level: LOW Pain: 
Pain Scale 1: Numeric (0 - 10) Pain Intensity 1: 0 Activity Tolerance:  
Good Please refer to the flowsheet for vital signs taken during this treatment. After treatment:  
[]  Patient left in no apparent distress sitting up in chair 
[x]  Patient left in no apparent distress in bed 
[x]  Call bell left within reach [x]  Nursing notified 
[]  Caregiver present 
[]  Bed alarm activated COMMUNICATION/EDUCATION:  
Communication/Collaboration: 
[x]      Home safety education was provided and the patient/caregiver indicated understanding. [x]      Patient/family have participated as able and agree with findings and recommendations. []      Patient is unable to participate in plan of care at this time. Findings and recommendations were discussed with: Registered Nurse Parth Castanon OT Time Calculation: 15 mins

## 2018-09-18 NOTE — PROGRESS NOTES
Pharmacist Discharge Medication Reconciliation Discharge Provider:  Dr. Siva Stack Discharge Medications: My Medications CHANGE how you take these medications Instructions Each Dose to Equal   Morning Noon Evening Bedtime  
 
 aspirin delayed-release 81 mg tablet What changed:  additional instructions Take 1 Tab by mouth daily. DO NOT TAKE for 1 WEEK  
 81 mg  
    
   
   
   
  
 clopidogrel 75 mg Tab Commonly known as:  PLAVIX What changed:  additional instructions Take 1 Tab by mouth daily. DO  NOT TAKE for 1 WEEK  
 75 mg CONTINUE taking these medications Instructions Each Dose to Equal   Morning Noon Evening Bedtime  
 
 acetaminophen 325 mg tablet Commonly known as:  TYLENOL Take 325 mg by mouth every four (4) hours as needed for Pain. 325 mg  
    
   
   
   
  
 amLODIPine 5 mg tablet Commonly known as:  Kyle [place] Take 5 mg by mouth daily. 5 mg  
    
  
   
   
   
  
 carBAMazepine 200 mg tablet Commonly known as:  TEGretol Take 200 mg by mouth every eight (8) hours. 200 mg  
    
  
   
  
   
   
  
  
 finasteride 5 mg tablet Commonly known as:  PROSCAR Take 1 Tab by mouth daily. 5 mg  
    
  
   
   
   
  
 pantoprazole 40 mg tablet Commonly known as:  PROTONIX Take 40 mg by mouth Daily (before breakfast). 40 mg  
    
  
   
   
   
  
 tamsulosin 0.4 mg capsule Commonly known as:  FLOMAX Take 0.4 mg by mouth daily (after breakfast). 0.4 mg Where to Get Your Medications These medications were sent to [address] [facility] [address] Phone:  [phone]  aspirin delayed-release 81 mg tablet  clopidogrel 75 mg Tab The patient's chart, MAR, and AVS were reviewed by 
 Indy Solis, PHARMD, Contact: [phone]

## 2018-09-18 NOTE — PROGRESS NOTES
physical Therapy EVALUATION/DISCHARGE Patient: Jeramy Schmidt Sr. (80 y.o. male) Date: 9/18/2018 Primary Diagnosis: Acute GI bleeding BRBPR (bright red blood per rectum) Precautions:   Seizure ASSESSMENT : 
Based on the objective data described below, the patient presents with modified independent flat surface gait, transfers, and bed mobility on day 1 of admission with acute GI bleed in setting of anticoagulation. Pt reports he is mobilizing at baseline and preparing for discharge later this morning. He demonstrates appropriate gait techniques using straight cane and ambulates without complaints. Skilled physical therapy is not indicated at this time. PLAN : 
Discharge Recommendations: None Further Equipment Recommendations for Discharge: none SUBJECTIVE:  
Patient stated I'm going home in just a little while.  Pt received partial sidelying, agreeable to PT and cleared by RN. OBJECTIVE DATA SUMMARY:  
HISTORY:   
Past Medical History:  
Diagnosis Date  Arrhythmia   
 atrial fib  Arthritis  CAD (coronary artery disease)   
 hx mi  Chronic kidney disease   
 no dialysis  CKD (chronic kidney disease), stage IV ([address]  Hyperlipidemia  Hypertension  Ill-defined condition   
 bronchitis  Ill-defined condition 02/2018  
 blood transfusion--gi bleed on coumadin  [facility]  Systolic CHF, chronic ([facility] Past Surgical History:  
Procedure Laterality Date  COLONOSCOPY N/A 9/6/2017 COLONOSCOPY performed by Enoch Lopez MD at [address] [place] [address]  COLONOSCOPY N/A 2/8/2018 COLONOSCOPY performed by Deepak Moreno MD at [address] [place] [address]    
 right  HX CORONARY ARTERY BYPASS GRAFT    
 x2 vessels  HX HEART CATHETERIZATION    
 HX PACEMAKER    
 right chest  
 
Prior Level of Function/Home Situation: mod I using cane with negative fall history Personal factors and/or comorbidities impacting plan of care: as above. Home Situation Home Environment: Private residence # Steps to Enter: 1 One/Two Story Residence: One story Living Alone: No (lives with son) Support Systems: Child(kenney), Family member(s) Patient Expects to be Discharged to[de-identified] Private residence Current DME Used/Available at Home: Cane, straight Tub or Shower Type: Tub/Shower combination EXAMINATION/PRESENTATION/DECISION MAKING:  
Critical Behavior: 
Neurologic State: Alert Orientation Level: Oriented X4 Cognition: Follows commands Safety/Judgement: Awareness of environment, Fall prevention Hearing: Auditory Auditory Impairment: None Edema: none noted LEs. Range Of Motion: 
AROM: Within functional limits PROM: Within functional limits Strength:   
Strength: Within functional limits Tone & Sensation:  
Tone: Normal 
  
  
  
  
Sensation: Intact Coordination: 
Coordination: Within functional limits Vision:  
Acuity: Able to read clock/calendar on wall without difficulty; Able to read employee name badge without difficulty Functional Mobility: 
Bed Mobility: 
  
Supine to Sit: Modified independent Transfers: 
Sit to Stand: Modified independent Stand to Sit: Modified independent Balance:  
Sitting: Intact Standing: Intact Ambulation/Gait Training: 
Distance (ft): 150 Feet (ft) Assistive Device: Gait belt;Cane, straight Ambulation - Level of Assistance: Modified independent Gait Abnormalities: Decreased step clearance Base of Support: Widened Speed/Juli: Pace decreased (<100 feet/min) Able to negotiate obstacles, attend to environment, change directions without loss of balance. Functional Measure Barthel Index: 
 
Bathin Bladder: 10 Bowels: 10 
Groomin Dressing: 10 Feeding: 10 Mobility: 10 Stairs: 5 Toilet Use: 10 Transfer (Bed to Chair and Back): 15 Total: 85 
 
 
 Barthel and G-code impairment scale: 
Percentage of impairment CH 
0% CI 
1-19% CJ 
20-39% CK 
40-59% CL 
60-79% CM 
80-99% CN 
100% Barthel Score 0-100 100 99-80 79-60 59-40 20-39 1-19 
 0 Barthel Score 0-20 20 17-19 13-16 9-12 5-8 1-4 0 The Barthel ADL Index: Guidelines 1. The index should be used as a record of what a patient does, not as a record of what a patient could do. 2. The main aim is to establish degree of independence from any help, physical or verbal, however minor and for whatever reason. 3. The need for supervision renders the patient not independent. 4. A patient's performance should be established using the best available evidence. Asking the patient, friends/relatives and nurses are the usual sources, but direct observation and common sense are also important. However direct testing is not needed. 5. Usually the patient's performance over the preceding 24-48 hours is important, but occasionally longer periods will be relevant. 6. Middle categories imply that the patient supplies over 50 per cent of the effort. 7. Use of aids to be independent is allowed. Francoise Ballesteros., Barthel, D.W. ([id number]). Functional evaluation: the Barthel Index. [facility] (14)2. Tod Palencia, DACIA, Machelle Roblero., Josie Isabel., [place], [address] [place] [address] (1999). Measuring the change indisability after inpatient rehabilitation; comparison of the responsiveness of the Barthel Index and Functional [place] Measure. Journal of Neurology, Neurosurgery, and Psychiatry, 66(4), 468-785. Rosita Huffman, N.LAURYN.A, LIZ Mckeon, & Michael Olmos, M.A. (2004.) Assessment of post-stroke quality of life in cost-effectiveness studies: The usefulness of the Barthel Index and the EuroQoL-5D. [facility], 13, 726-79 G codes: In compliance with CMSs Claims Based Outcome Reporting, the following G-code set was chosen for this patient based on their primary functional limitation being treated: The outcome measure chosen to determine the severity of the functional limitation was the barthel with a score of 85/100 which was correlated with the impairment scale. ? Mobility - Walking and Moving Around:  
  - CURRENT STATUS: CI - 1%-19% impaired, limited or restricted  - GOAL STATUS: CI - 1%-19% impaired, limited or restricted  - D/C STATUS:  CI - 1%-19% impaired, limited or restricted Physical Therapy Evaluation Charge Determination History Examination Presentation Decision-Making MEDIUM  Complexity : 1-2 comorbidities / personal factors will impact the outcome/ POC  HIGH Complexity : 4+ Standardized tests and measures addressing body structure, function, activity limitation and / or participation in recreation  LOW Complexity : Stable, uncomplicated  Other outcome measures barthel  LOW Based on the above components, the patient evaluation is determined to be of the following complexity level: LOW Pain: 
Pain Scale 1: Numeric (0 - 10) Pain Intensity 1: 0 Activity Tolerance: No pt complaints Please refer to the flowsheet for vital signs taken during this treatment. After treatment:  
[]   Patient left in no apparent distress sitting up in chair 
[x]   Patient left in no apparent distress in bed 
[x]   Call bell left within reach [x]   Nursing notified 
[]   Caregiver present 
[]   Bed alarm activated COMMUNICATION/EDUCATION:  
Communication/Collaboration: 
[x]   Fall prevention education was provided and the patient/caregiver indicated understanding. [x]   Patient/family have participated as able and agree with findings and recommendations. []   Patient is unable to participate in plan of care at this time. Findings and recommendations were discussed with: Occupational Therapist and Registered Nurse Thank you for this referral. 
Dedra Self, PT, DPT Time Calculation: 15 mins

## 2018-09-18 NOTE — ROUTINE PROCESS
TRANSFER - IN REPORT: 
 
Verbal report received from Doni(name) on General [facility]okhaOrigin Sr.  being received from ED(unit) for routine progression of care Report consisted of patients Situation, Background, Assessment and  
Recommendations(SBAR). Information from the following report(s) SBAR, Kardex, ED Summary, Intake/Output, MAR, Accordion, Recent Results and Med Rec Status was reviewed with the receiving nurse. Opportunity for questions and clarification was provided. Assessment completed upon patients arrival to unit and care assumed.

## 2018-10-22 ENCOUNTER — CLINICAL SUPPORT (OUTPATIENT)
Dept: CARDIOLOGY CLINIC | Age: 83
End: 2018-10-22

## 2018-10-22 DIAGNOSIS — Z95.0 PACEMAKER: Primary | ICD-10-CM

## 2018-11-30 ENCOUNTER — HOSPITAL ENCOUNTER (INPATIENT)
Age: 83
LOS: 4 days | Discharge: HOME | DRG: 378 | End: 2018-12-04
Attending: EMERGENCY MEDICINE | Admitting: INTERNAL MEDICINE
Payer: MEDICARE

## 2018-11-30 ENCOUNTER — APPOINTMENT (OUTPATIENT)
Dept: CT IMAGING | Age: 83
DRG: 378 | End: 2018-11-30
Attending: EMERGENCY MEDICINE
Payer: MEDICARE

## 2018-11-30 DIAGNOSIS — K92.2 GASTROINTESTINAL HEMORRHAGE, UNSPECIFIED GASTROINTESTINAL HEMORRHAGE TYPE: Primary | ICD-10-CM

## 2018-11-30 DIAGNOSIS — I16.0 HYPERTENSIVE URGENCY: ICD-10-CM

## 2018-11-30 DIAGNOSIS — R33.9 URINARY RETENTION: ICD-10-CM

## 2018-11-30 DIAGNOSIS — N17.9 ACUTE RENAL FAILURE SUPERIMPOSED ON CHRONIC KIDNEY DISEASE, UNSPECIFIED CKD STAGE, UNSPECIFIED ACUTE RENAL FAILURE TYPE (HCC): ICD-10-CM

## 2018-11-30 DIAGNOSIS — D64.9 CHRONIC ANEMIA: ICD-10-CM

## 2018-11-30 DIAGNOSIS — N18.9 ACUTE RENAL FAILURE SUPERIMPOSED ON CHRONIC KIDNEY DISEASE, UNSPECIFIED CKD STAGE, UNSPECIFIED ACUTE RENAL FAILURE TYPE (HCC): ICD-10-CM

## 2018-11-30 PROBLEM — K92.1 MELENA: Status: ACTIVE | Noted: 2018-11-30

## 2018-11-30 LAB
ALBUMIN SERPL-MCNC: 3.3 G/DL (ref 3.5–5)
ALBUMIN/GLOB SERPL: 0.8 {RATIO} (ref 1.1–2.2)
ALP SERPL-CCNC: 108 U/L (ref 45–117)
ALT SERPL-CCNC: 10 U/L (ref 12–78)
ANION GAP SERPL CALC-SCNC: 13 MMOL/L (ref 5–15)
AST SERPL-CCNC: 12 U/L (ref 15–37)
BASOPHILS # BLD: 0.1 K/UL (ref 0–0.1)
BASOPHILS NFR BLD: 1 % (ref 0–1)
BILIRUB SERPL-MCNC: 0.2 MG/DL (ref 0.2–1)
BUN SERPL-MCNC: 49 MG/DL (ref 6–20)
BUN/CREAT SERPL: 13 (ref 12–20)
CALCIUM SERPL-MCNC: 8.5 MG/DL (ref 8.5–10.1)
CHLORIDE SERPL-SCNC: 106 MMOL/L (ref 97–108)
CO2 SERPL-SCNC: 21 MMOL/L (ref 21–32)
CREAT SERPL-MCNC: 3.66 MG/DL (ref 0.7–1.3)
DIFFERENTIAL METHOD BLD: ABNORMAL
EOSINOPHIL # BLD: 0.1 K/UL (ref 0–0.4)
EOSINOPHIL NFR BLD: 1 % (ref 0–7)
ERYTHROCYTE [DISTWIDTH] IN BLOOD BY AUTOMATED COUNT: 13.9 % (ref 11.5–14.5)
GLOBULIN SER CALC-MCNC: 4.1 G/DL (ref 2–4)
GLUCOSE SERPL-MCNC: 132 MG/DL (ref 65–100)
HCT VFR BLD AUTO: 27.5 % (ref 36.6–50.3)
HEMOCCULT STL QL: POSITIVE
HGB BLD-MCNC: 8.7 G/DL (ref 12.1–17)
IMM GRANULOCYTES # BLD: 0 K/UL (ref 0–0.04)
IMM GRANULOCYTES NFR BLD AUTO: 0 % (ref 0–0.5)
INR PPP: 1.1 (ref 0.9–1.1)
LYMPHOCYTES # BLD: 0.6 K/UL (ref 0.8–3.5)
LYMPHOCYTES NFR BLD: 12 % (ref 12–49)
MAGNESIUM SERPL-MCNC: 2.3 MG/DL (ref 1.6–2.4)
MCH RBC QN AUTO: 30.9 PG (ref 26–34)
MCHC RBC AUTO-ENTMCNC: 31.6 G/DL (ref 30–36.5)
MCV RBC AUTO: 97.5 FL (ref 80–99)
MONOCYTES # BLD: 0.3 K/UL (ref 0–1)
MONOCYTES NFR BLD: 6 % (ref 5–13)
NEUTS SEG # BLD: 4.2 K/UL (ref 1.8–8)
NEUTS SEG NFR BLD: 80 % (ref 32–75)
NRBC # BLD: 0 K/UL (ref 0–0.01)
NRBC BLD-RTO: 0 PER 100 WBC
PLATELET # BLD AUTO: 185 K/UL (ref 150–400)
PMV BLD AUTO: 9.6 FL (ref 8.9–12.9)
POTASSIUM SERPL-SCNC: 4.6 MMOL/L (ref 3.5–5.1)
PROT SERPL-MCNC: 7.4 G/DL (ref 6.4–8.2)
PROTHROMBIN TIME: 10.9 SEC (ref 9–11.1)
RBC # BLD AUTO: 2.82 M/UL (ref 4.1–5.7)
RBC MORPH BLD: ABNORMAL
SODIUM SERPL-SCNC: 140 MMOL/L (ref 136–145)
TROPONIN I SERPL-MCNC: 0.07 NG/ML
WBC # BLD AUTO: 5.3 K/UL (ref 4.1–11.1)

## 2018-11-30 PROCEDURE — 65270000029 HC RM PRIVATE

## 2018-11-30 PROCEDURE — 51701 INSERT BLADDER CATHETER: CPT

## 2018-11-30 PROCEDURE — 74011250636 HC RX REV CODE- 250/636: Performed by: EMERGENCY MEDICINE

## 2018-11-30 PROCEDURE — 77030034850

## 2018-11-30 PROCEDURE — 77030034696 HC CATH URETH FOL 2W BARD -A

## 2018-11-30 PROCEDURE — 36415 COLL VENOUS BLD VENIPUNCTURE: CPT

## 2018-11-30 PROCEDURE — 96375 TX/PRO/DX INJ NEW DRUG ADDON: CPT

## 2018-11-30 PROCEDURE — 84484 ASSAY OF TROPONIN QUANT: CPT

## 2018-11-30 PROCEDURE — 86901 BLOOD TYPING SEROLOGIC RH(D): CPT

## 2018-11-30 PROCEDURE — 86923 COMPATIBILITY TEST ELECTRIC: CPT

## 2018-11-30 PROCEDURE — 77030011943

## 2018-11-30 PROCEDURE — 96374 THER/PROPH/DIAG INJ IV PUSH: CPT

## 2018-11-30 PROCEDURE — 85025 COMPLETE CBC W/AUTO DIFF WBC: CPT

## 2018-11-30 PROCEDURE — 99285 EMERGENCY DEPT VISIT HI MDM: CPT

## 2018-11-30 PROCEDURE — 85610 PROTHROMBIN TIME: CPT

## 2018-11-30 PROCEDURE — C9113 INJ PANTOPRAZOLE SODIUM, VIA: HCPCS | Performed by: EMERGENCY MEDICINE

## 2018-11-30 PROCEDURE — 93005 ELECTROCARDIOGRAM TRACING: CPT

## 2018-11-30 PROCEDURE — 82272 OCCULT BLD FECES 1-3 TESTS: CPT

## 2018-11-30 PROCEDURE — 96361 HYDRATE IV INFUSION ADD-ON: CPT

## 2018-11-30 PROCEDURE — 51798 US URINE CAPACITY MEASURE: CPT

## 2018-11-30 PROCEDURE — 80053 COMPREHEN METABOLIC PANEL: CPT

## 2018-11-30 PROCEDURE — 74176 CT ABD & PELVIS W/O CONTRAST: CPT

## 2018-11-30 PROCEDURE — 83735 ASSAY OF MAGNESIUM: CPT

## 2018-11-30 RX ORDER — PANTOPRAZOLE SODIUM 40 MG/10ML
80 INJECTION, POWDER, LYOPHILIZED, FOR SOLUTION INTRAVENOUS
Status: COMPLETED | OUTPATIENT
Start: 2018-11-30 | End: 2018-11-30

## 2018-11-30 RX ORDER — ONDANSETRON 2 MG/ML
4 INJECTION INTRAMUSCULAR; INTRAVENOUS
Status: DISCONTINUED | OUTPATIENT
Start: 2018-11-30 | End: 2018-12-04 | Stop reason: HOSPADM

## 2018-11-30 RX ORDER — MORPHINE SULFATE 4 MG/ML
1 INJECTION INTRAVENOUS
Status: DISCONTINUED | OUTPATIENT
Start: 2018-11-30 | End: 2018-12-04 | Stop reason: HOSPADM

## 2018-11-30 RX ORDER — AMLODIPINE BESYLATE 10 MG/1
10 TABLET ORAL DAILY
Status: ON HOLD | COMMUNITY
End: 2019-10-09 | Stop reason: SDUPTHER

## 2018-11-30 RX ORDER — ACETAMINOPHEN 500 MG
1000 TABLET ORAL
COMMUNITY

## 2018-11-30 RX ORDER — NALOXONE HYDROCHLORIDE 0.4 MG/ML
0.4 INJECTION, SOLUTION INTRAMUSCULAR; INTRAVENOUS; SUBCUTANEOUS AS NEEDED
Status: DISCONTINUED | OUTPATIENT
Start: 2018-11-30 | End: 2018-12-04 | Stop reason: HOSPADM

## 2018-11-30 RX ORDER — SODIUM CHLORIDE 0.9 % (FLUSH) 0.9 %
5-10 SYRINGE (ML) INJECTION EVERY 8 HOURS
Status: DISCONTINUED | OUTPATIENT
Start: 2018-11-30 | End: 2018-12-04 | Stop reason: HOSPADM

## 2018-11-30 RX ORDER — ACETAMINOPHEN 325 MG/1
650 TABLET ORAL
Status: DISCONTINUED | OUTPATIENT
Start: 2018-11-30 | End: 2018-12-04 | Stop reason: HOSPADM

## 2018-11-30 RX ORDER — HYDRALAZINE HYDROCHLORIDE 20 MG/ML
10 INJECTION INTRAMUSCULAR; INTRAVENOUS
Status: COMPLETED | OUTPATIENT
Start: 2018-11-30 | End: 2018-11-30

## 2018-11-30 RX ORDER — SODIUM CHLORIDE 0.9 % (FLUSH) 0.9 %
5-10 SYRINGE (ML) INJECTION AS NEEDED
Status: DISCONTINUED | OUTPATIENT
Start: 2018-11-30 | End: 2018-12-04 | Stop reason: HOSPADM

## 2018-11-30 RX ADMIN — PANTOPRAZOLE SODIUM 80 MG: 40 INJECTION, POWDER, FOR SOLUTION INTRAVENOUS at 20:00

## 2018-11-30 RX ADMIN — SODIUM CHLORIDE 1000 ML: 900 INJECTION, SOLUTION INTRAVENOUS at 20:00

## 2018-11-30 RX ADMIN — HYDRALAZINE HYDROCHLORIDE 10 MG: 20 INJECTION INTRAMUSCULAR; INTRAVENOUS at 21:42

## 2018-12-01 PROBLEM — N13.30 BILATERAL HYDRONEPHROSIS: Status: ACTIVE | Noted: 2018-12-01

## 2018-12-01 PROBLEM — D64.9 ANEMIA: Status: RESOLVED | Noted: 2017-10-10 | Resolved: 2018-12-01

## 2018-12-01 PROBLEM — K62.5 BRBPR (BRIGHT RED BLOOD PER RECTUM): Status: RESOLVED | Noted: 2018-09-18 | Resolved: 2018-12-01

## 2018-12-01 PROBLEM — I48.0 PAF (PAROXYSMAL ATRIAL FIBRILLATION) (HCC): Status: ACTIVE | Noted: 2018-06-28

## 2018-12-01 PROBLEM — N17.9 AKI (ACUTE KIDNEY INJURY) (HCC): Status: RESOLVED | Noted: 2018-09-17 | Resolved: 2018-12-01

## 2018-12-01 PROBLEM — Z98.890 S/P CARDIAC CATH: Status: RESOLVED | Noted: 2018-06-28 | Resolved: 2018-12-01

## 2018-12-01 LAB
ALBUMIN SERPL-MCNC: 2.7 G/DL (ref 3.5–5)
ALBUMIN/GLOB SERPL: 0.8 {RATIO} (ref 1.1–2.2)
ALP SERPL-CCNC: 87 U/L (ref 45–117)
ALT SERPL-CCNC: 6 U/L (ref 12–78)
ANION GAP SERPL CALC-SCNC: 10 MMOL/L (ref 5–15)
AST SERPL-CCNC: 11 U/L (ref 15–37)
BASOPHILS # BLD: 0 K/UL (ref 0–0.1)
BASOPHILS NFR BLD: 1 % (ref 0–1)
BILIRUB SERPL-MCNC: 0.1 MG/DL (ref 0.2–1)
BUN SERPL-MCNC: 50 MG/DL (ref 6–20)
BUN/CREAT SERPL: 14 (ref 12–20)
CALCIUM SERPL-MCNC: 7.9 MG/DL (ref 8.5–10.1)
CHLORIDE SERPL-SCNC: 109 MMOL/L (ref 97–108)
CO2 SERPL-SCNC: 20 MMOL/L (ref 21–32)
CREAT SERPL-MCNC: 3.52 MG/DL (ref 0.7–1.3)
DIFFERENTIAL METHOD BLD: ABNORMAL
EOSINOPHIL # BLD: 0.1 K/UL (ref 0–0.4)
EOSINOPHIL NFR BLD: 2 % (ref 0–7)
ERYTHROCYTE [DISTWIDTH] IN BLOOD BY AUTOMATED COUNT: 13.9 % (ref 11.5–14.5)
FERRITIN SERPL-MCNC: 23 NG/ML (ref 26–388)
FOLATE SERPL-MCNC: 16.5 NG/ML (ref 5–21)
GLOBULIN SER CALC-MCNC: 3.5 G/DL (ref 2–4)
GLUCOSE SERPL-MCNC: 101 MG/DL (ref 65–100)
HAPTOGLOB SERPL-MCNC: 131 MG/DL (ref 30–200)
HCT VFR BLD AUTO: 22.4 % (ref 36.6–50.3)
HCT VFR BLD AUTO: 23.9 % (ref 36.6–50.3)
HGB BLD-MCNC: 7.1 G/DL (ref 12.1–17)
HGB BLD-MCNC: 7.4 G/DL (ref 12.1–17)
IMM GRANULOCYTES # BLD: 0 K/UL (ref 0–0.04)
IMM GRANULOCYTES NFR BLD AUTO: 0 % (ref 0–0.5)
IRON SATN MFR SERPL: 27 % (ref 20–50)
IRON SERPL-MCNC: 52 UG/DL (ref 35–150)
LDH SERPL L TO P-CCNC: 165 U/L (ref 85–241)
LYMPHOCYTES # BLD: 0.9 K/UL (ref 0.8–3.5)
LYMPHOCYTES NFR BLD: 16 % (ref 12–49)
MCH RBC QN AUTO: 30.6 PG (ref 26–34)
MCHC RBC AUTO-ENTMCNC: 31.7 G/DL (ref 30–36.5)
MCV RBC AUTO: 96.6 FL (ref 80–99)
MONOCYTES # BLD: 0.6 K/UL (ref 0–1)
MONOCYTES NFR BLD: 11 % (ref 5–13)
NEUTS SEG # BLD: 3.9 K/UL (ref 1.8–8)
NEUTS SEG NFR BLD: 71 % (ref 32–75)
NRBC # BLD: 0 K/UL (ref 0–0.01)
NRBC BLD-RTO: 0 PER 100 WBC
PLATELET # BLD AUTO: 164 K/UL (ref 150–400)
PMV BLD AUTO: 10.1 FL (ref 8.9–12.9)
POTASSIUM SERPL-SCNC: 4.3 MMOL/L (ref 3.5–5.1)
PROT SERPL-MCNC: 6.2 G/DL (ref 6.4–8.2)
RBC # BLD AUTO: 2.32 M/UL (ref 4.1–5.7)
RETICS # AUTO: 0.03 M/UL (ref 0.03–0.1)
RETICS/RBC NFR AUTO: 1.1 % (ref 0.7–2.1)
SODIUM SERPL-SCNC: 139 MMOL/L (ref 136–145)
TIBC SERPL-MCNC: 193 UG/DL (ref 250–450)
TSH SERPL DL<=0.05 MIU/L-ACNC: 2.72 UIU/ML (ref 0.36–3.74)
VIT B12 SERPL-MCNC: 379 PG/ML (ref 193–986)
WBC # BLD AUTO: 5.5 K/UL (ref 4.1–11.1)

## 2018-12-01 PROCEDURE — 83615 LACTATE (LD) (LDH) ENZYME: CPT

## 2018-12-01 PROCEDURE — 51798 US URINE CAPACITY MEASURE: CPT

## 2018-12-01 PROCEDURE — 83010 ASSAY OF HAPTOGLOBIN QUANT: CPT

## 2018-12-01 PROCEDURE — 84443 ASSAY THYROID STIM HORMONE: CPT

## 2018-12-01 PROCEDURE — 82746 ASSAY OF FOLIC ACID SERUM: CPT

## 2018-12-01 PROCEDURE — 82607 VITAMIN B-12: CPT

## 2018-12-01 PROCEDURE — 74011250637 HC RX REV CODE- 250/637: Performed by: INTERNAL MEDICINE

## 2018-12-01 PROCEDURE — 82728 ASSAY OF FERRITIN: CPT

## 2018-12-01 PROCEDURE — 94760 N-INVAS EAR/PLS OXIMETRY 1: CPT

## 2018-12-01 PROCEDURE — 85025 COMPLETE CBC W/AUTO DIFF WBC: CPT

## 2018-12-01 PROCEDURE — 85018 HEMOGLOBIN: CPT

## 2018-12-01 PROCEDURE — 65270000029 HC RM PRIVATE

## 2018-12-01 PROCEDURE — 83540 ASSAY OF IRON: CPT

## 2018-12-01 PROCEDURE — 80053 COMPREHEN METABOLIC PANEL: CPT

## 2018-12-01 PROCEDURE — 85045 AUTOMATED RETICULOCYTE COUNT: CPT

## 2018-12-01 PROCEDURE — 36415 COLL VENOUS BLD VENIPUNCTURE: CPT

## 2018-12-01 RX ORDER — TAMSULOSIN HYDROCHLORIDE 0.4 MG/1
0.4 CAPSULE ORAL
Status: DISCONTINUED | OUTPATIENT
Start: 2018-12-01 | End: 2018-12-04 | Stop reason: HOSPADM

## 2018-12-01 RX ORDER — PANTOPRAZOLE SODIUM 40 MG/1
40 TABLET, DELAYED RELEASE ORAL
Status: DISCONTINUED | OUTPATIENT
Start: 2018-12-01 | End: 2018-12-04 | Stop reason: HOSPADM

## 2018-12-01 RX ORDER — FINASTERIDE 5 MG/1
5 TABLET, FILM COATED ORAL DAILY
Status: DISCONTINUED | OUTPATIENT
Start: 2018-12-01 | End: 2018-12-04 | Stop reason: HOSPADM

## 2018-12-01 RX ORDER — AMLODIPINE BESYLATE 5 MG/1
10 TABLET ORAL DAILY
Status: DISCONTINUED | OUTPATIENT
Start: 2018-12-01 | End: 2018-12-04 | Stop reason: HOSPADM

## 2018-12-01 RX ORDER — CARBAMAZEPINE 200 MG/1
200 TABLET ORAL EVERY 8 HOURS
Status: DISCONTINUED | OUTPATIENT
Start: 2018-12-01 | End: 2018-12-04 | Stop reason: HOSPADM

## 2018-12-01 RX ADMIN — AMLODIPINE BESYLATE 10 MG: 5 TABLET ORAL at 09:22

## 2018-12-01 RX ADMIN — TAMSULOSIN HYDROCHLORIDE 0.4 MG: 0.4 CAPSULE ORAL at 09:22

## 2018-12-01 RX ADMIN — CARBAMAZEPINE 200 MG: 200 TABLET ORAL at 09:22

## 2018-12-01 RX ADMIN — PANTOPRAZOLE SODIUM 40 MG: 40 TABLET, DELAYED RELEASE ORAL at 09:22

## 2018-12-01 RX ADMIN — Medication 10 ML: at 21:52

## 2018-12-01 RX ADMIN — CARBAMAZEPINE 200 MG: 200 TABLET ORAL at 17:56

## 2018-12-01 RX ADMIN — Medication 10 ML: at 05:26

## 2018-12-01 RX ADMIN — FINASTERIDE 5 MG: 5 TABLET, FILM COATED ORAL at 09:22

## 2018-12-01 RX ADMIN — Medication 10 ML: at 17:57

## 2018-12-01 RX ADMIN — CARBAMAZEPINE 200 MG: 200 TABLET ORAL at 02:11

## 2018-12-01 RX ADMIN — Medication 10 ML: at 01:02

## 2018-12-01 NOTE — ED PROVIDER NOTES
80year old male with a hx a fib on plavix and aspirin, diverticulosis, CAD s/p stent, HLD, HTN, CHF here for rectal bleeding. Had one episode of bloody bowel movement at 2:30pm followed by another episode that was dark maroon in color prior to arriving to the ER. States he feels a bit weak but otherwise asymptomatic. Took his plavix and aspirin this morning like he always does. Denies use of NSAIDs or ETOH. Previously admitted for acute blood loss anemia 2 months ago. Last colonoscopy from 02/08/18 revealed diverticulosis. Past Medical History:  
Diagnosis Date  Arrhythmia   
 atrial fib  Arthritis  CAD (coronary artery disease)   
 hx mi  Chronic kidney disease   
 no dialysis  CKD (chronic kidney disease), stage IV [facility]  Hyperlipidemia  Hypertension  Ill-defined condition   
 bronchitis  Ill-defined condition 02/2018  
 blood transfusion--gi bleed on coumadin  Seizures ([facility]  Systolic CHF, chronic ([facility] Past Surgical History:  
Procedure Laterality Date  COLONOSCOPY N/A 9/6/2017 COLONOSCOPY performed by Chiara Felipe MD at [address] [place] [address]  COLONOSCOPY N/A 2/8/2018 COLONOSCOPY performed by Blanca Myers MD at [address] [place] [address]    
 right  HX CORONARY ARTERY BYPASS GRAFT    
 x2 vessels  HX HEART CATHETERIZATION    
 HX PACEMAKER    
 right chest  
 
   
Family History:  
Problem Relation Age of Onset  Heart Disease Mother  Hypertension Mother Social History Socioeconomic History  Marital status:  Spouse name: Not on file  Number of children: Not on file  Years of education: Not on file  Highest education level: Not on file Social Needs  Financial resource strain: Not on file  Food insecurity - worry: Not on file  Food insecurity - inability: Not on file  Transportation needs - medical: Not on file  Transportation needs - non-medical: Not on file Occupational History  Not on file Tobacco Use  Smoking status: Former Smoker Packs/day: 0.50 Years: 40.00 Pack years: 20.00 Last attempt to quit: 1988 Years since quittin.4  Smokeless tobacco: Never Used Substance and Sexual Activity  Alcohol use: No  
 Drug use: No  
 Sexual activity: No  
Other Topics Concern  Not on file Social History Narrative  Not on file ALLERGIES: Patient has no known allergies. Review of Systems Constitutional: Negative for chills and fever. HENT: Negative for congestion. Eyes: Negative for visual disturbance. Respiratory: Negative for shortness of breath. Cardiovascular: Negative for chest pain. Gastrointestinal: Positive for blood in stool. Negative for abdominal pain, diarrhea, nausea and vomiting. Genitourinary: Negative for dysuria and urgency. Skin: Negative for color change. Neurological: Negative for dizziness and headaches. Vitals:  
 18 1900 BP: (!) 231/92 Pulse: 76 Resp: 22 Temp: 97.7 °F (36.5 °C) SpO2: 100% Weight: 69.4 kg (153 lb) Height: 5' 9\" (1.753 m) Physical Exam  
Constitutional: He is oriented to person, place, and time. He appears well-developed and well-nourished. No distress. HENT:  
Head: Normocephalic and atraumatic. Eyes: EOM are normal. Pupils are equal, round, and reactive to light. Neck: Normal range of motion. Neck supple. Cardiovascular: Normal rate. Irregular rhythm Pulmonary/Chest: Effort normal and breath sounds normal.  
Abdominal: Soft. He exhibits no distension. There is no tenderness. There is no rebound and no guarding. Genitourinary:  
Genitourinary Comments: Rectal exam with empty vault but rectum with surrounding maroon colored blood and bloody glove noted. Musculoskeletal: Normal range of motion. He exhibits no edema. Neurological: He is alert and oriented to person, place, and time.  No cranial nerve deficit. Skin: Skin is warm and dry. Capillary refill takes less than 2 seconds. Psychiatric: He has a normal mood and affect. MDM Number of Diagnoses or Management Options Acute renal failure superimposed on chronic kidney disease, unspecified CKD stage, unspecified acute renal failure type [facility]):  
Chronic anemia:  
Gastrointestinal hemorrhage, unspecified gastrointestinal hemorrhage type: Hypertensive urgency:  
Diagnosis management comments: 80year old male with a hx a fib on plavix and aspirin, diverticulosis, CAD s/p stent, HLD, HTN, CHF presenting for bloody stools. Will check basic labs, PT/PTT/INR, type and screen, FOBT, troponin. 8:34 PM 
EKG with HR 60. No ST-T changes. First degree AV block. 10:07 PM 
Hg stable at 8.7, Cr 3.66. CT scan with enlarged prostate and bilateral hydronephrosis. Will order bladder scan and pelayo. Discussed findings with Dr. Sreedhar Milton who will admit patient for GI bleed and TINA on CKD. Procedures

## 2018-12-01 NOTE — ED NOTES
TRANSFER - OUT REPORT: 
 
Verbal report given to Ortiz Still on [employer]  being transferred to 5th Floor for routine progression of care Report consisted of patients Situation, Background, Assessment and  
Recommendations(SBAR). Information from the following report(s) SBAR, Kardex, ED Summary, MAR, Accordion and Recent Results was reviewed with the receiving nurse. Lines:  
Peripheral IV 11/30/18 Left Antecubital (Active) Site Assessment Clean, dry, & intact 11/30/2018  7:28 PM  
Phlebitis Assessment 0 11/30/2018  7:28 PM  
Infiltration Assessment 0 11/30/2018  7:28 PM  
Dressing Status Clean, dry, & intact 11/30/2018  7:28 PM  
Dressing Type Tape;Transparent 11/30/2018  7:28 PM  
Hub Color/Line Status Pink;Flushed 11/30/2018  7:28 PM  
Action Taken Blood drawn 11/30/2018  7:28 PM  
Alcohol Cap Used Yes 11/30/2018  7:28 PM  
  
 
Opportunity for questions and clarification was provided. Patient transported with: 
 Registered Nurse

## 2018-12-01 NOTE — CONSULTS
[facility] Melody Olmedo  MR#: [id number]  : 1935  ACCOUNT #: [de-identified]   DATE OF SERVICE: 2018    REASON FOR CONSULTATION:  GI bleed. HISTORY OF PRESENT ILLNESS:  An 26-year-old gentleman who has a history of diverticular bleeding in the past who presents to the hospital with what he initially describes as dark stools followed by reddish or more maroon-like stools prior to presentation. He has had a couple more bowel movements since admission to the hospital.  Says the last one looked like it was fairly clear. He has not had any abdominal symptoms associated with this whatsoever except for the sensation of the need to move his bowels. He has no peptic symptoms. PAST MEDICAL HISTORY:  Positive for coronary artery disease, peripheral vascular disease, recurrent GI bleeding attributed to diverticular disease, history of atrial fibrillation, chronic renal failure, hyperlipidemia and hypertension. MEDICATIONS:  He was on Plavix and aspirin prior to presentation including Norvasc, Flomax, Protonix, Proscar, and Tegretol. FAMILY HISTORY:  Positive for organic heart disease. SOCIAL HISTORY:  Former smoker. Denies alcohol intake. REVIEW OF SYSTEMS:  Eleven system review of systems does have an elevated prostate. Recent evaluation does show bilateral hydronephrosis. As noted above, he is not having any abdominal pain, no dizziness. No neurologic symptoms. Remainder of 11 system review of systems is negative. PHYSICAL EXAMINATION:  GENERAL:  Reveals a well-developed, well-nourished male in no acute distress. VITAL SIGNS:  He is currently afebrile, resting pulse is 73, respiratory rate of 16, and blood pressure is elevated at 176/73. NEUROLOGIC:  Alert and oriented. HEENT:  Bilateral arcus senilis. EAR, NOSE AND THROAT:  No acute disease. NECK:  Supple, without lymphadenopathy.   LUNGS:  Decreased breath sounds. HEART:  First, there is a sternotomy scar; second, there is a pacemaker defibrillator. Thirdly, regular rate and rhythm right now with a grade II/VI systolic ejection murmur. ABDOMEN:  Quite benign. RECTAL:  Deferred. EXTREMITIES:  Without evidence of clubbing, cyanosis or edema. LABORATORY VALUES:  Reveal chronic renal insufficiency with elevated BUN and creatinine. Hemoglobin on admission 8.7, has dropped to 7.4. IMPRESSION:  Likely recurrent diverticular bleeding. I do not see evidence of previous upper endoscopic evaluation; however, he has been on Protonix prior to presentation, which should give him gastric protection. He says that this episode is very similar to his previous diverticular bleeding. Recommend continuation of proton pump inhibitor at the present time. Obviously he needs to stop his Plavix and aspirin, record frequency and character of stools. Transfuse as necessary and if there is evidence of more active bleeding obtain repeat bleeding scan. Thank you for asking us to see the patient.       MD TODD Coles / [place]  D: 12/01/2018 12:16     T: 12/01/2018 14:18  JOB #: [id number]

## 2018-12-01 NOTE — PROGRESS NOTES
[facility]: MED RECONCILIATION Comments/Recommendations: ? Medications reconciled with Rx bottles brought in for review. ? Patient states he had all of his morning doses of medication today. Medication(s) ADDED to PTA list: 1. none Medication(s) REMOVED from PTA list: 1. none Medication(s) ADJUSTED on PTA list: 1. Amlodipine dose changed to 10 mg (from 5 mg entry)- based on Rx bottle 2. APAP strength changed to 500 mg Information obtained from: Rx bottles Allergies: Patient has no known allergies. Prior to Admission Medications:  
 
Medication Documentation Review Audit Reviewed by Norma Laurent, PHARMD (Pharmacist) on 11/30/18 at 0343 [id number] Medication Sig Documenting Provider Last Dose Status Taking?  
acetaminophen (TYLENOL) 500 mg tablet Take 500 mg by mouth every six (6) hours as needed for Pain. Provider, Historical  Active Yes  
amLODIPine (NORVASC) 10 mg tablet Take 10 mg by mouth daily. Provider, Historical 11/30/2018 Unknown time Active Yes  
aspirin delayed-release 81 mg tablet Take 1 Tab by mouth daily. DO NOT TAKE for 1 WEEK Sonia Alcantar MD 11/30/2018 Unknown time Active Yes  
carbamazepine (TEGRETOL) 200 mg tablet Take 200 mg by mouth every eight (8) hours. OtherPablo MD 11/30/2018 am Active Yes  
clopidogrel (PLAVIX) 75 mg tab Take 1 Tab by mouth daily. DO  NOT TAKE for 1 WEEK Sonia Alcantar MD 11/30/2018 Unknown time Active Yes  
finasteride (PROSCAR) 5 mg tablet Take 1 Tab by mouth daily. Cordelia Zmaora MD 11/30/2018 Unknown time Active Yes  
pantoprazole (PROTONIX) 40 mg tablet Take 40 mg by mouth Daily (before breakfast). Provider, Historical 11/30/2018 Unknown time Active Yes  
tamsulosin (FLOMAX) 0.4 mg capsule Take 0.4 mg by mouth daily (after breakfast). Provider, Historical 11/30/2018 Unknown time Active Yes Deandre Sutton, [address] [place] [address]

## 2018-12-01 NOTE — H&P
[facility] [address] [facility], [facility] [address] 
[phone] Admission History and Physical 
 
 
NAME:              Jojo Mckenna Sr.  
:   1935 MRN:  [id number] PCP:  Ela Valadez MD  
 
Date:     2018 Chief  Complaint: Melena History Of Presenting Illness:    
 
Mr. Taylor Lynch is a 80 y.o. male who is being admitted for melena. Mr. Taylor Lynch presented to our Emergency Department today complaining of intermittent melanotic stools. Associated with generalized weakness. Minimal abdominal discomfort. He is not a good historian and I discussed with his family. He has a hx of CAD, PAF on Plavix and Asprin. Admitted for GI bleed in 2018 and a colonoscopy done showed no active bleeding and a diverticular etiology was suspected. His Hgb is 8.7. Due to risk for continued bleeding, a CT scan abdomen done in the ED showed no active bleeding but an enlarged prostate with hydronephrosis. He will be admitted for further management. No Known Allergies Prior to Admission medications Medication Sig Start Date End Date Taking? Authorizing Provider  
acetaminophen (TYLENOL) 500 mg tablet Take 500 mg by mouth every six (6) hours as needed for Pain. Yes Provider, Historical  
amLODIPine (NORVASC) 10 mg tablet Take 10 mg by mouth daily. Yes Provider, Historical  
aspirin delayed-release 81 mg tablet Take 1 Tab by mouth daily. DO NOT TAKE for 1 WEEK 18  Yes Jodi Mckoy MD  
clopidogrel (PLAVIX) 75 mg tab Take 1 Tab by mouth daily. DO  NOT TAKE for 1 WEEK 18  Yes Jodi Mckoy MD  
tamsulosin [facility]) 0.4 mg capsule Take 0.4 mg by mouth daily (after breakfast). Yes Provider, Historical  
pantoprazole (PROTONIX) 40 mg tablet Take 40 mg by mouth Daily (before breakfast).    Yes Provider, Historical  
 finasteride (PROSCAR) 5 mg tablet Take 1 Tab by mouth daily. 17  Yes Do, Haley Johns MD  
carbamazepine (TEGRETOL) 200 mg tablet Take 200 mg by mouth every eight (8) hours. Yes Other, MD Pablo  
 
 
Past Medical History:  
Diagnosis Date  Arrhythmia   
 atrial fib  Arthritis  CAD (coronary artery disease)   
 hx mi  Chronic kidney disease   
 no dialysis  CKD (chronic kidney disease), stage IV ([facility] [address]  Hyperlipidemia  Hypertension  Ill-defined condition   
 bronchitis  Ill-defined condition 2018  
 blood transfusion--gi bleed on coumadin  Seizures ([facility] [address]  Systolic CHF, chronic ([facility] [address] Past Surgical History:  
Procedure Laterality Date  COLONOSCOPY N/A 2017 COLONOSCOPY performed by Bulmaro Franklin MD at [address] [place] [address]  COLONOSCOPY N/A 2018 COLONOSCOPY performed by Anna Albarran MD at [address] [place] [address]    
 right  HX CORONARY ARTERY BYPASS GRAFT    
 x2 vessels  HX HEART CATHETERIZATION    
 HX PACEMAKER    
 right chest  
 
 
Social History Tobacco Use  Smoking status: Former Smoker Packs/day: 0.50 Years: 40.00 Pack years: 20.00 Last attempt to quit: 1988 Years since quittin.4  Smokeless tobacco: Former User Substance Use Topics  Alcohol use: No  
  
 
Family History Problem Relation Age of Onset  Heart Disease Mother  Hypertension Mother Review of Systems: 
Constitutional ROS: no fever, chills, rigors or night sweats Respiratory ROS: no cough, sputum, hemoptysis, dyspnea or pleuritic pain. Cardiovascular ROS: no chest pain, palpitations, orthopnea, PND or syncope Endocrine ROS: no polydispsia, polyuria, heat or cold intolerance or major weight change. Gastrointestinal ROS: no dysphagia, abdominal pain, nausea, vomiting or diarrhea  But melena Genito-Urinary ROS: no dysuria, frequency, hematuria, retention or flank pain Musculoskeletal ROS: no joint pain, swelling or muscular tenderness Neurological ROS: no headache, confusion, focal weakness or any other neurological symptoms Psychiatric ROS: no depression, anxiety, mood swings Dermatological ROS: no rash, pruritis, or urticaria Heme-Lymph ROS: no swollen glands, bleeding Examination: 
 
Constitutional:   
Visit Vitals /76 (BP 1 Location: Left arm, BP Patient Position: At rest) Pulse (!) 106 Temp 98.6 °F (37 °C) Resp 20 Ht 5' 9\" (1.753 m) Wt 69.4 kg (153 lb) SpO2 100% BMI 22.59 kg/m² General:  Weak and ill looking patient in no acute distress Eyes: Pale conjunctivae, PERRLA with no discharge. Normal eye movements Ear, Nose, Mouth & Throat: No ottorrhea, rhinorrhea, non tender sinuses, moist mucous membranes Respiratory:  No accessory muscle use, clear breath sounds without crackles or wheezes Cardiovascular:  No JVD or murmurs, regular and normal S1, S2 without thrills, bruits or peripheral edema. GI & :  Soft abdomen, non-tender, non-distended, normoactive bowel sounds with no palpable organomegaly Heme:  No cervical or axillary adenopathy. Musculoskeletal:  No cyanosis, clubbing, atrophy or deformities Skin:  No rashes, bruising or ulcers Neurological: Awake and alert, speech is clear, CNs 2-12 are grossly intact and otherwise non focal 
Psychiatric:  Has a poor insight to illness  
________________________________________________________________________ Data Review: 
 
Labs: 
 
Recent Labs 11/30/18 1929 WBC 5.3 HGB 8.7* HCT 27.5*  
 Recent Labs 11/30/18 1929   
K 4.6  CO2 21 * BUN 49* CREA 3.66* CA 8.5 MG 2.3 ALB 3.3* SGOT 12* ALT 10* No components found for: [place] No results for input(s): PH, PCO2, PO2, HCO3, FIO2 in the last 72 hours. Recent Labs 11/30/18 2020 INR 1.1 Radiological Studies:   
 
CT scan abdomen - reviewed Other Medical tests: 
 
Personally reviewed 12 lead EKG - paced I have also reviewed available old medical records. Assessment & Impression: 
  
Mr. Dinesh Lehman is a 80 y.o. male being evaluated for:  
 
Principal Problem: 
  Melena (11/30/2018) Active Problems: 
  HTN (hypertension), benign (9/2/2011) CKD (chronic kidney disease) stage 4, GFR 15-29 ml/min (HCC) (9/5/2017) Benign prostatic hyperplasia (9/5/2017) CAD (coronary artery disease) () Overview: 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 ([facility]) Dyslipidemia (10/24/2017) Pacemaker (10/24/2017) Overview: Implanted Feb 2017 (Dr Kathi Arreola at [facility]) - [employer] Acute GI bleeding (2/7/2018) PAF (paroxysmal atrial fibrillation) ([facility] [address] (6/28/2018) Acute blood loss anemia (9/17/2018) Plan of management: 
 
Melena (11/30/2018)/ Acute GI bleeding (2/7/2018): presumed diverticular. On anti platelet therapy for CAD. Admit to hospital. Start IV pantoprazole for now. Last colonoscopy showed no overt etiology in February 2018. EGD 9/2017 was unremarkable. Will consult GI to re-eval and in the meantime, hold Asprin and Plavix Acute blood loss anemia (9/17/2018): likely on chronic loss. Monitor Hgb and transfuse as needed to keep Hgb >7 g/dL Benign prostatic hyperplasia (9/5/2017)/ Bilateral Hydronephrosis POA: patient with  No symptoms. He says he has been voiding well. Had a straight cath in the ED. Resume Proscar and Flomax. Ruffin if he retains urine or renal function worsens CAD (coronary artery disease): Overview: 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 ([facility]): Hold Asprin, Plavix. Monitor PAF (paroxysmal atrial fibrillation) ([address] (6/28/2018)/ Pacemaker (10/24/2017): Overview: Implanted Feb 2017 (Dr Kathi Arreola at [facility]) - [employer]. Stable. Hold Asprin HTN (hypertension), benign (9/2/2011): BP stable. In light of bleed, will hold BP medications for now CKD (chronic kidney disease) stage 4, GFR 15-29 ml/min (HCC) (9/5/2017): Likely partly from obstructive uropathy. Monitor renal function. Dyslipidemia (10/24/2017): Not on any home medications. Monitor Code Status:  Full Surrogate decision maker: Family Risk of deterioration: high Total time spent for the care of the patient: 79 Minutes - patient seen 11/30/18 Care Plan discussed with: Patient, Family, Nursing Staff and ED physician Discussed:  Code Status, Care Plan and D/C Planning Prophylaxis:  SCD's 
 
Probable Disposition:  Home w/Family 
        
___________________________________________________ Attending Physician: Ann Santana MD

## 2018-12-01 NOTE — PROGRESS NOTES
Advance Care Planning Note Name: Chilango Booker YOB: 1935 MRN: [id number] Admission Date: 11/30/2018  7:02 PM 
 
Date of discussion: 12/1/2018 Active Diagnoses: 
 
Hospital Problems  Date Reviewed: 9/17/2018 Bilateral hydronephrosis Systolic CHF, chronic ([facility] [address] Hyperlipidemia Hx of seizure disorder CKD (chronic kidney disease), stage IV ([facility] [address] Dementia Chronic a-fib ([facility] [address] Arthritis * (Principal) Melena Acute blood loss anemia PAF (paroxysmal atrial fibrillation) ([facility] [address] Acute GI bleeding Dyslipidemia Pacemaker CAD (coronary artery disease) CKD (chronic kidney disease) stage 4, GFR 15-29 ml/min (HCC) Benign prostatic hyperplasia HTN (hypertension), benign These active diagnoses are of sufficient risk that focused discussion on advance care planning is indicated in order to allow the patient to thoughtfully consider personal goals of care, and if situations arise that prevent the ability to personally give input, to ensure appropriate representation of their personal desires for different levels and aggressiveness of care. Discussion:  
 
Persons present and participating in discussion: Chilango Radford., Susana Lao MD, Discussion: Discussed end stage renal disease in setting of heart disease and bleeding. Interview limited by cognitive ability and/or dementia. No family present. Patient is unclear about spokesperson. He has not though about AD nor DNR. I will consult palliative care for his severe chronic multiorgan disease. Time Spent:  
 
Total time spent face-to-face in education and discussion: 20 minutes.   
 
Susana Lao MD 
12/1/2018 
8:24 AM

## 2018-12-01 NOTE — PROGRESS NOTES
Bedside shift change report given to Thomas Prabhakar RN (oncoming nurse) by Lillie Foreman RN (offgoing nurse). Report included the following information SBAR, Procedure Summary, Intake/Output and Recent Results.

## 2018-12-01 NOTE — CONSULTS
[facility]  MR[facility] [id number]  : 1935  ACCOUNT #: [de-identified]   DATE OF SERVICE: 2018    REFERRING PHYSICIAN:  Dr. Tara Sinha. CHIEF COMPLAINT:  Elevated creatinine. HISTORY OF PRESENT ILLNESS:  The patient is an 42-year-old -American male who has a history of chronic kidney disease, stage 4. He has been seen in our office in [facility] on a couple of occasions, but has had irregular followup. He has currently been lost to followup for over a year. Baseline serum creatinine appears to be under 3 mg/dL. He has a history of urinary retention, a prior history of indwelling Ruffin catheter. He has a history of coronary artery disease and status post coronary artery bypass grafting in the past.  He has got a pacemaker in place and history of congestive heart failure. Patient presented to the hospital with bright red blood per rectum. He is being evaluated by GI and managed conservatively at present. His serum creatinine on admission was 3.66 and today is 3.52. Admission imaging showed bilateral hydroureteronephrosis and a distended bladder. The patient reports that he is voiding well and does not had sensation of incomplete emptying. Urology has been consulted and is going to see him later. REVIEW OF SYSTEMS:  CONSTITUTIONAL:  No fevers or chills. GASTROINTESTINAL:  No nausea, vomiting. Positive for bright red blood per rectum, which is now resolved. GENITOURINARY:  Voiding without difficulty. No hematuria. CARDIOVASCULAR:  No chest pain or shortness of breath. All other systems reviewed and are negative except as per history of present illness. PAST MEDICAL HISTORY:  Chronic kidney disease, stage 4, coronary artery disease, hypertension, hyperlipidemia, congestive heart failure. FAMILY HISTORY:  No known family history of renal disease. SOCIAL HISTORY:  He is a former smoker.     PHYSICAL EXAMINATION:  VITAL SIGNS:  Temperature 98.2, pulse 73, blood pressure 176/73. GENERAL:  Elderly -American male in no acute distress. EYES:  Anicteric. Extraocular movements intact. ENMT:  Mucous membranes are moist.  There is no epistaxis. NECK:  Nontender. There is no JVD. HEART:  Regular. There is a midline sternotomy scar. There is a pacemaker in place. There is no lower extremity edema. RESPIRATORY:  Lungs are clear with good effort. ABDOMEN:  Soft and nontender. Bowel sounds are active. Hepatosplenomegaly is not appreciated. SKIN:  Warm with normal turgor. There is no rash. MUSCULOSKELETAL:  There is no cyanosis or clubbing. There is no synovitis of fingers or wrists bilaterally. LABORATORY DATA:  Sodium 139, potassium 4.3, chloride 109, bicarbonate 20, BUN 50, creatinine 3.52. White count 5.5, hemoglobin 7.1, platelets 686. RADIOGRAPHIC STUDIES:  CT scan of the abdomen and pelvis shows bladder distention with hydroureteronephrosis bilaterally and an enlarged prostate. ASSESSMENT:  Acute renal failure superimposed on chronic kidney disease, stage 4 in the setting of gastrointestinal bleed. Imaging shows urinary retention and bilateral hydronephrosis. I suspect that he may have some acute component of renal dysfunction related to his obstructive uropathy and there may also be a component of volume depletion related to gastrointestinal bleeding. PLAN:  1. Avoid anticoagulants and antiplatelet medications. 2.  Cautious volume expansion and transfuse if needed. 3.  Continue current antihypertensives and monitor blood pressure. 4.  Continue finasteride and Flomax and await urology consultation. We will defer to urology and placement of Ruffin catheter. 5.  There is no acute indication for hemodialysis. 6.  Monitor serial labs. 7.  He would benefit from more frequent nephrology followup if he is willing to do so.       MD Hakeem Barbosa / Chace Heads  D: 12/01/2018 13:02     T: 12/01/2018 14:41  JOB #: [id number]

## 2018-12-01 NOTE — CONSULTS
Urology Consult    Subjective:     Date of Consultation:  2018    Referring Physician: Lorena Hancock    Reason for Consultation:  retention    History of Present Illness:   Patient is a 80 y.o.  male who is being seen for urinary retention. He was admitted to the hospital for Melena [K92.1]. Had acute on chronic renal failure. Associated findings are severe bladder distention and bilateral hydroureteronephrosis on CT. Images reviewed. Aggrivating factor is enlarged prostate. Timing constatnt. Severity moderate. Improved with straight cath with he tolerated well. Used to follow with  but has been a few years. Past Medical History:   Diagnosis Date    Arthritis     CAD (coronary artery disease)     hx mi    Chronic a-fib (HCC)     CKD (chronic kidney disease), stage IV (HCC)     Dementia     Hx of seizure disorder     Hyperlipidemia     Hypertension     Systolic CHF, chronic (HCC)       Past Surgical History:   Procedure Laterality Date    COLONOSCOPY N/A 2017    COLONOSCOPY performed by Bulmaro Franklin MD at [facility] COLONOSCOPY N/A 2018    COLONOSCOPY performed by Anna Albarran MD at [address] [place] [address] HX CATARACT REMOVAL      right    HX CORONARY ARTERY BYPASS GRAFT      x2 vessels    HX HEART CATHETERIZATION      HX PACEMAKER      right chest      Family History   Problem Relation Age of Onset    Heart Disease Mother     Hypertension Mother       Social History     Tobacco Use    Smoking status: Former Smoker     Packs/day: 0.50     Years: 40.00     Pack years: 20.00     Last attempt to quit: 1988     Years since quittin.4    Smokeless tobacco: Former User   Substance Use Topics    Alcohol use: No     No Known Allergies   Prior to Admission medications    Medication Sig Start Date End Date Taking? Authorizing Provider   acetaminophen (TYLENOL) 500 mg tablet Take 500 mg by mouth every six (6) hours as needed for Pain.    Yes Provider, Historical   amLODIPine (NORVASC) 10 mg tablet Take 10 mg by mouth daily. Yes Provider, Historical   aspirin delayed-release 81 mg tablet Take 1 Tab by mouth daily. DO NOT TAKE for 1 WEEK 18  Yes Yessenia Ellis MD   clopidogrel (PLAVIX) 75 mg tab Take 1 Tab by mouth daily. DO  NOT TAKE for 1 WEEK 18  Yes Yessenia Ellis MD   tamsulosin [facility]) 0.4 mg capsule Take 0.4 mg by mouth daily (after breakfast). Yes Provider, Historical   pantoprazole (PROTONIX) 40 mg tablet Take 40 mg by mouth Daily (before breakfast). Yes Provider, Historical   finasteride (PROSCAR) 5 mg tablet Take 1 Tab by mouth daily. 17  Yes , Vika Lancaster MD   carbamazepine (TEGRETOL) 200 mg tablet Take 200 mg by mouth every eight (8) hours. Yes Other, MD Pablo         Review of Systems:  A comprehensive review of systems was negative except for that written in the HPI.     Objective:     Patient Vitals for the past 8 hrs:   BP Temp Pulse Resp SpO2   18 1401   79     18 1116 176/73 98.2 °F (36.8 °C) 73 16 100 %   18 0714 150/69 97.8 °F (36.6 °C) 60 16 100 %   18 0652   60       Temp (24hrs), Av.3 °F (36.8 °C), Min:97.7 °F (36.5 °C), Max:98.8 °F (37.1 °C)      Intake and Output:    1901 -  0700  In: 200 [P.O.:200]  Out: 1003 [Urine:1000]    Physical Exam:            General:    alert, cooperative, no distress, appears stated age                     Skin:  Normal. and no rash or abnormalities                HEENT:  NCAT        Throat/Neck:  mucous membranes moist   Lymph nodes:  deferred                 Lungs: Sym chest rise   Cardiovascular: No edema             Abdomen[de-identified] s/ntnd             Genitalia: uncirc          Extremities: junior     Recent Results (from the past 12 hour(s))   HGB & HCT    Collection Time: 18 10:45 AM   Result Value Ref Range    HGB 7.4 (L) 12.1 - 17.0 g/dL    HCT 23.9 (L) 36.6 - 50.3 %   FERRITIN    Collection Time: 18 10:45 AM   Result Value Ref Range    Ferritin 23 (L) 26 - 388 NG/ML   FOLATE    Collection Time: 12/01/18 10:45 AM   Result Value Ref Range    Folate 16.5 5.0 - 21.0 ng/mL   VITAMIN B12    Collection Time: 12/01/18 10:45 AM   Result Value Ref Range    Vitamin B12 379 193 - 986 pg/mL   RETICULOCYTE COUNT    Collection Time: 12/01/18 10:45 AM   Result Value Ref Range    Reticulocyte count 1.1 0.7 - 2.1 %    Absolute Retic Cnt. 0.0256 (L) 0.0260 - 0.0950 M/ul   LD    Collection Time: 12/01/18 10:45 AM   Result Value Ref Range     85 - 241 U/L   TSH 3RD GENERATION    Collection Time: 12/01/18 10:45 AM   Result Value Ref Range    TSH 2.72 0.36 - 3.74 uIU/mL         Assessment:     Principal Problem:    Melena (11/30/2018)    Active Problems:    HTN (hypertension), benign (9/2/2011)      CKD (chronic kidney disease) stage 4, GFR 15-29 ml/min ([facility]) (9/5/2017)      Benign prostatic hyperplasia (9/5/2017)      CAD (coronary artery disease) ()      Overview: 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 ([facility])      Dyslipidemia (10/24/2017)      Pacemaker (10/24/2017)      Overview: Implanted Feb 2017 (Dr Kal Ramírez at [place] [id number]) - [employer]      Acute GI bleeding (2/7/2018)      PAF (paroxysmal atrial fibrillation) ([facility] [address] (6/28/2018)      Acute blood loss anemia (9/17/2018)      Bilateral hydronephrosis (68/0/8986)      Systolic CHF, chronic (HCC) ()      Hyperlipidemia ()      Hx of seizure disorder ()      CKD (chronic kidney disease), stage IV (HCC) ()      Dementia ()      Chronic a-fib (HCC) ()      Arthritis ()          Plan:     Start flomax & finasteride. Perform CIC x3 daily to clear residual urine, record cathed and voided volumes. Will set up for outpatient f/u.   Call with questions    Signed By: Nelia Campos MD                         December 1, 2018

## 2018-12-01 NOTE — PROGRESS NOTES
Full note dictated - some confusion re ezekiel of bleed - dark, bright blood and now \" normal\" ? ??, need to monitor freq and ezekiel of all stools, transfuse for HGB < 7.

## 2018-12-01 NOTE — CONSULTS
Consult dict    ARF  CKD 4  Urinary retention/B hydro/h/o indwelling pelayo  GIB  HTN    Rec:  Supportive care  Defer decision on pelayo to urology, who is to see him  No need for RRT  Continue anti-hypertensives  Would benefit from more regular nephrology follow up

## 2018-12-01 NOTE — PROGRESS NOTES
RN bladder scanned patient and obtained measurement of 604 ml. Patient able to void but unable to empty bladder completely. RN updated Hospitalist. No new orders given. RN will continue to monitor patient.

## 2018-12-02 LAB
ANION GAP SERPL CALC-SCNC: 11 MMOL/L (ref 5–15)
ATRIAL RATE: 60 BPM
BUN SERPL-MCNC: 49 MG/DL (ref 6–20)
BUN/CREAT SERPL: 14 (ref 12–20)
CALCIUM SERPL-MCNC: 8.1 MG/DL (ref 8.5–10.1)
CALCULATED P AXIS, ECG09: 30 DEGREES
CALCULATED R AXIS, ECG10: 34 DEGREES
CALCULATED T AXIS, ECG11: -168 DEGREES
CHLORIDE SERPL-SCNC: 108 MMOL/L (ref 97–108)
CO2 SERPL-SCNC: 21 MMOL/L (ref 21–32)
CREAT SERPL-MCNC: 3.55 MG/DL (ref 0.7–1.3)
DIAGNOSIS, 93000: NORMAL
ERYTHROCYTE [DISTWIDTH] IN BLOOD BY AUTOMATED COUNT: 14.3 % (ref 11.5–14.5)
GLUCOSE BLD STRIP.AUTO-MCNC: 148 MG/DL (ref 65–100)
GLUCOSE SERPL-MCNC: 87 MG/DL (ref 65–100)
HCT VFR BLD AUTO: 19.7 % (ref 36.6–50.3)
HCT VFR BLD AUTO: 25.2 % (ref 36.6–50.3)
HGB BLD-MCNC: 6.3 G/DL (ref 12.1–17)
HGB BLD-MCNC: 8.1 G/DL (ref 12.1–17)
MAGNESIUM SERPL-MCNC: 2.1 MG/DL (ref 1.6–2.4)
MCH RBC QN AUTO: 31.2 PG (ref 26–34)
MCHC RBC AUTO-ENTMCNC: 32 G/DL (ref 30–36.5)
MCV RBC AUTO: 97.5 FL (ref 80–99)
NRBC # BLD: 0 K/UL (ref 0–0.01)
NRBC BLD-RTO: 0 PER 100 WBC
P-R INTERVAL, ECG05: 298 MS
PLATELET # BLD AUTO: 140 K/UL (ref 150–400)
PMV BLD AUTO: 10.3 FL (ref 8.9–12.9)
POTASSIUM SERPL-SCNC: 4.2 MMOL/L (ref 3.5–5.1)
Q-T INTERVAL, ECG07: 390 MS
QRS DURATION, ECG06: 94 MS
QTC CALCULATION (BEZET), ECG08: 390 MS
RBC # BLD AUTO: 2.02 M/UL (ref 4.1–5.7)
SERVICE CMNT-IMP: ABNORMAL
SODIUM SERPL-SCNC: 140 MMOL/L (ref 136–145)
VENTRICULAR RATE, ECG03: 60 BPM
WBC # BLD AUTO: 4.1 K/UL (ref 4.1–11.1)

## 2018-12-02 PROCEDURE — 85027 COMPLETE CBC AUTOMATED: CPT

## 2018-12-02 PROCEDURE — 74011250636 HC RX REV CODE- 250/636: Performed by: INTERNAL MEDICINE

## 2018-12-02 PROCEDURE — 36415 COLL VENOUS BLD VENIPUNCTURE: CPT

## 2018-12-02 PROCEDURE — 85018 HEMOGLOBIN: CPT

## 2018-12-02 PROCEDURE — 30233N1 TRANSFUSION OF NONAUTOLOGOUS RED BLOOD CELLS INTO PERIPHERAL VEIN, PERCUTANEOUS APPROACH: ICD-10-PCS | Performed by: INTERNAL MEDICINE

## 2018-12-02 PROCEDURE — 51798 US URINE CAPACITY MEASURE: CPT

## 2018-12-02 PROCEDURE — 74011250637 HC RX REV CODE- 250/637: Performed by: INTERNAL MEDICINE

## 2018-12-02 PROCEDURE — 80048 BASIC METABOLIC PNL TOTAL CA: CPT

## 2018-12-02 PROCEDURE — 97165 OT EVAL LOW COMPLEX 30 MIN: CPT | Performed by: OCCUPATIONAL THERAPIST

## 2018-12-02 PROCEDURE — 77030011943

## 2018-12-02 PROCEDURE — 77030032490 HC SLV COMPR SCD KNE COVD -B

## 2018-12-02 PROCEDURE — 36430 TRANSFUSION BLD/BLD COMPNT: CPT

## 2018-12-02 PROCEDURE — 94760 N-INVAS EAR/PLS OXIMETRY 1: CPT

## 2018-12-02 PROCEDURE — 65270000029 HC RM PRIVATE

## 2018-12-02 PROCEDURE — 82962 GLUCOSE BLOOD TEST: CPT

## 2018-12-02 PROCEDURE — 83735 ASSAY OF MAGNESIUM: CPT

## 2018-12-02 PROCEDURE — P9016 RBC LEUKOCYTES REDUCED: HCPCS

## 2018-12-02 PROCEDURE — 77030005537 HC CATH URETH BARD -A

## 2018-12-02 RX ORDER — FERROUS GLUCONATE 324(38)MG
1 TABLET ORAL
Status: DISCONTINUED | OUTPATIENT
Start: 2018-12-02 | End: 2018-12-04 | Stop reason: HOSPADM

## 2018-12-02 RX ORDER — FUROSEMIDE 10 MG/ML
20 INJECTION INTRAMUSCULAR; INTRAVENOUS ONCE
Status: COMPLETED | OUTPATIENT
Start: 2018-12-02 | End: 2018-12-02

## 2018-12-02 RX ORDER — SODIUM CHLORIDE 9 MG/ML
250 INJECTION, SOLUTION INTRAVENOUS AS NEEDED
Status: DISCONTINUED | OUTPATIENT
Start: 2018-12-02 | End: 2018-12-04 | Stop reason: HOSPADM

## 2018-12-02 RX ADMIN — CARBAMAZEPINE 200 MG: 200 TABLET ORAL at 18:09

## 2018-12-02 RX ADMIN — FINASTERIDE 5 MG: 5 TABLET, FILM COATED ORAL at 08:16

## 2018-12-02 RX ADMIN — FUROSEMIDE 20 MG: 10 INJECTION, SOLUTION INTRAMUSCULAR; INTRAVENOUS at 12:04

## 2018-12-02 RX ADMIN — PANTOPRAZOLE SODIUM 40 MG: 40 TABLET, DELAYED RELEASE ORAL at 06:33

## 2018-12-02 RX ADMIN — CARBAMAZEPINE 200 MG: 200 TABLET ORAL at 01:28

## 2018-12-02 RX ADMIN — Medication 10 ML: at 06:00

## 2018-12-02 RX ADMIN — FERROUS GLUCONATE 1 TABLET: 324 TABLET ORAL at 08:16

## 2018-12-02 RX ADMIN — CARBAMAZEPINE 200 MG: 200 TABLET ORAL at 09:46

## 2018-12-02 RX ADMIN — AMLODIPINE BESYLATE 10 MG: 5 TABLET ORAL at 08:16

## 2018-12-02 RX ADMIN — TAMSULOSIN HYDROCHLORIDE 0.4 MG: 0.4 CAPSULE ORAL at 08:16

## 2018-12-02 RX ADMIN — Medication 5 ML: at 22:00

## 2018-12-02 NOTE — ROUTINE PROCESS
Bedside and Verbal shift change report given to Ananya (oncoming nurse) by Danuta Youngblood (offgoing nurse). Report included the following information SBAR, Kardex and Recent Results.

## 2018-12-02 NOTE — PROGRESS NOTES
Emily Yeh MD 
[phone] office Gastroenterology Progress Note 2018 Admit Date: 2018 Narrative Assessment and Plan · Hgb drop · One \" spot of BRB\" · No other BM · Getting transfused · CRF but BUN not inappropriately high · Suspect recurrent diverticular bleed but willkeep NPO after midnight in case EGD warrented Subjective:  
· Only on spot of BM since last pm.\" red\" Current Medications:  
 
Current Facility-Administered Medications Medication Dose Route Frequency  0.9% sodium chloride infusion 250 mL  250 mL IntraVENous PRN  
 ferrous gluconate 324 mg (38 mg iron) tablet 1 Tab  1 Tab Oral DAILY WITH BREAKFAST  furosemide (LASIX) injection 20 mg  20 mg IntraVENous ONCE  carBAMazepine (TEGretol) tablet 200 mg  200 mg Oral Q8H  
 finasteride (PROSCAR) tablet 5 mg  5 mg Oral DAILY  tamsulosin (FLOMAX) capsule 0.4 mg  0.4 mg Oral DAILY AFTER BREAKFAST  pantoprazole (PROTONIX) tablet 40 mg  40 mg Oral ACB  amLODIPine (NORVASC) tablet 10 mg  10 mg Oral DAILY  sodium chloride (NS) flush 5-10 mL  5-10 mL IntraVENous Q8H  
 sodium chloride (NS) flush 5-10 mL  5-10 mL IntraVENous PRN  
 acetaminophen (TYLENOL) tablet 650 mg  650 mg Oral Q4H PRN  
 morphine injection 1 mg  1 mg IntraVENous Q4H PRN  
 naloxone (NARCAN) injection 0.4 mg  0.4 mg IntraVENous PRN  
 ondansetron (ZOFRAN) injection 4 mg  4 mg IntraVENous Q4H PRN Objective: VITALS:  
Last 24hrs VS reviewed since prior progress note. Most recent are: 
Visit Vitals /83 (BP 1 Location: Left arm, BP Patient Position: At rest) Pulse 71 Temp 98.6 °F (37 °C) Resp 16 Ht 5' 9\" (1.753 m) Wt 68.2 kg (150 lb 5.7 oz) SpO2 100% BMI 22.20 kg/m² Temp (24hrs), Av.4 °F (36.9 °C), Min:97.8 °F (36.6 °C), Max:99.1 °F (37.3 °C) Intake/Output Summary (Last 24 hours) at 2018 1059 Last data filed at 2018 6651 Gross per 24 hour Intake  Output 900 ml Net -900 ml EXAM: 
General:  Comfortable, no distress   
HEENT:  Atraumatic skull, pupils equal 
Lungs:   No abnormal audible breath sounds. Speaking in complete sentences Heart:   No abnormal audible heart sounds. Well perfused Abdomen:   Nondistended, nontender. No mass, guarding or rebound Neurologic:   Cranial nerves grossly intact, moves all 4 extremities Psych:    Good insight. Not anxious nor agitated Derm:   No rash, jaundice, nor palpable dermatologic mass Lab Data Reviewed:  
Recent Labs 12/02/18 
0129 12/01/18 
1045 12/01/18 
0213 11/30/18 1929 WBC 4.1  --  5.5 5.3 HGB 6.3* 7.4* 7.1* 8.7* HCT 19.7* 23.9* 22.4* 27.5*  
*  --  164 185 Recent Labs 12/02/18 
0129 12/01/18 
0104 11/30/18 2020 11/30/18 1929  139  --  140  
K 4.2 4.3  --  4.6  109*  --  106 CO2 21 20*  --  21  
GLU 87 101*  --  132* BUN 49* 50*  --  49* CREA 3.55* 3.52*  --  3.66* CA 8.1* 7.9*  --  8.5 MG 2.1  --   --  2.3 ALB  --  2.7*  --  3.3* TBILI  --  0.1*  --  0.2 SGOT  --  11*  --  12* ALT  --  6*  --  10* INR  --   --  1.1  -- No components found for: [place] No results for input(s): PH, PCO2, PO2, HCO3, FIO2 in the last 72 hours. Recent Labs 11/30/18 
2020 INR 1.1 Assessment: (See above) Principal Problem: 
  Melena (11/30/2018) Active Problems: 
  HTN (hypertension), benign (9/2/2011) CKD (chronic kidney disease) stage 4, GFR 15-29 ml/min ([facility]) (9/5/2017) Benign prostatic hyperplasia (9/5/2017) CAD (coronary artery disease) () Overview: 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 ([facility]) Dyslipidemia (10/24/2017) Pacemaker (10/24/2017) Overview: Implanted Feb 2017 (Dr Arina Rucker at [facility]) - [employer] Acute GI bleeding (2/7/2018) [facility] (6/28/2018) Acute blood loss anemia (9/17/2018) Bilateral hydronephrosis (12/1/2018) Systolic CHF, chronic (HCC) () Hyperlipidemia () Hx of seizure disorder () 
 
  CKD (chronic kidney disease), stage IV (HCC) () Dementia () Chronic a-fib (HCC) () Arthritis () Plan: (See above) Signed By: Maura Dalton MD   
 12/2/2018  10:59 AM

## 2018-12-02 NOTE — PROGRESS NOTES
Problem: Falls - Risk of 
Goal: *Absence of Falls Document Genevive Camera Fall Risk and appropriate interventions in the flowsheet. Outcome: Progressing Towards Goal 
Fall Risk Interventions: 
Mobility Interventions: Bed/chair exit alarm Medication Interventions: Teach patient to arise slowly Elimination Interventions: Call light in reach

## 2018-12-02 NOTE — ROUTINE PROCESS
Bedside shift change report given to Jaleesa (oncoming nurse) by Sneha Randhawa (offgoing nurse). Report included the following information SBAR.

## 2018-12-02 NOTE — ROUTINE PROCESS
Pt had a bowel movement today with blood, medium size, scant pieces of formed stool but blood in toilet bowl and when wiping.

## 2018-12-02 NOTE — PROGRESS NOTES
Physical Therapy 1000 Order received and acknowledged. Spoke with nursing prior to attempting to see the patient for PT evaluation/treatment this day. Noted Hgb 6.3 and patient currently receiving blood at this time. We will follow this patient and attempt again as our schedule allows. Thank you.  
 
Cheyenne Zayas, PT, DPT, CLT

## 2018-12-02 NOTE — PROGRESS NOTES
Visit documented 11/2/18 Spiritual Care Partner Volunteer visited patient in 5 Med Surg on 10/1/18. Documented by: Phillip Cortes., MS., [address] ([id number])

## 2018-12-02 NOTE — PROGRESS NOTES
[facility] Medical Progress Note NAME: Monae Martinez Sr.  
:  1935 MRM:  [id number] Date/Time: 2018  8:13 AM 
 
  
Assessment and Plan:  
 
Acute GI bleeding / Melena - POA, likely upper GI bleed. Unclear cause. Hold ASA/Plavix. GI consult. Clear diet. PPI. Bilateral hydronephrosis / Urinary obstruction / Benign prostatic hyperplasia - Noted today on post void residual.  Urology consulted. They did pelayo with irrigation. They will arranged outpatient follow up. Continue proscar and flomax Acute blood loss anemia - POA, also some anemia of CKD. Worse overnight. Transfuse today for Hb<7.  EPO per renal.  Low iron on serologies, start PO iron CKD (chronic kidney disease) stage 4 - Slowly worsening baseline. Followed by renal outpatient. Nearing dialysis. Likely poor candidate due to CHF, dementia, etc.  Appreciate renal consult. Systolic CHF, chronic / CAD (coronary artery disease) / HTN (hypertension), benign - Stable. Holding ASA/Plavix due to bleed. Continue Norvasc. NOT on diuretic, BB, ACE or statin for unknown reasons. Consult cardiology to clarify this plan PAF (paroxysmal atrial fibrillation) / Pacemaker - Stable. Pacer Implanted 2017 (Dr Se Caldwell at [facility]). Bleeding risk too high for coumadin. Dyslipidemia - PCP to follow. Not listing any statin Rx Hx of seizure disorder - PCP to follow. Continue carbamazapine Dementia - No diagnosis on chart, but likely based on my interview. Would benefit from outpatient neuropsych testing. Palliative consult to help address this issue. Arthritis - Stable. Tylenol prn. No NSAIDS Subjective: Chief Complaint:  Feels fine. Weak. ROS: 
(bold if positive, if negative) Tolerating some PT  Tolerating Diet Objective:  
 
Last 24hrs VS reviewed since prior progress note. Most recent are: 
 
Visit Vitals /72 (BP 1 Location: Right arm, BP Patient Position: At rest) Pulse 66 Temp 98.9 °F (37.2 °C) Resp 16 Ht 5' 9\" (1.753 m) Wt 68.2 kg (150 lb 5.7 oz) SpO2 100% BMI 22.20 kg/m² SpO2 Readings from Last 6 Encounters:  
12/02/18 100% 09/18/18 100% 08/29/18 100% 07/13/18 98% 06/29/18 97% 06/21/18 100% Intake/Output Summary (Last 24 hours) at 12/2/2018 5742 Last data filed at 12/2/2018 1137 Gross per 24 hour Intake  Output 900 ml Net -900 ml Physical Exam: 
 
Gen:  Frail, in no acute distress HEENT:  Pink conjunctivae, PERRL, hearing intact to voice, moist mucous membranes Neck:  Supple, without masses, thyroid non-tender Resp:  No accessory muscle use, clear breath sounds without wheezes rales or rhonchi 
Card:  No murmurs, normal S1, S2 without thrills, bruits or peripheral edema Abd:  Soft, non-tender, non-distended, normoactive bowel sounds are present, no mass Lymph:  No cervical or inguinal adenopathy Musc:  No cyanosis or clubbing Skin:  No rashes or ulcers, skin turgor is good Neuro:  Cranial nerves are grossly intact, mild motor weakness, follows commands vaguely Psych:  Poor insight, oriented to person, place Telemetry reviewed:   normal sinus rhythm, paced 
__________________________________________________________________ Medications Reviewed: (see below) Medications:  
 
Current Facility-Administered Medications Medication Dose Route Frequency  0.9% sodium chloride infusion 250 mL  250 mL IntraVENous PRN  
 ferrous gluconate 324 mg (38 mg iron) tablet 1 Tab  1 Tab Oral DAILY WITH BREAKFAST  carBAMazepine (TEGretol) tablet 200 mg  200 mg Oral Q8H  
 finasteride (PROSCAR) tablet 5 mg  5 mg Oral DAILY  tamsulosin (FLOMAX) capsule 0.4 mg  0.4 mg Oral DAILY AFTER BREAKFAST  pantoprazole (PROTONIX) tablet 40 mg  40 mg Oral ACB  amLODIPine (NORVASC) tablet 10 mg  10 mg Oral DAILY  sodium chloride (NS) flush 5-10 mL  5-10 mL IntraVENous Q8H  
 sodium chloride (NS) flush 5-10 mL  5-10 mL IntraVENous PRN  
 acetaminophen (TYLENOL) tablet 650 mg  650 mg Oral Q4H PRN  
 morphine injection 1 mg  1 mg IntraVENous Q4H PRN  
 naloxone (NARCAN) injection 0.4 mg  0.4 mg IntraVENous PRN  
 ondansetron (ZOFRAN) injection 4 mg  4 mg IntraVENous Q4H PRN Lab Data Reviewed: (see below) Lab Review:  
 
Recent Labs 12/02/18 
0129 12/01/18 
1045 12/01/18 
0213 11/30/18 1929 WBC 4.1  --  5.5 5.3 HGB 6.3* 7.4* 7.1* 8.7* HCT 19.7* 23.9* 22.4* 27.5*  
*  --  164 185 Recent Labs 12/02/18 
0129 12/01/18 
0104 11/30/18 2020 11/30/18 1929  139  --  140  
K 4.2 4.3  --  4.6  109*  --  106 CO2 21 20*  --  21  
GLU 87 101*  --  132* BUN 49* 50*  --  49* CREA 3.55* 3.52*  --  3.66* CA 8.1* 7.9*  --  8.5 MG 2.1  --   --  2.3 ALB  --  2.7*  --  3.3* TBILI  --  0.1*  --  0.2 SGOT  --  11*  --  12* ALT  --  6*  --  10* INR  --   --  1.1  --   
 
Lab Results Component Value Date/Time Glucose (POC) 128 (H) 09/07/2011 06:16 PM  
 Glucose (POC) 99 09/05/2011 12:17 AM  
 Glucose (POC) 170 (H) 09/02/2011 08:39 PM  
 Glucose (POC) 115 (H) 09/02/2011 07:42 AM  
 
No results for input(s): PH, PCO2, PO2, HCO3, FIO2 in the last 72 hours. Recent Labs 11/30/18 2020 INR 1.1 All Micro Results None I have reviewed notes of prior 24hr. Other pertinent lab: none Total time spent with patient: 45 Minutes Care Plan discussed with: Patient, Nursing Staff, Consultant/Specialist and >50% of time spent in counseling and coordination of care Discussed:  Care Plan Prophylaxis:  H2B/PPI Disposition:  Home w/Family 
        
___________________________________________________ Attending Physician: Susana Lao MD

## 2018-12-02 NOTE — PROGRESS NOTES
Problem: Falls - Risk of 
Goal: *Absence of Falls Document Kanika  Fall Risk and appropriate interventions in the flowsheet. Outcome: Progressing Towards Goal 
Fall Risk Interventions: 
Mobility Interventions: Bed/chair exit alarm Medication Interventions: Patient to call before getting OOB Elimination Interventions: Call light in reach

## 2018-12-02 NOTE — PROGRESS NOTES
Pt stated he voided around 7pm.Bladder scan done with reading of 600-735 ml assisted to bathroom pt urinate in the urinal 300 ml clear yellow urine. Rebladder scan with post voided residual of 450 ml. MD to notify. 0 Spoke with  about urinary retention and Urology on board. Ok to straight cath x1 and bladder scan pt. 0250 straight cath done obtained 600 cc clear yellow urine. Rebladder reading 32 cc. 
 
0700 No bowel movement last night hgb went down to 6.3 to notify MD. 
 
[id number] Notified  no orders received.

## 2018-12-02 NOTE — PROGRESS NOTES
Occupational Therapy EVALUATION/discharge Patient: Ajay Castillo Sr. (80 y.o. male) Date: 12/2/2018 Primary Diagnosis: Melena [K92.1] Precautions:  Fall ASSESSMENT:  
Pt cleared by RN to work with pt prior to receiving 1 unit PRBC due to Hgb 6.3. VSS during session. Based on the objective data described below, the patient presents at an overall supervision to independent level with ADLs and functional mobility. He demonstrated good safety awareness and no LOB. Further skilled acute occupational therapy is not indicated at this time. Discharge Recommendations: None for OT Further Equipment Recommendations for Discharge: none for OT SUBJECTIVE:  
Patient stated I feel ok.  OBJECTIVE DATA SUMMARY:  
HISTORY:  
Past Medical History:  
Diagnosis Date  Arthritis  CAD (coronary artery disease)   
 hx mi  [facility]  CKD (chronic kidney disease), stage IV ([facility]  Dementia  Hx of seizure disorder  Hyperlipidemia  Hypertension  Systolic CHF, chronic ([facility] Past Surgical History:  
Procedure Laterality Date  COLONOSCOPY N/A 9/6/2017 COLONOSCOPY performed by Bulmaro Farnklin MD at [address] [place] [address]  COLONOSCOPY N/A 2/8/2018 COLONOSCOPY performed by Anna Albarran MD at [address] [place] [address]    
 right  HX CORONARY ARTERY BYPASS GRAFT    
 x2 vessels  HX HEART CATHETERIZATION    
 HX PACEMAKER    
 right chest  
 
 
Prior Level of Function/Environment/Context: mod I with cane, drives, active. Retired from [employer] in [address] Home Situation Home Environment: Private residence # Steps to Enter: 2 Rails to Enter: Yes Hand Rails : Bilateral 
One/Two Story Residence: One story Living Alone: No 
Support Systems: Child([place])(pt's son lives with him) Patient Expects to be Discharged to[de-identified] Private residence Current DME Used/Available at Home: Cane, straight Hand dominance: Left EXAMINATION OF PERFORMANCE DEFICITS: 
 Cognitive/Behavioral Status: 
Neurologic State: Alert; Appropriate for age Orientation Level: Oriented X4 Cognition: Appropriate decision making; Appropriate for age attention/concentration; Appropriate safety awareness; Follows commands Perception: Appears intact Perseveration: No perseveration noted Safety/Judgement: Awareness of environment;Driving appropriateness; Fall prevention;Good awareness of safety precautions; Home safety; Insight into deficits Hearing: Auditory Auditory Impairment: None Vision/Perceptual:   
Acuity: Able to read clock/calendar on wall without difficulty Range of Motion: 
AROM: Generally decreased, functional(imp L shoulder due to OA) PROM: Generally decreased, functional(imp L shoulder due to OA) Strength: 
Strength: Generally decreased, functional 
  
  
  
  
 
Coordination: 
Coordination: Within functional limits Fine Motor Skills-Upper: Left Intact; Right Intact Gross Motor Skills-Upper: Left Impaired;Right Intact Tone & Sensation: 
Tone: Normal 
Sensation: Intact Balance: 
Sitting: Intact Standing: Intact Functional Mobility and Transfers for ADLs:Bed Mobility: 
Rolling: Independent Supine to Sit: Independent Sit to Supine: Independent Scooting: Independent Transfers: 
Sit to Stand: Modified independent Stand to Sit: Modified independent Bathroom Mobility: Supervision/set up(using cane) Toilet Transfer : Supervision ADL Assessment: 
Feeding: Independent Oral Facial Hygiene/Grooming: Supervision(standing at sink) Bathing: Supervision Upper Body Dressing: Independent Lower Body Dressing: Supervision Toileting: Supervision ADL Intervention and task modifications: 
Patient was educated on the benefits of maintaining activity tolerance, functional mobility, and independence with self care tasks during acute stay.  Encouraged patient to be out of bed for all meals, perform daily ADLs (as approved by RN/MD regarding bathing etc), performing functional mobility to/from bathroom, and increasing time OOB daily. Patient educated about the importance of maintaining activity tolerance to ensure safe return home and to baseline. Patient verbalized understanding of education. Cognitive Retraining Safety/Judgement: Awareness of environment;Driving appropriateness; Fall prevention;Good awareness of safety precautions; Home safety; Insight into deficits Functional Measure: 
Barthel Index: 
 
Bathin Bladder: 10 Bowels: 10 
Groomin Dressin Feeding: 10 Mobility: 10 Stairs: 5 Toilet Use: 5 Transfer (Bed to Chair and Back): 10 Total: 70 Barthel and G-code impairment scale: 
Percentage of impairment CH 
0% CI 
1-19% CJ 
20-39% CK 
40-59% CL 
60-79% CM 
80-99% CN 
100% Barthel Score 0-100 100 99-80 79-60 59-40 20-39 1-19 
 0 Barthel Score 0-20 20 17-19 13-16 9-12 5-8 1-4 0 The Barthel ADL Index: Guidelines 1. The index should be used as a record of what a patient does, not as a record of what a patient could do. 2. The main aim is to establish degree of independence from any help, physical or verbal, however minor and for whatever reason. 3. The need for supervision renders the patient not independent. 4. A patient's performance should be established using the best available evidence. Asking the patient, friends/relatives and nurses are the usual sources, but direct observation and common sense are also important. However direct testing is not needed. 5. Usually the patient's performance over the preceding 24-48 hours is important, but occasionally longer periods will be relevant. 6. Middle categories imply that the patient supplies over 50 per cent of the effort. 7. Use of aids to be independent is allowed. Claudell Seal., Barthel, D.W. (3439). Functional evaluation: the Barthel Index. [facility] (14)2. DACIA William, Nalini Oneal., [facility]., [place], [address] (1999). Measuring the change indisability after inpatient rehabilitation; comparison of the responsiveness of the Barthel Index and Functional Bonner Measure. Journal of Neurology, Neurosurgery, and Psychiatry, 66(4), 057-311. Ardean Prader, N.J.A, LIZ Mckeon, & Augusta Bumpers, M.A. (2004.) Assessment of post-stroke quality of life in cost-effectiveness studies: The usefulness of the Barthel Index and the EuroQoL-5D. [facility], 13, 392-49 G codes: In compliance with CMSs Claims Based Outcome Reporting, the following G-code set was chosen for this patient based on their primary functional limitation being treated: The outcome measure chosen to determine the severity of the functional limitation was the Barthel Index with a score of 70/100 which was correlated with the impairment scale. ? Self Care:  
  - CURRENT STATUS: CJ - 20%-39% impaired, limited or restricted  - GOAL STATUS: CJ - 20%-39% impaired, limited or restricted  - D/C STATUS:  CJ - 20%-39% impaired, limited or restricted Occupational Therapy Evaluation Charge Determination History Examination Decision-Making LOW Complexity : Brief history review  LOW Complexity : 1-3 performance deficits relating to physical, cognitive , or psychosocial skils that result in activity limitations and / or participation restrictions  LOW Complexity : No comorbidities that affect functional and no verbal or physical assistance needed to complete eval tasks Based on the above components, the patient evaluation is determined to be of the following complexity level: LOW Pain: 
Pain Scale 1: Numeric (0 - 10) Pain Intensity 1: 0 Activity Tolerance:  
Fair Please refer to the flowsheet for vital signs taken during this treatment. After treatment:  
[]  Patient left in no apparent distress sitting up in chair [x]  Patient left in no apparent distress in bed 
[x]  Call bell left within reach [x]  Nursing notified and present 
[]  Caregiver present 
[]  Bed alarm activated COMMUNICATION/EDUCATION:  
Communication/Collaboration: 
[x]      Home safety education was provided and the patient/caregiver indicated understanding. [x]      Patient/family have participated as able and agree with findings and recommendations. []      Patient is unable to participate in plan of care at this time. Findings and recommendations were discussed with: Registered Nurse Dionne Hopper OT Time Calculation: 13 mins

## 2018-12-02 NOTE — CONSULTS
HISTORY OF PRESENTING ILLNESS      Dwaine Brewer is a 80 y.o. male with history of CAD/CABG, CKD stage IV, pacemaker, paroxysmal atrial fibrillation with previous episode of GI bleeding while on warfarin.  His anticoagulation was discontinued; he underwent WATCHMAN implant  and follow-up T at 45 days post implant which demonstrated a well-seated device without flow leaks. He was continued on aspirin and Plavix with an aim of continuing Plavix for 6 months or until February. He is now hospitalized after noticing blood in his stool. He underwent blood transfusion. He is being considered for possible scope tomorrow. He has a history of cardiomyopathy however this is resolved. He also has a history of CKD. For both of these reasons ACE inhibitor/arm therapy have been withheld.          ACTIVE PROBLEM LIST     Patient Active Problem List    Diagnosis Date Noted    Bilateral hydronephrosis 97/24/1913    Systolic CHF, chronic (HCC)     Hyperlipidemia     Hx of seizure disorder     CKD (chronic kidney disease), stage IV (HCC)     Dementia     Chronic a-fib (HCC)     Arthritis     Melena 11/30/2018    Acute blood loss anemia 09/17/2018    PAF (paroxysmal atrial fibrillation) ([facility] 06/28/2018    Acute GI bleeding 02/07/2018    Dyslipidemia 10/24/2017    Pacemaker 10/24/2017    CAD (coronary artery disease)     CKD (chronic kidney disease) stage 4, GFR 15-29 ml/min ([facility]) 09/05/2017    Benign prostatic hyperplasia 09/05/2017    HTN (hypertension), benign 09/02/2011           MEDICATIONS     Current Facility-Administered Medications   Medication Dose Route Frequency    0.9% sodium chloride infusion 250 mL  250 mL IntraVENous PRN    ferrous gluconate 324 mg (38 mg iron) tablet 1 Tab  1 Tab Oral DAILY WITH BREAKFAST    carBAMazepine (TEGretol) tablet 200 mg  200 mg Oral Q8H    finasteride (PROSCAR) tablet 5 mg  5 mg Oral DAILY    tamsulosin (FLOMAX) capsule 0.4 mg  0.4 mg Oral DAILY AFTER BREAKFAST    pantoprazole (PROTONIX) tablet 40 mg  40 mg Oral ACB    amLODIPine (NORVASC) tablet 10 mg  10 mg Oral DAILY    sodium chloride (NS) flush 5-10 mL  5-10 mL IntraVENous Q8H    sodium chloride (NS) flush 5-10 mL  5-10 mL IntraVENous PRN    acetaminophen (TYLENOL) tablet 650 mg  650 mg Oral Q4H PRN    morphine injection 1 mg  1 mg IntraVENous Q4H PRN    naloxone (NARCAN) injection 0.4 mg  0.4 mg IntraVENous PRN    ondansetron (ZOFRAN) injection 4 mg  4 mg IntraVENous Q4H PRN           PAST MEDICAL HISTORY     Past Medical History:   Diagnosis Date    Arthritis     CAD (coronary artery disease)     hx mi    Chronic a-fib (HCC)     CKD (chronic kidney disease), stage IV (HCC)     Dementia     Hx of seizure disorder     Hyperlipidemia     Hypertension     Systolic CHF, chronic (HCC)            PAST SURGICAL HISTORY     Past Surgical History:   Procedure Laterality Date    COLONOSCOPY N/A 2017    COLONOSCOPY performed by John Conti MD at [address] [place] [address] COLONOSCOPY N/A 2018    COLONOSCOPY performed by Juany Babcock MD at [address] [place] [address] HX CATARACT REMOVAL      right    HX CORONARY ARTERY BYPASS GRAFT      x2 vessels    HX HEART CATHETERIZATION      HX PACEMAKER      right chest          ALLERGIES     No Known Allergies       FAMILY HISTORY     Family History   Problem Relation Age of Onset    Heart Disease Mother     Hypertension Mother     negative for cardiac disease       SOCIAL HISTORY     Social History     Socioeconomic History    Marital status:      Spouse name: Not on file    Number of children: Not on file    Years of education: Not on file    Highest education level: Not on file   Tobacco Use    Smoking status: Former Smoker     Packs/day: 0.50     Years: 40.00     Pack years: 20.00     Last attempt to quit: 1988     Years since quittin.4    Smokeless tobacco: Former User   Substance and Sexual Activity    Alcohol use: No    Drug use: No    Sexual activity: No         MEDICATIONS     Current Facility-Administered Medications   Medication Dose Route Frequency    0.9% sodium chloride infusion 250 mL  250 mL IntraVENous PRN    ferrous gluconate 324 mg (38 mg iron) tablet 1 Tab  1 Tab Oral DAILY WITH BREAKFAST    carBAMazepine (TEGretol) tablet 200 mg  200 mg Oral Q8H    finasteride (PROSCAR) tablet 5 mg  5 mg Oral DAILY    tamsulosin (FLOMAX) capsule 0.4 mg  0.4 mg Oral DAILY AFTER BREAKFAST    pantoprazole (PROTONIX) tablet 40 mg  40 mg Oral ACB    amLODIPine (NORVASC) tablet 10 mg  10 mg Oral DAILY    sodium chloride (NS) flush 5-10 mL  5-10 mL IntraVENous Q8H    sodium chloride (NS) flush 5-10 mL  5-10 mL IntraVENous PRN    acetaminophen (TYLENOL) tablet 650 mg  650 mg Oral Q4H PRN    morphine injection 1 mg  1 mg IntraVENous Q4H PRN    naloxone (NARCAN) injection 0.4 mg  0.4 mg IntraVENous PRN    ondansetron (ZOFRAN) injection 4 mg  4 mg IntraVENous Q4H PRN       I have reviewed the nurses notes, vitals, problem list, allergy list, medical history, family, social history and medications. REVIEW OF SYMPTOMS      General: Pt denies excessive weight gain or loss. Pt is able to conduct ADL's  HEENT: Denies blurred vision, headaches, hearing loss, epistaxis and difficulty swallowing. Respiratory: Denies cough, congestion, shortness of breath, COOPER, wheezing or stridor. Cardiovascular: Denies precordial pain, palpitations, edema or PND  Gastrointestinal: Denies poor appetite, indigestion, abdominal pain or blood in stool  Genitourinary: Denies hematuria, dysuria, increased urinary frequency  Musculoskeletal: Denies joint pain or swelling from muscles or joints  Neurologic: Denies tremor, paresthesias, headache, or sensory motor disturbance  Psychiatric: Denies confusion, insomnia, depression  Integumentray: Denies rash, itching or ulcers.   Hematologic: Denies easy bruising, bleeding       PHYSICAL EXAMINATION Vitals:    12/02/18 1107 12/02/18 1157 12/02/18 1310 12/02/18 1534   BP: 147/81 196/86 164/71 (!) 162/92   Pulse: 68 69 75 65   Resp: 16 16 16 17   Temp: 98.5 °F (36.9 °C) 98.6 °F (37 °C) 98.6 °F (37 °C) 99 °F (37.2 °C)   SpO2: 100% 100% 100% 100%   Weight:       Height:         General: Well developed, in no acute distress. HEENT: No jaundice, oral mucosa moist, no oral ulcers  Neck: Supple, no stiffness, no lymphadenopathy, supple  Heart:  Normal S1/S2 negative S3 or S4. Regular, no murmur, gallop or rub, no jugular venous distention  Respiratory: Clear bilaterally x 4, no wheezing or rales  Abdomen:   Soft, non-tender, bowel sounds are active.   Extremities:  No edema, normal cap refill, no cyanosis. Musculoskeletal: No clubbing, no deformities  Neuro: A&Ox3, speech clear, gait stable, cooperative, no focal neurologic deficits  Skin: Skin color is normal. No rashes or lesions. Non diaphoretic, moist.  Vascular: 2+ pulses symmetric in all extremities           LABORATORY DATA      Lab Results   Component Value Date/Time    WBC 4.1 12/02/2018 01:29 AM    Hemoglobin (POC) 7.5 (L) 09/07/2011 06:16 PM    HGB 8.1 (L) 12/02/2018 01:10 PM    Hematocrit (POC) 22 (L) 09/07/2011 06:16 PM    HCT 25.2 (L) 12/02/2018 01:10 PM    PLATELET 372 (L) 40/15/1899 01:29 AM    MCV 97.5 12/02/2018 01:29 AM      Lab Results   Component Value Date/Time    Sodium 140 12/02/2018 01:29 AM    Potassium 4.2 12/02/2018 01:29 AM    Chloride 108 12/02/2018 01:29 AM    CO2 21 12/02/2018 01:29 AM    Anion gap 11 12/02/2018 01:29 AM    Glucose 87 12/02/2018 01:29 AM    BUN 49 (H) 12/02/2018 01:29 AM    Creatinine 3.55 (H) 12/02/2018 01:29 AM    BUN/Creatinine ratio 14 12/02/2018 01:29 AM    GFR est AA 20 (L) 12/02/2018 01:29 AM    GFR est non-AA 17 (L) 12/02/2018 01:29 AM    Calcium 8.1 (L) 12/02/2018 01:29 AM    Bilirubin, total 0.1 (L) 12/01/2018 01:04 AM    AST (SGOT) 11 (L) 12/01/2018 01:04 AM    Alk.  phosphatase 87 12/01/2018 01:04 AM Protein, total 6.2 (L) 12/01/2018 01:04 AM    Albumin 2.7 (L) 12/01/2018 01:04 AM    Globulin 3.5 12/01/2018 01:04 AM    A-G Ratio 0.8 (L) 12/01/2018 01:04 AM    ALT (SGPT) 6 (L) 12/01/2018 01:04 AM           ASSESSMENT      1.  Atrial fibrillation                        A. Paroxysmal                        U. CHADSVASC 4                        C. HASBLED 4-5  2.  Hypertension  3.  Coronary artery disease, native  4.  CKD  5.  History of GI bleed  6.  Cardiomyopathy                        V. Nonischemic                        B. Resolved  7. CABG  8. History of gastric ulcer  9.  Pacemaker                        I. Dual-chamber                        I. Right sided                        C. Medtronic  10. Cardiomyopathy                        A. Resolved       PLAN     Would continue to hold aspirin and Plavix for now. Following completion of GI evaluation if it is deemed safe to resume dual antiplatelet therapy will do so however if felt to be high risk for bleeding recurrence will hold off on restarting these agents. Thank you, Radha Polk MD and Dr. Dedra Daniel for involving me in the care of this extraordinarily pleasant male. Please do not hesitate to contact me for further questions/concerns.          Meera Ervin MD  Cardiac Electrophysiology / Cardiology    [address] [place] [address], [facility], [address], [address] [place] [address]  [phone] / [phone] Fax   [phone] / [phone] Fax

## 2018-12-03 ENCOUNTER — ANESTHESIA EVENT (OUTPATIENT)
Dept: ENDOSCOPY | Age: 83
DRG: 378 | End: 2018-12-03
Payer: MEDICARE

## 2018-12-03 ENCOUNTER — ANESTHESIA (OUTPATIENT)
Dept: ENDOSCOPY | Age: 83
DRG: 378 | End: 2018-12-03
Payer: MEDICARE

## 2018-12-03 LAB
ABO + RH BLD: NORMAL
ANION GAP SERPL CALC-SCNC: 10 MMOL/L (ref 5–15)
BLD PROD TYP BPU: NORMAL
BLOOD GROUP ANTIBODIES SERPL: NORMAL
BPU ID: NORMAL
BUN SERPL-MCNC: 48 MG/DL (ref 6–20)
BUN/CREAT SERPL: 13 (ref 12–20)
CALCIUM SERPL-MCNC: 8.4 MG/DL (ref 8.5–10.1)
CHLORIDE SERPL-SCNC: 108 MMOL/L (ref 97–108)
CO2 SERPL-SCNC: 21 MMOL/L (ref 21–32)
CREAT SERPL-MCNC: 3.78 MG/DL (ref 0.7–1.3)
CROSSMATCH RESULT,%XM: NORMAL
ERYTHROCYTE [DISTWIDTH] IN BLOOD BY AUTOMATED COUNT: 15.6 % (ref 11.5–14.5)
GLUCOSE SERPL-MCNC: 87 MG/DL (ref 65–100)
HCT VFR BLD AUTO: 22.2 % (ref 36.6–50.3)
HGB BLD-MCNC: 7.2 G/DL (ref 12.1–17)
MAGNESIUM SERPL-MCNC: 2.1 MG/DL (ref 1.6–2.4)
MCH RBC QN AUTO: 30.8 PG (ref 26–34)
MCHC RBC AUTO-ENTMCNC: 32.4 G/DL (ref 30–36.5)
MCV RBC AUTO: 94.9 FL (ref 80–99)
NRBC # BLD: 0 K/UL (ref 0–0.01)
NRBC BLD-RTO: 0 PER 100 WBC
PLATELET # BLD AUTO: 142 K/UL (ref 150–400)
PMV BLD AUTO: 9.8 FL (ref 8.9–12.9)
POTASSIUM SERPL-SCNC: 4.1 MMOL/L (ref 3.5–5.1)
RBC # BLD AUTO: 2.34 M/UL (ref 4.1–5.7)
SODIUM SERPL-SCNC: 139 MMOL/L (ref 136–145)
SPECIMEN EXP DATE BLD: NORMAL
STATUS OF UNIT,%ST: NORMAL
UNIT DIVISION, %UDIV: 0
WBC # BLD AUTO: 4.7 K/UL (ref 4.1–11.1)

## 2018-12-03 PROCEDURE — 74011250637 HC RX REV CODE- 250/637: Performed by: INTERNAL MEDICINE

## 2018-12-03 PROCEDURE — 83735 ASSAY OF MAGNESIUM: CPT

## 2018-12-03 PROCEDURE — 74011000250 HC RX REV CODE- 250

## 2018-12-03 PROCEDURE — 85027 COMPLETE CBC AUTOMATED: CPT

## 2018-12-03 PROCEDURE — 74011250636 HC RX REV CODE- 250/636: Performed by: INTERNAL MEDICINE

## 2018-12-03 PROCEDURE — 65660000000 HC RM CCU STEPDOWN

## 2018-12-03 PROCEDURE — 76060000031 HC ANESTHESIA FIRST 0.5 HR: Performed by: INTERNAL MEDICINE

## 2018-12-03 PROCEDURE — 76040000019: Performed by: INTERNAL MEDICINE

## 2018-12-03 PROCEDURE — 36415 COLL VENOUS BLD VENIPUNCTURE: CPT

## 2018-12-03 PROCEDURE — 74011250636 HC RX REV CODE- 250/636

## 2018-12-03 PROCEDURE — 94760 N-INVAS EAR/PLS OXIMETRY 1: CPT

## 2018-12-03 PROCEDURE — 0DJ08ZZ INSPECTION OF UPPER INTESTINAL TRACT, VIA NATURAL OR ARTIFICIAL OPENING ENDOSCOPIC: ICD-10-PCS | Performed by: INTERNAL MEDICINE

## 2018-12-03 PROCEDURE — 80048 BASIC METABOLIC PNL TOTAL CA: CPT

## 2018-12-03 PROCEDURE — 74011000250 HC RX REV CODE- 250: Performed by: INTERNAL MEDICINE

## 2018-12-03 RX ORDER — PROPOFOL 10 MG/ML
INJECTION, EMULSION INTRAVENOUS
Status: DISCONTINUED | OUTPATIENT
Start: 2018-12-03 | End: 2018-12-03 | Stop reason: HOSPADM

## 2018-12-03 RX ORDER — LABETALOL HYDROCHLORIDE 5 MG/ML
INJECTION, SOLUTION INTRAVENOUS AS NEEDED
Status: DISCONTINUED | OUTPATIENT
Start: 2018-12-03 | End: 2018-12-03 | Stop reason: HOSPADM

## 2018-12-03 RX ORDER — NYSTATIN 100000 [USP'U]/ML
500000 SUSPENSION ORAL 4 TIMES DAILY
Status: DISCONTINUED | OUTPATIENT
Start: 2018-12-03 | End: 2018-12-04 | Stop reason: HOSPADM

## 2018-12-03 RX ORDER — SODIUM CHLORIDE 9 MG/ML
50 INJECTION, SOLUTION INTRAVENOUS CONTINUOUS
Status: DISCONTINUED | OUTPATIENT
Start: 2018-12-03 | End: 2018-12-03

## 2018-12-03 RX ORDER — POLYETHYLENE GLYCOL 3350 17 G/17G
17 POWDER, FOR SOLUTION ORAL DAILY
Status: DISCONTINUED | OUTPATIENT
Start: 2018-12-03 | End: 2018-12-04 | Stop reason: HOSPADM

## 2018-12-03 RX ORDER — DEXTROMETHORPHAN/PSEUDOEPHED 2.5-7.5/.8
1.2 DROPS ORAL
Status: DISCONTINUED | OUTPATIENT
Start: 2018-12-03 | End: 2018-12-03 | Stop reason: HOSPADM

## 2018-12-03 RX ORDER — FENTANYL CITRATE 50 UG/ML
100 INJECTION, SOLUTION INTRAMUSCULAR; INTRAVENOUS
Status: DISCONTINUED | OUTPATIENT
Start: 2018-12-03 | End: 2018-12-03 | Stop reason: HOSPADM

## 2018-12-03 RX ORDER — PROPOFOL 10 MG/ML
INJECTION, EMULSION INTRAVENOUS AS NEEDED
Status: DISCONTINUED | OUTPATIENT
Start: 2018-12-03 | End: 2018-12-03 | Stop reason: HOSPADM

## 2018-12-03 RX ORDER — LIDOCAINE HYDROCHLORIDE 20 MG/ML
INJECTION, SOLUTION EPIDURAL; INFILTRATION; INTRACAUDAL; PERINEURAL AS NEEDED
Status: DISCONTINUED | OUTPATIENT
Start: 2018-12-03 | End: 2018-12-03 | Stop reason: HOSPADM

## 2018-12-03 RX ORDER — EPINEPHRINE 0.1 MG/ML
1 INJECTION INTRACARDIAC; INTRAVENOUS
Status: DISCONTINUED | OUTPATIENT
Start: 2018-12-03 | End: 2018-12-03 | Stop reason: HOSPADM

## 2018-12-03 RX ORDER — FLUMAZENIL 0.1 MG/ML
0.2 INJECTION INTRAVENOUS
Status: DISCONTINUED | OUTPATIENT
Start: 2018-12-03 | End: 2018-12-03 | Stop reason: HOSPADM

## 2018-12-03 RX ORDER — NALOXONE HYDROCHLORIDE 0.4 MG/ML
0.4 INJECTION, SOLUTION INTRAMUSCULAR; INTRAVENOUS; SUBCUTANEOUS
Status: DISCONTINUED | OUTPATIENT
Start: 2018-12-03 | End: 2018-12-03 | Stop reason: HOSPADM

## 2018-12-03 RX ORDER — ATROPINE SULFATE 0.1 MG/ML
0.5 INJECTION INTRAVENOUS
Status: DISCONTINUED | OUTPATIENT
Start: 2018-12-03 | End: 2018-12-03 | Stop reason: HOSPADM

## 2018-12-03 RX ORDER — MIDAZOLAM HYDROCHLORIDE 1 MG/ML
.25-5 INJECTION, SOLUTION INTRAMUSCULAR; INTRAVENOUS
Status: DISCONTINUED | OUTPATIENT
Start: 2018-12-03 | End: 2018-12-03 | Stop reason: HOSPADM

## 2018-12-03 RX ADMIN — FERROUS GLUCONATE 1 TABLET: 324 TABLET ORAL at 09:25

## 2018-12-03 RX ADMIN — PROPOFOL 80 MG: 10 INJECTION, EMULSION INTRAVENOUS at 08:07

## 2018-12-03 RX ADMIN — NYSTATIN 500000 UNITS: 500000 SUSPENSION ORAL at 21:52

## 2018-12-03 RX ADMIN — NYSTATIN 500000 UNITS: 500000 SUSPENSION ORAL at 12:10

## 2018-12-03 RX ADMIN — SODIUM CHLORIDE: 9 INJECTION, SOLUTION INTRAVENOUS at 08:02

## 2018-12-03 RX ADMIN — POLYETHYLENE GLYCOL 3350 17 G: 17 POWDER, FOR SOLUTION ORAL at 09:24

## 2018-12-03 RX ADMIN — NYSTATIN 500000 UNITS: 500000 SUSPENSION ORAL at 17:52

## 2018-12-03 RX ADMIN — Medication 10 ML: at 14:31

## 2018-12-03 RX ADMIN — AMLODIPINE BESYLATE 10 MG: 5 TABLET ORAL at 09:25

## 2018-12-03 RX ADMIN — PANTOPRAZOLE SODIUM 40 MG: 40 TABLET, DELAYED RELEASE ORAL at 06:36

## 2018-12-03 RX ADMIN — LIDOCAINE HYDROCHLORIDE 40 MG: 20 INJECTION, SOLUTION EPIDURAL; INFILTRATION; INTRACAUDAL; PERINEURAL at 08:06

## 2018-12-03 RX ADMIN — LABETALOL HYDROCHLORIDE 10 MG: 5 INJECTION, SOLUTION INTRAVENOUS at 08:03

## 2018-12-03 RX ADMIN — FINASTERIDE 5 MG: 5 TABLET, FILM COATED ORAL at 09:25

## 2018-12-03 RX ADMIN — PROPOFOL 140 MCG/KG/MIN: 10 INJECTION, EMULSION INTRAVENOUS at 08:07

## 2018-12-03 RX ADMIN — NYSTATIN 500000 UNITS: 500000 SUSPENSION ORAL at 09:25

## 2018-12-03 RX ADMIN — CARBAMAZEPINE 200 MG: 200 TABLET ORAL at 02:05

## 2018-12-03 RX ADMIN — CARBAMAZEPINE 200 MG: 200 TABLET ORAL at 09:25

## 2018-12-03 RX ADMIN — Medication 5 ML: at 06:00

## 2018-12-03 RX ADMIN — Medication 10 ML: at 21:52

## 2018-12-03 RX ADMIN — TAMSULOSIN HYDROCHLORIDE 0.4 MG: 0.4 CAPSULE ORAL at 09:25

## 2018-12-03 RX ADMIN — CARBAMAZEPINE 200 MG: 200 TABLET ORAL at 17:52

## 2018-12-03 NOTE — PROGRESS NOTES
Bedside shift change report given to Ibeth Blevins RN (oncoming nurse) by Gerber Pollock RN (offgoing nurse). Report included the following information SBAR, Kardex and MAR.

## 2018-12-03 NOTE — ROUTINE PROCESS
Bedside shift change report given to Ivan Howell (oncoming nurse) by Nidia Kelsey (offgoing nurse). Report included the following information SBAR, Kardex, Procedure Summary, Intake/Output, MAR, Accordion, Recent Results and Med Rec Status.

## 2018-12-03 NOTE — ROUTINE PROCESS
Bedside shift change report given to [facility] (oncoming nurse) by Ly Venegas (offgoing nurse). Report included the following information SBAR.

## 2018-12-03 NOTE — PROGRESS NOTES
Cardiology Progress Note       5th floor NAME:  Cricket Murillo Sr.  
:   1935 MRN:   [id number] Assessment/Plan: 1. PAF s/p watchman 2. GI bleed: on asa/plavix. Will need GI input when deemed safe to resume asa/plavix. (goal was to keep pt on asa/plavix till 2019.  
3. Cardiomyopathy: no asa/arb 2/2 # 4.  
4. CKD: Stage IV 5. Anemia HGB 7.2 Subjective:  
Cricket Murillo Sr. is a 80 y. o. male with history of CAD/CABG, CKD stage IV, pacemaker, paroxysmal atrial fibrillation with previous episode of GI bleeding while on warfarin.  His anticoagulation was discontinued; he underwent WATCHMAN implant  and follow-up T at 45 days post implant which demonstrated a well-seated device without flow leaks. He was continued on aspirin and Plavix with an aim of continuing Plavix for 6 months or until February. He had He has a history of cardiomyopathy however this is resolved. He also has a history of CKD. For both of these reasons ACE inhibitor/arm therapy have been withheld. 
  
 
He was now hospitalized after noticing blood in his stool. He underwent blood transfusion. Endoscopy today showed: Esophagus:few mild fluffy white exudates c/w candidiasis, Stomach: normal , Duodenum/jejunum: normal.  He was prescribed oral nystatin and if no additional bleeding can be discharged tomorrow per Dr. Galicia [place],  
  
 
 
 
 
  
 
Cardiac ROS: Patient denies any exertional chest pain, dyspnea, palpitations, syncope, orthopnea, edema or paroxysmal nocturnal dyspnea. Previous Cardiac Eval 
Echo 10/10/17- LVEF 30%, global HK, PA systolic 54 Lexiscan Cardiolite 11/15/17 - Normal perfusion. EF 50%. 17: Medtronic Pacemaker 2018:Left atrial appendage occlusion utilizing WATCHMAN device per Dr. Sarita Baca Review of Systems: No nausea, indigestion, vomiting, pain, cough, sputum. No bleeding. Taking po. Objective:  
 
Visit Vitals /83 (BP 1 Location: Left arm, BP Patient Position: At rest) Pulse 60 Temp 97.3 °F (36.3 °C) Resp 15 Ht 5' 9\" (1.753 m) Wt 155 lb 10.3 oz (70.6 kg) SpO2 99% BMI 22.98 kg/m² O2 Flow Rate (L/min): 2 l/min O2 Device: Room air Temp (24hrs), Av.4 °F (36.9 °C), Min:97.3 °F (36.3 °C), Max:99 °F (37.2 °C) 
 
 
701 - 1900 In: 200 [I.V.:200] Out: -  
 
1901 - 700 In: 0 Out: 2175 [Urine:2175] TELE: AV paced General: AAOx3 cooperative, no acute distress. HEENT: Atraumatic. Pink and moist.  Anicteric sclerae. Neck : Supple,  
Lungs: CTA bilaterally. No wheezing/rhonchi/rales. Heart: Regular rhythm, no murmur. No JVD. No carotid bruits. Abdomen: Soft, non-distended, non-tender. + Bowel sounds. Extremities: No edema, no clubbing, no cyanosis. Neurologic: Grossly intact. Alert and oriented X 3. No acute neurological distress. Psych: Good insight. Not anxious or agitated. Care Plan discussed with: 
  Comments Patient x Family RN Care Manager Consultant:     
 
 
Data Review: No lab exists for component: ITNL Recent Labs 18 TROIQ 0.07* Recent Labs 18 
0211 18 1310 18 
0129  18 
0213 18 
0104 18   --  140  --   --  139 140  
K 4.1  --  4.2  --   --  4.3 4.6   --  108  --   --  109* 106 CO2 21  --  21  --   --  20* 21 BUN 48*  --  49*  --   --  50* 49* CREA 3.78*  --  3.55*  --   --  3.52* 3.66* GLU 87  --  87  --   --  101* 132* MG 2.1  --  2.1  --   --   --  2.3 ALB  --   --   --   --   --  2.7* 3.3* WBC 4.7  --  4.1  --  5.5  --  5.3 HGB 7.2* 8.1* 6.3*   < > 7.1*  --  8.7* HCT 22.2* 25.2* 19.7*   < > 22.4*  --  27.5*  
*  --  140*  --  164  --  185  
 < > = values in this interval not displayed. Recent Labs 18 INR 1.1 PTP 10.9 Medications reviewed Current Facility-Administered Medications Medication Dose Route Frequency  0.9% sodium chloride infusion  50 mL/hr IntraVENous CONTINUOUS  
 nystatin (MYCOSTATIN) 100,000 unit/mL oral suspension 500,000 Units  500,000 Units Oral QID  polyethylene glycol (MIRALAX) packet 17 g  17 g Oral DAILY  0.9% sodium chloride infusion 250 mL  250 mL IntraVENous PRN  
 ferrous gluconate 324 mg (38 mg iron) tablet 1 Tab  1 Tab Oral DAILY WITH BREAKFAST  carBAMazepine (TEGretol) tablet 200 mg  200 mg Oral Q8H  
 finasteride (PROSCAR) tablet 5 mg  5 mg Oral DAILY  tamsulosin (FLOMAX) capsule 0.4 mg  0.4 mg Oral DAILY AFTER BREAKFAST  pantoprazole (PROTONIX) tablet 40 mg  40 mg Oral ACB  amLODIPine (NORVASC) tablet 10 mg  10 mg Oral DAILY  sodium chloride (NS) flush 5-10 mL  5-10 mL IntraVENous Q8H  
 sodium chloride (NS) flush 5-10 mL  5-10 mL IntraVENous PRN  
 acetaminophen (TYLENOL) tablet 650 mg  650 mg Oral Q4H PRN  
 morphine injection 1 mg  1 mg IntraVENous Q4H PRN  
 naloxone (NARCAN) injection 0.4 mg  0.4 mg IntraVENous PRN  
 ondansetron (ZOFRAN) injection 4 mg  4 mg IntraVENous Q4H PRN Maria Teresa Wasserman NP

## 2018-12-03 NOTE — PROGRESS NOTES
Jaspal Hopkins [address] [facility] [address] YOB: 1935 Assessment & Plan:  
ARF/CKD 4 
· Stable UTO · Chronic · On finasteride and flomax and IC · To f/u with urology HTN Anemia · Iron stores ok · Would probably benefit from EPO, if he will follow up Subjective:  
CC: f/u ARF, CKD, UTO 
HPI: Renal function stable ROS: no sob/n/v Current Facility-Administered Medications Medication Dose Route Frequency  nystatin (MYCOSTATIN) 100,000 unit/mL oral suspension 500,000 Units  500,000 Units Oral QID  polyethylene glycol (MIRALAX) packet 17 g  17 g Oral DAILY  0.9% sodium chloride infusion 250 mL  250 mL IntraVENous PRN  
 ferrous gluconate 324 mg (38 mg iron) tablet 1 Tab  1 Tab Oral DAILY WITH BREAKFAST  carBAMazepine (TEGretol) tablet 200 mg  200 mg Oral Q8H  
 finasteride (PROSCAR) tablet 5 mg  5 mg Oral DAILY  tamsulosin (FLOMAX) capsule 0.4 mg  0.4 mg Oral DAILY AFTER BREAKFAST  pantoprazole (PROTONIX) tablet 40 mg  40 mg Oral ACB  amLODIPine (NORVASC) tablet 10 mg  10 mg Oral DAILY  sodium chloride (NS) flush 5-10 mL  5-10 mL IntraVENous Q8H  
 sodium chloride (NS) flush 5-10 mL  5-10 mL IntraVENous PRN  
 acetaminophen (TYLENOL) tablet 650 mg  650 mg Oral Q4H PRN  
 morphine injection 1 mg  1 mg IntraVENous Q4H PRN  
 naloxone (NARCAN) injection 0.4 mg  0.4 mg IntraVENous PRN  
 ondansetron (ZOFRAN) injection 4 mg  4 mg IntraVENous Q4H PRN Objective:  
 
Vitals: 
Blood pressure 180/83, pulse 60, temperature 97.3 °F (36.3 °C), resp. rate 15, height 5' 9\" (1.753 m), weight 70.6 kg (155 lb 10.3 oz), SpO2 99 %. Temp (24hrs), Av.4 °F (36.9 °C), Min:97.3 °F (36.3 °C), Max:99 °F (37.2 °C) Intake and Output: 
701 - 1900 In: 200 [I.V.:200] Out: -  
1901 -  0700 In: 0 Out: 2175 [Urine:2175] Physical Exam:              
GENERAL ASSESSMENT: NAD 
CHEST: CTA HEART: S1S2 ABDOMEN: Soft,NT 
EXTREMITY: no EDEMA 
 
 
   
ECG/rhythm: 
 
Data Review No results for input(s): TNIPOC in the last 72 hours. No lab exists for component: ITNL Recent Labs 11/30/18 1929 TROIQ 0.07* Recent Labs 12/03/18 
0211 12/02/18 1310 12/02/18 
0129  12/01/18 
0213 12/01/18 
0104 11/30/18 1929   --  140  --   --  139 140  
K 4.1  --  4.2  --   --  4.3 4.6   --  108  --   --  109* 106 CO2 21  --  21  --   --  20* 21 BUN 48*  --  49*  --   --  50* 49* CREA 3.78*  --  3.55*  --   --  3.52* 3.66* GLU 87  --  87  --   --  101* 132* MG 2.1  --  2.1  --   --   --  2.3 CA 8.4*  --  8.1*  --   --  7.9* 8.5 ALB  --   --   --   --   --  2.7* 3.3* WBC 4.7  --  4.1  --  5.5  --  5.3 HGB 7.2* 8.1* 6.3*   < > 7.1*  --  8.7* HCT 22.2* 25.2* 19.7*   < > 22.4*  --  27.5*  
*  --  140*  --  164  --  185  
 < > = values in this interval not displayed. Recent Labs 11/30/18 2020 INR 1.1 PTP 10.9 Needs: urine analysis, urine sodium, protein and creatinine Lab Results Component Value Date/Time Sodium,urine random 99 09/17/2018 02:33 PM  
 Creatinine, urine 47.50 09/17/2018 02:33 PM  
 
 
 
 
 
: Laura Ramirez MD 
12/3/2018 [facility]: 
[email or web address]. "[employer] [email or web address]."[employer] Kami De La [place] office: 
[address], [address] Phone: [phone] Fax :     [phone] [place] office: 
[address] [facility] [place],  [place]io Cesarin Mihai Flores Phone - [phone] Fax - [phone]

## 2018-12-03 NOTE — PROGRESS NOTES
Mala Tran . 
1935 
[id number] Situation: 
Verbal report received from:   Giovana Urban Procedure: Procedure(s): ESOPHAGOGASTRODUODENOSCOPY (EGD) Background: 
 
Preoperative diagnosis: melena Postoperative diagnosis: Esophageal bridgette :  Dr. Ines Benedict Assistant(s): Endoscopy Technician-1: Rafael Love Endoscopy RN-1: Zander Jones RN Specimens: * No specimens in log * H. Pylori  no Assessment: 
Intra-procedure medications Anesthesia gave intra-procedure sedation and medications, see anesthesia flow sheet Intravenous fluids: NS@ [facility] Vital signs stable   yes Abdominal assessment: round and soft   yes Recommendation: 
Discharge patient per MD order  inpatient. Return to floor  yes Family or Friend   None present Permission to share finding with family or friend yes

## 2018-12-03 NOTE — PERIOP NOTES
Fer Giraldo . 
1935 
[id number] Situation: 
 
Scheduled Procedure: Procedure(s): ESOPHAGOGASTRODUODENOSCOPY (EGD) Verbal report received from: Sonya White RN 
Preoperative diagnosis: melena Background: 
 
Procedure: Procedure(s): ESOPHAGOGASTRODUODENOSCOPY (EGD) Physician performing procedure; Dr. Abril Barclay SBAR QUESTIONS FLOOR TO ENDO RN 
 
NPO Status/Last PO Intake: Yes Pregnancy Test:Not applicable If yes, result: none Is the patient taking Blood Thinners: NO Is the patient diabetic:no Does the patient have a Pacemaker/Defibrillator in place?: yes Does the patient need antibiotics before/during/after procedure: no Is patient on CONTACT precautions:no Assessment: 
Are the vital signs stable prior to patient coming to ENDO?  yes Is the patient alert/oriented and able to sign consent for the procedures:yes How does the patient's abdomen feel prior to coming to ENDO? round and soft yes Does the patient have a patient IV in place? yes Recommendation: 
Family or Friend present no

## 2018-12-03 NOTE — PROGRESS NOTES
Bedside shift change report given to Chayo Urbano RN (oncoming nurse) by Kaitlin Retana RN (offgoing nurse). Report included the following information SBAR, Kardex and MAR.

## 2018-12-03 NOTE — PERIOP NOTES
Procedure being performed under MAC; Marco A Cage CRNA at bedside monitoring patient at 3014. See anesthesia notes. Endoscope was pre-cleaned at bedside immediately following procedure by Jorge Valle at [phone]. Care of patient assumed from the anesthesia provider at 7823. Patient tolerated procedure well. Abdomen remains soft and non tender post procedure, no complaints or indication of discomfort noted at this time. See anesthesia note. Patient transferred to Endoscopy Recovery and report given to recovery nurse Haven Moncada. recovery room RN. TRANSFER - OUT REPORT: 
 
Verbal report given to Ashleigh Crow (name) on [employer] .  being transferred to [facility](unit) for routine progression of care Report consisted of patients Situation, Background, Assessment and  
Recommendations(SBAR). Information from the following report(s) SBAR, Procedure Summary and Recent Results was reviewed with the receiving nurse. Lines:  
Peripheral IV 12/03/18 Right Wrist (Active) Dressing Status Clean, dry, & intact 12/3/2018  7:29 AM  
Dressing Type Transparent 12/3/2018  7:29 AM  
Hub Color/Line Status Pink; Infusing;Patent 12/3/2018  7:29 AM  
Action Taken Open ports on tubing capped 12/3/2018  7:29 AM  
Alcohol Cap Used Yes 12/3/2018  7:29 AM  
  
 
Opportunity for questions and clarification was provided.

## 2018-12-03 NOTE — PROGRESS NOTES
Reason for Admission:   Melena RRAT Score:     33 Resources/supports as identified by patient/family:   Pt has adequate family support. Pt currently lives with his son. Top Challenges facing patient (as identified by patient/family and CM): Finances/Medication cost?  No issues reported at this time. Transportation? No issues reported at this time. Support system or lack thereof? No issues reported at this time. Living arrangements? Pt lives with his son. Self-care/ADLs/Cognition? AtAt baseline Pt is able to manage ADLs with supervision. Current Advanced Directive/Advance Care Plan:  Not on file at this time. Plan for utilizing home health:    Pt has had [address] in the past.  
                   
Likelihood of readmission:  high Transition of Care Plan:  TBD Pt is a 79 yo male admitted for Melena. Pt lives with son in a  1 story home. Pt pharmacy of preference is [facility]. CM will continue to follow for discharge needs. Care Management Interventions PCP Verified by CM: Yes(Tyler Red, No NN) Palliative Care Criteria Met (RRAT>21 & CHF Dx)?: Yes Mode of Transport at Discharge: (Family private vehicle) Transition of Care Consult (CM Consult): Discharge Planning Physical Therapy Consult: Yes Occupational Therapy Consult: Yes Current Support Network: Family Lives Nearby(family) Confirm Follow Up Transport: Family Discharge Location Discharge Placement: (TBD) Dalton Griffiths, BS, [facility]

## 2018-12-03 NOTE — ANESTHESIA POSTPROCEDURE EVALUATION
Procedure(s): ESOPHAGOGASTRODUODENOSCOPY (EGD). Anesthesia Post Evaluation Multimodal analgesia: multimodal analgesia not used between 6 hours prior to anesthesia start to PACU discharge Patient location during evaluation: bedside Patient participation: complete - patient participated Level of consciousness: awake Pain management: adequate Airway patency: patent Anesthetic complications: no 
Cardiovascular status: acceptable Respiratory status: acceptable Hydration status: acceptable Post anesthesia nausea and vomiting:  none Visit Vitals /67 Pulse 60 Temp 36.8 °C (98.2 °F) Resp 13 Ht 5' 9\" (1.753 m) Wt 70.6 kg (155 lb 10.3 oz) SpO2 100% BMI 22.98 kg/m²

## 2018-12-03 NOTE — PROCEDURES
Brii Dick M.D. December 3, 2018    Esophagogastroduodenoscopy (EGD) Procedure Note  John Lehman Sr.  : 1935  José Miguel Hernandez Medical Record Number: [id number]      Indications:    Melena/hematochezia  Referring Physician:  Nadira Carballo MD  Anesthesia/Sedation: Conscious Sedation/Moderate Sedation  Endoscopist:  Dr. Felicita Aschoff; no surgical assistants  Permit:  The indications, risks, benefits and alternatives were reviewed with the patient or their decision maker who was provided an opportunity to ask questions and all questions were answered. The specific risks of esophagogastroduodenoscopy with conscious sedation were reviewed, including but not limited to anesthetic complication, bleeding, adverse drug reaction, missed lesion, infection, IV site reactions, and intestinal perforation which would lead to the need for surgical repair. Alternatives to EGD including radiographic imaging, observation without testing, or laboratory testing were reviewed as well as the limitations of those alternatives discussed. After considering the options and having all their questions answered, the patient or their decision maker provided both verbal and written consent to proceed. Procedure in Detail:  After obtaining informed consent, positioning of the patient in the left lateral decubitus position, and conduction of a pre-procedure pause or \"time out\" the endoscope was introduced into the mouth and advanced to the duodenum. A careful inspection was made, and findings or interventions are described below.     Findings:   Esophagus:few mild fluffy white exudates c/w candidiasis  Stomach: normal   Duodenum/jejunum: normal    Complications/estimated blood loss: none    Therapies:  none    Specimens: none           Recommendations:  -oral nystatin, PEG 3350; if no additional bleeding by tomorrow, can discharge  Thank you for entrusting me with this patient's care. Please do not hesitate to contact me with any questions or if I can be of assistance with any of your other patients' GI needs.   Signed By: Merline Bald, MD                        December 3, 2018

## 2018-12-03 NOTE — ANESTHESIA PREPROCEDURE EVALUATION
Anesthetic History No history of anesthetic complications Review of Systems / Medical History Patient summary reviewed, nursing notes reviewed and pertinent labs reviewed Pulmonary Within defined limits Neuro/Psych  
 
seizures Cardiovascular Hypertension: well controlled Dysrhythmias Past MI, CAD and CABG Exercise tolerance: <4 METS Comments: pacemaker GI/Hepatic/Renal 
  
GERD Renal disease: CRI Comments: Admitted with GIB, received blood for Hct 19; last Hct 22 Endo/Other Arthritis and anemia Other Findings Comments: Very frail elderly male, dementia, poor historian, admitted with GIB Physical Exam 
 
Airway Mallampati: II 
TM Distance: > 6 cm Neck ROM: normal range of motion Mouth opening: Normal 
 
 Cardiovascular Murmur: Grade 2 Dental 
 
Dentition: Poor dentition Pulmonary Breath sounds clear to auscultation Abdominal 
GI exam deferred Other Findings Anesthetic Plan ASA: 4 Anesthesia type: MAC Induction: Intravenous Anesthetic plan and risks discussed with: Patient

## 2018-12-03 NOTE — PROGRESS NOTES
[facility] Medical Progress Note NAME: Mariza Sena Sr.  
:  1935 MRM:  [id number] Date/Time: 12/3/2018  10:59 AM 
 
  
Assessment and Plan:  
 
Acute GI bleeding / Melena - POA, unclear cause. Holding ASA/Plavix. GI consulted. EGD this AM found only mild candidiasis. Clear diet. PPI. Check AM Hb, can go home if stable Bilateral hydronephrosis / Urinary obstruction / Benign prostatic hyperplasia - Noted  on post void residual.  Urology consulted. They did pelayo with irrigation. They will arranged outpatient follow up. Continue proscar and flomax Acute blood loss anemia - POA, also some anemia of CKD. Worse overnight. Check in AM and transfuse today for Hb<7.  EPO per renal.  Low iron on serologies, start PO iron for discharge CKD (chronic kidney disease) stage 4 - Slowly worsening baseline. Followed by renal outpatient. Nearing dialysis. Likely poor candidate due to CHF, dementia, etc.  Appreciate renal consult. Systolic CHF, chronic / CAD (coronary artery disease) / HTN (hypertension), benign - Stable. Holding ASA/Plavix due to bleed. Continue Norvasc. NOT on diuretic, BB, ACE or statin for unknown reasons. Consult cardiology to clarify this plan PAF (paroxysmal atrial fibrillation) / Pacemaker - Stable. Pacer Implanted 2017 (Dr Nam Brown at [facility]). Bleeding risk too high for coumadin. Dyslipidemia - PCP to follow. Not listing any statin Rx Hx of seizure disorder - PCP to follow. Continue carbamazapine Dementia - No diagnosis on chart, but likely based on my interview. Would benefit from outpatient neuropsych testing. Palliative consult to help address this issue. Arthritis - Stable. Tylenol prn. No NSAIDS Subjective: Chief Complaint:  Feels fine. Weak. Tolerated EGD 
 
ROS: 
(bold if positive, if negative) Tolerating some PT  Tolerating Diet Objective: Last 24hrs VS reviewed since prior progress note. Most recent are: 
 
Visit Vitals /83 (BP 1 Location: Left arm, BP Patient Position: At rest) Pulse 60 Temp 97.3 °F (36.3 °C) Resp 15 Ht 5' 9\" (1.753 m) Wt 70.6 kg (155 lb 10.3 oz) SpO2 99% BMI 22.98 kg/m² SpO2 Readings from Last 6 Encounters:  
12/03/18 99% 09/18/18 100% 08/29/18 100% 07/13/18 98% 06/29/18 97% 06/21/18 100% O2 Flow Rate (L/min): 2 l/min Intake/Output Summary (Last 24 hours) at 12/3/2018 1058 Last data filed at 12/3/2018 7579 Gross per 24 hour Intake 200 ml Output 1275 ml Net -1075 ml Physical Exam: 
 
Gen:  Frail, in no acute distress HEENT:  Pale conjunctivae, PERRL, hearing intact to voice, moist mucous membranes Neck:  Supple, without masses, thyroid non-tender Resp:  No accessory muscle use, clear breath sounds without wheezes rales or rhonchi 
Card:  No murmurs, normal S1, S2 without thrills, bruits or peripheral edema Abd:  Soft, non-tender, non-distended, normoactive bowel sounds are present, no mass Lymph:  No cervical or inguinal adenopathy Musc:  No cyanosis or clubbing Skin:  No rashes or ulcers, skin turgor is good Neuro:  Cranial nerves are grossly intact, mild motor weakness, follows commands vaguely Psych:  Poor insight, oriented to person, place Telemetry reviewed:   normal sinus rhythm, paced 
__________________________________________________________________ Medications Reviewed: (see below) Medications:  
 
Current Facility-Administered Medications Medication Dose Route Frequency  0.9% sodium chloride infusion  50 mL/hr IntraVENous CONTINUOUS  
 nystatin (MYCOSTATIN) 100,000 unit/mL oral suspension 500,000 Units  500,000 Units Oral QID  polyethylene glycol (MIRALAX) packet 17 g  17 g Oral DAILY  0.9% sodium chloride infusion 250 mL  250 mL IntraVENous PRN  
 ferrous gluconate 324 mg (38 mg iron) tablet 1 Tab  1 Tab Oral DAILY WITH BREAKFAST  carBAMazepine (TEGretol) tablet 200 mg  200 mg Oral Q8H  
 finasteride (PROSCAR) tablet 5 mg  5 mg Oral DAILY  tamsulosin (FLOMAX) capsule 0.4 mg  0.4 mg Oral DAILY AFTER BREAKFAST  pantoprazole (PROTONIX) tablet 40 mg  40 mg Oral ACB  amLODIPine (NORVASC) tablet 10 mg  10 mg Oral DAILY  sodium chloride (NS) flush 5-10 mL  5-10 mL IntraVENous Q8H  
 sodium chloride (NS) flush 5-10 mL  5-10 mL IntraVENous PRN  
 acetaminophen (TYLENOL) tablet 650 mg  650 mg Oral Q4H PRN  
 morphine injection 1 mg  1 mg IntraVENous Q4H PRN  
 naloxone (NARCAN) injection 0.4 mg  0.4 mg IntraVENous PRN  
 ondansetron (ZOFRAN) injection 4 mg  4 mg IntraVENous Q4H PRN Lab Data Reviewed: (see below) Lab Review:  
 
Recent Labs 12/03/18 
0211 12/02/18 1310 12/02/18 
0129  12/01/18 
9192 WBC 4.7  --  4.1  --  5.5 HGB 7.2* 8.1* 6.3*   < > 7.1*  
HCT 22.2* 25.2* 19.7*   < > 22.4*  
*  --  140*  --  164  
 < > = values in this interval not displayed. Recent Labs 12/03/18 
0211 12/02/18 
0129 12/01/18 
0104 11/30/18 
2020 11/30/18 
1929  140 139  --  140  
K 4.1 4.2 4.3  --  4.6  108 109*  --  106 CO2 21 21 20*  --  21  
GLU 87 87 101*  --  132* BUN 48* 49* 50*  --  49* CREA 3.78* 3.55* 3.52*  --  3.66* CA 8.4* 8.1* 7.9*  --  8.5 MG 2.1 2.1  --   --  2.3 ALB  --   --  2.7*  --  3.3* TBILI  --   --  0.1*  --  0.2 SGOT  --   --  11*  --  12* ALT  --   --  6*  --  10* INR  --   --   --  1.1  --   
 
Lab Results Component Value Date/Time Glucose (POC) 148 (H) 12/02/2018 11:09 AM  
 Glucose (POC) 128 (H) 09/07/2011 06:16 PM  
 Glucose (POC) 99 09/05/2011 12:17 AM  
 Glucose (POC) 170 (H) 09/02/2011 08:39 PM  
 Glucose (POC) 115 (H) 09/02/2011 07:42 AM  
 
No results for input(s): PH, PCO2, PO2, HCO3, FIO2 in the last 72 hours. Recent Labs 11/30/18 2020 INR 1.1 All Micro Results None I have reviewed notes of prior 24hr. Other pertinent lab: none Total time spent with patient: 45 Minutes Care Plan discussed with: Patient, Nursing Staff, Consultant/Specialist and >50% of time spent in counseling and coordination of care Discussed:  Care Plan Prophylaxis:  H2B/PPI Disposition:  Home w/Family 
        
___________________________________________________ Attending Physician: Adrian Chang MD

## 2018-12-04 VITALS
HEIGHT: 69 IN | RESPIRATION RATE: 18 BRPM | OXYGEN SATURATION: 100 % | HEART RATE: 66 BPM | DIASTOLIC BLOOD PRESSURE: 93 MMHG | WEIGHT: 158.4 LBS | BODY MASS INDEX: 23.46 KG/M2 | SYSTOLIC BLOOD PRESSURE: 203 MMHG | TEMPERATURE: 98.6 F

## 2018-12-04 LAB
ANION GAP SERPL CALC-SCNC: 11 MMOL/L (ref 5–15)
BUN SERPL-MCNC: 46 MG/DL (ref 6–20)
BUN/CREAT SERPL: 13 (ref 12–20)
CALCIUM SERPL-MCNC: 8.4 MG/DL (ref 8.5–10.1)
CHLORIDE SERPL-SCNC: 109 MMOL/L (ref 97–108)
CO2 SERPL-SCNC: 21 MMOL/L (ref 21–32)
CREAT SERPL-MCNC: 3.64 MG/DL (ref 0.7–1.3)
ERYTHROCYTE [DISTWIDTH] IN BLOOD BY AUTOMATED COUNT: 15.1 % (ref 11.5–14.5)
GLUCOSE SERPL-MCNC: 92 MG/DL (ref 65–100)
HCT VFR BLD AUTO: 23.7 % (ref 36.6–50.3)
HGB BLD-MCNC: 7.4 G/DL (ref 12.1–17)
MAGNESIUM SERPL-MCNC: 2.2 MG/DL (ref 1.6–2.4)
MCH RBC QN AUTO: 30.2 PG (ref 26–34)
MCHC RBC AUTO-ENTMCNC: 31.2 G/DL (ref 30–36.5)
MCV RBC AUTO: 96.7 FL (ref 80–99)
NRBC # BLD: 0 K/UL (ref 0–0.01)
NRBC BLD-RTO: 0 PER 100 WBC
PLATELET # BLD AUTO: 136 K/UL (ref 150–400)
PMV BLD AUTO: 10.2 FL (ref 8.9–12.9)
POTASSIUM SERPL-SCNC: 4.2 MMOL/L (ref 3.5–5.1)
RBC # BLD AUTO: 2.45 M/UL (ref 4.1–5.7)
SODIUM SERPL-SCNC: 141 MMOL/L (ref 136–145)
WBC # BLD AUTO: 5 K/UL (ref 4.1–11.1)

## 2018-12-04 PROCEDURE — 36415 COLL VENOUS BLD VENIPUNCTURE: CPT

## 2018-12-04 PROCEDURE — 74011250637 HC RX REV CODE- 250/637: Performed by: INTERNAL MEDICINE

## 2018-12-04 PROCEDURE — 76450000000

## 2018-12-04 PROCEDURE — 85027 COMPLETE CBC AUTOMATED: CPT

## 2018-12-04 PROCEDURE — 74011250636 HC RX REV CODE- 250/636: Performed by: INTERNAL MEDICINE

## 2018-12-04 PROCEDURE — 74011000250 HC RX REV CODE- 250: Performed by: INTERNAL MEDICINE

## 2018-12-04 PROCEDURE — 80048 BASIC METABOLIC PNL TOTAL CA: CPT

## 2018-12-04 PROCEDURE — 83735 ASSAY OF MAGNESIUM: CPT

## 2018-12-04 PROCEDURE — 94760 N-INVAS EAR/PLS OXIMETRY 1: CPT

## 2018-12-04 RX ORDER — FUROSEMIDE 10 MG/ML
20 INJECTION INTRAMUSCULAR; INTRAVENOUS ONCE
Status: COMPLETED | OUTPATIENT
Start: 2018-12-04 | End: 2018-12-04

## 2018-12-04 RX ORDER — FERROUS GLUCONATE 324(38)MG
324 TABLET ORAL
Qty: 30 TAB | Refills: 2 | Status: SHIPPED | OUTPATIENT
Start: 2018-12-04 | End: 2019-03-04

## 2018-12-04 RX ORDER — NYSTATIN 100000 [USP'U]/ML
500000 SUSPENSION ORAL 4 TIMES DAILY
Qty: 140 ML | Refills: 0 | Status: SHIPPED | OUTPATIENT
Start: 2018-12-04 | End: 2018-12-11

## 2018-12-04 RX ADMIN — TAMSULOSIN HYDROCHLORIDE 0.4 MG: 0.4 CAPSULE ORAL at 08:17

## 2018-12-04 RX ADMIN — Medication 10 ML: at 06:29

## 2018-12-04 RX ADMIN — FUROSEMIDE 20 MG: 10 INJECTION, SOLUTION INTRAMUSCULAR; INTRAVENOUS at 09:41

## 2018-12-04 RX ADMIN — POLYETHYLENE GLYCOL 3350 17 G: 17 POWDER, FOR SOLUTION ORAL at 08:18

## 2018-12-04 RX ADMIN — CARBAMAZEPINE 200 MG: 200 TABLET ORAL at 01:43

## 2018-12-04 RX ADMIN — CARBAMAZEPINE 200 MG: 200 TABLET ORAL at 09:41

## 2018-12-04 RX ADMIN — PANTOPRAZOLE SODIUM 40 MG: 40 TABLET, DELAYED RELEASE ORAL at 06:30

## 2018-12-04 RX ADMIN — AMLODIPINE BESYLATE 10 MG: 5 TABLET ORAL at 08:17

## 2018-12-04 RX ADMIN — FINASTERIDE 5 MG: 5 TABLET, FILM COATED ORAL at 08:17

## 2018-12-04 RX ADMIN — FERROUS GLUCONATE 1 TABLET: 324 TABLET ORAL at 08:17

## 2018-12-04 RX ADMIN — NYSTATIN 500000 UNITS: 500000 SUSPENSION ORAL at 08:17

## 2018-12-04 NOTE — DISCHARGE SUMMARY
Physician Discharge Summary     Patient ID:  Matthew Juares  [id number]  80 y.o.  1935    Admit date: 11/30/2018    Discharge date and time: 12/4/2018    Admission Diagnoses: Melena [K92.1]    Discharge Diagnoses:    Principal Diagnosis   Melena                                             Other Diagnoses    HTN (hypertension), benign (9/2/2011)    CKD (chronic kidney disease) stage 4, GFR 15-29 ml/min (HCC) (9/5/2017)    Benign prostatic hyperplasia (9/5/2017)    CAD (coronary artery disease) ()    Dyslipidemia (10/24/2017)    Pacemaker (10/24/2017)    Acute GI bleeding (2/7/2018)    [facility] (6/28/2018)    Acute blood loss anemia (9/17/2018)    Bilateral hydronephrosis (78/4/4612)    Systolic CHF, chronic (HCC) ()    Hyperlipidemia ()    Hx of seizure disorder ()    CKD (chronic kidney disease), stage IV (HCC) ()    Dementia ()    Chronic a-fib (HCC) ()    Arthritis ()     Hospital Course:   Acute GI bleeding / Melena - POA, unclear cause. Held ASA/Plavix, can resume at home. GI consulted. EGD 12/3 found only mild candidiasis, nystatin started. Back to regular diet. PPI. This AM Hb stable and he can go home     Bilateral hydronephrosis / Urinary obstruction / Benign prostatic hyperplasia - Noted 12/1 on post void residual.  Urology consulted. They did pelayo with irrigation. They will arranged outpatient follow up. Continue proscar and flomax     Acute blood loss anemia - POA, also some anemia of CKD. Stable overnight. EPO per renal.  Low iron on serologies, start PO iron for discharge     CKD (chronic kidney disease) stage 4 - Slowly worsening baseline. Followed by renal outpatient. Nearing dialysis. Likely poor candidate due to CHF, dementia, etc.  Appreciate renal consult.     Diastolic CHF, chronic / CAD (coronary artery disease) / HTN (hypertension), benign - Stable. Last EF was 60%. Holding ASA/Plavix due to bleed. Continue Norvasc.   NOT on diuretic, BB, ACE due to renal failure and bradycardia. Consulted cardiology who verified this plan.       PAF (paroxysmal atrial fibrillation) / Pacemaker - Stable. Pacer Implanted Feb 2017 (Dr Oliva Dancer at [place] [address]). Bleeding risk too high for coumadin.     Dyslipidemia - PCP to follow. Not listing any statin Rx. PCP to follow.     Hx of seizure disorder - PCP to follow. Continue carbamazapine     Dementia - No diagnosis on chart, but likely based on my interview. Would benefit from outpatient neuropsych testing. Palliative consult to help address this issue.     Arthritis - Stable. Tylenol prn. No NSAIDS     PCP: Dian Man MD    Consults: Cardiology, GI, Nephrology and Urology    Significant Diagnostic Studies: See Hospital Course    Discharged home in improved condition. Discharge Exam:  BP (!) 203/93   Pulse 66   Temp 98.6 °F (37 °C)   Resp 18   Ht 5' 9\" (1.753 m)   Wt 71.8 kg (158 lb 6.4 oz)   SpO2 100%   BMI 23.39 kg/m²      Gen:  Frail, in no acute distress  HEENT:  Pale conjunctivae, PERRL, hearing intact to voice, moist mucous membranes  Neck:  Supple, without masses, thyroid non-tender  Resp:  No accessory muscle use, clear breath sounds without wheezes rales or rhonchi  Card:  No murmurs, normal S1, S2 without thrills, bruits or peripheral edema  Abd:  Soft, non-tender, non-distended, normoactive bowel sounds are present, no mass  Lymph:  No cervical or inguinal adenopathy  Musc:  No cyanosis or clubbing  Skin:  No rashes or ulcers, skin turgor is good  Neuro:  Cranial nerves are grossly intact, mild motor weakness, follows commands vaguely  Psych:  Poor insight, oriented to person, place     Patient Instructions:   Current Discharge Medication List      START taking these medications    Details   ferrous gluconate 324 mg (38 mg iron) tablet Take 1 Tab by mouth daily (with breakfast) for 90 days.   Qty: 30 Tab, Refills: 2      nystatin (MYCOSTATIN) 100,000 unit/mL suspension Take 5 mL by mouth four (4) times daily for 7 days. swish and spit  Qty: 140 mL, Refills: 0         CONTINUE these medications which have NOT CHANGED    Details   acetaminophen (TYLENOL) 500 mg tablet Take 500 mg by mouth every six (6) hours as needed for Pain. amLODIPine (NORVASC) 10 mg tablet Take 10 mg by mouth daily. aspirin delayed-release 81 mg tablet Take 1 Tab by mouth daily. DO NOT TAKE for 1 WEEK  Qty: 30 Tab, Refills: 3      clopidogrel (PLAVIX) 75 mg tab Take 1 Tab by mouth daily. DO  NOT TAKE for 1 WEEK  Qty: 30 Tab, Refills: 6      tamsulosin (FLOMAX) 0.4 mg capsule Take 0.4 mg by mouth daily (after breakfast). pantoprazole (PROTONIX) 40 mg tablet Take 40 mg by mouth Daily (before breakfast). finasteride (PROSCAR) 5 mg tablet Take 1 Tab by mouth daily. Qty: 30 Tab, Refills: 1      carbamazepine (TEGRETOL) 200 mg tablet Take 200 mg by mouth every eight (8) hours. Activity: Activity as tolerated  Diet: Clear liquids, Low fat, Low cholesterol and Renal Diet  Wound Care: None needed    Follow-up with your PCP in a few days.   Follow-up tests/labs - none    Signed:  Anabel William MD  12/4/2018  8:39 AM

## 2018-12-04 NOTE — PROGRESS NOTES
PCP LELAND appt scheduled with Dr. Dustin Barrera on 12/5/2018 at 9:30am. Appt added to AVS. Dian Gonzales CM Specialist

## 2018-12-04 NOTE — PROGRESS NOTES
Bedside and Verbal shift change report given to Danni Cartwright RN (oncoming nurse) by Samanta Montalvo RN (offgoing nurse). Report included the following information SBAR, Kardex, MAR, Accordion and Recent Results.

## 2018-12-04 NOTE — PROGRESS NOTES
Attending physician notified of patient's elevated blood pressure. No new orders received. Will continue to monitor.

## 2018-12-04 NOTE — DISCHARGE INSTRUCTIONS
Patient Discharge Instructions    Elise Bateman / [id number] : 1935    Admitted 2018 Discharged: 2018     Primary Diagnoses  Problem List as of 2018 Date Reviewed: 2018           Bilateral hydronephrosis   Systolic CHF, chronic (HCC)   Hyperlipidemia   Hx of seizure disorder   CKD (chronic kidney disease), stage IV (HCC)   Dementia   Chronic a-fib (HCC)   Arthritis   * (Principal) Melena   Acute blood loss anemia   PAF (paroxysmal atrial fibrillation) (HCC)   Acute GI bleeding   Dyslipidemia   Pacemaker   CAD (coronary artery disease)   CKD (chronic kidney disease) stage 4, GFR 15-29 ml/min (HCC)   Benign prostatic hyperplasia   HTN (hypertension), benign          Take Home Medications     · It is important that you take the medication exactly as they are prescribed. · Keep your medication in the bottles provided by the pharmacist and keep a list of the medication names, dosages, and times to be taken in your wallet. · Do not take other medications without consulting your doctor. What to do at Home    Recommended diet: Cardiac Diet, Low fat, Low cholesterol and Renal Diet    Recommended activity: Activity as tolerated    If you experience worse symptoms, please follow up with GI, cardiology or renal doctors. Follow-up with your PCP in a few weeks        Information obtained by :  I understand that if any problems occur once I am at home I am to contact my physician. I understand and acknowledge receipt of the instructions indicated above.                                                                                                                                            Physician's or R.N.'s Signature                                                                  Date/Time                                                                                                                                              Patient or Representative Signature Date/Time

## 2018-12-04 NOTE — PROGRESS NOTES
Bedside and Verbal shift change report given to JOS Parker (oncoming nurse) by Caleb Bernard RN (offgoing nurse). Report included the following information SBAR, Kardex, MAR, Accordion and Recent Results.

## 2018-12-04 NOTE — PROGRESS NOTES
Gastrointestinal Progress Note 12/4/2018 Admit Date: 11/30/2018 Subjective:  
 
New Complaints Today: None. Denies abdominal pain, nausea or vomiting. Had a bowel movements and no visible blood. Current Facility-Administered Medications Medication Dose Route Frequency  furosemide (LASIX) injection 20 mg  20 mg IntraVENous ONCE  nystatin (MYCOSTATIN) 100,000 unit/mL oral suspension 500,000 Units  500,000 Units Oral QID  polyethylene glycol (MIRALAX) packet 17 g  17 g Oral DAILY  0.9% sodium chloride infusion 250 mL  250 mL IntraVENous PRN  
 ferrous gluconate 324 mg (38 mg iron) tablet 1 Tab  1 Tab Oral DAILY WITH BREAKFAST  carBAMazepine (TEGretol) tablet 200 mg  200 mg Oral Q8H  
 finasteride (PROSCAR) tablet 5 mg  5 mg Oral DAILY  tamsulosin (FLOMAX) capsule 0.4 mg  0.4 mg Oral DAILY AFTER BREAKFAST  pantoprazole (PROTONIX) tablet 40 mg  40 mg Oral ACB  amLODIPine (NORVASC) tablet 10 mg  10 mg Oral DAILY  sodium chloride (NS) flush 5-10 mL  5-10 mL IntraVENous Q8H  
 sodium chloride (NS) flush 5-10 mL  5-10 mL IntraVENous PRN  
 acetaminophen (TYLENOL) tablet 650 mg  650 mg Oral Q4H PRN  
 morphine injection 1 mg  1 mg IntraVENous Q4H PRN  
 naloxone (NARCAN) injection 0.4 mg  0.4 mg IntraVENous PRN  
 ondansetron (ZOFRAN) injection 4 mg  4 mg IntraVENous Q4H PRN Objective:  
 
Blood pressure (!) 203/93, pulse 66, temperature 98.6 °F (37 °C), resp. rate 18, height 5' 9\" (1.753 m), weight 71.8 kg (158 lb 6.4 oz), SpO2 100 %. No intake/output data recorded. 12/02 1901 - 12/04 0700 In: 200 [I.V.:200] Out: 1970 [[id number]] EXAM:  GENERAL: alert, cooperative, no distress, HEART: regular rate and rhythm LUNGS: clear to auscultation ABDOMEN:bowel sounds present, soft, nondistended, nontender Data Review Recent Results (from the past 24 hour(s)) CBC W/O DIFF Collection Time: 12/04/18  4:19 AM  
Result Value Ref Range WBC 5.0 4.1 - 11.1 K/uL  
 RBC 2.45 (L) 4.10 - 5.70 M/uL HGB 7.4 (L) 12.1 - 17.0 g/dL HCT 23.7 (L) 36.6 - 50.3 % MCV 96.7 80.0 - 99.0 FL  
 MCH 30.2 26.0 - 34.0 PG  
 MCHC 31.2 30.0 - 36.5 g/dL  
 RDW 15.1 (H) 11.5 - 14.5 % PLATELET 644 (L) 981 - 400 K/uL MPV 10.2 8.9 - 12.9 FL  
 NRBC 0.0 0  WBC ABSOLUTE NRBC 0.00 0.00 - 0.01 K/uL MAGNESIUM Collection Time: 12/04/18  4:19 AM  
Result Value Ref Range Magnesium 2.2 1.6 - 2.4 mg/dL METABOLIC PANEL, BASIC Collection Time: 12/04/18  4:19 AM  
Result Value Ref Range Sodium 141 136 - 145 mmol/L Potassium 4.2 3.5 - 5.1 mmol/L Chloride 109 (H) 97 - 108 mmol/L  
 CO2 21 21 - 32 mmol/L Anion gap 11 5 - 15 mmol/L Glucose 92 65 - 100 mg/dL BUN 46 (H) 6 - 20 MG/DL Creatinine 3.64 (H) 0.70 - 1.30 MG/DL  
 BUN/Creatinine ratio 13 12 - 20 GFR est AA 19 (L) >60 ml/min/1.73m2 GFR est non-AA 16 (L) >60 ml/min/1.73m2 Calcium 8.4 (L) 8.5 - 10.1 MG/DL Assessment:  
 
Principal Problem: 
  Melena (11/30/2018) Active Problems: 
  HTN (hypertension), benign (9/2/2011) CKD (chronic kidney disease) stage 4, GFR 15-29 ml/min ([facility]) (9/5/2017) Benign prostatic hyperplasia (9/5/2017) CAD (coronary artery disease) () Overview: 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 ([facility]) Dyslipidemia (10/24/2017) Pacemaker (10/24/2017) Overview: Implanted Feb 2017 (Dr Huang Cruz at [facility]) - [employer] Acute GI bleeding (2/7/2018) PAF (paroxysmal atrial fibrillation) ([facility] [address] (6/28/2018) Acute blood loss anemia (9/17/2018) Bilateral hydronephrosis (12/1/2018) Systolic CHF, chronic (HCC) () Hyperlipidemia () Hx of seizure disorder () 
 
  CKD (chronic kidney disease), stage IV (HCC) () Dementia () Chronic a-fib (HCC) () Arthritis () Plan:  
 
- Hgb remained stable and no further signs of bleeding 
- complete treatment with Nystatin for candidiasis - stable for discharge from GI standpoint Taylor oG PA-C 
12/04/18 
8:51 AM 
 
I have interviewed and examined patient with addendum to note above and formulation care plan to reflect my evaluation Blanca Myers M.D.

## 2018-12-04 NOTE — PROGRESS NOTES
Patient given discharge instructions and prescription. Patient verbalizes understanding of discharge instructions, follow up and medications. PIV removed. Patient assisted to pack belongings and dress. Patient's son driving patient to home.

## 2018-12-04 NOTE — PROGRESS NOTES
0720- Bedside and Verbal shift change report given to [facility] (oncoming nurse) by Shefali Mayers (offgoing nurse). Report included the following information SBAR, Kardex, Procedure Summary, Intake/Output, MAR and Recent Results. Pt observed in bed, watching TV,  on room air, denies any pain at this time. Will assess. 1542- call placed to Dr. Delia Uriarte regarding patients recent blood pressure and no current prn blood pressure medications. Awaiting a return call. Will call again. Will continue to monitor patient closely. 46- Dr. Delia Uriarte on unit and updated regarding patients elevated BP stated that he would place an order for some lasix. Anticipate new orders. 6956- In patient room, JASMIN Reyes PA, with patient at this time. [facility], in patient room going over discharge instructions. Patient stated they did not need anything at this time. Awaiting patients son arrival for discharge.

## 2018-12-04 NOTE — ADVANCED PRACTICE NURSE
Pt has been discharged, asa/plavix have been resumed. Has distal follow up with Dr Lizeth Garcia. Agree with discharge today.

## 2018-12-04 NOTE — PROGRESS NOTES
[facility] Medical Progress Note NAME: Nate Silva Sr.  
:  1935 MRM:  [id number] Date/Time: 2018  8:31 AM 
 
  
Assessment and Plan:  
 
Acute GI bleeding / Melena - POA, unclear cause. Held ASA/Plavix, can resume at home. GI consulted. EGD 12/3 found only mild candidiasis, nystatin started. Back to regular diet. PPI. This AM Hb stable and he can go home Bilateral hydronephrosis / Urinary obstruction / Benign prostatic hyperplasia - Noted  on post void residual.  Urology consulted. They did pelayo with irrigation. They will arranged outpatient follow up. Continue proscar and flomax Acute blood loss anemia - POA, also some anemia of CKD. Stable overnight. EPO per renal.  Low iron on serologies, start PO iron for discharge CKD (chronic kidney disease) stage 4 - Slowly worsening baseline. Followed by renal outpatient. Nearing dialysis. Likely poor candidate due to CHF, dementia, etc.  Appreciate renal consult. Diastolic CHF, chronic / CAD (coronary artery disease) / HTN (hypertension), benign - Stable. Last EF was 60%. Holding ASA/Plavix due to bleed. Continue Norvasc. NOT on diuretic, BB, ACE due to renal failure and bradycardia. Consulted cardiology who verified this plan. PAF (paroxysmal atrial fibrillation) / Pacemaker - Stable. Pacer Implanted 2017 (Dr Dev Barber at [facility] Bleeding risk too high for coumadin. Dyslipidemia - PCP to follow. Not listing any statin Rx. PCP to follow. Hx of seizure disorder - PCP to follow. Continue carbamazapine Dementia - No diagnosis on chart, but likely based on my interview. Would benefit from outpatient neuropsych testing. Palliative consult to help address this issue. Arthritis - Stable. Tylenol prn. No NSAIDS Subjective: Chief Complaint:  Feels fine. Wants to go home ROS: 
(bold if positive, if negative) Tolerating some PT  Tolerating Diet Objective: Last 24hrs VS reviewed since prior progress note. Most recent are: 
 
Visit Vitals BP (!) 203/93 Pulse 66 Temp 98.6 °F (37 °C) Resp 18 Ht 5' 9\" (1.753 m) Wt 71.8 kg (158 lb 6.4 oz) SpO2 100% BMI 23.39 kg/m² SpO2 Readings from Last 6 Encounters:  
12/04/18 100% 09/18/18 100% 08/29/18 100% 07/13/18 98% 06/29/18 97% 06/21/18 100% O2 Flow Rate (L/min): 2 l/min Intake/Output Summary (Last 24 hours) at 12/4/2018 0831 Last data filed at 12/4/2018 0700 Gross per 24 hour Intake 0 ml Output 1270 ml Net -1270 ml Physical Exam: 
 
Gen:  Frail, in no acute distress HEENT:  Pale conjunctivae, PERRL, hearing intact to voice, moist mucous membranes Neck:  Supple, without masses, thyroid non-tender Resp:  No accessory muscle use, clear breath sounds without wheezes rales or rhonchi 
Card:  No murmurs, normal S1, S2 without thrills, bruits or peripheral edema Abd:  Soft, non-tender, non-distended, normoactive bowel sounds are present, no mass Lymph:  No cervical or inguinal adenopathy Musc:  No cyanosis or clubbing Skin:  No rashes or ulcers, skin turgor is good Neuro:  Cranial nerves are grossly intact, mild motor weakness, follows commands vaguely Psych:  Poor insight, oriented to person, place Telemetry reviewed:   normal sinus rhythm, paced 
__________________________________________________________________ Medications Reviewed: (see below) Medications:  
 
Current Facility-Administered Medications Medication Dose Route Frequency  furosemide (LASIX) injection 20 mg  20 mg IntraVENous ONCE  nystatin (MYCOSTATIN) 100,000 unit/mL oral suspension 500,000 Units  500,000 Units Oral QID  polyethylene glycol (MIRALAX) packet 17 g  17 g Oral DAILY  0.9% sodium chloride infusion 250 mL  250 mL IntraVENous PRN  
 ferrous gluconate 324 mg (38 mg iron) tablet 1 Tab  1 Tab Oral DAILY WITH BREAKFAST  carBAMazepine (TEGretol) tablet 200 mg  200 mg Oral Q8H  
  finasteride (PROSCAR) tablet 5 mg  5 mg Oral DAILY  tamsulosin (FLOMAX) capsule 0.4 mg  0.4 mg Oral DAILY AFTER BREAKFAST  pantoprazole (PROTONIX) tablet 40 mg  40 mg Oral ACB  amLODIPine (NORVASC) tablet 10 mg  10 mg Oral DAILY  sodium chloride (NS) flush 5-10 mL  5-10 mL IntraVENous Q8H  
 sodium chloride (NS) flush 5-10 mL  5-10 mL IntraVENous PRN  
 acetaminophen (TYLENOL) tablet 650 mg  650 mg Oral Q4H PRN  
 morphine injection 1 mg  1 mg IntraVENous Q4H PRN  
 naloxone (NARCAN) injection 0.4 mg  0.4 mg IntraVENous PRN  
 ondansetron (ZOFRAN) injection 4 mg  4 mg IntraVENous Q4H PRN Lab Data Reviewed: (see below) Lab Review:  
 
Recent Labs 12/04/18 
0419 12/03/18 
0211 12/02/18 1310 12/02/18 
0129 WBC 5.0 4.7  --  4.1 HGB 7.4* 7.2* 8.1* 6.3* HCT 23.7* 22.2* 25.2* 19.7* * 142*  --  140* Recent Labs 12/04/18 
8067 12/03/18 
0211 12/02/18 
0129  139 140  
K 4.2 4.1 4.2 * 108 108 CO2 21 21 21 GLU 92 87 87 BUN 46* 48* 49* CREA 3.64* 3.78* 3.55* CA 8.4* 8.4* 8.1*  
MG 2.2 2.1 2.1 Lab Results Component Value Date/Time Glucose (POC) 148 (H) 12/02/2018 11:09 AM  
 Glucose (POC) 128 (H) 09/07/2011 06:16 PM  
 Glucose (POC) 99 09/05/2011 12:17 AM  
 Glucose (POC) 170 (H) 09/02/2011 08:39 PM  
 Glucose (POC) 115 (H) 09/02/2011 07:42 AM  
 
No results for input(s): PH, PCO2, PO2, HCO3, FIO2 in the last 72 hours. No results for input(s): INR in the last 72 hours. No lab exists for component: INREXT, INREXT All Micro Results None I have reviewed notes of prior 24hr. Other pertinent lab: none Total time spent with patient: 45 Minutes Care Plan discussed with: Patient, Nursing Staff, Consultant/Specialist and >50% of time spent in counseling and coordination of care Discussed:  Care Plan Prophylaxis:  H2B/PPI Disposition:  Home w/Family ___________________________________________________ Attending Physician: Zohaib Moser MD

## 2019-04-02 ENCOUNTER — TELEPHONE (OUTPATIENT)
Dept: CARDIOLOGY CLINIC | Age: 84
End: 2019-04-02

## 2019-04-02 NOTE — TELEPHONE ENCOUNTER
Spoke with patient. Reviewed with patient instructions to discontinue plavix as instructed pre Mackenzie Apodaca. Will also send letter to home address. ----- Message from Alexia Chen NP sent at 4/2/2019  8:49 AM EDT -----  Please let Mr. Steven Tong know he can stop his plavix. Continue Aspirin. Thanks!

## 2019-10-06 ENCOUNTER — HOSPITAL ENCOUNTER (INPATIENT)
Age: 84
LOS: 3 days | Discharge: HOME HEALTH | DRG: 683 | End: 2019-10-09
Attending: EMERGENCY MEDICINE | Admitting: INTERNAL MEDICINE
Payer: MEDICARE

## 2019-10-06 ENCOUNTER — APPOINTMENT (OUTPATIENT)
Dept: GENERAL RADIOLOGY | Age: 84
DRG: 683 | End: 2019-10-06
Attending: NURSE PRACTITIONER
Payer: MEDICARE

## 2019-10-06 ENCOUNTER — APPOINTMENT (OUTPATIENT)
Dept: ULTRASOUND IMAGING | Age: 84
DRG: 683 | End: 2019-10-06
Attending: INTERNAL MEDICINE
Payer: MEDICARE

## 2019-10-06 ENCOUNTER — APPOINTMENT (OUTPATIENT)
Dept: CT IMAGING | Age: 84
DRG: 683 | End: 2019-10-06
Attending: NURSE PRACTITIONER
Payer: MEDICARE

## 2019-10-06 DIAGNOSIS — N17.9 ACUTE RENAL FAILURE SUPERIMPOSED ON STAGE 4 CHRONIC KIDNEY DISEASE, UNSPECIFIED ACUTE RENAL FAILURE TYPE (HCC): Primary | ICD-10-CM

## 2019-10-06 DIAGNOSIS — I50.21 ACUTE SYSTOLIC CONGESTIVE HEART FAILURE (HCC): ICD-10-CM

## 2019-10-06 DIAGNOSIS — N18.4 ACUTE RENAL FAILURE SUPERIMPOSED ON STAGE 4 CHRONIC KIDNEY DISEASE, UNSPECIFIED ACUTE RENAL FAILURE TYPE (HCC): Primary | ICD-10-CM

## 2019-10-06 PROBLEM — D62 ACUTE BLOOD LOSS ANEMIA: Status: RESOLVED | Noted: 2018-09-17 | Resolved: 2019-10-06

## 2019-10-06 PROBLEM — K92.2 ACUTE GI BLEEDING: Status: RESOLVED | Noted: 2018-02-07 | Resolved: 2019-10-06

## 2019-10-06 LAB
ANION GAP SERPL CALC-SCNC: 13 MMOL/L (ref 5–15)
BASOPHILS # BLD: 0 K/UL (ref 0–0.1)
BASOPHILS NFR BLD: 0 % (ref 0–1)
BNP SERPL-MCNC: ABNORMAL PG/ML
BUN SERPL-MCNC: 63 MG/DL (ref 6–20)
BUN/CREAT SERPL: 10 (ref 12–20)
CALCIUM SERPL-MCNC: 8.8 MG/DL (ref 8.5–10.1)
CHLORIDE SERPL-SCNC: 108 MMOL/L (ref 97–108)
CO2 SERPL-SCNC: 19 MMOL/L (ref 21–32)
COMMENT, HOLDF: NORMAL
CREAT SERPL-MCNC: 6.41 MG/DL (ref 0.7–1.3)
DIFFERENTIAL METHOD BLD: ABNORMAL
EOSINOPHIL # BLD: 0 K/UL (ref 0–0.4)
EOSINOPHIL NFR BLD: 0 % (ref 0–7)
ERYTHROCYTE [DISTWIDTH] IN BLOOD BY AUTOMATED COUNT: 14.6 % (ref 11.5–14.5)
ERYTHROCYTE [SEDIMENTATION RATE] IN BLOOD: 60 MM/HR (ref 0–20)
GLUCOSE SERPL-MCNC: 117 MG/DL (ref 65–100)
HCT VFR BLD AUTO: 32 % (ref 36.6–50.3)
HGB BLD-MCNC: 9.8 G/DL (ref 12.1–17)
IMM GRANULOCYTES # BLD AUTO: 0 K/UL (ref 0–0.04)
IMM GRANULOCYTES NFR BLD AUTO: 0 % (ref 0–0.5)
LYMPHOCYTES # BLD: 0.2 K/UL (ref 0.8–3.5)
LYMPHOCYTES NFR BLD: 4 % (ref 12–49)
MCH RBC QN AUTO: 31.3 PG (ref 26–34)
MCHC RBC AUTO-ENTMCNC: 30.6 G/DL (ref 30–36.5)
MCV RBC AUTO: 102.2 FL (ref 80–99)
MONOCYTES # BLD: 0.4 K/UL (ref 0–1)
MONOCYTES NFR BLD: 9 % (ref 5–13)
NEUTS SEG # BLD: 4.3 K/UL (ref 1.8–8)
NEUTS SEG NFR BLD: 87 % (ref 32–75)
NRBC # BLD: 0 K/UL (ref 0–0.01)
NRBC BLD-RTO: 0 PER 100 WBC
PLATELET # BLD AUTO: 113 K/UL (ref 150–400)
PMV BLD AUTO: 10.4 FL (ref 8.9–12.9)
POTASSIUM SERPL-SCNC: 4.6 MMOL/L (ref 3.5–5.1)
RBC # BLD AUTO: 3.13 M/UL (ref 4.1–5.7)
RBC MORPH BLD: ABNORMAL
SAMPLES BEING HELD,HOLD: NORMAL
SODIUM SERPL-SCNC: 140 MMOL/L (ref 136–145)
TROPONIN I SERPL-MCNC: 0.07 NG/ML
TSH SERPL DL<=0.05 MIU/L-ACNC: 2.49 UIU/ML (ref 0.36–3.74)
WBC # BLD AUTO: 4.9 K/UL (ref 4.1–11.1)

## 2019-10-06 PROCEDURE — 84484 ASSAY OF TROPONIN QUANT: CPT

## 2019-10-06 PROCEDURE — 85025 COMPLETE CBC W/AUTO DIFF WBC: CPT

## 2019-10-06 PROCEDURE — 84443 ASSAY THYROID STIM HORMONE: CPT

## 2019-10-06 PROCEDURE — 74011000250 HC RX REV CODE- 250: Performed by: NURSE PRACTITIONER

## 2019-10-06 PROCEDURE — 74011000258 HC RX REV CODE- 258: Performed by: INTERNAL MEDICINE

## 2019-10-06 PROCEDURE — 71046 X-RAY EXAM CHEST 2 VIEWS: CPT

## 2019-10-06 PROCEDURE — 85652 RBC SED RATE AUTOMATED: CPT

## 2019-10-06 PROCEDURE — 83880 ASSAY OF NATRIURETIC PEPTIDE: CPT

## 2019-10-06 PROCEDURE — 74176 CT ABD & PELVIS W/O CONTRAST: CPT

## 2019-10-06 PROCEDURE — 36415 COLL VENOUS BLD VENIPUNCTURE: CPT

## 2019-10-06 PROCEDURE — 74011250636 HC RX REV CODE- 250/636: Performed by: INTERNAL MEDICINE

## 2019-10-06 PROCEDURE — 74011250637 HC RX REV CODE- 250/637: Performed by: INTERNAL MEDICINE

## 2019-10-06 PROCEDURE — 76770 US EXAM ABDO BACK WALL COMP: CPT

## 2019-10-06 PROCEDURE — 65660000000 HC RM CCU STEPDOWN

## 2019-10-06 PROCEDURE — 94640 AIRWAY INHALATION TREATMENT: CPT

## 2019-10-06 PROCEDURE — 94761 N-INVAS EAR/PLS OXIMETRY MLT: CPT

## 2019-10-06 PROCEDURE — 80048 BASIC METABOLIC PNL TOTAL CA: CPT

## 2019-10-06 PROCEDURE — 51702 INSERT TEMP BLADDER CATH: CPT

## 2019-10-06 PROCEDURE — 99284 EMERGENCY DEPT VISIT MOD MDM: CPT

## 2019-10-06 RX ORDER — FINASTERIDE 5 MG/1
5 TABLET, FILM COATED ORAL DAILY
Status: ON HOLD | COMMUNITY
End: 2019-10-09 | Stop reason: SDUPTHER

## 2019-10-06 RX ORDER — CARBAMAZEPINE 200 MG/1
200 TABLET ORAL DAILY
Status: DISCONTINUED | OUTPATIENT
Start: 2019-10-06 | End: 2019-10-09 | Stop reason: HOSPADM

## 2019-10-06 RX ORDER — DEXTROSE MONOHYDRATE AND SODIUM CHLORIDE 5; .45 G/100ML; G/100ML
50 INJECTION, SOLUTION INTRAVENOUS CONTINUOUS
Status: DISCONTINUED | OUTPATIENT
Start: 2019-10-06 | End: 2019-10-07

## 2019-10-06 RX ORDER — HYDRALAZINE HYDROCHLORIDE 20 MG/ML
10 INJECTION INTRAMUSCULAR; INTRAVENOUS
Status: DISCONTINUED | OUTPATIENT
Start: 2019-10-06 | End: 2019-10-09 | Stop reason: HOSPADM

## 2019-10-06 RX ORDER — ONDANSETRON 2 MG/ML
2 INJECTION INTRAMUSCULAR; INTRAVENOUS
Status: DISCONTINUED | OUTPATIENT
Start: 2019-10-06 | End: 2019-10-09 | Stop reason: HOSPADM

## 2019-10-06 RX ORDER — FINASTERIDE 5 MG/1
5 TABLET, FILM COATED ORAL DAILY
Status: DISCONTINUED | OUTPATIENT
Start: 2019-10-06 | End: 2019-10-09 | Stop reason: HOSPADM

## 2019-10-06 RX ORDER — HYDROCODONE BITARTRATE AND ACETAMINOPHEN 5; 325 MG/1; MG/1
1 TABLET ORAL
Status: DISCONTINUED | OUTPATIENT
Start: 2019-10-06 | End: 2019-10-09 | Stop reason: HOSPADM

## 2019-10-06 RX ORDER — ASPIRIN 81 MG/1
81 TABLET ORAL DAILY
Status: DISCONTINUED | OUTPATIENT
Start: 2019-10-07 | End: 2019-10-09 | Stop reason: HOSPADM

## 2019-10-06 RX ORDER — SODIUM CHLORIDE 0.9 % (FLUSH) 0.9 %
5-40 SYRINGE (ML) INJECTION AS NEEDED
Status: DISCONTINUED | OUTPATIENT
Start: 2019-10-06 | End: 2019-10-09 | Stop reason: HOSPADM

## 2019-10-06 RX ORDER — SODIUM CHLORIDE 0.9 % (FLUSH) 0.9 %
5-40 SYRINGE (ML) INJECTION EVERY 8 HOURS
Status: DISCONTINUED | OUTPATIENT
Start: 2019-10-06 | End: 2019-10-09 | Stop reason: HOSPADM

## 2019-10-06 RX ORDER — TAMSULOSIN HYDROCHLORIDE 0.4 MG/1
0.4 CAPSULE ORAL
Status: DISCONTINUED | OUTPATIENT
Start: 2019-10-07 | End: 2019-10-09 | Stop reason: HOSPADM

## 2019-10-06 RX ORDER — ACETAMINOPHEN 500 MG
500 TABLET ORAL
Status: DISCONTINUED | OUTPATIENT
Start: 2019-10-06 | End: 2019-10-09 | Stop reason: HOSPADM

## 2019-10-06 RX ORDER — ASPIRIN 81 MG/1
81 TABLET ORAL DAILY
COMMUNITY
End: 2019-10-09

## 2019-10-06 RX ORDER — NALOXONE HYDROCHLORIDE 0.4 MG/ML
0.4 INJECTION, SOLUTION INTRAMUSCULAR; INTRAVENOUS; SUBCUTANEOUS AS NEEDED
Status: DISCONTINUED | OUTPATIENT
Start: 2019-10-06 | End: 2019-10-09 | Stop reason: HOSPADM

## 2019-10-06 RX ORDER — BISACODYL 5 MG
5 TABLET, DELAYED RELEASE (ENTERIC COATED) ORAL DAILY PRN
Status: DISCONTINUED | OUTPATIENT
Start: 2019-10-06 | End: 2019-10-09 | Stop reason: HOSPADM

## 2019-10-06 RX ORDER — NICOTINE 7MG/24HR
1 PATCH, TRANSDERMAL 24 HOURS TRANSDERMAL
Status: DISCONTINUED | OUTPATIENT
Start: 2019-10-06 | End: 2019-10-09 | Stop reason: HOSPADM

## 2019-10-06 RX ORDER — PANTOPRAZOLE SODIUM 40 MG/1
40 TABLET, DELAYED RELEASE ORAL
Status: DISCONTINUED | OUTPATIENT
Start: 2019-10-07 | End: 2019-10-09 | Stop reason: HOSPADM

## 2019-10-06 RX ORDER — IPRATROPIUM BROMIDE AND ALBUTEROL SULFATE 2.5; .5 MG/3ML; MG/3ML
3 SOLUTION RESPIRATORY (INHALATION)
Status: COMPLETED | OUTPATIENT
Start: 2019-10-06 | End: 2019-10-06

## 2019-10-06 RX ORDER — AMLODIPINE BESYLATE 5 MG/1
10 TABLET ORAL DAILY
Status: DISCONTINUED | OUTPATIENT
Start: 2019-10-06 | End: 2019-10-09 | Stop reason: HOSPADM

## 2019-10-06 RX ADMIN — Medication 10 ML: at 15:50

## 2019-10-06 RX ADMIN — FINASTERIDE 5 MG: 5 TABLET, FILM COATED ORAL at 15:49

## 2019-10-06 RX ADMIN — IPRATROPIUM BROMIDE AND ALBUTEROL SULFATE 3 ML: .5; 3 SOLUTION RESPIRATORY (INHALATION) at 10:35

## 2019-10-06 RX ADMIN — Medication 10 ML: at 22:11

## 2019-10-06 RX ADMIN — HYDRALAZINE HYDROCHLORIDE 10 MG: 20 INJECTION INTRAMUSCULAR; INTRAVENOUS at 16:54

## 2019-10-06 RX ADMIN — DEXTROSE MONOHYDRATE AND SODIUM CHLORIDE 50 ML/HR: 5; .45 INJECTION, SOLUTION INTRAVENOUS at 17:23

## 2019-10-06 RX ADMIN — AMLODIPINE BESYLATE 10 MG: 5 TABLET ORAL at 15:48

## 2019-10-06 RX ADMIN — CARBAMAZEPINE 200 MG: 200 TABLET ORAL at 15:49

## 2019-10-06 NOTE — ED PROVIDER NOTES
Initial Complaint: URI symptoms    Started: 3 days    Endorses: cough occasionally productive, weakness, \"Cold in my stomach\", possibly nausea. H/O Bronchitis  Denies: F/C, V, CP, SOB, rhinorrhea, abdominal pain    Made better: nothing  Made worse: nothing    No further complaints. Past Medical History:  No date: Arthritis  No date: CAD (coronary artery disease)      Comment:  hx mi  No date: Chronic a-fib (HCC)  No date: CKD (chronic kidney disease), stage IV (HCC)  No date: Dementia  No date: Hx of seizure disorder  No date: Hyperlipidemia  No date: Hypertension  No date: Systolic CHF, chronic ([facility] [address]  Past Surgical History:  9/6/2017: COLONOSCOPY; N/A      Comment:  COLONOSCOPY performed by Jacob Rasmussen MD at [address] [place] [address]  2/8/2018: COLONOSCOPY; N/A      Comment:  COLONOSCOPY performed by Ke Smith MD at [address] [place] [address]  No date: HX CATARACT REMOVAL      Comment:  right  No date: HX CORONARY ARTERY BYPASS GRAFT      Comment:  x2 vessels  No date: HX HEART CATHETERIZATION  No date: HX PACEMAKER      Comment:  right chest  Reviewed      Primary care provider: Cristian Pham MD      The history is provided by the patient and a relative. No  was used.       Past Medical History:   Diagnosis Date    Arthritis     CAD (coronary artery disease)     hx mi    Chronic a-fib (HCC)     CKD (chronic kidney disease), stage IV (HCC)     Dementia     Hx of seizure disorder     Hyperlipidemia     Hypertension     Systolic CHF, chronic ([facility] [address]      Past Surgical History:   Procedure Laterality Date    COLONOSCOPY N/A 9/6/2017    COLONOSCOPY performed by Jacob Rasmussen MD at [address] [facility] COLONOSCOPY N/A 2/8/2018    COLONOSCOPY performed by Ke Smith MD at [place] [address]      right    HX CORONARY ARTERY BYPASS GRAFT      x2 vessels    HX HEART CATHETERIZATION      HX PACEMAKER      right chest     Family History:   Problem Relation Age of Onset    Heart Disease Mother     Hypertension Mother        Social History     Socioeconomic History    Marital status:      Spouse name: Not on file    Number of children: Not on file    Years of education: Not on file    Highest education level: Not on file   Occupational History    Not on file   Social Needs    Financial resource strain: Not on file    Food insecurity:     Worry: Not on file     Inability: Not on file    Transportation needs:     Medical: Not on file     Non-medical: Not on file   Tobacco Use    Smoking status: Former Smoker     Packs/day: 0.50     Years: 40.00     Pack years: 20.00     Last attempt to quit: 1988     Years since quittin.3    Smokeless tobacco: Former User   Substance and Sexual Activity    Alcohol use: No    Drug use: No    Sexual activity: Never   Lifestyle    Physical activity:     Days per week: Not on file     Minutes per session: Not on file    Stress: Not on file   Relationships    Social connections:     Talks on phone: Not on file     Gets together: Not on file     Attends Yarsani service: Not on file     Active member of club or organization: Not on file     Attends meetings of clubs or organizations: Not on file     Relationship status: Not on file    Intimate partner violence:     Fear of current or ex partner: Not on file     Emotionally abused: Not on file     Physically abused: Not on file     Forced sexual activity: Not on file   Other Topics Concern    Not on file   Social History Narrative    Not on file     ALLERGIES: Patient has no known allergies. Review of Systems   Constitutional: Negative for chills and fever. HENT: Positive for congestion and sore throat. Negative for ear pain, rhinorrhea, sneezing, trouble swallowing and voice change. Respiratory: Positive for cough. Gastrointestinal: Negative for abdominal pain, nausea and vomiting. Genitourinary: Negative.     All other systems reviewed and are negative. Vitals:    10/06/19 0922   BP: 178/88   Pulse: 94   Resp: 18   Temp: 98.1 °F (36.7 °C)   SpO2: 100%   Weight: 77.1 kg (170 lb)   Height: 5' 9\" (1.753 m)          Physical Exam   Constitutional: He is oriented to person, place, and time. He appears well-developed and well-nourished. HENT:   Head: Normocephalic and atraumatic. Neck: Normal range of motion. Neck supple. Cardiovascular: Normal rate, regular rhythm, normal heart sounds and intact distal pulses. Pulmonary/Chest: Effort normal. No respiratory distress. He has no wheezes. He has rhonchi (all fields). He has no rales. He exhibits no tenderness. Abdominal: Soft. Bowel sounds are normal. There is no tenderness. There is no guarding. Musculoskeletal: Normal range of motion. Neurological: He is alert and oriented to person, place, and time. Skin: Skin is warm and dry. No erythema. Psychiatric: He has a normal mood and affect. His behavior is normal. Judgment and thought content normal.   Nursing note and vitals reviewed. MDM       Procedures    Assessment & Plan:     Orders Placed This Encounter    XR CHEST PA AND LATERAL    CBC WITH AUTOMATED DIFF    METABOLIC PANEL, BASIC    Hold Sample    NT-PRO BNP    albuterol-ipratropium (DUO-NEB) 2.5 MG-0.5 MG/3 ML       Discussed with Shabnam Buchanan MD,ED Provider    Brooks Momin NP  10/06/19  9:31 AM    TINA on CKD, Elevated BNP. Admission for heart failure and worsening renal function. 10:21 AM  Patient is being admitted to the hospital.  The results of their tests and reasons for their admission have been discussed with them and/or available family. They convey agreement and understanding for the need to be admitted and for their admission diagnosis. Consultation has been made with the inpatient physician specialist for hospitalization.     LABORATORY TESTS:  Recent Results (from the past 12 hour(s))   CBC WITH AUTOMATED DIFF    Collection Time: 10/06/19  9:41 AM Result Value Ref Range    WBC 4.9 4.1 - 11.1 K/uL    RBC 3.13 (L) 4.10 - 5.70 M/uL    HGB 9.8 (L) 12.1 - 17.0 g/dL    HCT 32.0 (L) 36.6 - 50.3 %    .2 (H) 80.0 - 99.0 FL    MCH 31.3 26.0 - 34.0 PG    MCHC 30.6 30.0 - 36.5 g/dL    RDW 14.6 (H) 11.5 - 14.5 %    PLATELET 338 (L) 836 - 400 K/uL    MPV 10.4 8.9 - 12.9 FL    NRBC 0.0 0  WBC    ABSOLUTE NRBC 0.00 0.00 - 0.01 K/uL    NEUTROPHILS 87 (H) 32 - 75 %    LYMPHOCYTES 4 (L) 12 - 49 %    MONOCYTES 9 5 - 13 %    EOSINOPHILS 0 0 - 7 %    BASOPHILS 0 0 - 1 %    IMMATURE GRANULOCYTES 0 0.0 - 0.5 %    ABS. NEUTROPHILS 4.3 1.8 - 8.0 K/UL    ABS. LYMPHOCYTES 0.2 (L) 0.8 - 3.5 K/UL    ABS. MONOCYTES 0.4 0.0 - 1.0 K/UL    ABS. EOSINOPHILS 0.0 0.0 - 0.4 K/UL    ABS. BASOPHILS 0.0 0.0 - 0.1 K/UL    ABS. IMM. GRANS. 0.0 0.00 - 0.04 K/UL    DF SMEAR SCANNED      RBC COMMENTS NORMOCYTIC, NORMOCHROMIC     METABOLIC PANEL, BASIC    Collection Time: 10/06/19  9:41 AM   Result Value Ref Range    Sodium 140 136 - 145 mmol/L    Potassium 4.6 3.5 - 5.1 mmol/L    Chloride 108 97 - 108 mmol/L    CO2 19 (L) 21 - 32 mmol/L    Anion gap 13 5 - 15 mmol/L    Glucose 117 (H) 65 - 100 mg/dL    BUN 63 (H) 6 - 20 MG/DL    Creatinine 6.41 (H) 0.70 - 1.30 MG/DL    BUN/Creatinine ratio 10 (L) 12 - 20      GFR est AA 10 (L) >60 ml/min/1.73m2    GFR est non-AA 8 (L) >60 ml/min/1.73m2    Calcium 8.8 8.5 - 10.1 MG/DL   SAMPLES BEING HELD    Collection Time: 10/06/19  9:41 AM   Result Value Ref Range    SAMPLES BEING HELD 1BL,1SST     COMMENT        Add-on orders for these samples will be processed based on acceptable specimen integrity and analyte stability, which may vary by analyte.    NT-PRO BNP    Collection Time: 10/06/19  9:41 AM   Result Value Ref Range    NT pro-BNP 32,729 (H) <450 PG/ML       IMAGING RESULTS:  XR CHEST PA LAT   Final Result   Impression: No acute process or change compared to the prior exam.           Xr Chest Pa Lat    Result Date: 10/6/2019  Exam:  2 view chest Indication: Cough with abnormal breath sounds Comparison to 6/21/2018. PA and lateral views demonstrate normal heart size. The patient is status post median sternotomy. Pacer leads are unchanged in position. There is no acute process in the lung fields. Degenerative changes are seen in the thoracic spine. Impression: No acute process or change compared to the prior exam.       MEDICATIONS GIVEN:  Medications   albuterol-ipratropium (DUO-NEB) 2.5 MG-0.5 MG/3 ML (has no administration in time range)       IMPRESSION:  1. Acute renal failure superimposed on stage 4 chronic kidney disease, unspecified acute renal failure type ([address]    2. Acute systolic congestive heart failure ([address]        PLAN:  1. Admit to hospitalist.    Dr. Jung Bound to call the hospitalist for admission. Milly Monroe NP              Please note that this dictation was completed with [employer], the computer voice recognition software. Quite often unanticipated grammatical, syntax, homophones, and other interpretive errors are inadvertently transcribed by the computer software. Please disregard these errors. Please excuse any errors that have escaped final proofreading.

## 2019-10-06 NOTE — PROGRESS NOTES
[facility]: MED RECONCILIATION    Comments/Recommendations:   Interview with the patient and his daughter at the bedside. Verifies [facility] and preferred pharmacy listed. Some pill bottles are present for review. The patient will not take his own medications while in the hospital and family will take them home today. The patient thinks he takes a fluid pill which he has not had in five days. He reports it is ready for  a the [address] and describes it as small and blue. Pharmacist called the pharmacy and spoke with the pharmacist. The patient has finasteride 5 mg daily ready for  and has no diuretic on his prescription refill history. Pharmacist returned to the room and updated the patient and his daughter. The patient reports taking carbamazepine 200 mg once daily to prevent seizures and he has done so for years. Last seizure was many years ago before he retired. Medications added:     Finasteride      Medications removed:    None    Medications adjusted:    Carbamazepine 200 mg daily  Acetaminophen changed to 1000 mg daily prn      Allergies: Patient has no known allergies. Prior to Admission Medications:     Prior to Admission Medications   Prescriptions Last Dose Informant Patient Reported? Taking?   acetaminophen (TYLENOL) 500 mg tablet 10/5/2019 at Unknown time  Yes Yes   Sig: Take 1,000 mg by mouth two (2) times daily as needed for Pain. amLODIPine (NORVASC) 10 mg tablet 10/5/2019 at Unknown time  Yes Yes   Sig: Take 10 mg by mouth daily. aspirin delayed-release 81 mg tablet 10/5/2019 at Unknown time  Yes Yes   Sig: Take 81 mg by mouth daily. carbamazepine (TEGRETOL) 200 mg tablet 10/5/2019 at Unknown time Self Yes Yes   Sig: Take 200 mg by mouth daily. finasteride (PROSCAR) 5 mg tablet 10/5/2019 at Unknown time  Yes Yes   Sig: Take 5 mg by mouth daily.    pantoprazole (PROTONIX) 40 mg tablet 10/5/2019 at Unknown time Self Yes Yes   Sig: Take 40 mg by mouth Daily (before breakfast). tamsulosin (FLOMAX) 0.4 mg capsule 10/5/2019 at Unknown time Self Yes Yes   Sig: Take 0.4 mg by mouth daily (after breakfast).       Facility-Administered Medications: None      Thank you,      Estee Moncada, PharmD, BCPS

## 2019-10-06 NOTE — CONSULTS
[facility]    Bella Saez Sr. YOB: 1935     Assessment & Plan:   1. TINA/CKD4   - 2ND TO STRONG  - DOWD BEING PLACED NOW  - WILL NEED UROLOGY TO SEE AT SOME POINT    OTHER ISSUES NOTED  DISCUSSED WITH NURSING  NO ACUTE NEED FOR RRT                   Subjective:   CHIEF COMPLAINT: TINA/CKD  HPI:  MR. RANDLE IS A PLEASANT 79 YO M WITH A PMH SIG FOR ADVANCED CKD FOLLOWED BY MY PARTNER, DR. Vikash Fowler, CAD, AFIB, DEMENTIA, HTN, SYSTOLIC HF, AND BPH WITH CHRONIC LEFT HYDRO WHO WE ARE ASKED TO B/C OF TINA. HE PRESENTED BY HIS FAMILY FOR SOB. NO REPORT OF INABILITY TO URINATE. HE ISN'T THE BEST HISTORIAN. + CURRENTLY WITHOUT CP, N/V/D/ABD PAIN. GIVEN HX OF BPH AND HYDRO I ASKED TEAM TO GET A CT. IT SHOWED DISTENDED BLADDER AND HYDRO.  DOWD IS GOING IN NOW    Review of Systems  SEE ABOVE     Past Medical History:   Diagnosis Date    Arthritis     CAD (coronary artery disease)     hx mi    Chronic a-fib     CKD (chronic kidney disease), stage IV (HCC)     Dementia (HCC)     Hx of seizure disorder     Hyperlipidemia     Hypertension     Systolic CHF, chronic (HCC)       Past Surgical History:   Procedure Laterality Date    COLONOSCOPY N/A 9/6/2017    COLONOSCOPY performed by Letha Dominguez MD at [address] [place] [address] COLONOSCOPY N/A 2/8/2018    COLONOSCOPY performed by Carli Mancilla MD at [address] [place] [address] HX CATARACT REMOVAL      right    HX CORONARY ARTERY BYPASS GRAFT      x2 vessels    HX HEART CATHETERIZATION      HX PACEMAKER      right chest       Social History     Socioeconomic History    Marital status:      Spouse name: Not on file    Number of children: 10    Years of education: Not on file    Highest education level: Not on file   Occupational History    Not on file   Social Needs    Financial resource strain: Not hard at all   [employer] insecurity:     Worry: Never true     Inability: Never true   Racemi needs: Medical: No     Non-medical: No   Tobacco Use    Smoking status: Former Smoker     Packs/day: 0.50     Years: 40.00     Pack years: 20.00     Last attempt to quit: 1988     Years since quittin.3    Smokeless tobacco: Former User   Substance and Sexual Activity    Alcohol use: No    Drug use: No    Sexual activity: Never   Lifestyle    Physical activity:     Days per week: 3 days     Minutes per session: 20 min    Stress: Not at all   Relationships    Social connections:     Talks on phone: More than three times a week     Gets together: More than three times a week     Attends Sikh service: More than 4 times per year     Active member of club or organization: Yes     Attends meetings of clubs or organizations: More than 4 times per year     Relationship status:     Intimate partner violence:     Fear of current or ex partner: No     Emotionally abused: No     Physically abused: No     Forced sexual activity: No   Other Topics Concern     Service No    Blood Transfusions Yes    Caffeine Concern No    Occupational Exposure No    Hobby Hazards No    Sleep Concern No    Stress Concern No    Weight Concern No    Special Diet No    Back Care No    Exercise No    Bike Helmet No    Seat Belt No    Self-Exams No   Social History Narrative    Son lives with him on and off, usually 3-4 days/week. Still does well independently. Good family support. Family History   Problem Relation Age of Onset    Heart Disease Mother     Hypertension Mother       Prior to Admission medications    Medication Sig Start Date End Date Taking? Authorizing Provider   aspirin delayed-release 81 mg tablet Take 81 mg by mouth daily. Yes Provider, Historical   finasteride (PROSCAR) 5 mg tablet Take 5 mg by mouth daily. Yes Provider, Historical   acetaminophen (TYLENOL) 500 mg tablet Take 1,000 mg by mouth two (2) times daily as needed for Pain.    Yes Provider, Historical   amLODIPine (NORVASC) 10 mg tablet Take 10 mg by mouth daily. Yes Provider, Historical   tamsulosin (FLOMAX) 0.4 mg capsule Take 0.4 mg by mouth daily (after breakfast). Yes Provider, Historical   pantoprazole (PROTONIX) 40 mg tablet Take 40 mg by mouth Daily (before breakfast). Yes Provider, Historical   carbamazepine (TEGRETOL) 200 mg tablet Take 200 mg by mouth daily. Yes Other, MD Pablo     No Known Allergies    Objective:     Vitals:  Blood pressure 161/78, pulse 78, temperature 98 °F (36.7 °C), resp. rate 21, height 5' 9\" (1.753 m), weight 77.1 kg (170 lb), SpO2 100 %. Temp (24hrs), Av.1 °F (36.7 °C), Min:98 °F (36.7 °C), Max:98.1 °F (36.7 °C)      Intake and Output:  No intake/output data recorded. No intake/output data recorded. Physical Exam:                Patient is intubated: NO    Physical Examination:   GENERAL ASSESSMENT: NAD  SKIN: WARM  HEAD: NC/AT  EYES: ANICTERIC   NECK: SUPPLE   CHEST: CTAB  HEART: RRR  ABDOMEN: SOFT, NTND   :Ruffin: NO  EXTREMITY: EDEMA  NEURO: AXOX3      ECG/rhythm[de-identified] Rev: YES  Xray/CT/US/MRI REV:YES  Data Review   Recent Results (from the past 72 hour(s))   CBC WITH AUTOMATED DIFF    Collection Time: 10/06/19  9:41 AM   Result Value Ref Range    WBC 4.9 4.1 - 11.1 K/uL    RBC 3.13 (L) 4.10 - 5.70 M/uL    HGB 9.8 (L) 12.1 - 17.0 g/dL    HCT 32.0 (L) 36.6 - 50.3 %    .2 (H) 80.0 - 99.0 FL    MCH 31.3 26.0 - 34.0 PG    MCHC 30.6 30.0 - 36.5 g/dL    RDW 14.6 (H) 11.5 - 14.5 %    PLATELET 894 (L) 798 - 400 K/uL    MPV 10.4 8.9 - 12.9 FL    NRBC 0.0 0  WBC    ABSOLUTE NRBC 0.00 0.00 - 0.01 K/uL    NEUTROPHILS 87 (H) 32 - 75 %    LYMPHOCYTES 4 (L) 12 - 49 %    MONOCYTES 9 5 - 13 %    EOSINOPHILS 0 0 - 7 %    BASOPHILS 0 0 - 1 %    IMMATURE GRANULOCYTES 0 0.0 - 0.5 %    ABS. NEUTROPHILS 4.3 1.8 - 8.0 K/UL    ABS. LYMPHOCYTES 0.2 (L) 0.8 - 3.5 K/UL    ABS. MONOCYTES 0.4 0.0 - 1.0 K/UL    ABS. EOSINOPHILS 0.0 0.0 - 0.4 K/UL    ABS. BASOPHILS 0.0 0.0 - 0.1 K/UL    ABS. IMM. Contreras Flaquita. 0.0 0.00 - 0.04 K/UL    DF SMEAR SCANNED      RBC COMMENTS NORMOCYTIC, NORMOCHROMIC     METABOLIC PANEL, BASIC    Collection Time: 10/06/19  9:41 AM   Result Value Ref Range    Sodium 140 136 - 145 mmol/L    Potassium 4.6 3.5 - 5.1 mmol/L    Chloride 108 97 - 108 mmol/L    CO2 19 (L) 21 - 32 mmol/L    Anion gap 13 5 - 15 mmol/L    Glucose 117 (H) 65 - 100 mg/dL    BUN 63 (H) 6 - 20 MG/DL    Creatinine 6.41 (H) 0.70 - 1.30 MG/DL    BUN/Creatinine ratio 10 (L) 12 - 20      GFR est AA 10 (L) >60 ml/min/1.73m2    GFR est non-AA 8 (L) >60 ml/min/1.73m2    Calcium 8.8 8.5 - 10.1 MG/DL   SAMPLES BEING HELD    Collection Time: 10/06/19  9:41 AM   Result Value Ref Range    SAMPLES BEING HELD 1BL,1SST     COMMENT        Add-on orders for these samples will be processed based on acceptable specimen integrity and analyte stability, which may vary by analyte. NT-PRO BNP    Collection Time: 10/06/19  9:41 AM   Result Value Ref Range    NT pro-BNP 32,729 (H) <450 PG/ML   TROPONIN I    Collection Time: 10/06/19  9:41 AM   Result Value Ref Range    Troponin-I, Qt. 0.07 (H) <0.05 ng/mL   TSH 3RD GENERATION    Collection Time: 10/06/19  9:41 AM   Result Value Ref Range    TSH 2.49 0.36 - 3.74 uIU/mL   SED RATE (ESR)    Collection Time: 10/06/19  9:41 AM   Result Value Ref Range    Sed rate, automated 60 (H) 0 - 20 mm/hr       Discussed with:    Pt, family, and nurse   Thank you so much to allow us to participate in this patient's care. We will follow.  : Sarmad Rodas MD  10/6/2019      [address] [facility]:  [email or web address]  Traci Prdany office:  [address] [place] [address], [address] [place] [address] [address]  1 [facility], [address] [place] [address]  Phone: [phone]  Fax :     [phone] [address] office:  [address], [address]  Phone - [phone]  Fax - [phone]

## 2019-10-06 NOTE — ED NOTES
Verbal shift change report given to Kirit (oncoming nurse) by Reji Shell (offgoing nurse). Report included the following information SBAR, ED Summary and Recent Results.

## 2019-10-06 NOTE — H&P
History and Physical          Pt Name  Janki Villeda Sr. Date of Birth 1935   Medical Record Number  [id number]      Age  80 y.o. PCP Doc MD Rafaela   Admit date:  10/6/2019    Room Number  ER10/10  @ [facility]   Date of Service  10/6/2019     Admission Diagnoses:  ARF (acute renal failure) [facility])      Certification: We are admitting Janki Villeda Sr. 80 y.o. male with a principle diagnosis of ARF (acute renal failure) ([facility]  This patient also suffers from other comorbidities listed below. I have a high level of concern for rapid decline in homeostasis  leading to life threatening complications      Assessment and plan:  Present on Admission:   ARF (acute renal failure) (HCC)   Arthritis   Benign prostatic hyperplasia   Chronic a-fib   CAD (coronary artery disease)   CKD (chronic kidney disease) stage 4, GFR 15-29 ml/min ([facility])   Dyslipidemia   Hx of seizure disorder   HTN (hypertension), benign   Pacemaker   Systolic CHF, chronic ([facility]      ARF on CKD stage IV -pt has had previous hydronephrosis which had resolved. He suffers from unknown status of BPH   Clinically patient is euvolemic with clear  Lungs and 1 + ankle edema   · Admit to stepdown  · Gentle IVF D5W at 50 ml per hour   · Renal US   · Post void residuals   · Nephrologist consultation   · Strict I/O     History of Heart failure -last echo 2018 showed essentially normal EF and no significant valvular disease and no sign of Pulmonary HTN   · We will ask cardiologist to assist.   · His elevated BNP is noted. BPH -chronic   · Continue flomax as was PTA     Hx of seizure disorder -no reported recent events   · Continue AED as was PTA     Uncontrolled HTN -pt reports that he had run out of his medications last week or so   · Resume home meds as they were.        Hx of Chronic Afib  Hx of AICD implantation  -in light of previous GI bleeding events, I am assuming he is not on full anticoagulation  · Will continue rate control medications     Hx of recurrent GI bleeds -  · Will not use anticoagulants   · SCD for DVT prevention. · PPI as was PTA     Body mass index is 25.1 kg/m². -    ·        CODE STATUS  Full         Functional Status  Pt resides in his own house in [facility]   His son checks on him several times per day   He is retired from [employer]    Surrogate decision maker: Pt's daughter Paul Jarrell    Prophylaxis  SCD's   Discharge Plan: Home w/Family,     There are currently no Active Isolations Payor: [place] MEDICARE / Plan: VA MEDICARE PART A & B / Product Type: Medicare /    Social issues  Date Comment    10/06/19   12:40 PM  I met with pt's daughter Paul Jarrell in the ER room. We talked about above plans in simple language and answered all questions       Prognosis  Fair      Subjective Data         History of Present Illness : The pt is not a great historian. His daughter present in the ER is not familiar with his medical issues and was not of much help with HPI information. In summary, the pt had run out of his unknown number of medications for about a week or so. He started coughing and thought he developed a cold about four days ago. No PND Sx reported. His daughter found him that he was short of breath while visiting him and brought him to the ER. Workup here reveals above findings and we were asked to admit him.       Review of Systems - History obtained from child and the patient  General ROS: positive for  - fatigue  negative for - chills or fever  Respiratory ROS: positive for - cough and shortness of breath  negative for - hemoptysis or orthopnea  Cardiovascular ROS: positive for - dyspnea on exertion  negative for - chest pain or loss of consciousness  Gastrointestinal ROS: no abdominal pain, change in bowel habits, or black or bloody stools  Genito-Urinary ROS: no dysuria, trouble voiding, or hematuria  Musculoskeletal ROS: negative  Neurological ROS: no TIA or stroke symptoms  Dermatological ROS: negative  ROS       Past Medical History:   Diagnosis Date    Arthritis     CAD (coronary artery disease)     hx mi    Chronic a-fib     CKD (chronic kidney disease), stage IV (HCC)     Dementia (HCC)     Hx of seizure disorder     Hyperlipidemia     Hypertension     Systolic CHF, chronic (HCC)          Past Surgical History:   Procedure Laterality Date    COLONOSCOPY N/A 2017    COLONOSCOPY performed by Yin Wise MD at [address] [place] [address] COLONOSCOPY N/A 2018    COLONOSCOPY performed by Vimal Styles MD at [address] [place] [address] HX CATARACT REMOVAL      right    HX CORONARY ARTERY BYPASS GRAFT      x2 vessels    HX HEART CATHETERIZATION      HX PACEMAKER      right chest         Social History     Tobacco Use    Smoking status: Former Smoker     Packs/day: 0.50     Years: 40.00     Pack years: 20.00     Last attempt to quit: 1988     Years since quittin.3    Smokeless tobacco: Former User   Substance Use Topics    Alcohol use: No         Family History   Problem Relation Age of Onset    Heart Disease Mother     Hypertension Mother               Objective data     Comment: Elderly man lying in ER bed in no distress . His daughter is at his side. Patient Vitals for the past 24 hrs:   Temp   10/06/19 0922 98.1 °F (36.7 °C)      Patient Vitals for the past 24 hrs:   Pulse   10/06/19 1130 69   10/06/19 1100 72   10/06/19 0922 94      Patient Vitals for the past 24 hrs:   Resp   10/06/19 1130 14   10/06/19 1100 14   10/06/19 0922 18      Patient Vitals for the past 24 hrs:   BP   10/06/19 1130 179/80   10/06/19 1100 187/78   10/06/19 0922 178/88        SpO2 Readings from Last 6 Encounters:   10/06/19 96%   18 100%   18 100%   18 100%   18 98%   18 97%         O2 Device: Room air   Body mass index is 25.1 kg/m².  - Wt Readings from Last 10 Encounters:   10/06/19 77.1 kg (170 lb)   18 71.8 kg (158 lb 6.4 oz)   18 69.5 kg (153 lb 3.5 oz)   08/29/18 67.5 kg (148 lb 13 oz)   07/13/18 75.1 kg (165 lb 9.6 oz)   06/28/18 77.1 kg (170 lb)   06/21/18 73.5 kg (162 lb 0.6 oz)   04/23/18 73.4 kg (161 lb 13.1 oz)   04/13/18 71 kg (156 lb 8.4 oz)   03/28/18 71.7 kg (158 lb)          Physical Exam:             General:  Alert, cooperative,   well noursished,   well developed,   appears stated age    Ears/Eyes:  Hearing intact  Sclera anicteric. Pupils equal   Mouth/Throat:  Mucous membranes normal pink and moist  Oral pharynx clear    Neck:  No JVD    Lungs:  Trachea midline  Chest excursion symmetrical   Auscultation B/L Symmetrical with   Vesicular breath sounds     minimal  Crepitations noted at the bases   Percussion note resonant on mid Clavicular line; no sign of pneumothorax        CVS:  IRRegular rate and rhythm   no  murmur,   No click, rub or gallop  S1 normal   S2 normal   Pedal pulses  b/l symmetrical   Abdomen:    Soft, non-tender  Bowel sounds normal  No distension   Percussion note tympanitic   Extremities:    No cyanosis, jaundice  1+ ankle  edema noted   No sign of DVT/cord like lesion on palpation  No sign of acute trauma   Age appropriate OA changes noted. Skin:    Skin color, texture, turgor normal. no acute rash or lesions    Lymph nodes:     Musculoskeletal Muscle bulk B/L symmetrical   Neuro Cranial nerves are intact,   motor movement b/l symmetrical,   Sensory evaluation b/l symmetrical    Psych:  Alert and oriented, normal mood & affect given the clinical scenario          Medications reviewed     No current facility-administered medications for this encounter. Current Outpatient Medications   Medication Sig    acetaminophen (TYLENOL) 500 mg tablet Take 500 mg by mouth every six (6) hours as needed for Pain.  amLODIPine (NORVASC) 10 mg tablet Take 10 mg by mouth daily.  aspirin delayed-release 81 mg tablet Take 1 Tab by mouth daily.  DO NOT TAKE for 1 WEEK    tamsulosin (FLOMAX) 0.4 mg capsule Take 0.4 mg by mouth daily (after breakfast).  pantoprazole (PROTONIX) 40 mg tablet Take 40 mg by mouth Daily (before breakfast).  carbamazepine (TEGRETOL) 200 mg tablet Take 200 mg by mouth every eight (8) hours. Relevant other informations: Other medical conditions listed in this following active hospital problem list section; all of these and other pertinent data were taken into consideration when treatment plan is developed and customized to this patient's unique overall circumstances and needs. We have reviewed available old medical records within the constraints of this admission process. Present on Admission:   ARF (acute renal failure) (HCC)   Arthritis   Benign prostatic hyperplasia   Chronic a-fib   CAD (coronary artery disease)   CKD (chronic kidney disease) stage 4, GFR 15-29 ml/min (HCC)   Dyslipidemia   Hx of seizure disorder   HTN (hypertension), benign   Pacemaker   Systolic CHF, chronic (HCC)       Data Review:   Recent Days:  All Micro Results     None          Recent Labs     10/06/19  0941   WBC 4.9   HGB 9.8*   HCT 32.0*   *     Recent Labs     10/06/19  0941      K 4.6      CO2 19*   *   BUN 63*   CREA 6.41*   CA 8.8      Lab Results   Component Value Date/Time    TSH 2.72 12/01/2018 10:45 AM         Care Plan discussed with: Patient/Family   Other medical conditions are listed in the active hospital problem list section; these and other pertinent data were taken into consideration when the treatment plan was developed and customized to this patient's unique overall circumstances and needs. High complexity decision making was performed for this patient who is at high risk for decompensation with multiple organ involvement. Today total floor/unit time was 65 minutes while caring for this patient and greater than 50% of that time was spent with patient (and/or family) coordinating patients clinical issues.      Kalli Balderrama MD MPH  FACP 10/6/2019       __________________________________________________________   FOR SOUND PHYSICIAN ADMINISTRATIVE USE ONLY   [facility]     INPT 223      Sacha Dietrich MD [facility] FACP   12:29 PM  10/6/2019

## 2019-10-06 NOTE — ED NOTES
Pt Throughput: Charge Nurse on [facility]  made aware of patient's bed assignment, room 324. Nevaeh Lu, RN  Emergency Dept Charge RN.

## 2019-10-06 NOTE — ED NOTES
Bedside and Verbal shift change report given to Damir Rich (oncoming nurse) by Yahir Middleton (offgoing nurse). Report included the following information SBAR, Kardex, MAR and Recent Results.

## 2019-10-07 PROBLEM — N40.0 BENIGN PROSTATIC HYPERPLASIA: Chronic | Status: ACTIVE | Noted: 2017-09-05

## 2019-10-07 PROBLEM — Z95.0 PACEMAKER: Chronic | Status: ACTIVE | Noted: 2017-10-24

## 2019-10-07 PROBLEM — N18.4 CKD (CHRONIC KIDNEY DISEASE) STAGE 4, GFR 15-29 ML/MIN (HCC): Chronic | Status: ACTIVE | Noted: 2017-09-05

## 2019-10-07 PROBLEM — E78.5 DYSLIPIDEMIA: Chronic | Status: ACTIVE | Noted: 2017-10-24

## 2019-10-07 LAB
ANION GAP SERPL CALC-SCNC: 11 MMOL/L (ref 5–15)
BASOPHILS # BLD: 0 K/UL (ref 0–0.1)
BASOPHILS NFR BLD: 0 % (ref 0–1)
BUN SERPL-MCNC: 64 MG/DL (ref 6–20)
BUN/CREAT SERPL: 11 (ref 12–20)
CALCIUM SERPL-MCNC: 8.1 MG/DL (ref 8.5–10.1)
CHLORIDE SERPL-SCNC: 108 MMOL/L (ref 97–108)
CO2 SERPL-SCNC: 20 MMOL/L (ref 21–32)
CREAT SERPL-MCNC: 5.99 MG/DL (ref 0.7–1.3)
DIFFERENTIAL METHOD BLD: ABNORMAL
EOSINOPHIL # BLD: 0 K/UL (ref 0–0.4)
EOSINOPHIL NFR BLD: 1 % (ref 0–7)
ERYTHROCYTE [DISTWIDTH] IN BLOOD BY AUTOMATED COUNT: 14.6 % (ref 11.5–14.5)
GLUCOSE SERPL-MCNC: 92 MG/DL (ref 65–100)
HCT VFR BLD AUTO: 27.3 % (ref 36.6–50.3)
HGB BLD-MCNC: 8.6 G/DL (ref 12.1–17)
IMM GRANULOCYTES # BLD AUTO: 0 K/UL (ref 0–0.04)
IMM GRANULOCYTES NFR BLD AUTO: 0 % (ref 0–0.5)
LYMPHOCYTES # BLD: 0.3 K/UL (ref 0.8–3.5)
LYMPHOCYTES NFR BLD: 10 % (ref 12–49)
MCH RBC QN AUTO: 31.5 PG (ref 26–34)
MCHC RBC AUTO-ENTMCNC: 31.5 G/DL (ref 30–36.5)
MCV RBC AUTO: 100 FL (ref 80–99)
MONOCYTES # BLD: 0.5 K/UL (ref 0–1)
MONOCYTES NFR BLD: 14 % (ref 5–13)
NEUTS SEG # BLD: 2.4 K/UL (ref 1.8–8)
NEUTS SEG NFR BLD: 74 % (ref 32–75)
NRBC # BLD: 0 K/UL (ref 0–0.01)
NRBC BLD-RTO: 0 PER 100 WBC
PLATELET # BLD AUTO: 109 K/UL (ref 150–400)
PMV BLD AUTO: 10.7 FL (ref 8.9–12.9)
POTASSIUM SERPL-SCNC: 4.2 MMOL/L (ref 3.5–5.1)
RBC # BLD AUTO: 2.73 M/UL (ref 4.1–5.7)
SODIUM SERPL-SCNC: 139 MMOL/L (ref 136–145)
TROPONIN I SERPL-MCNC: 0.1 NG/ML
WBC # BLD AUTO: 3.3 K/UL (ref 4.1–11.1)

## 2019-10-07 PROCEDURE — 85025 COMPLETE CBC W/AUTO DIFF WBC: CPT

## 2019-10-07 PROCEDURE — 97161 PT EVAL LOW COMPLEX 20 MIN: CPT

## 2019-10-07 PROCEDURE — 36415 COLL VENOUS BLD VENIPUNCTURE: CPT

## 2019-10-07 PROCEDURE — 80048 BASIC METABOLIC PNL TOTAL CA: CPT

## 2019-10-07 PROCEDURE — 97116 GAIT TRAINING THERAPY: CPT

## 2019-10-07 PROCEDURE — 84484 ASSAY OF TROPONIN QUANT: CPT

## 2019-10-07 PROCEDURE — 74011250636 HC RX REV CODE- 250/636: Performed by: INTERNAL MEDICINE

## 2019-10-07 PROCEDURE — 97165 OT EVAL LOW COMPLEX 30 MIN: CPT

## 2019-10-07 PROCEDURE — 94640 AIRWAY INHALATION TREATMENT: CPT

## 2019-10-07 PROCEDURE — 65660000000 HC RM CCU STEPDOWN

## 2019-10-07 PROCEDURE — 97535 SELF CARE MNGMENT TRAINING: CPT

## 2019-10-07 PROCEDURE — 74011250637 HC RX REV CODE- 250/637: Performed by: INTERNAL MEDICINE

## 2019-10-07 PROCEDURE — 74011000250 HC RX REV CODE- 250: Performed by: INTERNAL MEDICINE

## 2019-10-07 RX ORDER — IPRATROPIUM BROMIDE AND ALBUTEROL SULFATE 2.5; .5 MG/3ML; MG/3ML
3 SOLUTION RESPIRATORY (INHALATION)
Status: DISCONTINUED | OUTPATIENT
Start: 2019-10-07 | End: 2019-10-09 | Stop reason: HOSPADM

## 2019-10-07 RX ADMIN — CARBAMAZEPINE 200 MG: 200 TABLET ORAL at 09:56

## 2019-10-07 RX ADMIN — Medication 10 ML: at 22:00

## 2019-10-07 RX ADMIN — TAMSULOSIN HYDROCHLORIDE 0.4 MG: 0.4 CAPSULE ORAL at 09:56

## 2019-10-07 RX ADMIN — ASPIRIN 81 MG: 81 TABLET, COATED ORAL at 09:56

## 2019-10-07 RX ADMIN — HYDRALAZINE HYDROCHLORIDE 10 MG: 20 INJECTION INTRAMUSCULAR; INTRAVENOUS at 04:27

## 2019-10-07 RX ADMIN — FINASTERIDE 5 MG: 5 TABLET, FILM COATED ORAL at 09:56

## 2019-10-07 RX ADMIN — AMLODIPINE BESYLATE 10 MG: 5 TABLET ORAL at 09:56

## 2019-10-07 RX ADMIN — Medication 10 ML: at 14:00

## 2019-10-07 RX ADMIN — IPRATROPIUM BROMIDE AND ALBUTEROL SULFATE 3 ML: .5; 3 SOLUTION RESPIRATORY (INHALATION) at 20:15

## 2019-10-07 RX ADMIN — Medication 10 ML: at 06:11

## 2019-10-07 RX ADMIN — PANTOPRAZOLE SODIUM 40 MG: 40 TABLET, DELAYED RELEASE ORAL at 06:11

## 2019-10-07 NOTE — CONSULTS
Requesting Provider: Jenn Kelly MD - Reason for Consultation: \"bladder outlet obstruction\"  Pre-existing [facility] Patient:   yes                Patient: Joaquin Menchaca. MRN: [id number]  SSN: [id number]    YOB: 1935  Age: 80 y.o. Sex: male     Location: [facility]       Code Status: Full Code   PCP: Dominick Becerra MD  - [phone]   Emergency Contact:  Primary Emergency Contact: Mary Gomez, Home Phone: [phone]   Race/Alevism/Language: [address] / Liseth Estrada / [employer]: Payor: Tarah Stevenson / Plan: Tiffany Falk / Product Type: Medicare /    Prior Admission Data: 12/4/18 [facility] 5M2 MED SURG 2 Jhoan Collier   Hospitalized:  Hospital Day: 2 - Admitted 10/6/2019  9:28 AM   POD # * No surgery found *  by * Surgery not found * - Blood Loss: * No surgery found * * Surgery not found *     CONSULTANTS  IP CONSULT TO NEPHROLOGY  IP CONSULT TO UROLOGY  IP CONSULT TO CARDIOLOGY   ADMISSION DIAGNOSES    ICD-10-CM ICD-9-CM   1. Acute renal failure superimposed on stage 4 chronic kidney disease, unspecified acute renal failure type ([facility] [address] N17.9 584.9    N18.4 585.4   2. Acute systolic congestive heart failure (HCC) I50.21 428.21     428.0         Assessment/Plan:     · Acute on chronic renal failure  · Bladder outlet obstruction - urinary retention and bilateral hydroureteronephrosis  -cont pelayo, monitor for post obstructive diuresis  -suspect he will be catheter dependent, has failed max medical management and noncompliant with CIC and follow up.  -fu as outpt with pelayo catheter in place.    -call with questions       CC: Cough   HPI: He is a 80 y.o. male with hx of bladder outlet obstruction and bilateral hydroureteronephrosis, seen for the same 12/2018. He was supposed to do CIC and fu in office. He never followed up. He represented with complaints of difficulty urinating.   Cr up to 6.41.  CT with distended bladder and bilateral hydroureteronephrosis. Ruffin in place and cr trending down. Has been on flomax, finasteride. Temp (24hrs), Av.2 °F (36.8 °C), Min:98 °F (36.7 °C), Max:98.6 °F (37 °C)    Urinary Status: Ruffin  Creatinine   Date/Time Value Ref Range Status   10/07/2019 02:40 AM 5.99 (H) 0.70 - 1.30 MG/DL Final   10/06/2019 09:41 AM 6.41 (H) 0.70 - 1.30 MG/DL Final   2018 04:19 AM 3.64 (H) 0.70 - 1.30 MG/DL Final   2018 02:11 AM 3.78 (H) 0.70 - 1.30 MG/DL Final   2018 01:29 AM 3.55 (H) 0.70 - 1.30 MG/DL Final     Current Antimicrobial Therapy (168h ago, onward)    None        Key Anti-Platelet Anticoagulant Meds             aspirin delayed-release 81 mg tablet (Taking) Take 81 mg by mouth daily.         Diet: DIET CARDIAC Regular -       Labs     Lab Results   Component Value Date/Time    Lactic acid 0.9 2018 04:15 PM    WBC 3.3 (L) 10/07/2019 02:40 AM    HCT 27.3 (L) 10/07/2019 02:40 AM    PLATELET 306 (L)  02:40 AM    Sodium 139 10/07/2019 02:40 AM    Potassium 4.2 10/07/2019 02:40 AM    Chloride 108 10/07/2019 02:40 AM    CO2 20 (L) 10/07/2019 02:40 AM    BUN 64 (H) 10/07/2019 02:40 AM    Creatinine 5.99 (H) 10/07/2019 02:40 AM    Glucose 92 10/07/2019 02:40 AM    Calcium 8.1 (L) 10/07/2019 02:40 AM    Magnesium 2.2 2018 04:19 AM    INR 1.1 2018 08:20 PM     UA:   Lab Results   Component Value Date/Time    Color YELLOW/STRAW 2018 02:33 PM    Appearance CLEAR 2018 02:33 PM    Specific gravity 1.009 2018 02:33 PM    pH (UA) 5.5 2018 02:33 PM    Protein 100 (A) 2018 02:33 PM    Glucose NEGATIVE  2018 02:33 PM    Ketone NEGATIVE  2018 02:33 PM    Bilirubin NEGATIVE  2018 02:33 PM    Urobilinogen 0.2 2018 02:33 PM    Nitrites NEGATIVE  2018 02:33 PM    Leukocyte Esterase NEGATIVE  2018 02:33 PM    Epithelial cells FEW 2018 02:33 PM    Bacteria NEGATIVE  2018 02:33 PM    WBC 0-4 2018 02:33 PM    RBC 0-5 09/17/2018 02:33 PM     Imaging     Results for orders placed during the hospital encounter of 10/06/19   CT ABD PELV WO CONT    Narrative EXAM: CT ABDOMEN AND PELVIS WITHOUT CONTRAST  INDICATION: Evaluate for renal for hydronephrosis of the kidneys. COMPARISON: 11/30/2018. CONTRAST: None. TECHNIQUE:   Unenhanced multislice helical CT was performed from the diaphragm to the  symphysis pubis without intravenous contrast administration. Oral contrast was  not administered. Contiguous 5 mm axial images were reconstructed and lung and  soft tissue windows were generated. Coronal and sagittal reformations were  generated. CT dose reduction was achieved through use of a standardized protocol  tailored for this examination and automatic exposure control for dose  modulation. FINDINGS:  LOWER CHEST: There are sternal sutures and there is a pacemaker in place. There  is a new right pleural effusion with atelectasis in the right lower lobe. The absence of intravenous contrast material reduces the sensitivity for  evaluation of the solid parenchymal organs of the abdomen. ABDOMEN:  Liver: There is normal in size and contour and there are several low-attenuation  cysts in the left lobe. The largest measures 1.7 cm. Gallbladder and bile ducts: There are small calcified stones in the gallbladder. Spleen: No abnormality. Pancreas: No abnormality. Adrenal glands: No abnormality. Kidneys: There are bilateral renal cysts and there is hydronephrosis and  hydroureter on the left down to the level of the ureterovesical junction with no  mass or stone identified. There is no renal or ureteral calculus or obstruction. PELVIS:  Reproductive organs: The prostate gland and seminal vesicles are symmetrical.    Bladder: The urinary bladder is distended. RETROPERITONEUM: The aorta is atherosclerotic and tapers without aneurysm. There  is no retroperitoneal adenopathy or mass.  There is no pelvic mass or adenopathy. BOWEL AND MESENTERY: The small bowel is not obstructed. There is a large right  inguinal hernia containing small bowel and ascites without bowel obstruction. The appendix is normal.  There are diverticula of the colon without evidence of  diverticulitis. PERITONEUM: There is ascites in a right inguinal hernia. There is no free  intraperitoneal air. BONES AND SOFT TISSUES: There are degenerative changes of the lumbar spine with  loss of lordosis and ankylosis. Impression IMPRESSION:   1. Left hydronephrosis and hydroureter unchanged. 2. Marked urinary bladder distention unchanged. 3. Large right inguinal hernia containing small bowel and ascites without bowel  obstruction. 4. Bilateral renal cysts unchanged. 5. Right pleural effusion and lower lobe atelectasis, new since the prior  examination. 6. Cardiac pacemaker. 7. Hepatic cysts unchanged. 8. Atherosclerotic abdominal aorta without aneurysm. 9. Diverticulosis unchanged. 10. Lumbar spondylosis. US Results (most recent):  Results from [place] encounter on 10/06/19   US RETROPERITONEUM COMP    Narrative EXAM:  US RETROPERITONEUM COMP   INDICATION: Acute on chronic renal failure. COMPARISON: 10/12/2017    TECHNIQUE:  Real-time sonography of the kidneys, retroperitoneum and bladder was performed  with multiple static images obtained. FINDINGS:  RIGHT KIDNEY:  The right kidney has increased echogenicity with no mass, stone or  hydronephrosis. Multiple cysts. The largest measures 2.9 x 3 x 2.9 cm The  right kidney measures 8.7 x 5.7 x 4.9 cm. LEFT KIDNEY:   The left kidney has increased echogenicity with  no mass, stone or  hydronephrosis. There are multiple cysts. The largest measures 3.2 x 2.1 x 3 cm  and there is mild caliectasis. The left kidney measures 9.2 x 5.2 x 6.2 cm. RETROPERITONEUM:  The aorta is not visualized due to body habitus and bowel gas.    The aortic bifurcation is not visualized due to body habitus and bowel gas. The IVC is normal.  No retroperitoneal mass is identified. BLADDER:  The urinary bladder wall is thickened and the prostate gland is enlarged. Impression IMPRESSION:   1. Echogenic kidneys consistent with medical renal disease. 2. Bilateral renal cysts. 3. Enlarged prostate with thickened urinary bladder wall and mild left renal  caliectasis. Cultures     All Micro Results     None           Past History: (Complete 2+/3 areas)   No Known Allergies   Current Facility-Administered Medications   Medication Dose Route Frequency    amLODIPine (NORVASC) tablet 10 mg  10 mg Oral DAILY    aspirin delayed-release tablet 81 mg  81 mg Oral DAILY    carBAMazepine (TEGretol) tablet 200 mg  200 mg Oral DAILY    pantoprazole (PROTONIX) tablet 40 mg  40 mg Oral ACB    tamsulosin (FLOMAX) capsule 0.4 mg  0.4 mg Oral DAILY AFTER BREAKFAST    sodium chloride (NS) flush 5-40 mL  5-40 mL IntraVENous Q8H    sodium chloride (NS) flush 5-40 mL  5-40 mL IntraVENous PRN    acetaminophen (TYLENOL) tablet 500 mg  500 mg Oral Q4H PRN    HYDROcodone-acetaminophen (NORCO) 5-325 mg per tablet 1 Tab  1 Tab Oral Q6H PRN    naloxone (NARCAN) injection 0.4 mg  0.4 mg IntraVENous PRN    ondansetron (ZOFRAN) injection 2 mg  2 mg IntraVENous Q6H PRN    bisacodyl (DULCOLAX) tablet 5 mg  5 mg Oral DAILY PRN    nicotine (NICODERM CQ) 7 mg/24 hr patch 1 Patch  1 Patch TransDERmal DAILY PRN    finasteride (PROSCAR) tablet 5 mg  5 mg Oral DAILY    hydrALAZINE (APRESOLINE) 20 mg/mL injection 10 mg  10 mg IntraVENous Q6H PRN    influenza vaccine 2019-20 (6 mos+)(PF) (FLUARIX/FLULAVAL/FLUZONE QUAD) injection 0.5 mL  0.5 mL IntraMUSCular PRIOR TO DISCHARGE    Prior to Admission medications    Medication Sig Start Date End Date Taking? Authorizing Provider   aspirin delayed-release 81 mg tablet Take 81 mg by mouth daily.    Yes Provider, Historical   finasteride (PROSCAR) 5 mg tablet Take 5 mg by mouth daily. Yes Provider, Historical   acetaminophen (TYLENOL) 500 mg tablet Take 1,000 mg by mouth two (2) times daily as needed for Pain. Yes Provider, Historical   amLODIPine (NORVASC) 10 mg tablet Take 10 mg by mouth daily. Yes Provider, Historical   tamsulosin (FLOMAX) 0.4 mg capsule Take 0.4 mg by mouth daily (after breakfast). Yes Provider, Historical   pantoprazole (PROTONIX) 40 mg tablet Take 40 mg by mouth Daily (before breakfast). Yes Provider, Historical   carbamazepine (TEGRETOL) 200 mg tablet Take 200 mg by mouth daily. Yes Other, MD Pablo        PMHx:  has a past medical history of Arthritis, CAD (coronary artery disease), Chronic a-fib, CKD (chronic kidney disease), stage IV ([facility] [address] Dementia ([facility] [address] seizure disorder, Hyperlipidemia, Hypertension, and Systolic CHF, chronic ([facility] [address] PSurgHx:  has a past surgical history that includes colonoscopy (N/A, 9/6/2017); colonoscopy (N/A, 2/8/2018); hx cataract removal; hx heart catheterization; hx coronary artery bypass graft; and hx pacemaker. PSocHx:  reports that he quit smoking about 31 years ago. He has a 20.00 pack-year smoking history. He has quit using smokeless tobacco. He reports that he does not drink alcohol or use drugs.    ROS:  (Complete - 10 systems) -   [] Weight Loss (Constitutional)  [] Dry Mouth (ENMT)  [] Palpitations (CV)                         [] SOB (Respiratory)  [] Flatulance (GI)  [] Major Focal Weakness (MS)  [] Pallor (Skin)                            [] TIA Sx (Neuro)  [] Hallicinations (Psych)                [] Easy Bleeding (Heme)       Physical Exam: (Comprehesive - 8+ 1995 Systems)     (1) Constitutional:  FIO2:   on SpO2: O2 Sat (%): 98 %  O2 Device: Room air    Patient Vitals for the past 24 hrs:   BP Temp Pulse Resp SpO2   10/07/19 1115 139/61 98.2 °F (36.8 °C) 73 17 98 %   10/07/19 0956 158/68  79     10/07/19 0803 162/72 98 °F (36.7 °C) 74 17 98 %   10/07/19 0708   69     10/07/19 0600 145/67  63 17 97 % 10/07/19 0500 140/60  66 21 97 %   10/07/19 0422 173/78  71 19 99 %   10/07/19 0420 173/78 98.6 °F (37 °C) 70 19 100 %   10/07/19 0003 159/84 98 °F (36.7 °C) 64 18 100 %   10/06/19 2222   64     10/06/19 2154 167/84 98.4 °F (36.9 °C) 71 18 100 %   10/06/19 2115 159/67  69 17 99 %   10/06/19 2100 148/67  63 15 99 %   10/06/19 2045 (!) 152/95  72 15 99 %   10/06/19 2030 146/72  77 17 99 %   10/06/19 2015 178/74  76 16 98 %   10/06/19 2000 156/66  74 19 97 %   10/06/19 1945 153/66  77 15 98 %   10/06/19 1930 169/75  75 14 99 %   10/06/19 1800 146/69  73 (!) 32 94 %   10/06/19 1715 161/78  78 21 100 %   10/06/19 1700 161/89  87 23 100 %   10/06/19 1654 184/88  79     10/06/19 1645 184/88  74 18 100 %   10/06/19 1630 (!) 213/99  81 22 100 %   10/06/19 1548 (!) 186/95  71     10/06/19 1500 190/87  77 25 96 %   10/06/19 1430 (!) 209/84  83 21 96 %   10/06/19 1400 (!) 170/91  75 13 90 %   10/06/19 1330 199/89  78 15 98 %   10/06/19 1315 175/84  75 16 96 %   10/06/19 1300 185/84  82 15 100 %   10/06/19 1245 194/77  78 15 99 %   10/06/19 1230 (!) 182/98  76 22    10/06/19 1226 (!) 174/103 98 °F (36.7 °C) 75 22 100 %       Date 10/06/19 0700 - 10/07/19 0659 10/07/19 0700 - 10/08/19 0659   Shift 6257-8892 8812-4742 24 Hour Total 9827-1293 7883-0280 24 Hour Total   INTAKE   P.O.  440 440        P. O.  440 440      I. V.(mL/kg/hr)  640.8(0.7) 640.8(0.3)        Volume (dextrose 5 % - 0.45% NaCl infusion)  640.8 640.8      Shift Total(mL/kg)  1080.8(14) 1080.8(14)      OUTPUT   Urine(mL/kg/hr)  3624(8.7) 2050(1.1)        Urine Output (mL) (Urinary Catheter 10/06/19)  2050 2050      Stool           Stool Occurrence(s)    1 x  1 x   Shift Total(mL/kg)  2446(73.5) 8544(80.1)      NET  -969.2 -969.2      Weight (kg) 77.1 77.1 77.1 77.1 77.1 77.1      (2) ENMT:   moist mucous membranes   (3) Respiratory:  breathing easily   (4) GI:  Soft, bladder not palpable   (5) :   uncirc phallus, pelayo in place draining light pink urine   (6)      (7) Muscloskeletal:  no gross deformity   (8) Skin:  no rash   (9) Neuro:  no focal deficits      Signed By: Abdiel Young MD  - October 7, 2019

## 2019-10-07 NOTE — PROGRESS NOTES
10-7-2019 Reason for Admission:   Acute Renal Failure               RRAT Score:   33               Resources/supports as identified by patient/family:  Son lives with the patient                 Top Challenges facing patient (as identified by patient/family and CM): Finances/Medication cost?   Has drug coverage                 Transportation? Drives              Support system or lack thereof? No problem                     Living arrangements? Lives in one level house           Self-care/ADLs/Cognition? Independent with ADL's, A&O          Current Advanced Directive/Advance Care Plan:  Full Code                          Plan for utilizing home health: Yes                   Transition of Care Plan:                1. Son lives with the patient  2. The patient has been independent and drove  3. Agreeable to using home health-wants [facility]    I met with the patient to datermine potential discharge needs. He said his son, Rose Mary Marin ([phone]), lives with him in his one level house. His son works part-time. The patient has been independent with his ADL's, uses a cane and drove. He understands he is returning home with a Ruffin catheter and said he had done this about a year ago. He understands the MD wants home health to follow and he signed a choice letter for AT [id number] TSO3 placed on chart. His PCP is Dr. Avinash Alonzo who has no nurse navigator. He has prescription drug coverage and [facility] [place] [place]. CM will continue to follow. Care Management Interventions  PCP Verified by CM: Yes(Dr. Dot Mejia nurse navigator)  Mode of Transport at Discharge:  Other (see comment)(Family)  Transition of Care Consult (CM Consult): [address]: No  Reason Outside [facility]: (Patient choice)  Discharge Durable Medical Equipment: No(Has a cane)  Physical Therapy Consult: Yes  Occupational Therapy Consult: Yes  Current Support Network: (Son lives with the patient)  Confirm Follow Up Transport: Family  Plan discussed with Pt/Family/Caregiver: Yes  Freedom of Choice Offered: (S) Yes  Discharge Location  Discharge Placement: (Home with son and AT [address])    [facility], [place]

## 2019-10-07 NOTE — PROGRESS NOTES
Bedside and Verbal shift change report given to Jacqueline (oncoming nurse) by Kendall Doll (offgoing nurse). Report included the following information SBAR, Kardex, ED Summary, Procedure Summary, Intake/Output, MAR, Recent Results and Cardiac Rhythm afib.     Pt had 9 beat run NSVT overnight, trops 0.07, 0.10, Dominic Kovacs will notify MD     2941 consult noted for urology, CM to set up [facility] for pelayo care

## 2019-10-07 NOTE — PROGRESS NOTES
OCCUPATIONAL THERAPY EVALUATION/DISCHARGE  Patient: Nam Johnson Sr. (80 y.o. male)  Date: 10/7/2019  Primary Diagnosis: ARF (acute renal failure) (HCC) [N17.9]       Precautions: fall       ASSESSMENT  Based on the objective data described below, the patient presents with good overall activity tolerance following admission on 10/6 for ARF d/t bladder outlet obstruction. Patient lives with his son who he reports is able to provide assistance PRN however patient is independent with all care PTA. Today, patient was able to transfer into the bathroom for ADL retraining without LOB or physical assistance needed. Patient tolerated both sitting and standing ADLs with good tolerance. Education provided regarding energy conservation and pacing techniques. Patient has no further skilled OT needs and is cleared from OT services at this time. Current Level of Function (ADLs/self-care): Patient was independent with bed mobility and required supervision for OOB transfers. He was independent/mod I level for completion of all ADLs. Functional Outcome Measure: The patient scored 90/100 on the Barthel Index outcome measure. Other factors to consider for discharge: Patient will have support form son if needed. PLAN :    Recommendation for discharge: (in order for the patient to meet his/her long term goals)  No skilled occupational therapy/ follow up rehabilitation needs identified at this time. This discharge recommendation:  Has been made in collaboration with the attending provider and/or case management    Equipment recommendations for successful discharge: none       SUBJECTIVE:   Patient stated I do pretty good for myself.     OBJECTIVE DATA SUMMARY:   HISTORY:   Past Medical History:   Diagnosis Date    Arthritis     CAD (coronary artery disease)     hx mi    Chronic a-fib     CKD (chronic kidney disease), stage IV (HCC)     Dementia (HCC)     Hx of seizure disorder     Hyperlipidemia  Hypertension     Systolic CHF, chronic ([facility] [address]      Past Surgical History:   Procedure Laterality Date    COLONOSCOPY N/A 9/6/2017    COLONOSCOPY performed by Rainer Man MD at [address] [place] [address] COLONOSCOPY N/A 2/8/2018    COLONOSCOPY performed by Bladimir Pleitez MD at [address] [place] [address] HX CATARACT REMOVAL      right    HX CORONARY ARTERY BYPASS GRAFT      x2 vessels    HX HEART CATHETERIZATION      HX PACEMAKER      right chest       Prior Level of Function/Environment/Context: Patient lives with his son. Expanded or extensive additional review of patient history:   Home Situation  Home Environment: Private residence  One/Two Story Residence: Other (Comment)  Living Alone: No  Support Systems: Child(kenney)  Patient Expects to be Discharged to[de-identified] Private residence  Current DME Used/Available at Home: None  Tub or Shower Type: Tub/Shower combination    Hand dominance: Right    EXAMINATION OF PERFORMANCE DEFICITS:  Cognitive/Behavioral Status:  Neurologic State: Alert  Orientation Level: Oriented X4  Cognition: Appropriate decision making; Appropriate safety awareness; Appropriate for age attention/concentration  Perception: Appears intact  Perseveration: No perseveration noted  Safety/Judgement: Awareness of environment    Skin: Intact in the uppers    Edema: None noted in the uppers    Hearing: Auditory  Auditory Impairment: None    Vision/Perceptual:    Tracking: Able to track stimulus in all quadrants w/o difficulty     Diplopia: No    Acuity: Within Defined Limits       Range of Motion:  WDL in the uppers    Strength:  WDL in the uppers    Coordination:  Fine Motor Skills-Upper: Left Intact; Right Intact    Gross Motor Skills-Upper: Left Intact; Right Intact    Tone & Sensation:  Tone: normal  Sensation: intact     Balance:  Sitting: Intact  Standing: Intact    Functional Mobility and Transfers for ADLs:  Bed Mobility:  Rolling: Independent  Supine to Sit: Independent  Sit to Supine: (pt remained up at end of tx)  Scooting: Independent    Transfers:  Sit to Stand: Supervision  Stand to Sit: Supervision  Bed to Chair: Supervision  Bathroom Mobility: Supervision/set up  Toilet Transfer : Supervision    ADL Assessment:  Feeding: Independent    Oral Facial Hygiene/Grooming: Independent    Bathing: Modified independent    Upper Body Dressing: Independent    Lower Body Dressing: Modified independent    Toileting: Modified independent     Cognitive Retraining  Safety/Judgement: Awareness of environment    Functional Measure:  Barthel Index:    Bathin  Bladder: 10  Bowels: 10  Groomin  Dressing: 10  Feeding: 10  Mobility: 15  Stairs: 5  Toilet Use: 10  Transfer (Bed to Chair and Back): 10  Total: 90/100        The Barthel ADL Index: Guidelines  1. The index should be used as a record of what a patient does, not as a record of what a patient could do. 2. The main aim is to establish degree of independence from any help, physical or verbal, however minor and for whatever reason. 3. The need for supervision renders the patient not independent. 4. A patient's performance should be established using the best available evidence. Asking the patient, friends/relatives and nurses are the usual sources, but direct observation and common sense are also important. However direct testing is not needed. 5. Usually the patient's performance over the preceding 24-48 hours is important, but occasionally longer periods will be relevant. 6. Middle categories imply that the patient supplies over 50 per cent of the effort. 7. Use of aids to be independent is allowed. Sourav Jerome., Barthel, DYahirW. (8676). Functional evaluation: the Barthel Index. [address] [employer] (14)2. DACIA Nassar, Maddi Steen., Kiet Barrera., Micah, [address] [place] [address] Ave (). Measuring the change indisability after inpatient rehabilitation; comparison of the responsiveness of the Barthel Index and Functional [place] Measure.  Journal of Neurology, Neurosurgery, and Psychiatry, 66(4), 232-148. LAINEY Smith Mai.ANETA, LIZ Mckeon, & Andrew Jones M.A. (2004.) Assessment of post-stroke quality of life in cost-effectiveness studies: The usefulness of the Barthel Index and the EuroQoL-5D. Quality of Life Research, 15, 522-37       Occupational Therapy Evaluation Charge Determination   History Examination Decision-Making   LOW Complexity : Brief history review  LOW Complexity : 1-3 performance deficits relating to physical, cognitive , or psychosocial skils that result in activity limitations and / or participation restrictions  LOW Complexity : No comorbidities that affect functional and no verbal or physical assistance needed to complete eval tasks       Based on the above components, the patient evaluation is determined to be of the following complexity level: LOW     Activity Tolerance:   Good  Please refer to the flowsheet for vital signs taken during this treatment. After treatment patient left in no apparent distress:    Sitting in chair and Bed / chair alarm activated    COMMUNICATION/EDUCATION:   The patients plan of care was discussed with: Physical Therapist, Registered Nurse and patient. .    Thank you for this referral.  TO Sanchez/L  Time Calculation: 18 mins

## 2019-10-07 NOTE — PROGRESS NOTES
Shift Change:  Bedside and Verbal shift change report given to Jorge Wayne RN (oncoming nurse) by Lewis RN (offgoing nurse). Report included the following information SBAR and Kardex. Shift Summary:  1955: Upon assessment of pt; inspiratory/expiratory wheezing noted. Called Dr. Joel Anderson ordered duo-neb q6h PRN. Notified RT of order. End of Shift Report[de-identified]  Bedside and Verbal shift change report given to Lewis RN (oncoming nurse) by Jorge Wayne RN (offgoing nurse). Report included the following information SBAR and Kardex.

## 2019-10-07 NOTE — PROGRESS NOTES
Jaspal Murrell rosario [place] 79  [facility], [address] [place] [address]  [phone]      Medical Progress Note      NAME: Naldo Gan Sr.   :  1935  MRM:  [id number]    Date/Time: 10/7/2019  9:37 AM         Subjective:     Chief Complaint:  Cough: seems to have resolved, patient understands he is here with ARF due to STRONG    ROS:  (bold if positive, if negative)                        Tolerating Diet          Objective:       Vitals:          Last 24hrs VS reviewed since prior progress note.  Most recent are:    Visit Vitals  /72 (BP 1 Location: Right arm, BP Patient Position: At rest)   Pulse 74   Temp 98 °F (36.7 °C)   Resp 17   Ht 5' 9\" (1.753 m)   Wt 77.1 kg (170 lb)   SpO2 98%   BMI 25.10 kg/m²     SpO2 Readings from Last 6 Encounters:   10/07/19 98%   18 100%   18 100%   18 100%   18 98%   18 97%            Intake/Output Summary (Last 24 hours) at 10/7/2019 6961  Last data filed at 10/7/2019 6472  Gross per 24 hour   Intake 1080.83 ml   Output 2050 ml   Net -969.17 ml          Exam:     Physical Exam:    Gen:  Well-developed, well-nourished, frail, elderly, chronically ill-appearing, n no acute distress  HEENT:  Pink conjunctivae, PERRL, hearing intact to voice, moist mucous membranes  Neck:  Supple, without masses, thyroid non-tender  Resp:  No accessory muscle use, clear breath sounds without wheezes rales or rhonchi  Card:  No murmurs, normal S1, S2 without thrills, bruits or peripheral edema, 2+ pedal pulses  Abd:  Soft, non-tender, non-distended, normoactive bowel sounds are present, no palpable organomegaly and no detectable hernias  Lymph:  No cervical or inguinal adenopathy  Musc:  No cyanosis or clubbing  Skin:  No rashes or ulcers, skin turgor is good, cap refill <2 sec  Neuro:  Cranial nerves are grossly intact, no focal motor weakness, follows commands appropriately  Psych:  Fair insight, oriented to person, place and time, alert Telemetry reviewed:   AFIB    Medications Reviewed: (see below)    Lab Data Reviewed: (see below)    ______________________________________________________________________    Medications:     Current Facility-Administered Medications   Medication Dose Route Frequency    amLODIPine (NORVASC) tablet 10 mg  10 mg Oral DAILY    aspirin delayed-release tablet 81 mg  81 mg Oral DAILY    carBAMazepine (TEGretol) tablet 200 mg  200 mg Oral DAILY    pantoprazole (PROTONIX) tablet 40 mg  40 mg Oral ACB    tamsulosin (FLOMAX) capsule 0.4 mg  0.4 mg Oral DAILY AFTER BREAKFAST    sodium chloride (NS) flush 5-40 mL  5-40 mL IntraVENous Q8H    sodium chloride (NS) flush 5-40 mL  5-40 mL IntraVENous PRN    acetaminophen (TYLENOL) tablet 500 mg  500 mg Oral Q4H PRN    HYDROcodone-acetaminophen (NORCO) 5-325 mg per tablet 1 Tab  1 Tab Oral Q6H PRN    naloxone (NARCAN) injection 0.4 mg  0.4 mg IntraVENous PRN    ondansetron (ZOFRAN) injection 2 mg  2 mg IntraVENous Q6H PRN    bisacodyl (DULCOLAX) tablet 5 mg  5 mg Oral DAILY PRN    nicotine (NICODERM CQ) 7 mg/24 hr patch 1 Patch  1 Patch TransDERmal DAILY PRN    dextrose 5 % - 0.45% NaCl infusion  50 mL/hr IntraVENous CONTINUOUS    finasteride (PROSCAR) tablet 5 mg  5 mg Oral DAILY    hydrALAZINE (APRESOLINE) 20 mg/mL injection 10 mg  10 mg IntraVENous Q6H PRN    influenza vaccine 2019-20 (6 mos+)(PF) (FLUARIX/FLULAVAL/FLUZONE QUAD) injection 0.5 mL  0.5 mL IntraMUSCular PRIOR TO DISCHARGE            Lab Review:     Recent Labs     10/07/19  0240 10/06/19  0941   WBC 3.3* 4.9   HGB 8.6* 9.8*   HCT 27.3* 32.0*   * 113*     Recent Labs     10/07/19  0240 10/06/19  0941    140   K 4.2 4.6    108   CO2 20* 19*   GLU 92 117*   BUN 64* 63*   CREA 5.99* 6.41*   CA 8.1* 8.8     Lab Results   Component Value Date/Time    Glucose (POC) 148 (H) 12/02/2018 11:09 AM    Glucose (POC) 128 (H) 09/07/2011 06:16 PM    Glucose (POC) 99 09/05/2011 12:17 AM    Glucose (POC) 170 (H) 09/02/2011 08:39 PM    Glucose (POC) 115 (H) 09/02/2011 07:42 AM     No results for input(s): PH, PCO2, PO2, HCO3, FIO2 in the last 72 hours. No results for input(s): INR, INREXT in the last 72 hours. No results found for: SDES  Lab Results   Component Value Date/Time    Culture result: MRSA NOT PRESENT 06/21/2018 11:36 AM    Culture result:  06/21/2018 11:36 AM         Screening of patient nares for MRSA is for surveillance purposes and, if positive, to facilitate isolation considerations in high risk settings. It is not intended for automatic decolonization interventions per se as regimens are not sufficiently effective to warrant routine use. Culture result: MRSA NOT PRESENT 04/23/2018 09:45 AM    Culture result:  04/23/2018 09:45 AM         Screening of patient nares for MRSA is for surveillance purposes and, if positive, to facilitate isolation considerations in high risk settings. It is not intended for automatic decolonization interventions per se as regimens are not sufficiently effective to warrant routine use.     Culture result: STAPHYLOCOCCUS EPIDERMIDIS (OXACILLIN RESISTANT) (A) 04/23/2018 09:45 AM            Assessment:     Principal Problem:    ARF [facility]acute renal [facility]) [facility] (10/6/2019)    Active Problems:    HTN (hypertension), benign (9/2/2011)      CKD (chronic kidney disease) stage 4, GFR 15-29 ml/min ([facility]) (9/5/2017)      Benign prostatic hyperplasia (9/5/2017)      CAD (coronary artery disease) ()      Overview: 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 ([facility])      Dyslipidemia (26/14/2734)      Systolic CHF, chronic (HCC) ()      Hx of seizure disorder ()      Chronic a-fib ()           Plan:     Principal Problem:    ARF (acute renal failure) ([facility] (10/6/2019)   - due to bladder outlet obstruction with bladder distention and hydronephrosis on CT on presentation   - Renal input appreciated   - s/p Ruffin, will need to remain in at discharge, arrange [place]   -  consult for STRONG, this is recurrent, he will need close follow up with  to prevent another recurrence    Active Problems:    HTN (hypertension), benign (9/2/2011)   - BP better back on medications      CKD (chronic kidney disease) stage 4, GFR 15-29 ml/min ([facility]) (9/5/2017)   - follow creatine as ARF resolved      Benign prostatic hyperplasia (9/5/2017)   - likely cause of ARF/STRONG, continue Flomax and Ruffin   - outpatient follow up with       CAD (coronary artery disease) ()     - stable, continue cardiac meds      Systolic CHF, chronic ([facility]) ()   - watch volume status,s top IV fluids      Hx of seizure disorder ()   - continue AEDs      Chronic a-fib ()   - rate controlled, not on chronic anticoagulation due to history of bleeding      Total time spent in patient care: 25 minutes                  Care Plan discussed with: Patient, Care Manager and Nursing Staff    Discussed:  Code Status, Care Plan and D/C Planning    Prophylaxis:  SCD's    Disposition:   PT, OT, RN           ___________________________________________________    Attending Physician: Craig Gomez MD

## 2019-10-07 NOTE — PROGRESS NOTES
TRANSFER - IN REPORT:    Verbal report received from Juan Daniel Hartley (name) on Joselyn Yeh Sr.  being received from ED (unit) for routine progression of care      Report consisted of patients Situation, Background, Assessment and   Recommendations(SBAR). Information from the following report(s) SBAR, Kardex, [facility] and Recent Results was reviewed with the receiving nurse. Opportunity for questions and clarification was provided. Assessment completed upon patients arrival to unit and care assumed. 2200  Patient arrived on floor, no distress or pain at this time. Ruffin output pink/bloody, no clots noted. Will continue to monitor. 2661  Dr. Vita Joyner notified of patient's 9 beat run of vtach, pacer spikes noted during episode, patient sleeping/asymptomatic at this time. Order given for repeat troponin this am.  0404  Reported troponin up to 0.10 from 0.07. Cardiology consult today, no new orders at this time. 4208  Hydralazine given for sbp 173 per orders. Orange juice provided per request.  0600  Blood pressures responded to hydralazine, sbp 140's.  0730  Bedside and Verbal shift change report given to Lewis (oncoming nurse) by Fany Godfrey (offgoing nurse). Report included the following information SBAR, Kardex, MAR and Recent Results.

## 2019-10-07 NOTE — PROGRESS NOTES
Bedside and Verbal shift change report given to Lewis (oncoming nurse) by Siddharth Caballero (offgoing nurse). Report included the following information SBAR, Kardex, ED Summary, Intake/Output, MAR, Recent Results and Cardiac Rhythm Afib. Bedside and Verbal shift change report given to Ralph Isabel (oncoming nurse) by Lewis (offgoing nurse). Report included the following information SBAR, Kardex, ED Summary, Procedure Summary, Intake/Output, MAR, Recent Results and Cardiac Rhythm A-fib.

## 2019-10-07 NOTE — PROGRESS NOTES
Problem: Mobility Impaired (Adult and Pediatric)  Goal: *Acute Goals and Plan of Care (Insert Text)  Description  FUNCTIONAL STATUS PRIOR TO ADMISSION: Patient was modified independent using a Single point cane for functional mobility. HOME SUPPORT PRIOR TO ADMISSION: The patient lived with son but did not require assist.    Physical Therapy Goals  Initiated 10/7/2019  1. Patient will move from supine to sit and sit to supine , scoot up and down and roll side to side in bed with independence within 7 day(s). 2.  Patient will transfer from bed to chair and chair to bed with independence using the least restrictive device within 7 day(s). 3.  Patient will perform sit to stand with independence within 7 day(s). 4.  Patient will ambulate with modified independence for 200 feet with the least restrictive device within 7 day(s). 5.  Patient will ascend/descend 4 stairs with  handrail(s) with independence within 7 day(s). Outcome: Progressing Towards Goal   PHYSICAL THERAPY EVALUATION  Patient: Joann Gu Sr. (80 y.o. male)  Date: 10/7/2019  Primary Diagnosis: ARF (acute renal failure) ([facility]) [N17.9]        Precautions: fall         ASSESSMENT  Based on the objective data described below, the patient presents with generalized weakness and debility. Sit on the edge of bed, ambulate with handheld assist out on the [facility] hallway. Use to ambulate with single point cane at home at base line. OOB to chair as tolerated performed some active range of motion exercise on both LE all planes. Educate and  Instructed patient to continue active range of motion exercise on both legs while up on chair or on bed. Communicated nurse who agreed to monitor patient. Current Level of Function Impacting Discharge (mobility/balance): CGA with ambulation and transfers. Functional Outcome Measure: The patient scored 90/100 on the barthel index outcome measure .     Other factors to consider for discharge: none     Patient will benefit from skilled therapy intervention to address the above noted impairments. PLAN :  Recommendations and Planned Interventions: bed mobility training, transfer training, gait training, therapeutic exercises and therapeutic activities      Frequency/Duration: Patient will be followed by physical therapy:  5 times a week to address goals. Recommendation for discharge: (in order for the patient to meet his/her long term goals)  Physical therapy at least 2 days/week in the home     This discharge recommendation:  Has been made in collaboration with the attending provider and/or case management    Equipment recommendations for successful discharge (if) home: patient owns DME required for discharge         SUBJECTIVE:   Patient stated [address].     OBJECTIVE DATA SUMMARY:   HISTORY:    Past Medical History:   Diagnosis Date    Arthritis     CAD (coronary artery disease)     hx mi    Chronic a-fib     CKD (chronic kidney disease), stage IV (HCC)     Dementia (HCC)     Hx of seizure disorder     Hyperlipidemia     Hypertension     Systolic CHF, chronic (HCC)      Past Surgical History:   Procedure Laterality Date    COLONOSCOPY N/A 9/6/2017    COLONOSCOPY performed by Herb Regalado MD at [facility] N/A 2/8/2018    COLONOSCOPY performed by Libia Reyes MD at [facility]      right    HX CORONARY ARTERY BYPASS GRAFT      x2 vessels    HX HEART CATHETERIZATION      HX PACEMAKER      right chest       Personal factors and/or comorbidities impacting plan of care:     Home Situation  Home Environment: Private residence  One/Two Story Residence: Other (Comment)  Living Alone: No  Support Systems: Child(kenney)  Patient Expects to be Discharged to[de-identified] Private residence  Current DME Used/Available at Home: None  Tub or Shower Type: Tub/Shower combination    EXAMINATION/PRESENTATION/DECISION MAKING:   Critical Behavior:  Neurologic State: Alert  Orientation Level: Oriented X4  Cognition: Appropriate decision making, Appropriate safety awareness, Appropriate for age attention/concentration  Safety/Judgement: Awareness of environment  Hearing: Auditory  Auditory Impairment: None    Range Of Motion:  AROM: Within functional limits           PROM: Within functional limits           Strength:    Strength: Within functional limits                    Tone & Sensation:                                  Coordination:  Coordination: Within functional limits  Vision:   Tracking: Able to track stimulus in all quadrants w/o difficulty  Diplopia: No  Acuity: Within Defined Limits  Functional Mobility:  Bed Mobility:  Rolling: Independent  Supine to Sit: Independent  Sit to Supine: Independent  Scooting: Independent  Transfers:  Sit to Stand: Supervision  Stand to Sit: Supervision  Stand Pivot Transfers: Supervision     Bed to Chair: Supervision              Balance:   Sitting: Intact  Standing: Intact; With support  Ambulation/Gait Training:  Distance (ft): 100 Feet (ft)  Assistive Device: Cane, straight;Gait belt  Ambulation - Level of Assistance: Contact guard assistance     Gait Description (WDL): Exceptions to WDL  Gait Abnormalities: Path deviations; Step to gait        Base of Support: Widened     Speed/Juli: Pace decreased (<100 feet/min)                  Therapeutic Exercises:    Instructed patient to continue active range of motion exercise on both legs while up on chair or on bed. Functional Measure:  Barthel Index:    Bathin  Bladder: 10  Bowels: 10  Groomin  Dressing: 10  Feeding: 10  Mobility: 15  Stairs: 5  Toilet Use: 10  Transfer (Bed to Chair and Back): 10  Total: 90/100       The Barthel ADL Index: Guidelines  1. The index should be used as a record of what a patient does, not as a record of what a patient could do. 2. The main aim is to establish degree of independence from any help, physical or verbal, however minor and for whatever reason.   3. The need for supervision renders the patient not independent. 4. A patient's performance should be established using the best available evidence. Asking the patient, friends/relatives and nurses are the usual sources, but direct observation and common sense are also important. However direct testing is not needed. 5. Usually the patient's performance over the preceding 24-48 hours is important, but occasionally longer periods will be relevant. 6. Middle categories imply that the patient supplies over 50 per cent of the effort. 7. Use of aids to be independent is allowed. Romie Yang., Barthel, DYahirW. (0532). Functional evaluation: the Barthel Index. [facility] (14)2. Jason Tate polo DACIA Diamond, Arjun Santiago., Johnnie Cervantes., [place], [address] [place] [address] Ave (). Measuring the change indisability after inpatient rehabilitation; comparison of the responsiveness of the Barthel Index and Functional [place] Measure. Journal of Neurology, Neurosurgery, and Psychiatry, 66(4), 497-440. Tracy Garcia, N.J.A, LIZ Mckeon, & Wilbur Bueno MOLIMPIA. (2004.) Assessment of post-stroke quality of life in cost-effectiveness studies: The usefulness of the Barthel Index and the EuroQoL-5D. Quality of Life Research, 15, 296-67           Physical Therapy Evaluation Charge Determination   History Examination Presentation Decision-Making   LOW Complexity : Zero comorbidities / personal factors that will impact the outcome / POC LOW Complexity : 1-2 Standardized tests and measures addressing body structure, function, activity limitation and / or participation in recreation  LOW Complexity : Stable, uncomplicated  Other outcome measures barthel  LOW       Based on the above components, the patient evaluation is determined to be of the following complexity level: LOW     Pain Ratin/10    Activity Tolerance:   Good  Please refer to the flowsheet for vital signs taken during this treatment.     After treatment patient left in no apparent distress: Sitting in chair and Call bell within reach    COMMUNICATION/EDUCATION:   The patients plan of care was discussed with: Occupational Therapist and Registered Nurse. Fall prevention education was provided and the patient/caregiver indicated understanding. Thank you for this referral.  Andre Álvarez PT,WCC.    Time Calculation: 27 mins

## 2019-10-07 NOTE — CONSULTS
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  Suspect IVVD    Recommendation:  No acute CV issues  Hydrate  Ruffin drainage  If  procedure required, cardiac risk would be considered low to intermediate  Hold aspirin for now in case    Annabelle Murillo MD

## 2019-10-07 NOTE — PROGRESS NOTES
[address] [facility] Naina Ralph. YOB: 1935          Assessment & Plan:     1. TINA/CKD4   - 2ND TO STRONG recurrent  - cr down  - non oliguric  2. Anemia  3. HTN  - CCB  4. CAD  Plan  1. No RRT  2. Iron panel  3. Pelayo and Urology         Subjective:   CC:ARF  HPI: Patient seen   TINA is severe, has pelayo,making urine ,cr beter  ROS:neg for 12 sys except above    Current Facility-Administered Medications   Medication Dose Route Frequency    amLODIPine (NORVASC) tablet 10 mg  10 mg Oral DAILY    aspirin delayed-release tablet 81 mg  81 mg Oral DAILY    carBAMazepine (TEGretol) tablet 200 mg  200 mg Oral DAILY    pantoprazole (PROTONIX) tablet 40 mg  40 mg Oral ACB    tamsulosin (FLOMAX) capsule 0.4 mg  0.4 mg Oral DAILY AFTER BREAKFAST    sodium chloride (NS) flush 5-40 mL  5-40 mL IntraVENous Q8H    sodium chloride (NS) flush 5-40 mL  5-40 mL IntraVENous PRN    acetaminophen (TYLENOL) tablet 500 mg  500 mg Oral Q4H PRN    HYDROcodone-acetaminophen (NORCO) 5-325 mg per tablet 1 Tab  1 Tab Oral Q6H PRN    naloxone (NARCAN) injection 0.4 mg  0.4 mg IntraVENous PRN    ondansetron (ZOFRAN) injection 2 mg  2 mg IntraVENous Q6H PRN    bisacodyl (DULCOLAX) tablet 5 mg  5 mg Oral DAILY PRN    nicotine (NICODERM CQ) 7 mg/24 hr patch 1 Patch  1 Patch TransDERmal DAILY PRN    finasteride (PROSCAR) tablet 5 mg  5 mg Oral DAILY    hydrALAZINE (APRESOLINE) 20 mg/mL injection 10 mg  10 mg IntraVENous Q6H PRN    influenza vaccine 2019-20 (6 mos+)(PF) (FLUARIX/FLULAVAL/FLUZONE QUAD) injection 0.5 mL  0.5 mL IntraMUSCular PRIOR TO DISCHARGE          Objective:     Vitals:  Blood pressure 158/68, pulse 79, temperature 98 °F (36.7 °C), resp. rate 17, height 5' 9\" (1.753 m), weight 77.1 kg (170 lb), SpO2 98 %. Temp (24hrs), Av.2 °F (36.8 °C), Min:98 °F (36.7 °C), Max:98.6 °F (37 °C)      Intake and Output:  No intake/output data recorded.   10/05 1901 - 10/07 0700  In: 1080.8 [P.O.:440; I.V.:640.8]  Out: 2050 [Urine:2050]    Physical Exam:                Patient is intubated:  no    Physical Examination:   GENERAL ASSESSMENT: NAD  HEENT:Nontraumatic   CHEST: CTA  HEART: S1S2  ABDOMEN: Soft,NT,  :Ruffin: yes  EXTREMITY: EDEMA  NEURO:Grossly non focal          ECG/rhythm:    Data Review      No results for input(s): TNIPOC in the last 72 hours. No lab exists for component: ITNL   Recent Labs     10/07/19  0240 10/06/19  0941   TROIQ 0.10* 0.07*     Recent Labs     10/07/19  0240 10/06/19  0941    140   K 4.2 4.6    108   CO2 20* 19*   BUN 64* 63*   CREA 5.99* 6.41*   GLU 92 117*   CA 8.1* 8.8   WBC 3.3* 4.9   HGB 8.6* 9.8*   HCT 27.3* 32.0*   * 113*      No results for input(s): INR, PTP, APTT, INREXT in the last 72 hours. Needs: urine analysis, urine sodium, protein and creatinine  Lab Results   Component Value Date/Time    Sodium,urine random 99 09/17/2018 02:33 PM    Creatinine, urine 47.50 09/17/2018 02:33 PM         Discussed with:  pt    : Lamberto Abarca MD  10/7/2019        [facility]:  [email or web address]  Karina Eugene office:  [address] [place] [address], [address] [place] [address],[address]  [place], [address] [address]  Phone: [phone]  Fax :     [phone]    [place] office:  [address] [facility], [address] [facility]  Phone - [phone]  Fax - [phone]

## 2019-10-08 LAB
ANION GAP SERPL CALC-SCNC: 11 MMOL/L (ref 5–15)
BASOPHILS # BLD: 0 K/UL (ref 0–0.1)
BASOPHILS NFR BLD: 0 % (ref 0–1)
BUN SERPL-MCNC: 62 MG/DL (ref 6–20)
BUN/CREAT SERPL: 11 (ref 12–20)
CALCIUM SERPL-MCNC: 7.8 MG/DL (ref 8.5–10.1)
CHLORIDE SERPL-SCNC: 109 MMOL/L (ref 97–108)
CO2 SERPL-SCNC: 19 MMOL/L (ref 21–32)
CREAT SERPL-MCNC: 5.83 MG/DL (ref 0.7–1.3)
DIFFERENTIAL METHOD BLD: ABNORMAL
EOSINOPHIL # BLD: 0 K/UL (ref 0–0.4)
EOSINOPHIL NFR BLD: 0 % (ref 0–7)
ERYTHROCYTE [DISTWIDTH] IN BLOOD BY AUTOMATED COUNT: 14.6 % (ref 11.5–14.5)
GLUCOSE SERPL-MCNC: 84 MG/DL (ref 65–100)
HCT VFR BLD AUTO: 27.3 % (ref 36.6–50.3)
HGB BLD-MCNC: 8.6 G/DL (ref 12.1–17)
IMM GRANULOCYTES # BLD AUTO: 0.1 K/UL (ref 0–0.04)
IMM GRANULOCYTES NFR BLD AUTO: 1 % (ref 0–0.5)
IRON SATN MFR SERPL: 9 % (ref 20–50)
IRON SERPL-MCNC: 17 UG/DL (ref 35–150)
LYMPHOCYTES # BLD: 0.5 K/UL (ref 0.8–3.5)
LYMPHOCYTES NFR BLD: 9 % (ref 12–49)
MCH RBC QN AUTO: 31.4 PG (ref 26–34)
MCHC RBC AUTO-ENTMCNC: 31.5 G/DL (ref 30–36.5)
MCV RBC AUTO: 99.6 FL (ref 80–99)
MONOCYTES # BLD: 0.5 K/UL (ref 0–1)
MONOCYTES NFR BLD: 10 % (ref 5–13)
NEUTS SEG # BLD: 4 K/UL (ref 1.8–8)
NEUTS SEG NFR BLD: 80 % (ref 32–75)
NRBC # BLD: 0 K/UL (ref 0–0.01)
NRBC BLD-RTO: 0 PER 100 WBC
PHOSPHATE SERPL-MCNC: 4.2 MG/DL (ref 2.6–4.7)
PLATELET # BLD AUTO: 114 K/UL (ref 150–400)
PMV BLD AUTO: 10.5 FL (ref 8.9–12.9)
POTASSIUM SERPL-SCNC: 4.9 MMOL/L (ref 3.5–5.1)
RBC # BLD AUTO: 2.74 M/UL (ref 4.1–5.7)
RBC MORPH BLD: ABNORMAL
SODIUM SERPL-SCNC: 139 MMOL/L (ref 136–145)
TIBC SERPL-MCNC: 200 UG/DL (ref 250–450)
WBC # BLD AUTO: 5.1 K/UL (ref 4.1–11.1)

## 2019-10-08 PROCEDURE — 97116 GAIT TRAINING THERAPY: CPT

## 2019-10-08 PROCEDURE — 74011000250 HC RX REV CODE- 250: Performed by: INTERNAL MEDICINE

## 2019-10-08 PROCEDURE — 74011250637 HC RX REV CODE- 250/637: Performed by: INTERNAL MEDICINE

## 2019-10-08 PROCEDURE — 36415 COLL VENOUS BLD VENIPUNCTURE: CPT

## 2019-10-08 PROCEDURE — 83540 ASSAY OF IRON: CPT

## 2019-10-08 PROCEDURE — 74011250636 HC RX REV CODE- 250/636: Performed by: INTERNAL MEDICINE

## 2019-10-08 PROCEDURE — 85025 COMPLETE CBC W/AUTO DIFF WBC: CPT

## 2019-10-08 PROCEDURE — 74011000258 HC RX REV CODE- 258: Performed by: INTERNAL MEDICINE

## 2019-10-08 PROCEDURE — 97530 THERAPEUTIC ACTIVITIES: CPT

## 2019-10-08 PROCEDURE — 84100 ASSAY OF PHOSPHORUS: CPT

## 2019-10-08 PROCEDURE — 80048 BASIC METABOLIC PNL TOTAL CA: CPT

## 2019-10-08 PROCEDURE — 65660000000 HC RM CCU STEPDOWN

## 2019-10-08 PROCEDURE — 94640 AIRWAY INHALATION TREATMENT: CPT

## 2019-10-08 RX ADMIN — SODIUM CHLORIDE: 450 INJECTION, SOLUTION INTRAVENOUS at 09:54

## 2019-10-08 RX ADMIN — FINASTERIDE 5 MG: 5 TABLET, FILM COATED ORAL at 08:25

## 2019-10-08 RX ADMIN — IPRATROPIUM BROMIDE AND ALBUTEROL SULFATE 3 ML: .5; 3 SOLUTION RESPIRATORY (INHALATION) at 16:09

## 2019-10-08 RX ADMIN — CARBAMAZEPINE 200 MG: 200 TABLET ORAL at 08:25

## 2019-10-08 RX ADMIN — TAMSULOSIN HYDROCHLORIDE 0.4 MG: 0.4 CAPSULE ORAL at 08:25

## 2019-10-08 RX ADMIN — SODIUM CHLORIDE: 450 INJECTION, SOLUTION INTRAVENOUS at 22:25

## 2019-10-08 RX ADMIN — Medication 10 ML: at 05:34

## 2019-10-08 RX ADMIN — Medication 10 ML: at 22:00

## 2019-10-08 RX ADMIN — Medication 10 ML: at 14:00

## 2019-10-08 RX ADMIN — PANTOPRAZOLE SODIUM 40 MG: 40 TABLET, DELAYED RELEASE ORAL at 05:33

## 2019-10-08 RX ADMIN — AMLODIPINE BESYLATE 10 MG: 5 TABLET ORAL at 08:25

## 2019-10-08 RX ADMIN — ASPIRIN 81 MG: 81 TABLET, COATED ORAL at 08:24

## 2019-10-08 RX ADMIN — EPOETIN ALFA-EPBX 10000 UNITS: 10000 INJECTION, SOLUTION INTRAVENOUS; SUBCUTANEOUS at 10:49

## 2019-10-08 NOTE — PROGRESS NOTES
Problem: Mobility Impaired (Adult and Pediatric)  Goal: *Acute Goals and Plan of Care (Insert Text)  Description  FUNCTIONAL STATUS PRIOR TO ADMISSION: Patient was modified independent using a Single point cane for functional mobility. HOME SUPPORT PRIOR TO ADMISSION: The patient lived with son but did not require assist.    Physical Therapy Goals  Initiated 10/7/2019  1. Patient will move from supine to sit and sit to supine , scoot up and down and roll side to side in bed with independence within 7 day(s). 2.  Patient will transfer from bed to chair and chair to bed with independence using the least restrictive device within 7 day(s). 3.  Patient will perform sit to stand with independence within 7 day(s). 4.  Patient will ambulate with modified independence for 200 feet with the least restrictive device within 7 day(s). 5.  Patient will ascend/descend 4 stairs with  handrail(s) with independence within 7 day(s). Outcome: Progressing Towards Goal   PHYSICAL THERAPY TREATMENT  Patient: Jose Francisco Blackwell Sr. (80 y.o. male)  Date: 10/8/2019  Diagnosis: ARF (acute renal failure) ([facility]) [N17.9] ARF (acute renal failure) ([facility])       Precautions:    Chart, physical therapy assessment, plan of care and goals were reviewed. ASSESSMENT  Patient continues with skilled PT services and is progressing towards goals. Sit on the edge of bed with Supervision, ambulate with rolling walker out on the [facility] hallway SBA no loss of balance. Offered to be OOB to chair as tolerated patient declined and just to go back to bed after ambulation. Performed some active range of motion exercise on both LE all planes. Patient single point cane at home told patient to call son to bring it in the hospital when he comes to visit home. Communicated with nurse who agreed to monitor patient. Current Level of Function Impacting Discharge (mobility/balance):  SBA with ambulation using a rolling walker    Other factors to consider for discharge: none         PLAN :  Patient continues to benefit from skilled intervention to address the above impairments. Continue treatment per established plan of care. to address goals. Recommendation for discharge: (in order for the patient to meet his/her long term goals)  Physical therapy at least 2 days/week in the home     This discharge recommendation:  Has been made in collaboration with the attending provider and/or case management    Equipment recommendations for successful discharge (if) home: patient owns DME required for discharge and none       SUBJECTIVE:   Patient stated [address].     OBJECTIVE DATA SUMMARY:   Critical Behavior:  Neurologic State: Alert  Orientation Level: Oriented X4  Cognition: Follows commands  Safety/Judgement: Awareness of environment  Functional Mobility Training:  Bed Mobility:  Rolling: Independent  Supine to Sit: Independent  Sit to Supine: Independent  Scooting: Independent        Transfers:  Sit to Stand: Supervision  Stand to Sit: Supervision  Stand Pivot Transfers: Supervision     Bed to Chair: Supervision                    Balance:  Sitting: Intact  Standing: Intact; With support  Ambulation/Gait Training:  Distance (ft): 200 Feet (ft)  Assistive Device: Walker, rolling;Gait belt(single point cane still at home)  Ambulation - Level of Assistance: Stand-by assistance     Gait Description (WDL): Exceptions to [facility]           Base of Support: Widened     Speed/Juli: Pace decreased (<100 feet/min)                         Therapeutic Exercises:    Instructed patient to continue active range of motion exercise on both legs while up on chair or on bed. Pain Ratin/10    Activity Tolerance:   Good  Please refer to the flowsheet for vital signs taken during this treatment.     After treatment patient left in no apparent distress:   Supine in bed, Heels elevated for pressure relief, Call bell within reach, Bed / chair alarm activated and Side rails x 3    COMMUNICATION/COLLABORATION:   The patients plan of care was discussed with: Registered Nurse and patient     Terresa Duane, PT,WCC.    Time Calculation: 24 mins

## 2019-10-08 NOTE — PROGRESS NOTES
63 [facility] Naina Ralph. YOB: 1935          Assessment & Plan:     1. TINA/CKD4  And M acidosis  - 2ND TO STRONG recurrent  - cr down  - non oliguric  2. Anemia  3. HTN  - CCB  4. CAD  Plan  1. No RRT  2. Iron panel, add epo  3. Ruffin and Urology  4. Add IVF with little bicarb, will add po bicarb on dc  5.  If labs better, ok to dc in am and [facility]         Subjective:   CC:ARF  HPI: Patient seen   TINA is better but minimal, no sob, high BNP.  ROS:neg for 12 sys except above    Current Facility-Administered Medications   Medication Dose Route Frequency    albuterol-ipratropium (DUO-NEB) 2.5 MG-0.5 MG/3 ML  3 mL Nebulization Q6H PRN    amLODIPine (NORVASC) tablet 10 mg  10 mg Oral DAILY    aspirin delayed-release tablet 81 mg  81 mg Oral DAILY    carBAMazepine (TEGretol) tablet 200 mg  200 mg Oral DAILY    pantoprazole (PROTONIX) tablet 40 mg  40 mg Oral ACB    tamsulosin (FLOMAX) capsule 0.4 mg  0.4 mg Oral DAILY AFTER BREAKFAST    sodium chloride (NS) flush 5-40 mL  5-40 mL IntraVENous Q8H    sodium chloride (NS) flush 5-40 mL  5-40 mL IntraVENous PRN    acetaminophen (TYLENOL) tablet 500 mg  500 mg Oral Q4H PRN    HYDROcodone-acetaminophen (NORCO) 5-325 mg per tablet 1 Tab  1 Tab Oral Q6H PRN    naloxone (NARCAN) injection 0.4 mg  0.4 mg IntraVENous PRN    ondansetron (ZOFRAN) injection 2 mg  2 mg IntraVENous Q6H PRN    bisacodyl (DULCOLAX) tablet 5 mg  5 mg Oral DAILY PRN    nicotine (NICODERM CQ) 7 mg/24 hr patch 1 Patch  1 Patch TransDERmal DAILY PRN    finasteride (PROSCAR) tablet 5 mg  5 mg Oral DAILY    hydrALAZINE (APRESOLINE) 20 mg/mL injection 10 mg  10 mg IntraVENous Q6H PRN    influenza vaccine 2019-20 (6 mos+)(PF) (FLUARIX/FLULAVAL/FLUZONE QUAD) injection 0.5 mL  0.5 mL IntraMUSCular PRIOR TO DISCHARGE          Objective:     Vitals:  Blood pressure 154/76, pulse 69, temperature 98.9 °F (37.2 °C), resp. rate 20, height 5' 9\" (1.753 m), weight 71.5 kg (157 lb 9.6 oz), SpO2 100 %. Temp (24hrs), Av.6 °F (37 °C), Min:98 °F (36.7 °C), Max:99.2 °F (37.3 °C)      Intake and Output:  No intake/output data recorded. 10/06 1901 - 10/08 0700  In: 1080.8 [P.O.:440; I.V.:640.8]  Out: 3500 [Urine:3500]    Physical Exam:                Patient is intubated:  no    Physical Examination:   GENERAL ASSESSMENT: NAD  HEENT:Nontraumatic   CHEST: CTA  HEART: S1S2  ABDOMEN: Soft,NT,  :Ruffin: yes  EXTREMITY: EDEMA  NEURO:Grossly non focal          ECG/rhythm:    Data Review      No results for input(s): TNIPOC in the last 72 hours. No lab exists for component: ITNL   Recent Labs     10/07/19  0240 10/06/19  0941   TROIQ 0.10* 0.07*     Recent Labs     10/08/19  0345 10/07/19  0240 10/06/19  0941    139 140   K 4.9 4.2 4.6   * 108 108   CO2 19* 20* 19*   BUN 62* 64* 63*   CREA 5.83* 5.99* 6.41*   GLU 84 92 117*   PHOS 4.2  --   --    CA 7.8* 8.1* 8.8   WBC 5.1 3.3* 4.9   HGB 8.6* 8.6* 9.8*   HCT 27.3* 27.3* 32.0*   * 109* 113*      No results for input(s): INR, PTP, APTT, INREXT, INREXT in the last 72 hours. Needs: urine analysis, urine sodium, protein and creatinine  Lab Results   Component Value Date/Time    Sodium,urine random 99 2018 02:33 PM    Creatinine, urine 47.50 2018 02:33 PM         Discussed with:  pt    : Lamberto Abarca MD  10/8/2019        [facility]:  [email or web address]  Karina [place] office:  [address] [place] [address], [facility] [address]  [place], [address] [facility]  Phone: [phone]  Fax :     [phone]    [place] office:  [address] [facility], [address]  Phone - [phone]  Fax - [phone]

## 2019-10-08 NOTE — PROGRESS NOTES
Bedside and Verbal shift change report given to Lewis (oncoming nurse) by Andrzej Kamara (offgoing nurse). Report included the following information SBAR, Kardex, Intake/Output, MAR, Recent Results and Cardiac Rhythm A-fib. 1000 orders received for iron profile, epoetin almita injection & IVF sodium bicarb @100ml/hr. IVF started     1030 Iron profile was able to completed as an add on.    1600 pt has scattered expiratory wheezing. Notified respiratory therapy for PRN duo-neb. Bedside and Verbal shift change report given to Andrzej Kamara (oncoming nurse) by Lewsi (offgoing nurse). Report included the following information SBAR, Kardex, Intake/Output, MAR, Recent Results and Cardiac Rhythm A-fib.

## 2019-10-08 NOTE — PROGRESS NOTES
10-8-2019 CASE MANAGEMENT NOTE:  I was told the patient will not be discharged today. I spoke with the intake line for [facility] to inform them. When the patient is scheduled for discharge we need to contact the [address] [place] [address],[address] [place] [phone]-to notify them.  ARIELLA Card, CM

## 2019-10-08 NOTE — PROGRESS NOTES
Shift Change:  Bedside and Verbal shift change report given to Kalyn Benson RN (oncoming nurse) by Leila Yates RN (offgoing nurse). Report included the following information SBAR and Kardex. End of Shift Report[de-identified]  Bedside and Verbal shift change report given to Jacqueline RN (oncoming nurse) by Kalyn Benson RN (offgoing nurse). Report included the following information SBAR and Kardex.

## 2019-10-08 NOTE — PROGRESS NOTES
Jaspal Murrell [facility]  [address] [facility], [address] [place] [address]  [phone]      Medical Progress Note      NAME: Bogdan Michelle Sr.   :  1935  MRM:  [id number]    Date/Time: 10/8/2019  9:11 AM          Subjective:     Chief Complaint:  Cough: seems to have resolved, patient understands he is here with ARF due to STRONG and will need catheter permanently    ROS:  (bold if positive, if negative)                        Tolerating Diet          Objective:       Vitals:          Last 24hrs VS reviewed since prior progress note.  Most recent are:    Visit Vitals  /76 (BP 1 Location: Right arm, BP Patient Position: At rest)   Pulse 69   Temp 98.9 °F (37.2 °C)   Resp 20   Ht 5' 9\" (1.753 m)   Wt 71.5 kg (157 lb 9.6 oz)   SpO2 100%   BMI 23.27 kg/m²     SpO2 Readings from Last 6 Encounters:   10/08/19 100%   18 100%   18 100%   18 100%   18 98%   18 97%            Intake/Output Summary (Last 24 hours) at 10/8/2019 0911  Last data filed at 10/8/2019 0430  Gross per 24 hour   Intake    Output 1450 ml   Net -1450 ml          Exam:     Physical Exam:    Gen:  Well-developed, well-nourished, frail, elderly, chronically ill-appearing, n no acute distress  HEENT:  Pink conjunctivae, PERRL, hearing intact to voice, moist mucous membranes  Neck:  Supple, without masses, thyroid non-tender  Resp:  No accessory muscle use, clear breath sounds without wheezes rales or rhonchi  Card:  No murmurs, normal S1, S2 without thrills, bruits or peripheral edema, 2+ pedal pulses  Abd:  Soft, non-tender, non-distended, normoactive bowel sounds are present, no palpable organomegaly and no detectable hernias  Lymph:  No cervical or inguinal adenopathy  Musc:  No cyanosis or clubbing  Skin:  No rashes or ulcers, skin turgor is good, cap refill <2 sec  Neuro:  Cranial nerves are grossly intact, no focal motor weakness, follows commands appropriately  Psych:  Fair insight, oriented to person, place and time, alert       Telemetry reviewed:   AFIB    Medications Reviewed: (see below)    Lab Data Reviewed: (see below)    ______________________________________________________________________    Medications:     Current Facility-Administered Medications   Medication Dose Route Frequency    albuterol-ipratropium (DUO-NEB) 2.5 MG-0.5 MG/3 ML  3 mL Nebulization Q6H PRN    amLODIPine (NORVASC) tablet 10 mg  10 mg Oral DAILY    aspirin delayed-release tablet 81 mg  81 mg Oral DAILY    carBAMazepine (TEGretol) tablet 200 mg  200 mg Oral DAILY    pantoprazole (PROTONIX) tablet 40 mg  40 mg Oral ACB    tamsulosin (FLOMAX) capsule 0.4 mg  0.4 mg Oral DAILY AFTER BREAKFAST    sodium chloride (NS) flush 5-40 mL  5-40 mL IntraVENous Q8H    sodium chloride (NS) flush 5-40 mL  5-40 mL IntraVENous PRN    acetaminophen (TYLENOL) tablet 500 mg  500 mg Oral Q4H PRN    HYDROcodone-acetaminophen (NORCO) 5-325 mg per tablet 1 Tab  1 Tab Oral Q6H PRN    naloxone (NARCAN) injection 0.4 mg  0.4 mg IntraVENous PRN    ondansetron (ZOFRAN) injection 2 mg  2 mg IntraVENous Q6H PRN    bisacodyl (DULCOLAX) tablet 5 mg  5 mg Oral DAILY PRN    nicotine (NICODERM CQ) 7 mg/24 hr patch 1 Patch  1 Patch TransDERmal DAILY PRN    finasteride (PROSCAR) tablet 5 mg  5 mg Oral DAILY    hydrALAZINE (APRESOLINE) 20 mg/mL injection 10 mg  10 mg IntraVENous Q6H PRN    influenza vaccine 2019-20 (6 mos+)(PF) (FLUARIX/FLULAVAL/FLUZONE QUAD) injection 0.5 mL  0.5 mL IntraMUSCular PRIOR TO DISCHARGE            Lab Review:     Recent Labs     10/08/19  0345 10/07/19  0240 10/06/19  0941   WBC 5.1 3.3* 4.9   HGB 8.6* 8.6* 9.8*   HCT 27.3* 27.3* 32.0*   * 109* 113*     Recent Labs     10/08/19  0345 10/07/19  0240 10/06/19  0941    139 140   K 4.9 4.2 4.6   * 108 108   CO2 19* 20* 19*   GLU 84 92 117*   BUN 62* 64* 63*   CREA 5.83* 5.99* 6.41*   CA 7.8* 8.1* 8.8   PHOS 4.2  --   --      Lab Results   Component Value Date/Time    Glucose (POC) 148 (H) 12/02/2018 11:09 AM    Glucose (POC) 128 (H) 09/07/2011 06:16 PM    Glucose (POC) 99 09/05/2011 12:17 AM    Glucose (POC) 170 (H) 09/02/2011 08:39 PM    Glucose (POC) 115 (H) 09/02/2011 07:42 AM     No results for input(s): PH, PCO2, PO2, HCO3, FIO2 in the last 72 hours. No results for input(s): INR, INREXT, INREXT in the last 72 hours. No results found for: SDES  Lab Results   Component Value Date/Time    Culture result: MRSA NOT PRESENT 06/21/2018 11:36 AM    Culture result:  06/21/2018 11:36 AM         Screening of patient nares for MRSA is for surveillance purposes and, if positive, to facilitate isolation considerations in high risk settings. It is not intended for automatic decolonization interventions per se as regimens are not sufficiently effective to warrant routine use. Culture result: MRSA NOT PRESENT 04/23/2018 09:45 AM    Culture result:  04/23/2018 09:45 AM         Screening of patient nares for MRSA is for surveillance purposes and, if positive, to facilitate isolation considerations in high risk settings. It is not intended for automatic decolonization interventions per se as regimens are not sufficiently effective to warrant routine use.     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 Marina Green, outpatient  follow up, consider conversion to SPT if appropriate   - creatinine slowly improving, home when okay with     Active Problems:    HTN (hypertension), benign (9/2/2011)   - BP better back on medications      CKD (chronic kidney disease) stage 4, GFR 15-29 ml/min ([address] (9/5/2017)   - follow creatine as ARF resolves      Benign prostatic hyperplasia (9/5/2017)   - likely cause of ARF/STRONG, continue Flomax and Ruffin as above   - outpatient follow up with       CAD (coronary artery disease) ()     - stable, continue cardiac meds      Systolic CHF, chronic (HCC) ()   - watch volume status, off fluids      Hx of seizure disorder ()   - continue AEDs      Chronic a-fib ()   - rate controlled, not on chronic anticoagulation due to history of bleeding      Total time spent in patient care: 25 minutes                  Care Plan discussed with: Patient, Care Manager and Nursing Staff    Discussed:  Code Status, Care Plan and D/C Planning    Prophylaxis:  SCD's    Disposition:   PT, OT, RN           ___________________________________________________    Attending Physician: Laly Yip MD

## 2019-10-08 NOTE — PROGRESS NOTES
Cr trending down. Ok to [employer] from [employer] when approp per primary team, nephrology. Will fu as outpt with repeat upper tract imaging.

## 2019-10-09 VITALS
DIASTOLIC BLOOD PRESSURE: 80 MMHG | TEMPERATURE: 98.5 F | HEART RATE: 67 BPM | HEIGHT: 69 IN | SYSTOLIC BLOOD PRESSURE: 177 MMHG | BODY MASS INDEX: 23.34 KG/M2 | RESPIRATION RATE: 20 BRPM | OXYGEN SATURATION: 100 % | WEIGHT: 157.6 LBS

## 2019-10-09 LAB
ANION GAP SERPL CALC-SCNC: 9 MMOL/L (ref 5–15)
BASOPHILS # BLD: 0 K/UL (ref 0–0.1)
BASOPHILS NFR BLD: 0 % (ref 0–1)
BUN SERPL-MCNC: 64 MG/DL (ref 6–20)
BUN/CREAT SERPL: 11 (ref 12–20)
CALCIUM SERPL-MCNC: 7.5 MG/DL (ref 8.5–10.1)
CHLORIDE SERPL-SCNC: 107 MMOL/L (ref 97–108)
CO2 SERPL-SCNC: 21 MMOL/L (ref 21–32)
CREAT SERPL-MCNC: 5.67 MG/DL (ref 0.7–1.3)
DIFFERENTIAL METHOD BLD: ABNORMAL
EOSINOPHIL # BLD: 0 K/UL (ref 0–0.4)
EOSINOPHIL NFR BLD: 1 % (ref 0–7)
ERYTHROCYTE [DISTWIDTH] IN BLOOD BY AUTOMATED COUNT: 14.4 % (ref 11.5–14.5)
GLUCOSE SERPL-MCNC: 92 MG/DL (ref 65–100)
HCT VFR BLD AUTO: 25.6 % (ref 36.6–50.3)
HGB BLD-MCNC: 8.1 G/DL (ref 12.1–17)
IMM GRANULOCYTES # BLD AUTO: 0 K/UL (ref 0–0.04)
IMM GRANULOCYTES NFR BLD AUTO: 1 % (ref 0–0.5)
LYMPHOCYTES # BLD: 0.5 K/UL (ref 0.8–3.5)
LYMPHOCYTES NFR BLD: 13 % (ref 12–49)
MCH RBC QN AUTO: 31.2 PG (ref 26–34)
MCHC RBC AUTO-ENTMCNC: 31.6 G/DL (ref 30–36.5)
MCV RBC AUTO: 98.5 FL (ref 80–99)
MONOCYTES # BLD: 0.4 K/UL (ref 0–1)
MONOCYTES NFR BLD: 11 % (ref 5–13)
NEUTS SEG # BLD: 2.9 K/UL (ref 1.8–8)
NEUTS SEG NFR BLD: 74 % (ref 32–75)
NRBC # BLD: 0 K/UL (ref 0–0.01)
NRBC BLD-RTO: 0 PER 100 WBC
PLATELET # BLD AUTO: 114 K/UL (ref 150–400)
PMV BLD AUTO: 11 FL (ref 8.9–12.9)
POTASSIUM SERPL-SCNC: 4.3 MMOL/L (ref 3.5–5.1)
RBC # BLD AUTO: 2.6 M/UL (ref 4.1–5.7)
SODIUM SERPL-SCNC: 137 MMOL/L (ref 136–145)
WBC # BLD AUTO: 3.9 K/UL (ref 4.1–11.1)

## 2019-10-09 PROCEDURE — 74011250636 HC RX REV CODE- 250/636: Performed by: INTERNAL MEDICINE

## 2019-10-09 PROCEDURE — 90471 IMMUNIZATION ADMIN: CPT

## 2019-10-09 PROCEDURE — 90686 IIV4 VACC NO PRSV 0.5 ML IM: CPT | Performed by: INTERNAL MEDICINE

## 2019-10-09 PROCEDURE — 85025 COMPLETE CBC W/AUTO DIFF WBC: CPT

## 2019-10-09 PROCEDURE — 74011250637 HC RX REV CODE- 250/637: Performed by: INTERNAL MEDICINE

## 2019-10-09 PROCEDURE — 80048 BASIC METABOLIC PNL TOTAL CA: CPT

## 2019-10-09 PROCEDURE — 36415 COLL VENOUS BLD VENIPUNCTURE: CPT

## 2019-10-09 RX ORDER — AMLODIPINE BESYLATE 10 MG/1
10 TABLET ORAL DAILY
Qty: 30 TAB | Refills: 0 | Status: SHIPPED | OUTPATIENT
Start: 2019-10-09

## 2019-10-09 RX ORDER — ASPIRIN 81 MG/1
81 TABLET ORAL DAILY
Qty: 30 TAB | Refills: 3 | Status: SHIPPED | OUTPATIENT
Start: 2019-10-09

## 2019-10-09 RX ORDER — FINASTERIDE 5 MG/1
5 TABLET, FILM COATED ORAL DAILY
Qty: 30 TAB | Refills: 0 | Status: SHIPPED | OUTPATIENT
Start: 2019-10-09

## 2019-10-09 RX ORDER — CARBAMAZEPINE 200 MG/1
200 TABLET ORAL DAILY
Qty: 30 TAB | Refills: 0 | Status: SHIPPED | OUTPATIENT
Start: 2019-10-09

## 2019-10-09 RX ORDER — LANOLIN ALCOHOL/MO/W.PET/CERES
1 CREAM (GRAM) TOPICAL 2 TIMES DAILY WITH MEALS
Status: DISCONTINUED | OUTPATIENT
Start: 2019-10-09 | End: 2019-10-09 | Stop reason: HOSPADM

## 2019-10-09 RX ORDER — TAMSULOSIN HYDROCHLORIDE 0.4 MG/1
0.4 CAPSULE ORAL
Qty: 30 CAP | Refills: 0 | Status: SHIPPED | OUTPATIENT
Start: 2019-10-09

## 2019-10-09 RX ORDER — PANTOPRAZOLE SODIUM 40 MG/1
40 TABLET, DELAYED RELEASE ORAL
Qty: 30 TAB | Refills: 0 | Status: SHIPPED | OUTPATIENT
Start: 2019-10-09

## 2019-10-09 RX ADMIN — FERROUS SULFATE TAB 325 MG (65 MG ELEMENTAL FE) 325 MG: 325 (65 FE) TAB at 10:11

## 2019-10-09 RX ADMIN — INFLUENZA VIRUS VACCINE 0.5 ML: 15; 15; 15; 15 SUSPENSION INTRAMUSCULAR at 09:44

## 2019-10-09 RX ADMIN — Medication 10 ML: at 06:49

## 2019-10-09 RX ADMIN — AMLODIPINE BESYLATE 10 MG: 5 TABLET ORAL at 08:09

## 2019-10-09 RX ADMIN — FINASTERIDE 5 MG: 5 TABLET, FILM COATED ORAL at 08:09

## 2019-10-09 RX ADMIN — PANTOPRAZOLE SODIUM 40 MG: 40 TABLET, DELAYED RELEASE ORAL at 06:48

## 2019-10-09 RX ADMIN — TAMSULOSIN HYDROCHLORIDE 0.4 MG: 0.4 CAPSULE ORAL at 08:09

## 2019-10-09 RX ADMIN — ASPIRIN 81 MG: 81 TABLET, COATED ORAL at 08:08

## 2019-10-09 RX ADMIN — CARBAMAZEPINE 200 MG: 200 TABLET ORAL at 08:09

## 2019-10-09 NOTE — PROGRESS NOTES
Jaspal Murrell rosario [address]  [facility], [address] [place] [address]  [phone]      Medical Progress Note      NAME: Nathan Lopez Sr.   :  1935  MRM:  [id number]    Date/Time: 10/9/2019  8:16 AM          Subjective:     Chief Complaint:  Cough: seems to have resolved, patient understands he is here with ARF due to STRONG and will need catheter permanently    ROS:  (bold if positive, if negative)                        Tolerating Diet          Objective:       Vitals:          Last 24hrs VS reviewed since prior progress note.  Most recent are:    Visit Vitals  /80 (BP 1 Location: Right arm, BP Patient Position: At rest)   Pulse 67   Temp 98.5 °F (36.9 °C)   Resp 20   Ht 5' 9\" (1.753 m)   Wt 71.5 kg (157 lb 9.6 oz)   SpO2 100%   BMI 23.27 kg/m²     SpO2 Readings from Last 6 Encounters:   10/09/19 100%   18 100%   18 100%   18 100%   18 98%   18 97%            Intake/Output Summary (Last 24 hours) at 10/9/2019 0816  Last data filed at 10/9/2019 0702  Gross per 24 hour   Intake 1541.67 ml   Output 1575 ml   Net -33.33 ml          Exam:     Physical Exam:    Gen:  Well-developed, well-nourished, frail, elderly, chronically ill-appearing, n no acute distress  HEENT:  Pink conjunctivae, PERRL, hearing intact to voice, moist mucous membranes  Neck:  Supple, without masses, thyroid non-tender  Resp:  No accessory muscle use, clear breath sounds without wheezes rales or rhonchi  Card:  No murmurs, normal S1, S2 without thrills, bruits or peripheral edema, 2+ pedal pulses  Abd:  Soft, non-tender, non-distended, normoactive bowel sounds are present, no palpable organomegaly and no detectable hernias  Lymph:  No cervical or inguinal adenopathy  Musc:  No cyanosis or clubbing  Skin:  No rashes or ulcers, skin turgor is good, cap refill <2 sec  Neuro:  Cranial nerves are grossly intact, no focal motor weakness, follows commands appropriately  Psych:  Fair insight, oriented to person, place and time, alert       Telemetry reviewed:   AFIB    Medications Reviewed: (see below)    Lab Data Reviewed: (see below)    ______________________________________________________________________    Medications:     Current Facility-Administered Medications   Medication Dose Route Frequency    sodium bicarbonate (8.4%) 75 mEq in 0.45% sodium chloride 1,000 mL infusion   IntraVENous CONTINUOUS    epoetin almita-epbx (RETACRIT) injection 10,000 Units  10,000 Units SubCUTAneous Q7D    albuterol-ipratropium (DUO-NEB) 2.5 MG-0.5 MG/3 ML  3 mL Nebulization Q6H PRN    amLODIPine (NORVASC) tablet 10 mg  10 mg Oral DAILY    aspirin delayed-release tablet 81 mg  81 mg Oral DAILY    carBAMazepine (TEGretol) tablet 200 mg  200 mg Oral DAILY    pantoprazole (PROTONIX) tablet 40 mg  40 mg Oral ACB    tamsulosin (FLOMAX) capsule 0.4 mg  0.4 mg Oral DAILY AFTER BREAKFAST    sodium chloride (NS) flush 5-40 mL  5-40 mL IntraVENous Q8H    sodium chloride (NS) flush 5-40 mL  5-40 mL IntraVENous PRN    acetaminophen (TYLENOL) tablet 500 mg  500 mg Oral Q4H PRN    HYDROcodone-acetaminophen (NORCO) 5-325 mg per tablet 1 Tab  1 Tab Oral Q6H PRN    naloxone (NARCAN) injection 0.4 mg  0.4 mg IntraVENous PRN    ondansetron (ZOFRAN) injection 2 mg  2 mg IntraVENous Q6H PRN    bisacodyl (DULCOLAX) tablet 5 mg  5 mg Oral DAILY PRN    nicotine (NICODERM CQ) 7 mg/24 hr patch 1 Patch  1 Patch TransDERmal DAILY PRN    finasteride (PROSCAR) tablet 5 mg  5 mg Oral DAILY    hydrALAZINE (APRESOLINE) 20 mg/mL injection 10 mg  10 mg IntraVENous Q6H PRN    influenza vaccine 2019-20 (6 mos+)(PF) (FLUARIX/FLULAVAL/FLUZONE QUAD) injection 0.5 mL  0.5 mL IntraMUSCular PRIOR TO DISCHARGE            Lab Review:     Recent Labs     10/09/19  0114 10/08/19  0345 10/07/19  0240   WBC 3.9* 5.1 3.3*   HGB 8.1* 8.6* 8.6*   HCT 25.6* 27.3* 27.3*   * 114* 109*     Recent Labs     10/09/19  0114 10/08/19  0345 10/07/19  0240    139 139   K 4.3 4.9 4.2    109* 108   CO2 21 19* 20*   GLU 92 84 92   BUN 64* 62* 64*   CREA 5.67* 5.83* 5.99*   CA 7.5* 7.8* 8.1*   PHOS  --  4.2  --      Lab Results   Component Value Date/Time    Glucose (POC) 148 (H) 12/02/2018 11:09 AM    Glucose (POC) 128 (H) 09/07/2011 06:16 PM    Glucose (POC) 99 09/05/2011 12:17 AM    Glucose (POC) 170 (H) 09/02/2011 08:39 PM    Glucose (POC) 115 (H) 09/02/2011 07:42 AM     No results for input(s): PH, PCO2, PO2, HCO3, FIO2 in the last 72 hours. No results for input(s): INR, INREXT, INREXT in the last 72 hours. No results found for: SDES  Lab Results   Component Value Date/Time    Culture result: MRSA NOT PRESENT 06/21/2018 11:36 AM    Culture result:  06/21/2018 11:36 AM         Screening of patient nares for MRSA is for surveillance purposes and, if positive, to facilitate isolation considerations in high risk settings. It is not intended for automatic decolonization interventions per se as regimens are not sufficiently effective to warrant routine use. Culture result: MRSA NOT PRESENT 04/23/2018 09:45 AM    Culture result:  04/23/2018 09:45 AM         Screening of patient nares for MRSA is for surveillance purposes and, if positive, to facilitate isolation considerations in high risk settings. It is not intended for automatic decolonization interventions per se as regimens are not sufficiently effective to warrant routine use.     Culture result: STAPHYLOCOCCUS EPIDERMIDIS (OXACILLIN RESISTANT) (A) 04/23/2018 09:45 AM            Assessment:     Principal Problem:    ARF (acute renal failure) ([facility] (10/6/2019)    Active Problems:    HTN (hypertension), benign (9/2/2011)      CKD (chronic kidney disease) stage 4, GFR 15-29 ml/min ([facility]) (9/5/2017)      Benign prostatic hyperplasia (9/5/2017)      CAD (coronary artery disease) ()      Overview: 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 ([place])      Dyslipidemia (53/60/5211)      Systolic CHF, chronic ([facility] ()      Hx of seizure disorder ()      Chronic a-fib ()           Plan:     Principal Problem:    ARF (acute renal failure) ([facility] (10/6/2019)   - due to bladder outlet obstruction with bladder distention and hydronephrosis on CT on presentation   - Renal and  input appreciated   - creatinine improved again today, cleared by Renal for discharge home   - outpatient follow up per Dr. Briana London for Ruffin management, possible conversion to SPT    Active Problems:    HTN (hypertension), benign (9/2/2011)   - BP better back on medications      CKD (chronic kidney disease) stage 4, GFR 15-29 ml/min ([facility] (9/5/2017)   - resolving      Benign prostatic hyperplasia (9/5/2017)   - likely cause of ARF/STRONG, continue Flomax and Ruffin as above   - outpatient follow up with       CAD (coronary artery disease) ()     - stable, continue cardiac meds      Systolic CHF, chronic (HCC) ()   - watch volume status, off fluids      Hx of seizure disorder ()   - continue AEDs      Chronic a-fib ()   - rate controlled, not on chronic anticoagulation due to history of bleeding      Total time spent in patient care: 35 minutes                  Care Plan discussed with: Patient, Care Manager and Nursing Staff    Discussed:  Code Status, Care Plan and D/C Planning    Prophylaxis:  SCD's    Disposition:   PT, OT, RN           ___________________________________________________    Attending Physician: Oliver Ward MD

## 2019-10-09 NOTE — PROGRESS NOTES
In preparation for discharge, AVS med -updates, Care Plans and Education have been completed in [facility] and message has been set to CMS to assist with PCP appointment. Patient will be discharged with Ruffin catheter and follow up with Urology as outpatient. Case Management is setting up home health. Will continue to follow. 1300  Discharge instructions were reviewed with patient. All questions were answered. He received a copy of his discharge papers and his prescriptions were electronically sent to his pharmacy. Case Management has set up home health through [facility]. PCP and Renal appointments were scheduled and added to AVS. He understands that he needs to call Urology for appointment. He will be discharged home with his family. IV and heart monitor will be removed prior to discharge.  Primary nurse updated

## 2019-10-09 NOTE — DISCHARGE SUMMARY
Physician Discharge Summary     Patient ID:  Rony Mullen  [id number]  80 y.o.  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 ([facility])      Dyslipidemia (19/54/3113)      Systolic CHF, chronic (HCC) ()      Hx of seizure disorder ()      Chronic a-fib ()         Resolved Problems:  Problem List as of 10/9/2019 Date Reviewed: 10/6/2019          Codes Class Noted - Resolved    * (Principal) ARF (acute renal failure) ([place] [address] ICD-10-CM: N17.9  ICD-9-CM: 584.9  10/6/2019 - Present        Bilateral hydronephrosis ICD-10-CM: N13.30  ICD-9-CM: 591  12/1/2018 - Present        Systolic CHF, chronic (HCC) (Chronic) ICD-10-CM: I50.22  ICD-9-CM: 428.22, 428.0  Unknown - Present        Hyperlipidemia ICD-10-CM: E78.5  ICD-9-CM: 272.4  Unknown - Present        Hx of seizure disorder (Chronic) ICD-10-CM: Z86.69  ICD-9-CM: V12.49  Unknown - Present        CKD (chronic kidney disease), stage IV ([place] [address] ICD-10-CM: N18.4  ICD-9-CM: 102. 4  Unknown - Present        Dementia ([facility] ICD-10-CM: F03.90  ICD-9-CM: 294.20  Unknown - Present        Chronic a-fib (Chronic) ICD-10-CM: I48.20  ICD-9-CM: 427.31  Unknown - Present        Arthritis (Chronic) ICD-10-CM: M19.90  ICD-9-CM: 716.90  Unknown - Present        Melena ICD-10-CM: K92.1  ICD-9-CM: 578.1  11/30/2018 - Present        PAF (paroxysmal atrial fibrillation) (HCC) ICD-10-CM: I48.0  ICD-9-CM: 427.31  6/28/2018 - Present        Dyslipidemia (Chronic) ICD-10-CM: E78.5  ICD-9-CM: 272.4  10/24/2017 - Present        Pacemaker (Chronic) ICD-10-CM: Z95.0  ICD-9-CM: V45.01  10/24/2017 - Present    Overview Signed 10/24/2017  2:12 PM by Hamlet Robertson MD     Implanted Feb 2017 (Dr Eleni Curran at [place] [address]) - [employer]             CAD (coronary artery disease) (Chronic) ICD-10-CM: I25.10  ICD-9-CM: 414.00  Unknown - Present    Overview Signed 10/24/2017  2:11 PM by Hamlet Robertson MD     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 ([facility])             CKD (chronic kidney disease) stage 4, GFR 15-29 ml/min ([facility]) (Chronic) ICD-10-CM: N18.4  ICD-9-CM: 585.4  9/5/2017 - Present        Benign prostatic hyperplasia (Chronic) ICD-10-CM: N40.0  ICD-9-CM: 600.00  9/5/2017 - Present        HTN (hypertension), benign (Chronic) ICD-10-CM: I10  ICD-9-CM: 401.1  9/2/2011 - Present        RESOLVED: BRBPR (bright red blood per rectum) ICD-10-CM: K62.5  ICD-9-CM: 569.3  9/18/2018 - 12/1/2018        RESOLVED: Acute blood loss anemia ICD-10-CM: D62  ICD-9-CM: 285.1  9/17/2018 - 10/6/2019        RESOLVED: TINA (acute kidney injury) ([facility] ICD-10-CM: N17.9  ICD-9-CM: 584.9  9/17/2018 - 12/1/2018        RESOLVED: S/P cardiac cath ICD-10-CM: I31.693  ICD-9-CM: V45.89  6/28/2018 - 12/1/2018        RESOLVED: Acute GI bleeding ICD-10-CM: K92.2  ICD-9-CM: 578.9  2/7/2018 - 10/6/2019        RESOLVED: Dilated cardiomyopathy ([facility] ICD-10-CM: I42.0  ICD-9-CM: 425.4  10/24/2017 - 9/18/2018        RESOLVED: Atrial fibrillation with controlled ventricular rate ([facility] ICD-10-CM: I48.91  ICD-9-CM: 427.31  10/24/2017 - 9/18/2018    Overview Signed 10/24/2017  2:10 PM by Hamlet Robertson MD     New discovered 10/19/17             RESOLVED: Hypertension ICD-10-CM: I10  ICD-9-CM: 401.9  Unknown - 12/1/2018        RESOLVED: Systolic CHF, chronic (HCC) ICD-10-CM: I50.22  ICD-9-CM: 428.22, 428.0  Unknown - 9/18/2018        RESOLVED: Chronic combined systolic and diastolic congestive heart failure ([facility] ICD-10-CM: I50.42  ICD-9-CM: 428.42, 428.0  Unknown - 10/24/2017        RESOLVED: Hypertensive urgency ICD-10-CM: I16.0  ICD-9-CM: 401.9  10/10/2017 - 10/23/2017        RESOLVED: Acute renal failure superimposed on stage 4 chronic kidney disease ([facility] ICD-10-CM: N17.9, N18.4  ICD-9-CM: 584.9, 585.4  10/10/2017 - 10/23/2017        RESOLVED: Elevated brain natriuretic peptide (BNP) level ICD-10-CM: R79.89  ICD-9-CM: 790.99  10/10/2017 - 9/18/2018        RESOLVED: Anemia ICD-10-CM: D64.9  ICD-9-CM: 285.9  10/10/2017 - 12/1/2018        RESOLVED: H pylori ulcer ICD-10-CM: K27.9, B96.81  ICD-9-CM: 533.90, 041.86  9/6/2017 - 9/18/2018        RESOLVED: Urine retention ICD-10-CM: R33.9  ICD-9-CM: 788.20  9/6/2017 - 9/18/2018        RESOLVED: GI bleeding ICD-10-CM: K92.2  ICD-9-CM: 578.9  9/5/2017 - 10/11/2017        RESOLVED: Melena ICD-10-CM: K92.1  ICD-9-CM: 578.1  9/5/2017 - 10/11/2017        RESOLVED: Hydronephrosis ICD-10-CM: N13.30  ICD-9-CM: 632  9/5/2017 - 9/18/2018        RESOLVED: GI bleed ICD-10-CM: K92.2  ICD-9-CM: 578.9  9/2/2011 - 10/11/2017        RESOLVED: Coronary atherosclerosis of native coronary artery ICD-10-CM: I25.10  ICD-9-CM: 414.01  9/2/2011 - 9/18/2018        RESOLVED: Acute systolic CHF (congestive heart failure) ([facility] ICD-10-CM: I50.21  ICD-9-CM: 428.21, 428.0  9/2/2011 - 10/24/2017        RESOLVED: Other and unspecified hyperlipidemia ICD-10-CM: E78.5  ICD-9-CM: 272.4  9/2/2011 - 10/11/2017        RESOLVED: CKD (chronic kidney disease) ICD-10-CM: N18.9  ICD-9-CM: 585.9  9/2/2011 - 10/11/2017        RESOLVED: Obstructive chronic bronchitis with acute bronchitis [facility]) ICD-10-CM: J44.0  ICD-9-CM: 491.22  9/2/2011 - 10/24/2017                Hospital Course:   Mr. Garcia Rodriguez was admitted to the Hospitalist Service on the 3rd floor for treatment of ARF. His ARF was due to bladder outlet obstruction and started to resolve with Ruffin placement. Renal and  were consulted.   Because of his long history of recurrent obstruction and inability to reliably straight cath himself, Dr. Manuel Schwartz recommended long term Ruffin catheter. Home health was arranged for Ruffin care. He was discharged home on 10/9/2019 in improved condition. PCP: Cady Gunderson MD    Consults: Cardiology, Nephrology and Urology    Discharge Exam:  See my Progress Note from today. Disposition: home    Patient Instructions:   Current Discharge Medication List      CONTINUE these medications which have CHANGED    Details   amLODIPine (NORVASC) 10 mg tablet Take 1 Tab by mouth daily. Qty: 30 Tab, Refills: 0      carBAMazepine (TEGRETOL) 200 mg tablet Take 1 Tab by mouth daily. Qty: 30 Tab, Refills: 0      aspirin delayed-release 81 mg tablet Take 1 Tab by mouth daily. Qty: 30 Tab, Refills: 3      finasteride (PROSCAR) 5 mg tablet Take 1 Tab by mouth daily. Qty: 30 Tab, Refills: 0      pantoprazole (PROTONIX) 40 mg tablet Take 1 Tab by mouth Daily (before breakfast). Qty: 30 Tab, Refills: 0      tamsulosin (FLOMAX) 0.4 mg capsule Take 1 Cap by mouth daily (after breakfast). Qty: 30 Cap, Refills: 0         CONTINUE these medications which have NOT CHANGED    Details   acetaminophen (TYLENOL) 500 mg tablet Take 1,000 mg by mouth two (2) times daily as needed for Pain. Activity: Activity as tolerated  Diet: Cardiac Diet  Wound Care: None needed    Follow-up Information     Follow up With Specialties Details Why Contact Info    AT [address] [place] [facility] [facility]  THEY WILL CONTACT YOU TO SCHEDULE A HOME VISIT [address] [place] [address]    Cady Gunderson MD Family Practice Go on 10/10/2019 For hospital follow up appointment at 2:30PM [phone]      [address] [place] [address] Urology   as directed by their service     Dr. Frank Caballero  On 10/14/2019 as directed [facility]          35 minutes were spent on this discharge.     Signed:  Maliha Murray MD  10/9/2019  12:22 PM

## 2019-10-09 NOTE — PROGRESS NOTES
Bedside and Verbal shift change report given to Lewis (oncoming nurse) by Alan Steven (offgoing nurse). Report included the following information SBAR, Kardex, Intake/Output, MAR, Recent Results and Cardiac Rhythm A-fib.      1130 pelayo leg bag applied to pt

## 2019-10-09 NOTE — DISCHARGE INSTRUCTIONS
HOSPITALIST DISCHARGE INSTRUCTIONS  NAME: Luis Macdonald Sr.   :  1935   MRN:  [id number]     Date/Time:  10/9/2019 7:50 AM    ADMIT DATE: 10/6/2019     DISCHARGE DATE: 10/9/2019     DISCHARGE DIAGNOSIS:  ARF due to bladder outlet obstruction    MEDICATIONS:  · It is important that you take the medication exactly as they are prescribed. · Keep your medication in the bottles provided by the pharmacist and keep a list of the medication names, dosages, and times to be taken in your wallet. · Do not take other medications without consulting your doctor. Dr Darren Orr (Kidney doctor) is recommending that you take an iron supplement (ferrous sulfate 325 mg twice daily with meals). You can purchase this over the counter. Pain Management: per above medications    What to do at Home    Ruffin catheter to remain in place until you follow up with Urology. This may need to remain in place permanently, they will discuss further . Terrell Patel [phone]    Recommended diet:  Cardiac Diet    Recommended activity: Activity as tolerated    If you experience any of the following symptoms then please call your primary care physician or return to the emergency room if you cannot get hold of your doctor:  Fever, chills, nausea, vomiting, diarrhea, change in mentation, falling, bleeding, shortness of breath    Follow Up:   Follow-up Information     Follow up With Specialties Details Why Contact Info    AT [address] [employer]  THEY WILL CONTACT YOU TO [facility] A HOME VISIT [address] [place] [address]    Kaleb Gustafson MD Family Practice Go on 10/10/2019 For hospital follow up appointment at 2:30PM [address] [place] [address] Urology   as directed by their service     Dr. Darren Orr  On 10/14/2019 as directed Jacque Leong obtained by :  I understand that if any problems occur once I am at home I am to contact my physician. I understand and acknowledge receipt of the instructions indicated above. Physician's or R.N.'s Signature                                                                  Date/Time                                                                                                                                              Patient or Representative Signature                                                          Date/Time    Patient Education        Learning About Urinary Catheter Care to Prevent Infection  What is a urinary catheter? A urinary catheter is a flexible plastic tube used to drain urine from your bladder when you can't urinate on your own. The catheter allows urine to drain from the bladder into a bag. Two types of drainage bags may be used with a urinary catheter. · A bedside bag is a large bag that you can hang on the side of your bed or on a chair. You can use it overnight or anytime you will be sitting or lying down for a long time. · A leg bag is a small bag that you can use during the day. It is usually attached to your thigh or calf and hidden under your clothes. Having a urinary catheter increases your risk of getting a urinary tract infection. Germs may get on the catheter and cause an infection in your bladder or kidneys. The longer you have a catheter, the more likely it is that you will get an infection. You can help prevent this problem with good hygiene and careful handling of your catheter and drainage bags. How can you help prevent infection? Take care to be clean  · Always wash your hands well before and after you handle your catheter. · Clean the skin around the catheter twice a day using soap and water. Dry with a clean towel afterward.  You can shower with your catheter and drainage bag in place unless your doctor told you not to. · When you clean around the catheter, check the surrounding skin for signs of infection. Look for things like pus or irritated, swollen, red, or tender skin around the catheter. Be careful with your drainage bag  · Always keep the drainage bag below the level of your bladder. This will help keep urine from flowing back into your bladder. · Check often to see that urine is flowing through the catheter into the drainage bag. · Empty the drainage bag when it is half full. This will keep it from overflowing or backing up. · When you empty the drainage bag, do not let the tubing or drain spout touch anything. Be careful with your catheter  · Do not unhook the catheter from the drain tube. That could let germs get into the tube. · Make sure that the catheter tubing does not get twisted or kinked. · Do not tug or pull on the catheter. And make sure that the drainage bag does not drag or pull on the catheter. · Do not put powder or lotion on the skin around the catheter. · Talk with your doctor about your options for sexual intercourse while wearing a catheter. How do you empty a urine drainage bag? If your doctor has asked you to keep a record, write down the amount of urine in the bag before you empty it. Wash your hands before and after you touch the bag. 1. Remove the drain spout from its sleeve at the bottom of the drainage bag.  2. Open the valve on the drain spout. Let the urine flow out into the toilet or a container. Be careful not to let the tubing or drain spout touch anything. 3. After you empty the bag, close the valve. Then put the drain spout back into its sleeve at the bottom of the collection bag. How do you add a bedside bag to a leg bag? Wash your hands before and after you handle the bags. 1. Empty the leg bag attached to the catheter. 2. Put a clean towel under the leg bag.  3. Use an alcohol wipe to clean the tip of the bedside bag.  Then connect the bedside bag to the leg bag. How can you clean a bedside drainage bag? Many people clean their bedside bag in the morning if they switch to a leg bag. To clean a bedside drainage ba. Remove the bedside bag from the leg bag.  2. Fill the bag with 2 parts vinegar and 3 parts water. Let it stand for 20 minutes. 3. Empty the bag, and let it air dry. When should you call for help? Call your doctor now or seek immediate medical care if:  · You have symptoms of a urinary infection. These may include:  ? Pain or burning when you urinate. ? A frequent need to urinate without being able to pass much urine. ? Pain in the flank, which is just below the rib cage and above the waist on either side of the back. ? Blood in your urine. ? A fever. · Your urine smells bad. · You see large blood clots in your urine. · No urine or very little urine is flowing into the bag for 4 or more hours. Watch closely for changes in your health, and be sure to contact your doctor if:  · The area around the catheter becomes irritated, swollen, red, or tender, or there is pus draining from it. · Urine is leaking from the place where the catheter enters your body. Follow-up care is a key part of your treatment and safety. Be sure to make and go to all appointments, and call your doctor if you are having problems. It's also a good idea to know your test results and keep a list of the medicines you take. Where can you learn more? Go to http://oscar-kamari.info/. Enter U010 in the search box to learn more about \"Learning About Urinary Catheter Care to Prevent Infection. \"  Current as of: 2018  Content Version: 12.2  © 1312-2890 [employer]. Care instructions adapted under license by [employer] (which disclaims liability or warranty for this information).  If you have questions about a medical condition or this instruction, always ask your healthcare professional. [employer] disclaims any warranty or liability for your use of this information.

## 2019-10-09 NOTE — PROGRESS NOTES
[address] [place] Yonas Ralph. YOB: 1935          Assessment & Plan:     1. 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 Anemia  3. HTN  - CCB  4. CAD  Plan  1.  ok to dc  [facility]  2.  PO FESO4 on dc         Subjective:   CC:ARF  HPI: Patient seen   TINA is slowly getting better  ROS:neg for 12 sys except above    Current Facility-Administered Medications   Medication Dose Route Frequency    sodium bicarbonate (8.4%) 75 mEq in 0.45% sodium chloride 1,000 mL infusion   IntraVENous CONTINUOUS    epoetin almita-epbx (RETACRIT) injection 10,000 Units  10,000 Units SubCUTAneous Q7D    albuterol-ipratropium (DUO-NEB) 2.5 MG-0.5 MG/3 ML  3 mL Nebulization Q6H PRN    amLODIPine (NORVASC) tablet 10 mg  10 mg Oral DAILY    aspirin delayed-release tablet 81 mg  81 mg Oral DAILY    carBAMazepine (TEGretol) tablet 200 mg  200 mg Oral DAILY    pantoprazole (PROTONIX) tablet 40 mg  40 mg Oral ACB    tamsulosin (FLOMAX) capsule 0.4 mg  0.4 mg Oral DAILY AFTER BREAKFAST    sodium chloride (NS) flush 5-40 mL  5-40 mL IntraVENous Q8H    sodium chloride (NS) flush 5-40 mL  5-40 mL IntraVENous PRN    acetaminophen (TYLENOL) tablet 500 mg  500 mg Oral Q4H PRN    HYDROcodone-acetaminophen (NORCO) 5-325 mg per tablet 1 Tab  1 Tab Oral Q6H PRN    naloxone (NARCAN) injection 0.4 mg  0.4 mg IntraVENous PRN    ondansetron (ZOFRAN) injection 2 mg  2 mg IntraVENous Q6H PRN    bisacodyl (DULCOLAX) tablet 5 mg  5 mg Oral DAILY PRN    nicotine (NICODERM CQ) 7 mg/24 hr patch 1 Patch  1 Patch TransDERmal DAILY PRN    finasteride (PROSCAR) tablet 5 mg  5 mg Oral DAILY    hydrALAZINE (APRESOLINE) 20 mg/mL injection 10 mg  10 mg IntraVENous Q6H PRN    influenza vaccine 2019-20 (6 mos+)(PF) (FLUARIX/FLULAVAL/FLUZONE QUAD) injection 0.5 mL  0.5 mL IntraMUSCular PRIOR TO DISCHARGE          Objective:     Vitals:  Blood pressure 177/80, pulse 67, temperature 98.5 °F (36.9 °C), resp. rate 20, height 5' 9\" (1.753 m), weight 71.5 kg (157 lb 9.6 oz), SpO2 100 %. Temp (24hrs), Av.5 °F (36.9 °C), Min:98.1 °F (36.7 °C), Max:98.8 °F (37.1 °C)      Intake and Output:  10/09 0701 - 10/09 1900  In: -   Out: 350 [Urine:350]  10/07 1901 - 10/09 0700  In: 1541.7 [P.O.:720; I.V.:821.7]  Out: 2675 [Urine:2675]    Physical Exam:                Patient is intubated:  no    Physical Examination:   GENERAL ASSESSMENT: NAD  HEENT:Nontraumatic   CHEST: CTA  HEART: S1S2  ABDOMEN: Soft,NT,  :Ruffin: yes  EXTREMITY: EDEMA  NEURO:Grossly non focal          ECG/rhythm:    Data Review      No results for input(s): TNIPOC in the last 72 hours. No lab exists for component: ITNL   Recent Labs     10/07/19  0240 10/06/19  0941   TROIQ 0.10* 0.07*     Recent Labs     10/09/19  0114 10/08/19  0345 10/07/19  0240    139 139   K 4.3 4.9 4.2    109* 108   CO2 21 19* 20*   BUN 64* 62* 64*   CREA 5.67* 5.83* 5.99*   GLU 92 84 92   PHOS  --  4.2  --    CA 7.5* 7.8* 8.1*   WBC 3.9* 5.1 3.3*   HGB 8.1* 8.6* 8.6*   HCT 25.6* 27.3* 27.3*   * 114* 109*      No results for input(s): INR, PTP, APTT, INREXT, INREXT in the last 72 hours. Needs: urine analysis, urine sodium, protein and creatinine  Lab Results   Component Value Date/Time    Sodium,urine random 99 2018 02:33 PM    Creatinine, urine 47.50 2018 02:33 PM         Discussed with:  pt    : Brittany Dobbs MD  10/9/2019        [facility]:  [email or web address]  Jeremiah Garcia office:  [address], [address] [place] [address],[address]  [place], [address] [place] [address]  Phone: [phone]  Fax :     [phone]    [facility] office:  [address] [facility], [address]  Phone - [phone]  Fax - [phone]

## 2019-10-09 NOTE — PROGRESS NOTES
Transition of Care Plan:                1. Home with Home Health; AVS has been faxed to [facility]  2. Outpatient f/u as indicated on AVS  3.  Pt's son or daughter will provide transportation home    [place], [place]

## 2019-10-17 ENCOUNTER — HOSPITAL ENCOUNTER (EMERGENCY)
Age: 84
Discharge: HOME | End: 2019-10-17
Attending: STUDENT IN AN ORGANIZED HEALTH CARE EDUCATION/TRAINING PROGRAM
Payer: MEDICARE

## 2019-10-17 VITALS
BODY MASS INDEX: 26.8 KG/M2 | WEIGHT: 157 LBS | TEMPERATURE: 98 F | HEART RATE: 81 BPM | DIASTOLIC BLOOD PRESSURE: 73 MMHG | HEIGHT: 64 IN | SYSTOLIC BLOOD PRESSURE: 148 MMHG | OXYGEN SATURATION: 100 % | RESPIRATION RATE: 18 BRPM

## 2019-10-17 DIAGNOSIS — T83.9XXA FOLEY CATHETER PROBLEM, INITIAL ENCOUNTER (HCC): Primary | ICD-10-CM

## 2019-10-17 PROCEDURE — 99283 EMERGENCY DEPT VISIT LOW MDM: CPT

## 2019-10-17 PROCEDURE — 77030012865 HC BG URIN LEG MDII -A

## 2019-10-17 NOTE — ED NOTES
Leg bag changed per Gabrielle Mcgregor, Esmer Zamarripa, education provided on leg bag placement and maintenance. Case management requested to follow up on home health.

## 2019-10-17 NOTE — PROGRESS NOTES
ED RN came to [facility] with some concerns as to whether [facility] was set up for pt at previous admission and if he was seen. CM contacted [facility] ([phone]) and spoke with Brit. [facility] attempted to reach pt and daughter multiple times (10/9, 10/12, 10/13, and 10/14 by phone and 10/11 in person). Pt was not opened as a result. They will need a new order to see pt. CM met with pt to discuss and confirm best contact numbers for him. Pt was also provided the number for [facility]. New HH order received and referral placed in [employer].   Blanca Reyes LCSW

## 2019-10-17 NOTE — ED PROVIDER NOTES
80 y.o. male with past medical history significant for dementia, HTN, hyperlipidemia, CKD, CHF, CAD, and a-fib who presents from home with chief complaint of catheter problem. Pt presents due to a problem with his catheter bag in that the tube keeps coming disconnected to the pelayo bag. Pt states the pelayo was placed about 2 weeks ago after discharge from [facility] due to a bladder output obstruction. Pt was admitted from 10/06-10/09 for acute on chronic renal failure. There are no other acute medical concerns at this time. Full history, physical exam, and ROS unable to be obtained due to:  dementia. Social hx: Former Smoker. Denies EtOH Use. PCP: Micki Galdamez MD    Note written by Becca Josue, as dictated by Gerardo Ogden MD 10:14 AM      The history is provided by the patient.         Past Medical History:   Diagnosis Date    Arthritis     CAD (coronary artery disease)     hx mi    Chronic a-fib     CKD (chronic kidney disease), stage IV (HCC)     Dementia (HCC)     Hx of seizure disorder     Hyperlipidemia     Hypertension     Systolic CHF, chronic (HCC)        Past Surgical History:   Procedure Laterality Date    COLONOSCOPY N/A 9/6/2017    COLONOSCOPY performed by Francois Ramos MD at [address] [place] [address] COLONOSCOPY N/A 2/8/2018    COLONOSCOPY performed by Kristy Garibay MD at [address] [place] [address] HX CATARACT REMOVAL      right    HX CORONARY ARTERY BYPASS GRAFT      x2 vessels    HX HEART CATHETERIZATION      HX PACEMAKER      right chest         Family History:   Problem Relation Age of Onset    Heart Disease Mother     Hypertension Mother        Social History     Socioeconomic History    Marital status:      Spouse name: Not on file    Number of children: 10    Years of education: Not on file    Highest education level: Not on file   Occupational History    Not on file   Social Needs    Financial resource strain: Not hard at all   Astatula-Joslyn insecurity: Worry: Never true     Inability: Never true    Transportation needs:     Medical: No     Non-medical: No   Tobacco Use    Smoking status: Former Smoker     Packs/day: 0.50     Years: 40.00     Pack years: 20.00     Last attempt to quit: 1988     Years since quittin.3    Smokeless tobacco: Former User   Substance and Sexual Activity    Alcohol use: No    Drug use: No    Sexual activity: Never   Lifestyle    Physical activity:     Days per week: 3 days     Minutes per session: 20 min    Stress: Not at all   Relationships    Social connections:     Talks on phone: More than three times a week     Gets together: More than three times a week     Attends Confucianist service: More than 4 times per year     Active member of club or organization: Yes     Attends meetings of clubs or organizations: More than 4 times per year     Relationship status:     Intimate partner violence:     Fear of current or ex partner: No     Emotionally abused: No     Physically abused: No     Forced sexual activity: No   Other Topics Concern     Service No    Blood Transfusions Yes    Caffeine Concern No    Occupational Exposure No    Hobby Hazards No    Sleep Concern No    Stress Concern No    Weight Concern No    Special Diet No    Back Care No    Exercise No    Bike Helmet No    Seat Belt No    Self-Exams No   Social History Narrative    Son lives with him on and off, usually 3-4 days/week. Still does well independently. Good family support. ALLERGIES: Patient has no known allergies. Review of Systems   Unable to perform ROS: Dementia       Vitals:    10/17/19 0955   BP: 148/73   Pulse: 81   Resp: 18   Temp: 98 °F (36.7 °C)   SpO2: 100%   Weight: 71.2 kg (157 lb)   Height: 5' 4\" (1.626 m)            Physical Exam   Constitutional: He appears well-developed. No distress. HENT:   Head: Normocephalic and atraumatic.    Eyes: Conjunctivae and EOM are normal.   Neck: Normal range of motion. Cardiovascular: Normal rate. Pulmonary/Chest: Effort normal. No respiratory distress. Abdominal: Soft. He exhibits no distension. There is no tenderness. There is no rebound. Genitourinary:   Genitourinary Comments: Indwelling catheter noted that is disconnected with the leg bag and leaking clear brown urine. Musculoskeletal: He exhibits no edema or tenderness. Neurological: He is alert. He exhibits normal muscle tone. Coordination normal.   Oriented to person and placed. Skin: Skin is warm and dry. Nursing note and vitals reviewed. Note written by Jacqueline Das, as dictated by Dewitt Harada, MD 10:16 AM       [facility]       Procedures    Equipment replaced by RN. Case management saw pt and is assisting with restarting home health, etc. Pricila Pollock stabilized today.

## 2019-10-17 NOTE — ED NOTES
Patient reports leg bag leaking at connection point.  Reported to Meek Perez MD. Changing leg bag per MD.

## 2021-08-03 PROBLEM — E78.5 HYPERLIPIDEMIA: Status: RESOLVED | Noted: 2021-08-03 | Resolved: 2021-08-03

## 2021-08-23 NOTE — ED NOTES
IS given to pt, instructions given using teachback method. Pt demonstrated understanding by proper use of spirometer. Periorbital Skin Units: 12

## 2024-05-01 NOTE — ED NOTES
At this time the patient is still in CT, the patient was not in room 9, even though he was placed there on the board. DISPLAY PLAN FREE TEXT

## (undated) DEVICE — BITEBLOCK ENDOSCP 60FR MAXI WHT POLYETH STURDY W/ VELC WVN

## (undated) DEVICE — BAG BELONG PT PERS CLEAR HANDL

## (undated) DEVICE — SYRINGE 50ML E/T

## (undated) DEVICE — CANNULA CUSH AD W/ 14FT TBG

## (undated) DEVICE — 3M™ CUROS™ DISINFECTING CAP FOR NEEDLELESS CONNECTORS 270/CARTON 20 CARTONS/CASE CFF1-270: Brand: CUROS™

## (undated) DEVICE — SYR 3ML LL TIP 1/10ML GRAD --

## (undated) DEVICE — Device

## (undated) DEVICE — NDL FLTR TIP 5 MIC 18GX1.5IN --

## (undated) DEVICE — TUBING ADMIN SET INTRAV ARTERI -- CONVERT TO ITEM 340436

## (undated) DEVICE — SOLIDIFIER MEDC 1200ML -- CONVERT TO 356117

## (undated) DEVICE — 1200 GUARD II KIT W/5MM TUBE W/O VAC TUBE: Brand: GUARDIAN

## (undated) DEVICE — CANN NASAL O2 CAPNOGRAPHY AD -- FILTERLINE

## (undated) DEVICE — NDL PRT INJ NSAF BLNT 18GX1.5 --

## (undated) DEVICE — KIT COLON W/ 1.1OZ LUB AND 2 END

## (undated) DEVICE — SET GRAV CK VLV NEEDLESS ST 3 GANGED 4WAY STPCOCK HI FLO 10

## (undated) DEVICE — SET ADMIN 16ML TBNG L100IN 2 Y INJ SITE IV PIGGY BK DISP

## (undated) DEVICE — FORCEPS BX L240CM JAW DIA2.8MM L CAP W/ NDL MIC MESH TOOTH

## (undated) DEVICE — KENDALL RADIOLUCENT FOAM MONITORING ELECTRODE -RECTANGULAR SHAPE: Brand: KENDALL

## (undated) DEVICE — SIMPLICITY FLUFF UNDERPAD 23X36, MODERATE: Brand: SIMPLICITY

## (undated) DEVICE — SYR 5ML 1/5 GRAD LL NSAF LF --

## (undated) DEVICE — BAG SPEC BIOHZRD 10 X 10 IN --

## (undated) DEVICE — BASIN EMESIS 500CC ROSE 250/CS 60/PLT: Brand: MEDEGEN MEDICAL PRODUCTS, LLC

## (undated) DEVICE — ADULT SPO2 SENSOR: Brand: NELLCOR